# Patient Record
Sex: FEMALE | Race: WHITE | Employment: OTHER | URBAN - METROPOLITAN AREA
[De-identification: names, ages, dates, MRNs, and addresses within clinical notes are randomized per-mention and may not be internally consistent; named-entity substitution may affect disease eponyms.]

---

## 2016-07-12 LAB — TSH SERPL-ACNC: 0.17 UIU/ML (ref 0.45–4.5)

## 2016-09-22 LAB — TSH SERPL-ACNC: 0.65 UIU/ML (ref 0.45–4.5)

## 2016-09-29 LAB
CREAT SERPL-MCNC: 0.72 MG/DL (ref 0.57–1)
GFR SERPL CREATININE-BSD FRML MDRD: 92 ML/MIN/1.73M2
GLUCOSE SERPL-MCNC: 94 MG/DL (ref 65–99)
POTASSIUM SERPL-SCNC: 5.2 MMOL/L (ref 3.5–5.2)

## 2017-03-14 LAB
CREAT SERPL-MCNC: 0.82 MG/DL (ref 0.57–1)
GFR SERPL CREATININE-BSD FRML MDRD: 78 ML/MIN/1.73M2
GLUCOSE SERPL-MCNC: 118 MG/DL (ref 65–99)
POTASSIUM SERPL-SCNC: 3.4 MMOL/L (ref 3.5–5.2)
TSH SERPL-ACNC: 0.22 UIU/ML (ref 0.45–4.5)

## 2017-05-11 LAB
POTASSIUM SERPL-SCNC: 4 MMOL/L (ref 3.5–5.2)
TSH SERPL-ACNC: 0.47 UIU/ML (ref 0.45–4.5)

## 2017-06-26 ENCOUNTER — TRANSFERRED RECORDS (OUTPATIENT)
Dept: HEALTH INFORMATION MANAGEMENT | Facility: CLINIC | Age: 62
End: 2017-06-26

## 2017-06-26 LAB — POTASSIUM SERPL-SCNC: 4.2 MMOL/L (ref 3.5–5.2)

## 2018-01-22 ENCOUNTER — TRANSFERRED RECORDS (OUTPATIENT)
Dept: HEALTH INFORMATION MANAGEMENT | Facility: CLINIC | Age: 63
End: 2018-01-22

## 2018-05-27 ENCOUNTER — HEALTH MAINTENANCE LETTER (OUTPATIENT)
Age: 63
End: 2018-05-27

## 2018-05-29 ENCOUNTER — OFFICE VISIT (OUTPATIENT)
Dept: FAMILY MEDICINE | Facility: CLINIC | Age: 63
End: 2018-05-29
Payer: COMMERCIAL

## 2018-05-29 VITALS
TEMPERATURE: 97.9 F | SYSTOLIC BLOOD PRESSURE: 142 MMHG | BODY MASS INDEX: 35.66 KG/M2 | WEIGHT: 221.9 LBS | HEART RATE: 76 BPM | DIASTOLIC BLOOD PRESSURE: 80 MMHG | OXYGEN SATURATION: 96 % | HEIGHT: 66 IN

## 2018-05-29 DIAGNOSIS — Z12.11 SCREEN FOR COLON CANCER: ICD-10-CM

## 2018-05-29 DIAGNOSIS — E66.9 OBESITY (BMI 30-39.9): ICD-10-CM

## 2018-05-29 DIAGNOSIS — Z76.89 ENCOUNTER TO ESTABLISH CARE: Primary | ICD-10-CM

## 2018-05-29 DIAGNOSIS — I10 BENIGN ESSENTIAL HYPERTENSION: ICD-10-CM

## 2018-05-29 DIAGNOSIS — Z11.59 NEED FOR HEPATITIS C SCREENING TEST: ICD-10-CM

## 2018-05-29 DIAGNOSIS — E03.9 HYPOTHYROIDISM, UNSPECIFIED TYPE: ICD-10-CM

## 2018-05-29 DIAGNOSIS — J44.9 CHRONIC OBSTRUCTIVE PULMONARY DISEASE, UNSPECIFIED COPD TYPE (H): ICD-10-CM

## 2018-05-29 DIAGNOSIS — Z91.09 ENVIRONMENTAL ALLERGIES: ICD-10-CM

## 2018-05-29 PROCEDURE — 99203 OFFICE O/P NEW LOW 30 MIN: CPT | Performed by: NURSE PRACTITIONER

## 2018-05-29 RX ORDER — METOPROLOL SUCCINATE 50 MG/1
50 TABLET, EXTENDED RELEASE ORAL DAILY
Qty: 90 TABLET | Refills: 1 | Status: SHIPPED | OUTPATIENT
Start: 2018-05-29 | End: 2018-12-28

## 2018-05-29 RX ORDER — FLUTICASONE PROPIONATE 50 MCG
1 SPRAY, SUSPENSION (ML) NASAL DAILY
COMMUNITY
End: 2019-11-14

## 2018-05-29 RX ORDER — LEVOTHYROXINE SODIUM 125 UG/1
125 TABLET ORAL DAILY
Qty: 90 TABLET | Refills: 1 | Status: SHIPPED | OUTPATIENT
Start: 2018-05-29 | End: 2018-11-23

## 2018-05-29 RX ORDER — FLUTICASONE PROPIONATE AND SALMETEROL 113; 14 UG/1; UG/1
1 POWDER, METERED RESPIRATORY (INHALATION) 2 TIMES DAILY
Qty: 1 EACH | Refills: 11 | Status: SHIPPED | OUTPATIENT
Start: 2018-05-29 | End: 2018-08-01

## 2018-05-29 RX ORDER — LEVOTHYROXINE SODIUM 125 UG/1
125 TABLET ORAL DAILY
Refills: 0 | COMMUNITY
Start: 2018-04-27 | End: 2018-05-29

## 2018-05-29 RX ORDER — ALBUTEROL SULFATE 90 UG/1
AEROSOL, METERED RESPIRATORY (INHALATION)
Refills: 3 | COMMUNITY
Start: 2017-06-26 | End: 2019-10-16

## 2018-05-29 RX ORDER — METOPROLOL SUCCINATE 50 MG/1
50 TABLET, EXTENDED RELEASE ORAL DAILY
Refills: 4 | COMMUNITY
Start: 2018-03-07 | End: 2018-05-29

## 2018-05-29 RX ORDER — CHLORTHALIDONE 50 MG/1
50 TABLET ORAL DAILY
Refills: 0 | COMMUNITY
Start: 2018-05-02 | End: 2018-07-31

## 2018-05-29 RX ORDER — FLUTICASONE PROPIONATE AND SALMETEROL 113; 14 UG/1; UG/1
POWDER, METERED RESPIRATORY (INHALATION)
Refills: 0 | COMMUNITY
Start: 2018-02-22 | End: 2018-05-29

## 2018-05-29 NOTE — MR AVS SNAPSHOT
After Visit Summary   5/29/2018    Hanna Campo    MRN: [id number]           Patient Information     Date Of Birth          1955        Visit Information        Provider Department      5/29/2018 2:00 PM Stephanie Sherman NP [facility]        Today's Diagnoses     Hypothyroidism, unspecified type    -  1    Screen for colon cancer        Need for hepatitis C screening test        Need for prophylactic vaccination with tetanus-diphtheria (TD)        Benign essential hypertension        Chronic obstructive pulmonary disease, unspecified COPD type (H)           Follow-ups after your visit        Additional Services     GASTROENTEROLOGY ADULT REF PROCEDURE ONLY       Last Lab Result: No results found for: CR  Body mass index is 35.41 kg/(m^2).     Needed:  No  Language:  English    Patient will be contacted to schedule procedure.     Please be aware that coverage of these services is subject to the terms and limitations of your health insurance plan.  Call member services at your health plan with any benefit or coverage questions.  Any procedures must be performed at a [place] facility OR coordinated by your clinic's referral office.    Please bring the following with you to your appointment:    (1) Any X-Rays, CTs or MRIs which have been performed.  Contact the facility where they were done to arrange for  prior to your scheduled appointment.    (2) List of current medications   (3) This referral request   (4) Any documents/labs given to you for this referral                  Future tests that were ordered for you today     Open Future Orders        Priority Expected Expires Ordered    Hepatitis C antibody Routine  5/29/2019 5/29/2018            Who to contact     If you have questions or need follow up information about today's clinic visit or your schedule please contact [facility] directly at [phone].  Normal or non-critical lab and imaging  "results will be communicated to you by MyChart, letter or phone within 4 business days after the clinic has received the results. If you do not hear from us within 7 days, please contact the clinic through Crossborders or phone. If you have a critical or abnormal lab result, we will notify you by phone as soon as possible.  Submit refill requests through Crossborders or call your pharmacy and they will forward the refill request to us. Please allow 3 business days for your refill to be completed.          Additional Information About Your Visit        Crossborders Information     Crossborders gives you secure access to your electronic health record. If you see a primary care provider, you can also send messages to your care team and make appointments. If you have questions, please call your primary care clinic.  If you do not have a primary care provider, please call [phone] and they will assist you.        Care EveryWhere ID     This is your Care EveryWhere ID. This could be used by other organizations to access your [place] medical records  [id number]        Your Vitals Were     Pulse Temperature Height Pulse Oximetry BMI (Body Mass Index)       76 97.9  F (36.6  C) (Oral) 1.686 m (5' 6.38\") 96% 35.41 kg/m2        Blood Pressure from Last 3 Encounters:   05/29/18 142/80    Weight from Last 3 Encounters:   05/29/18 100.7 kg (221 lb 14.4 oz)              We Performed the Following     GASTROENTEROLOGY ADULT REF PROCEDURE ONLY          Today's Medication Changes          These changes are accurate as of 5/29/18  2:49 PM.  If you have any questions, ask your nurse or doctor.               These medicines have changed or have updated prescriptions.        Dose/Directions    Fluticasone-Salmeterol 113-14 MCG/ACT Aepb inhaler   Commonly known as:  AIRDUO RESPICLICK   This may have changed:  See the new instructions.   Used for:  Chronic obstructive pulmonary disease, unspecified COPD type (H)   Changed by:  Stephanie Sherman NP        " Dose:  1 puff   Take 1 puff by mouth 2 times daily   Quantity:  1 each   Refills:  11            Where to get your medicines      These medications were sent to [id number] - [place], [place] - [address] AT [facility] OF [address]  [address] [place] [address]     Phone:  [phone]     Fluticasone-Salmeterol 113-14 MCG/ACT Aepb inhaler    levothyroxine 125 MCG tablet    metoprolol succinate 50 MG 24 hr tablet                Primary Care Provider Office Phone # Fax #    Stephanie Sherman, [phone] [address]  [facility] [address]        Equal Access to Services     NATALY RECINOS : Hadii alexander ruiz hadasho Soomaali, waaxda luqadaha, qaybta kaalmada adeegyada, waxlinda englein haycynthian milan craig. So [facility] [phone].    ATENCIÓN: Si habla español, tiene a patrick disposición servicios gratuitos de asistencia lingüística. [facility] [phone].    We comply with applicable federal civil rights laws and [place] laws. We do not discriminate on the basis of race, color, national origin, age, disability, sex, sexual orientation, or gender identity.            Thank you!     Thank you for choosing [facility]  for your care. Our goal is always to provide you with excellent care. Hearing back from our patients is one way we can continue to improve our services. Please take a few minutes to complete the written survey that you may receive in the mail after your visit with us. Thank you!             Your Updated Medication List - Protect others around you: Learn how to safely use, store and throw away your medicines at www.disposemymeds.org.          This list is accurate as of 5/29/18  2:49 PM.  Always use your most recent med list.                   Brand Name Dispense Instructions for use Diagnosis    chlorthalidone 50 MG tablet    HYGROTON     Take 50 mg by mouth daily        fluticasone 50 MCG/ACT spray    FLONASE     Spray 1 spray into  both nostrils daily        Fluticasone-Salmeterol 113-14 MCG/ACT Aepb inhaler    AIRDUO RESPICLICK    1 each    Take 1 puff by mouth 2 times daily    Chronic obstructive pulmonary disease, unspecified COPD type (H)       levothyroxine 125 MCG tablet    SYNTHROID/LEVOTHROID    90 tablet    Take 1 tablet (125 mcg) by mouth daily    Hypothyroidism, unspecified type       metoprolol succinate 50 MG 24 hr tablet    TOPROL-XL    90 tablet    Take 1 tablet (50 mg) by mouth daily    Benign essential hypertension       VENTOLIN  (90 Base) MCG/ACT Inhaler   Generic drug:  albuterol      INL 1 TO 2 PFS PO Q 4 TO 6 H PRF WHZ        VITAMIN D (CHOLECALCIFEROL) PO      Take 5,000 Units by mouth daily        ZYRTEC ALLERGY PO      Take 5 mg by mouth daily

## 2018-05-29 NOTE — PROGRESS NOTES
SUBJECTIVE:   Hanna Campo is a 63 year old female who presents to clinic today for the following health issues:    New Patient/Transfer of Care    COPD Follow-Up    Symptoms are currently: stable    Current fatigue or dyspnea with ambulation: none    Shortness of breath: stable    Increased or change in Cough/Sputum: No    Fever(s): No    Baseline ambulation without stopping to rest:  3 miles walking. Able to walk up 4 flights of stairs without stopping to rest.    Any ER/UC or hospital admissions since your last visit? No     History   Smoking Status     Former Smoker   Smokeless Tobacco     Never Used     No results found for: FEV1, MJS7HBV  Smoked for 2 years, grew up in home with smoking, worked in smoking [employer] for 10 years. Chronic bronchitis yearly.   -if she stays out of heat/humidity her breathing is okay.         Hypothyroidism Follow-up    Since last visit, patient describes the following symptoms: Weight stable, no hair loss, no skin changes, no constipation, no loose stools      Amount of exercise or physical activity: 3-4 days/week for an average of 3 miles    Problems taking medications regularly: No    Medication side effects: none    Diet: regular (no restrictions)        HTN: dx 2 years ago when she had double bronchitis. BP has been very well controlled on medications. Checks at home    Allergies: had 10 yr allergy injections. Now on zyrtec    [place] for 9 yr. 2 daughters, 1 in [place] , 1 I [place]  Almost  few years from undetected leg blood clots. Ended up blood clots both clots.     Spends a lot of time praying, helps her anxiety.     Uses sunscreen. Had pre-cancer spots taken off.   Is  for [employer] does a lot of walking around, lifting, traveling    Other preventative measures:  Pap smear-up to date, unknown last time  2 colonoscopies-at age 52  Mammogram-unknown last time, states up to date  Has had complete blood work done in the past-cannot remember when last  done  States cholesterol is stable    Problem list and histories reviewed & adjusted, as indicated.  Additional history: as documented    Patient Active Problem List   Diagnosis     Chronic obstructive pulmonary disease, unspecified COPD type (H)     Benign essential hypertension     Hypothyroidism, unspecified type     Environmental allergies     Obesity (BMI 30-39.9)     Past Surgical History:   Procedure Laterality Date     C LAP,APPENDIX UNLISTED PROCED       FOOT SURGERY       MAMMOPLASTY REDUCTION BILATERAL       SURGERY GENERAL IP CONSULT      cervical bone fusion, anterior approach     TUBAL LIGATION         Social History   Substance Use Topics     Smoking status: Former Smoker     Smokeless tobacco: Never Used     Alcohol use Yes     Family History   Problem Relation Age of Onset     Hypertension Mother      Anxiety Disorder Mother      Skin Cancer Father      Lung Cancer Father      smoker     Anxiety Disorder Daughter          Current Outpatient Prescriptions   Medication Sig Dispense Refill     Cetirizine HCl (ZYRTEC ALLERGY PO) Take 5 mg by mouth daily       fluticasone (FLONASE) 50 MCG/ACT spray Spray 1 spray into both nostrils daily       Fluticasone-Salmeterol (AIRDUO RESPICLICK) 113-14 MCG/ACT AEPB inhaler Take 1 puff by mouth 2 times daily 1 each 11     levothyroxine (SYNTHROID/LEVOTHROID) 125 MCG tablet Take 1 tablet (125 mcg) by mouth daily 90 tablet 1     metoprolol succinate (TOPROL-XL) 50 MG 24 hr tablet Take 1 tablet (50 mg) by mouth daily 90 tablet 1     VENTOLIN  (90 Base) MCG/ACT Inhaler INL 1 TO 2 PFS PO Q 4 TO 6 H PRF WHZ  3     VITAMIN D, CHOLECALCIFEROL, PO Take 5,000 Units by mouth daily       chlorthalidone (HYGROTON) 50 MG tablet Take 50 mg by mouth daily  0     [DISCONTINUED] Fluticasone-Salmeterol (AIRDUO RESPICLICK) 113-14 MCG/ACT AEPB inhaler INHALE 1 PUFF BID  0     [DISCONTINUED] levothyroxine (SYNTHROID/LEVOTHROID) 125 MCG tablet Take 125 mcg by mouth daily  0      "[DISCONTINUED] metoprolol succinate (TOPROL-XL) 50 MG 24 hr tablet Take 50 mg by mouth daily  4     No Known Allergies    Reviewed and updated as needed this visit by clinical staff  Tobacco  Allergies  Meds  Problems  Med Hx  Surg Hx  Fam Hx  Soc Hx        Reviewed and updated as needed this visit by Provider  Allergies  Meds  Problems  Med Hx  Surg Hx  Fam Hx         ROS:  Constitutional, HEENT, cardiovascular, pulmonary, gi and gu systems are negative, except as otherwise noted.    OBJECTIVE:     /80 (BP Location: Right arm, Patient Position: Chair, Cuff Size: Adult Regular)  Pulse 76  Temp 97.9  F (36.6  C) (Oral)  Ht 1.686 m (5' 6.38\")  Wt 100.7 kg (221 lb 14.4 oz)  SpO2 96%  BMI 35.41 kg/m2  Body mass index is 35.41 kg/(m^2).  GENERAL: healthy, alert and no distress  EYES: Eyes grossly normal to inspection, PERRL and conjunctivae and sclerae normal  HENT: ear canals and TM's normal, nose and mouth without ulcers or lesions  NECK: no adenopathy, no asymmetry, masses, or scars and thyroid normal to palpation  RESP: lungs clear to auscultation - no rales, rhonchi or wheezes  CV: regular rate and rhythm, normal S1 S2, no S3 or S4, no murmur, click or rub, no peripheral edema  ABDOMEN: soft, overweight, nontender, no hepatosplenomegaly, no masses and bowel sounds normal  MS: no gross musculoskeletal defects noted, no edema  SKIN: no suspicious lesions or rashes  PSYCH: mentation appears normal, affect normal/bright    Diagnostic Test Results:  none     ASSESSMENT/PLAN:         1. Encounter to establish care  She will contact NP when she figures out when her last preventative tests were.    2. Hypothyroidism, unspecified type  refilled  - levothyroxine (SYNTHROID/LEVOTHROID) 125 MCG tablet; Take 1 tablet (125 mcg) by mouth daily  Dispense: 90 tablet; Refill: 1    3. Benign essential hypertension  refilled  - metoprolol succinate (TOPROL-XL) 50 MG 24 hr tablet; Take 1 tablet (50 mg) by mouth " daily  Dispense: 90 tablet; Refill: 1    4. Chronic obstructive pulmonary disease, unspecified COPD type (H)  refilled  - Fluticasone-Salmeterol (AIRDUO RESPICLICK) 113-14 MCG/ACT AEPB inhaler; Take 1 puff by mouth 2 times daily  Dispense: 1 each; Refill: 11    5. Need for hepatitis C screening test  screen  - Hepatitis C antibody; Future    6. Screen for colon cancer  screen  - GASTROENTEROLOGY ADULT REF PROCEDURE ONLY    7. Environmental allergies  stable    8. Obesity (BMI 30-39.9)  Works on diet/exercise. Walks 3 miles few times a week      FUTURE APPOINTMENTS:       - Follow-up visit prn or 6 months. May be due for lab work    FARIBA Maloney, NP-C  [facility]

## 2018-07-31 ENCOUNTER — MYC REFILL (OUTPATIENT)
Dept: FAMILY MEDICINE | Facility: CLINIC | Age: 63
End: 2018-07-31

## 2018-07-31 DIAGNOSIS — J44.9 CHRONIC OBSTRUCTIVE PULMONARY DISEASE, UNSPECIFIED COPD TYPE (H): ICD-10-CM

## 2018-07-31 RX ORDER — FLUTICASONE PROPIONATE AND SALMETEROL 113; 14 UG/1; UG/1
1 POWDER, METERED RESPIRATORY (INHALATION) 2 TIMES DAILY
Qty: 1 EACH | Refills: 11 | Status: CANCELLED | OUTPATIENT
Start: 2018-07-31

## 2018-07-31 NOTE — TELEPHONE ENCOUNTER
"Requested Prescriptions   Pending Prescriptions Disp Refills     Fluticasone-Salmeterol (AIRDUO RESPICLICK) 113-14 MCG/ACT AEPB inhaler  Last Written Prescription Date:  5/29/18  Last Fill Quantity: 1 each,  # refills: 11   Last office visit: 5/29/2018 with prescribing provider:  Stephanie Sherman NP   Future Office Visit:   1 each 11     Sig: Take 1 puff by mouth 2 times daily    Inhaled Steroids Protocol Passed    7/31/2018 12:37 PM       Passed - Patient is age 12 or older       Passed - Recent (12 mo) or future (30 days) visit within the authorizing provider's specialty    Patient had office visit in the last 12 months or has a visit in the next 30 days with authorizing provider or within the authorizing provider's specialty.  See \"Patient Info\" tab in inbasket, or \"Choose Columns\" in Meds & Orders section of the refill encounter.              "

## 2018-07-31 NOTE — TELEPHONE ENCOUNTER
Message from [employer] Cut Films[employer]:  Original authorizing provider: SHERI Maloney would like a refill of the following medications:  Fluticasone-Salmeterol (AIRDUO RESPICLICK) 113-14 MCG/ACT AEPB inhaler [Stephanie Sherman NP]    Preferred pharmacy: [facility] [address] [place] [address] [address] AT [facility] OF [address]    Comment:

## 2018-08-08 DIAGNOSIS — I10 BENIGN ESSENTIAL HYPERTENSION: ICD-10-CM

## 2018-08-08 DIAGNOSIS — Z11.59 NEED FOR HEPATITIS C SCREENING TEST: ICD-10-CM

## 2018-08-08 LAB
ANION GAP SERPL CALCULATED.3IONS-SCNC: 9 MMOL/L (ref 3–14)
BUN SERPL-MCNC: 10 MG/DL (ref 7–30)
CALCIUM SERPL-MCNC: 8.8 MG/DL (ref 8.5–10.1)
CHLORIDE SERPL-SCNC: 106 MMOL/L (ref 94–109)
CO2 SERPL-SCNC: 27 MMOL/L (ref 20–32)
CREAT SERPL-MCNC: 0.76 MG/DL (ref 0.52–1.04)
GFR SERPL CREATININE-BSD FRML MDRD: 77 ML/MIN/1.7M2
GLUCOSE SERPL-MCNC: 106 MG/DL (ref 70–99)
POTASSIUM SERPL-SCNC: 3.9 MMOL/L (ref 3.4–5.3)
SODIUM SERPL-SCNC: 142 MMOL/L (ref 133–144)

## 2018-08-08 PROCEDURE — 86803 HEPATITIS C AB TEST: CPT | Performed by: NURSE PRACTITIONER

## 2018-08-08 PROCEDURE — 36415 COLL VENOUS BLD VENIPUNCTURE: CPT | Performed by: NURSE PRACTITIONER

## 2018-08-08 PROCEDURE — 80048 BASIC METABOLIC PNL TOTAL CA: CPT | Performed by: NURSE PRACTITIONER

## 2018-08-09 LAB — HCV AB SERPL QL IA: NONREACTIVE

## 2018-10-29 ENCOUNTER — ALLIED HEALTH/NURSE VISIT (OUTPATIENT)
Dept: NURSING | Facility: CLINIC | Age: 63
End: 2018-10-29
Payer: COMMERCIAL

## 2018-10-29 DIAGNOSIS — Z23 NEED FOR TETANUS, DIPHTHERIA, AND ACELLULAR PERTUSSIS (TDAP) VACCINE IN PATIENT OF ADOLESCENT AGE OR OLDER: ICD-10-CM

## 2018-10-29 DIAGNOSIS — Z23 NEED FOR PROPHYLACTIC VACCINATION AND INOCULATION AGAINST INFLUENZA: Primary | ICD-10-CM

## 2018-10-29 PROCEDURE — 90471 IMMUNIZATION ADMIN: CPT

## 2018-10-29 PROCEDURE — 90686 IIV4 VACC NO PRSV 0.5 ML IM: CPT

## 2018-10-29 PROCEDURE — 90715 TDAP VACCINE 7 YRS/> IM: CPT

## 2018-10-29 PROCEDURE — 90472 IMMUNIZATION ADMIN EACH ADD: CPT

## 2018-10-29 PROCEDURE — 99207 ZZC NO CHARGE NURSE ONLY: CPT

## 2018-10-29 NOTE — MR AVS SNAPSHOT
After Visit Summary   10/29/2018    Hanna Campo    MRN: [id number]           Patient Information     Date Of Birth          1955        Visit Information        Provider Department      10/29/2018 8:40 AM [facility]        Today's Diagnoses     Need for prophylactic vaccination and inoculation against influenza    -  1    Need for tetanus, diphtheria, and acellular pertussis (Tdap) vaccine in patient of adolescent age or older           Follow-ups after your visit        Who to contact     If you have questions or need follow up information about today's clinic visit or your schedule please contact [facility] directly at [phone].  Normal or non-critical lab and imaging results will be communicated to you by Advanced Telemetryhart, letter or phone within 4 business days after the clinic has received the results. If you do not hear from us within 7 days, please contact the clinic through [employer] Tele[employer] or phone. If you have a critical or abnormal lab result, we will notify you by phone as soon as possible.  Submit refill requests through Musicnotes or call your pharmacy and they will forward the refill request to us. Please allow 3 business days for your refill to be completed.          Additional Information About Your Visit        MyChart Information     Music[employer] gives you secure access to your electronic health record. If you see a primary care provider, you can also send messages to your care team and make appointments. If you have questions, please call your primary care clinic.  If you do not have a primary care provider, please call [phone] and they will assist you.        Care EveryWhere ID     This is your Care EveryWhere ID. This could be used by other organizations to access your [place] medical records  [id number]         Blood Pressure from Last 3 Encounters:   05/29/18 142/80    Weight from Last 3 Encounters:   05/29/18 100.7 kg (221 lb 14.4 oz)               We Performed the Following     FLU VACCINE, SPLIT VIRUS, IM (QUADRIVALENT) [87082]- >3 YRS     TDAP VACCINE (ADACEL)     VACCINE ADMINISTRATION, EACH ADDITIONAL     Vaccine Administration, Initial [83763]        Primary Care Provider Office Phone # Fax #    Stephanie Sherman [phone] [address]  [facility] [address]        Equal Access to Services     [facility] CenterMAGDALENA : Hadii aad ku hadasho Soomaali, waaxda luqadaha, qaybta kaalmada adeegyada, waxay idiin hayaan adeeg kharash la'aan . So [facility] [phone].    ATENCIÓN: Si habla español, tiene a patrick disposición servicios gratuitos de asistencia lingüística. Genevieveame al [phone].    We comply with applicable federal civil rights laws and [place] laws. We do not discriminate on the basis of race, color, national origin, age, disability, sex, sexual orientation, or gender identity.            Thank you!     Thank you for choosing [facility]  for your care. Our goal is always to provide you with excellent care. Hearing back from our patients is one way we can continue to improve our services. Please take a few minutes to complete the written survey that you may receive in the mail after your visit with us. Thank you!             Your Updated Medication List - Protect others around you: Learn how to safely use, store and throw away your medicines at www.disposemymeds.org.          This list is accurate as of 10/29/18  8:52 AM.  Always use your most recent med list.                   Brand Name Dispense Instructions for use Diagnosis    chlorthalidone 50 MG tablet    HYGROTON    90 tablet    Take 1 tablet (50 mg) by mouth daily    Benign essential hypertension       fluticasone 50 MCG/ACT spray    FLONASE     Spray 1 spray into both nostrils daily        fluticasone-salmeterol 113-14 MCG/ACT inhaler    AIRDUO RESPICLICK    1 each    Take 1 puff by mouth 2 times daily    Chronic obstructive pulmonary disease, unspecified  COPD type (H)       levothyroxine 125 MCG tablet    SYNTHROID/LEVOTHROID    90 tablet    Take 1 tablet (125 mcg) by mouth daily    Hypothyroidism, unspecified type       metoprolol succinate 50 MG 24 hr tablet    TOPROL-XL    90 tablet    Take 1 tablet (50 mg) by mouth daily    Benign essential hypertension       VENTOLIN  (90 Base) MCG/ACT inhaler   Generic drug:  albuterol      INL 1 TO 2 PFS PO Q 4 TO 6 H PRF WHZ        VITAMIN D (CHOLECALCIFEROL) PO      Take 5,000 Units by mouth daily        ZYRTEC ALLERGY PO      Take 5 mg by mouth daily

## 2018-10-29 NOTE — PROGRESS NOTES
Injectable Influenza Immunization Documentation    1.  Is the person to be vaccinated sick today?   No    2. Does the person to be vaccinated have an allergy to a component   of the vaccine?   No  Egg Allergy Algorithm Link    3. Has the person to be vaccinated ever had a serious reaction   to influenza vaccine in the past?   No    4. Has the person to be vaccinated ever had Guillain-Barré syndrome?   No    Form completed by Melinda Henderson MA on 10/29/2018 at 8:50 AM

## 2018-10-29 NOTE — NURSING NOTE
Screening Questionnaire for Adult Immunization    Are you sick today?   No   Do you have allergies to medications, food, a vaccine component or latex?   No   Have you ever had a serious reaction after receiving a vaccination?   No   Do you have a long-term health problem with heart disease, lung disease, asthma, kidney disease, metabolic disease (e.g. diabetes), anemia, or other blood disorder?   No   Do you have cancer, leukemia, HIV/AIDS, or any other immune system problem?   No   In the past 3 months, have you taken medications that affect  your immune system, such as prednisone, other steroids, or anticancer drugs; drugs for the treatment of rheumatoid arthritis, Crohn s disease, or psoriasis; or have you had radiation treatments?   No   Have you had a seizure, or a brain or other nervous system problem?   No   During the past year, have you received a transfusion of blood or blood     products, or been given immune (gamma) globulin or antiviral drug?   No   For women: Are you pregnant or is there a chance you could become        pregnant during the next month?   No   Have you received any vaccinations in the past 4 weeks?   No     Immunization questionnaire answers were all negative.        Per orders of Dr. Sherman, injection of TDAP and Flu given by Melinda Henderson. Patient instructed to remain in clinic for 15 minutes afterwards, and to report any adverse reaction to me immediately.       Screening performed by Melinda Henderson on 10/29/2018 at 8:52 AM.

## 2018-11-23 DIAGNOSIS — E03.9 HYPOTHYROIDISM, UNSPECIFIED TYPE: ICD-10-CM

## 2018-11-23 NOTE — TELEPHONE ENCOUNTER
"Requested Prescriptions   Pending Prescriptions Disp Refills     levothyroxine (SYNTHROID/LEVOTHROID) 125 MCG tablet  Last Written Prescription Date:  05/29/18  Last Fill Quantity: 90 tablet,  # refills: 1   Last office visit: 5/29/2018 with prescribing provider:  Stephanie Sherman NP   Future Office Visit:     90 tablet 1     Sig: Take 1 tablet (125 mcg) by mouth daily    Thyroid Protocol Failed    11/23/2018  9:25 AM       Failed - Normal TSH on file in past 12 months    Recent Labs   Lab Test 05/11/17   TSH  0.475             Passed - Patient is 12 years or older       Passed - Recent (12 mo) or future (30 days) visit within the authorizing provider's specialty    Patient had office visit in the last 12 months or has a visit in the next 30 days with authorizing provider or within the authorizing provider's specialty.  See \"Patient Info\" tab in inbasket, or \"Choose Columns\" in Meds & Orders section of the refill encounter.             Passed - No active pregnancy on record    If patient is pregnant or has had a positive pregnancy test, please check TSH.         Passed - No positive pregnancy test in past 12 months    If patient is pregnant or has had a positive pregnancy test, please check TSH.            "

## 2018-11-27 RX ORDER — LEVOTHYROXINE SODIUM 125 UG/1
125 TABLET ORAL DAILY
Qty: 30 TABLET | Refills: 0 | Status: SHIPPED | OUTPATIENT
Start: 2018-11-27 | End: 2018-12-18

## 2018-11-27 NOTE — TELEPHONE ENCOUNTER
Routing refill request to provider for review/approval because:  Labs not current:  TSH      Rupali Patterson RN, BSN

## 2018-11-30 ENCOUNTER — OFFICE VISIT (OUTPATIENT)
Dept: FAMILY MEDICINE | Facility: CLINIC | Age: 63
End: 2018-11-30
Payer: COMMERCIAL

## 2018-11-30 VITALS
BODY MASS INDEX: 35.52 KG/M2 | SYSTOLIC BLOOD PRESSURE: 132 MMHG | RESPIRATION RATE: 16 BRPM | DIASTOLIC BLOOD PRESSURE: 82 MMHG | HEART RATE: 77 BPM | WEIGHT: 221 LBS | OXYGEN SATURATION: 97 % | HEIGHT: 66 IN

## 2018-11-30 DIAGNOSIS — Z12.31 VISIT FOR SCREENING MAMMOGRAM: ICD-10-CM

## 2018-11-30 DIAGNOSIS — E66.01 MORBID OBESITY (H): ICD-10-CM

## 2018-11-30 DIAGNOSIS — E03.9 HYPOTHYROIDISM, UNSPECIFIED TYPE: Primary | ICD-10-CM

## 2018-11-30 LAB
T4 FREE SERPL-MCNC: 1.42 NG/DL (ref 0.76–1.46)
TSH SERPL DL<=0.005 MIU/L-ACNC: 0.07 MU/L (ref 0.4–4)

## 2018-11-30 PROCEDURE — 84439 ASSAY OF FREE THYROXINE: CPT | Performed by: NURSE PRACTITIONER

## 2018-11-30 PROCEDURE — 84443 ASSAY THYROID STIM HORMONE: CPT | Performed by: NURSE PRACTITIONER

## 2018-11-30 PROCEDURE — 36415 COLL VENOUS BLD VENIPUNCTURE: CPT | Performed by: NURSE PRACTITIONER

## 2018-11-30 PROCEDURE — 99213 OFFICE O/P EST LOW 20 MIN: CPT | Performed by: NURSE PRACTITIONER

## 2018-11-30 NOTE — PROGRESS NOTES
SUBJECTIVE:   Hanna Campo is a 63 year old female who presents to clinic today for the following health issues:          Hypothyroidism Follow-up      Since last visit, patient describes the following symptoms: Weight stable, no hair loss, no skin changes, no constipation, no loose stools      Amount of exercise or physical activity: None    Problems taking medications regularly: No    Medication side effects: none    Diet: regular (no restrictions)    Has had pap smear yearly.   Had the flu vaccine  Had TDAP  Has had colonoscopy    Is aware she is slightly overweight.  Does not exercise.  Difficult with her feet pain.  She did start of the complex vitamin to see if it would help.          Problem list and histories reviewed & adjusted, as indicated.  Additional history: as documented    Patient Active Problem List   Diagnosis     Chronic obstructive pulmonary disease, unspecified COPD type (H)     Benign essential hypertension     Hypothyroidism, unspecified type     Environmental allergies     Obesity (BMI 30-39.9)     Obesity (BMI 35.0-39.9) with comorbidity (H)     Past Surgical History:   Procedure Laterality Date     C LAP,APPENDIX UNLISTED PROCED       FOOT SURGERY       MAMMOPLASTY REDUCTION BILATERAL       SURGERY GENERAL IP CONSULT      cervical bone fusion, anterior approach     TUBAL LIGATION         Social History   Substance Use Topics     Smoking status: Former Smoker     Smokeless tobacco: Never Used     Alcohol use Yes     Family History   Problem Relation Age of Onset     Hypertension Mother      Anxiety Disorder Mother      Skin Cancer Father      Lung Cancer Father      smoker     Anxiety Disorder Daughter      Diabetes No family hx of          Current Outpatient Prescriptions   Medication Sig Dispense Refill     Cetirizine HCl (ZYRTEC ALLERGY PO) Take 5 mg by mouth daily       chlorthalidone (HYGROTON) 50 MG tablet Take 1 tablet (50 mg) by mouth daily 90 tablet 0     fluticasone (FLONASE)  "50 MCG/ACT spray Spray 1 spray into both nostrils daily       Fluticasone-Salmeterol (AIRDUO RESPICLICK) 113-14 MCG/ACT AEPB inhaler Take 1 puff by mouth 2 times daily 1 each 11     levothyroxine (SYNTHROID/LEVOTHROID) 125 MCG tablet Take 1 tablet (125 mcg) by mouth daily 30 tablet 0     metoprolol succinate (TOPROL-XL) 50 MG 24 hr tablet Take 1 tablet (50 mg) by mouth daily 90 tablet 1     VENTOLIN  (90 Base) MCG/ACT Inhaler INL 1 TO 2 PFS PO Q 4 TO 6 H PRF WHZ  3     vitamin B complex with vitamin C (VITAMIN  B COMPLEX) tablet Take 1 tablet by mouth daily  0     VITAMIN D, CHOLECALCIFEROL, PO Take 5,000 Units by mouth daily       No Known Allergies    Reviewed and updated as needed this visit by clinical staff  Tobacco  Allergies  Meds  Problems  Med Hx  Surg Hx  Fam Hx  Soc Hx        Reviewed and updated as needed this visit by Provider  Allergies  Meds  Problems         ROS:  Constitutional, HEENT, cardiovascular, pulmonary, gi and gu systems are negative, except as otherwise noted.    OBJECTIVE:     /82 (BP Location: Right arm, Patient Position: Sitting, Cuff Size: Adult Large)  Pulse 77  Resp 16  Ht 1.676 m (5' 6\")  Wt 100.2 kg (221 lb)  SpO2 97%  BMI 35.67 kg/m2  Body mass index is 35.67 kg/(m^2).  GENERAL: healthy, alert and no distress  NECK: no adenopathy, no asymmetry, masses, or scars and thyroid normal to palpation  RESP: lungs clear to auscultation - no rales, rhonchi or wheezes  CV: regular rate and rhythm, normal S1 S2, no S3 or S4, no murmur, click or rub  MS: no gross musculoskeletal defects noted, no edema    Diagnostic Test Results:  No results found for this or any previous visit (from the past 24 hour(s)).    ASSESSMENT/PLAN:       1. Hypothyroidism, unspecified type  Recheck thyroid today.  If all normal return in 1 year for physical.  She is aware to return sooner if she becomes symptomatic and may need repeat thyroid check  - TSH with free T4 reflex    2. Visit " for screening mammogram  Will get mammogram this year  - MA SCREENING DIGITAL BILAT - Future  (s+30); Future    3. Morbid obesity (H)  Only briefly talked about that she needs to work on exercise and diet      FUTURE APPOINTMENTS:       - Follow-up visit in 1 year or as needed    FARIBA Maloney, NP-C  [facility]

## 2018-11-30 NOTE — MR AVS SNAPSHOT
After Visit Summary   11/30/2018    Hanna Campo    MRN: [id number]           Patient Information     Date Of Birth          1955        Visit Information        Provider Department      11/30/2018 9:00 AM Stephanie Sherman NP [facility]        Today's Diagnoses     Hypothyroidism, unspecified type    -  1    Visit for screening mammogram        Morbid obesity (H)           Follow-ups after your visit        Future tests that were ordered for you today     Open Future Orders        Priority Expected Expires Ordered    MA SCREENING DIGITAL BILAT - Future  (s+30) Routine  11/30/2019 11/30/2018            Who to contact     If you have questions or need follow up information about today's clinic visit or your schedule please contact [facility] directly at [phone].  Normal or non-critical lab and imaging results will be communicated to you by MyChart, letter or phone within 4 business days after the clinic has received the results. If you do not hear from us within 7 days, please contact the clinic through documistichart or phone. If you have a critical or abnormal lab result, we will notify you by phone as soon as possible.  Submit refill requests through [employer] or call your pharmacy and they will forward the refill request to us. Please allow 3 business days for your refill to be completed.          Additional Information About Your Visit        MyChart Information     [employer] gives you secure access to your electronic health record. If you see a primary care provider, you can also send messages to your care team and make appointments. If you have questions, please call your primary care clinic.  If you do not have a primary care provider, please call [phone] and they will assist you.        Care EveryWhere ID     This is your Care EveryWhere ID. This could be used by other organizations to access your [facility] medical records  [id number]        Your Vitals Were   "   Pulse Respirations Height Pulse Oximetry BMI (Body Mass Index)       77 16 1.676 m (5' 6\") 97% 35.67 kg/m2        Blood Pressure from Last 3 Encounters:   11/30/18 132/82   05/29/18 142/80    Weight from Last 3 Encounters:   11/30/18 100.2 kg (221 lb)   05/29/18 100.7 kg (221 lb 14.4 oz)              We Performed the Following     TSH with free T4 reflex        Primary Care Provider Office Phone # Fax #    Stephanie NicolasSHERI cooper [phone] [address]  [place] [address]        Equal Access to Services     [facility]: Hadii alexander ruiz hadchencho Sojohn, waaxda luqadaha, qaybta kaalmada adenorahda, tamia rodriguez . So [facility] [phone].    ATENCIÓN: Si habla español, tiene a patrick disposición servicios gratuitos de asistencia lingüística. [facility] [phone].    We comply with applicable federal civil rights laws and [place] laws. We do not discriminate on the basis of race, color, national origin, age, disability, sex, sexual orientation, or gender identity.            Thank you!     Thank you for choosing [facility]  for your care. Our goal is always to provide you with excellent care. Hearing back from our patients is one way we can continue to improve our services. Please take a few minutes to complete the written survey that you may receive in the mail after your visit with us. Thank you!             Your Updated Medication List - Protect others around you: Learn how to safely use, store and throw away your medicines at www.disposemymeds.org.          This list is accurate as of 11/30/18  9:13 AM.  Always use your most recent med list.                   Brand Name Dispense Instructions for use Diagnosis    chlorthalidone 50 MG tablet    HYGROTON    90 tablet    Take 1 tablet (50 mg) by mouth daily    Benign essential hypertension       fluticasone 50 MCG/ACT nasal spray    FLONASE     Spray 1 spray into both nostrils daily        " fluticasone-salmeterol 113-14 MCG/ACT inhaler    AIRDUO RESPICLICK    1 each    Take 1 puff by mouth 2 times daily    Chronic obstructive pulmonary disease, unspecified COPD type (H)       levothyroxine 125 MCG tablet    SYNTHROID/LEVOTHROID    30 tablet    Take 1 tablet (125 mcg) by mouth daily    Hypothyroidism, unspecified type       metoprolol succinate ER 50 MG 24 hr tablet    TOPROL-XL    90 tablet    Take 1 tablet (50 mg) by mouth daily    Benign essential hypertension       VENTOLIN  (90 Base) MCG/ACT inhaler   Generic drug:  albuterol      INL 1 TO 2 PFS PO Q 4 TO 6 H PRF WHZ        vitamin B complex with vitamin C tablet      Take 1 tablet by mouth daily        VITAMIN D (CHOLECALCIFEROL) PO      Take 5,000 Units by mouth daily        ZYRTEC ALLERGY PO      Take 5 mg by mouth daily

## 2018-12-10 DIAGNOSIS — I10 BENIGN ESSENTIAL HYPERTENSION: ICD-10-CM

## 2018-12-10 NOTE — TELEPHONE ENCOUNTER
"Requested Prescriptions   Pending Prescriptions Disp Refills     chlorthalidone (HYGROTON) 50 MG tablet 90 tablet 0     Sig: Take 1 tablet (50 mg) by mouth daily    Diuretics (Including Combos) Protocol Passed - 12/10/2018 10:42 AM       Passed - Blood pressure under 140/90 in past 12 months    BP Readings from Last 3 Encounters:   11/30/18 132/82   05/29/18 142/80                Passed - Recent (12 mo) or future (30 days) visit within the authorizing provider's specialty    Patient had office visit in the last 12 months or has a visit in the next 30 days with authorizing provider or within the authorizing provider's specialty.  See \"Patient Info\" tab in inbasket, or \"Choose Columns\" in Meds & Orders section of the refill encounter.             Passed - Patient is age 18 or older       Passed - No active pregancy on record       Passed - Normal serum creatinine on file in past 12 months    Recent Labs   Lab Test 08/08/18  0945   CR 0.76             Passed - Normal serum potassium on file in past 12 months    Recent Labs   Lab Test 08/08/18  0945   POTASSIUM 3.9                   Passed - Normal serum sodium on file in past 12 months    Recent Labs   Lab Test 08/08/18  0945                Passed - No positive pregnancy test in past 12 months        chlorthalidone (HYGROTON) 50 MG tablet  Last Written Prescription Date:  7/31/18  Last Fill Quantity: 90,  # refills: 0   Last office visit: 11/30/2018 with prescribing provider:  Stephanie Shemran   Future Office Visit:      "

## 2018-12-13 RX ORDER — CHLORTHALIDONE 50 MG/1
50 TABLET ORAL DAILY
Qty: 90 TABLET | Refills: 1 | Status: SHIPPED | OUTPATIENT
Start: 2018-12-13 | End: 2019-06-14

## 2018-12-13 NOTE — TELEPHONE ENCOUNTER
Prescription approved per [facility] Refill Protocol.    Lisa Almonte RN  [facility]

## 2018-12-18 DIAGNOSIS — E03.9 HYPOTHYROIDISM, UNSPECIFIED TYPE: Primary | ICD-10-CM

## 2018-12-18 DIAGNOSIS — E03.9 HYPOTHYROIDISM, UNSPECIFIED TYPE: ICD-10-CM

## 2018-12-18 LAB
T4 FREE SERPL-MCNC: 1.39 NG/DL (ref 0.76–1.46)
TSH SERPL DL<=0.005 MIU/L-ACNC: 0.14 MU/L (ref 0.4–4)

## 2018-12-18 PROCEDURE — 36415 COLL VENOUS BLD VENIPUNCTURE: CPT | Performed by: NURSE PRACTITIONER

## 2018-12-18 PROCEDURE — 84443 ASSAY THYROID STIM HORMONE: CPT | Performed by: NURSE PRACTITIONER

## 2018-12-18 PROCEDURE — 84439 ASSAY OF FREE THYROXINE: CPT | Performed by: NURSE PRACTITIONER

## 2018-12-18 RX ORDER — LEVOTHYROXINE SODIUM 100 UG/1
100 TABLET ORAL DAILY
Qty: 60 TABLET | Refills: 0 | Status: SHIPPED | OUTPATIENT
Start: 2018-12-18 | End: 2019-02-15

## 2018-12-18 RX ORDER — LEVOTHYROXINE SODIUM 25 UG/1
12.5 TABLET ORAL DAILY
Qty: 30 TABLET | Refills: 0 | Status: SHIPPED | OUTPATIENT
Start: 2018-12-18 | End: 2019-02-15

## 2018-12-26 DIAGNOSIS — I10 BENIGN ESSENTIAL HYPERTENSION: ICD-10-CM

## 2018-12-26 DIAGNOSIS — E03.9 HYPOTHYROIDISM, UNSPECIFIED TYPE: ICD-10-CM

## 2018-12-26 NOTE — TELEPHONE ENCOUNTER
"Requested Prescriptions   Pending Prescriptions Disp Refills     metoprolol succinate ER (TOPROL-XL) 50 MG 24 hr tablet 90 tablet 1     Sig: Take 1 tablet (50 mg) by mouth daily    Beta-Blockers Protocol Passed - 12/26/2018  2:30 PM       Passed - Blood pressure under 140/90 in past 12 months    BP Readings from Last 3 Encounters:   11/30/18 132/82   05/29/18 142/80                Passed - Patient is age 6 or older       Passed - Recent (12 mo) or future (30 days) visit within the authorizing provider's specialty    Patient had office visit in the last 12 months or has a visit in the next 30 days with authorizing provider or within the authorizing provider's specialty.  See \"Patient Info\" tab in inbasket, or \"Choose Columns\" in Meds & Orders section of the refill encounter.                metoprolol succinate (TOPROL-XL) 50 MG 24 hr tablet  Last Written Prescription Date:  5/29/18  Last Fill Quantity: 90,  # refills: 1   Last office visit: 11/30/2018 with prescribing provider:  Stephanie Sherman   Future Office Visit:      "

## 2018-12-26 NOTE — TELEPHONE ENCOUNTER
"Requested Prescriptions   Pending Prescriptions Disp Refills     levothyroxine (SYNTHROID/LEVOTHROID) 125 MCG tablet 30 tablet 0     Sig: Take 1 tablet (125 mcg) by mouth daily    Thyroid Protocol Failed - 12/26/2018 12:33 PM       Failed - Normal TSH on file in past 12 months    Recent Labs   Lab Test 12/18/18  0857   TSH 0.14*             Passed - Patient is 12 years or older       Passed - Recent (12 mo) or future (30 days) visit within the authorizing provider's specialty    Patient had office visit in the last 12 months or has a visit in the next 30 days with authorizing provider or within the authorizing provider's specialty.  See \"Patient Info\" tab in inbasket, or \"Choose Columns\" in Meds & Orders section of the refill encounter.             Passed - No active pregnancy on record    If patient is pregnant or has had a positive pregnancy test, please check TSH.         Passed - No positive pregnancy test in past 12 months    If patient is pregnant or has had a positive pregnancy test, please check TSH.              levothyroxine (SYNTHROID/LEVOTHROID) 125 MCG tablet (Discontinued)      Last Written Prescription Date:  11/27/18  Last Fill Quantity: 30,   # refills: 0  Last Office Visit: 11/30/18  Future Office visit:       Routing refill request to provider for review/approval because:  Drug not active on patient's medication list    "

## 2018-12-27 RX ORDER — LEVOTHYROXINE SODIUM 125 UG/1
125 TABLET ORAL DAILY
Qty: 30 TABLET | Refills: 0 | OUTPATIENT
Start: 2018-12-27

## 2018-12-27 NOTE — TELEPHONE ENCOUNTER
Please call patient.  We adjusted her levothyroxine to 112.5 mcg every morning on 12/18/2018.  The pharmacy did get confirmation of receipt on that date.  She should no longer be on the 125 mcg daily.  FARIBA Maloney, NP-C  [facility]

## 2018-12-27 NOTE — TELEPHONE ENCOUNTER
"Routing refill request to provider for review/approval because:  Labs out of range:  TSH    Requested Prescriptions   Pending Prescriptions Disp Refills     levothyroxine (SYNTHROID/LEVOTHROID) 125 MCG tablet 30 tablet 0     Sig: Take 1 tablet (125 mcg) by mouth daily    Thyroid Protocol Failed - 12/26/2018 12:34 PM       Failed - Normal TSH on file in past 12 months    Recent Labs   Lab Test 12/18/18  0857   TSH 0.14*             Passed - Patient is 12 years or older       Passed - Recent (12 mo) or future (30 days) visit within the authorizing provider's specialty    Patient had office visit in the last 12 months or has a visit in the next 30 days with authorizing provider or within the authorizing provider's specialty.  See \"Patient Info\" tab in inbasket, or \"Choose Columns\" in Meds & Orders section of the refill encounter.             Passed - No active pregnancy on record    If patient is pregnant or has had a positive pregnancy test, please check TSH.         Passed - No positive pregnancy test in past 12 months    If patient is pregnant or has had a positive pregnancy test, please check TSH.          Ignacia Harris RN  "

## 2018-12-28 RX ORDER — METOPROLOL SUCCINATE 50 MG/1
50 TABLET, EXTENDED RELEASE ORAL DAILY
Qty: 90 TABLET | Refills: 2 | Status: SHIPPED | OUTPATIENT
Start: 2018-12-28 | End: 2019-10-07

## 2018-12-28 NOTE — TELEPHONE ENCOUNTER
Prescription approved per [facility] Refill Protocol.  Kalli Mccarty RN

## 2019-01-07 DIAGNOSIS — E03.9 HYPOTHYROIDISM, UNSPECIFIED TYPE: ICD-10-CM

## 2019-01-07 NOTE — TELEPHONE ENCOUNTER
"Requested Prescriptions   Pending Prescriptions Disp Refills     levothyroxine (SYNTHROID/LEVOTHROID) 25 MCG tablet 30 tablet 0     Sig: Take 0.5 tablets (12.5 mcg) by mouth daily Take with 100 mcg to = 112.5 mcg daily    Thyroid Protocol Failed - 1/7/2019 12:15 PM       Failed - Normal TSH on file in past 12 months    Recent Labs   Lab Test 12/18/18  0857   TSH 0.14*             Passed - Patient is 12 years or older       Passed - Recent (12 mo) or future (30 days) visit within the authorizing provider's specialty    Patient had office visit in the last 12 months or has a visit in the next 30 days with authorizing provider or within the authorizing provider's specialty.  See \"Patient Info\" tab in inbasket, or \"Choose Columns\" in Meds & Orders section of the refill encounter.             Passed - Medication is active on med list       Passed - No active pregnancy on record    If patient is pregnant or has had a positive pregnancy test, please check TSH.         Passed - No positive pregnancy test in past 12 months    If patient is pregnant or has had a positive pregnancy test, please check TSH.          levothyroxine (SYNTHROID/LEVOTHROID) 25 MCG tablet  Last Written Prescription Date:  12/18/18  Last Fill Quantity: 30,  # refills: 0   Last office visit: 11/30/2018 with prescribing provider:  Stephanie Sherman   Future Office Visit:      "

## 2019-01-09 RX ORDER — LEVOTHYROXINE SODIUM 25 UG/1
12.5 TABLET ORAL DAILY
Qty: 30 TABLET | Refills: 0 | OUTPATIENT
Start: 2019-01-09

## 2019-01-10 NOTE — TELEPHONE ENCOUNTER
Denied. Too soon.   Patient was given a 2 month supply (lasting to mid February) - patient needs appointment and labs since medication was adjusted last Rx.     Kalli Mccarty RN

## 2019-01-15 ENCOUNTER — OFFICE VISIT (OUTPATIENT)
Dept: NURSING | Facility: CLINIC | Age: 64
End: 2019-01-15

## 2019-01-15 DIAGNOSIS — J44.9 CHRONIC OBSTRUCTIVE PULMONARY DISEASE, UNSPECIFIED COPD TYPE (H): ICD-10-CM

## 2019-01-15 NOTE — PROGRESS NOTES
Pt had to reschedule test. She arrived 20 minutes late for appointment and then the Breeze software for testing froze for another 20 minutes and had to be rebooted so no time to do complete PFT before next schedule patient.

## 2019-01-15 NOTE — LETTER
[address]  [address]  [phone]    January 22, 2019      Hanna Campo  [facility] [facility] [address]      Dear Hanna,    Please call to make an appointment at the clinic to discuss your results.  This is not urgent, but we would also like to develop a plan going forward based on your history of being diagnosed with COPD.     Please contact the clinic if you have additional questions.  Thank you.     Sincerely,         FARIBA Maloney, NP-C   [facility]

## 2019-01-21 ENCOUNTER — OFFICE VISIT (OUTPATIENT)
Dept: NURSING | Facility: CLINIC | Age: 64
End: 2019-01-21
Payer: COMMERCIAL

## 2019-01-21 DIAGNOSIS — J44.9 CHRONIC OBSTRUCTIVE PULMONARY DISEASE, UNSPECIFIED COPD TYPE (H): Primary | ICD-10-CM

## 2019-01-21 PROCEDURE — 94060 EVALUATION OF WHEEZING: CPT | Performed by: INTERNAL MEDICINE

## 2019-01-21 PROCEDURE — 94729 DIFFUSING CAPACITY: CPT | Performed by: INTERNAL MEDICINE

## 2019-01-21 PROCEDURE — 94726 PLETHYSMOGRAPHY LUNG VOLUMES: CPT | Performed by: INTERNAL MEDICINE

## 2019-01-23 LAB
DLCOUNC-%PRED-PRE: 98 %
DLCOUNC-PRE: 22.04 ML/MIN/MMHG
DLCOUNC-PRED: 22.42 ML/MIN/MMHG
ERV-%PRED-PRE: 81 %
ERV-PRE: 0.38 L
ERV-PRED: 0.47 L
EXPTIME-PRE: 7.36 SEC
FEF2575-%PRED-POST: 81 %
FEF2575-%PRED-PRE: 76 %
FEF2575-POST: 1.89 L/SEC
FEF2575-PRE: 1.77 L/SEC
FEF2575-PRED: 2.31 L/SEC
FEFMAX-%PRED-PRE: 96 %
FEFMAX-PRE: 6.33 L/SEC
FEFMAX-PRED: 6.57 L/SEC
FEV1-%PRED-PRE: 91 %
FEV1-PRE: 2.43 L
FEV1FEV6-PRE: 74 %
FEV1FEV6-PRED: 80 %
FEV1FVC-PRE: 74 %
FEV1FVC-PRED: 78 %
FEV1SVC-PRE: 75 %
FEV1SVC-PRED: 75 %
FIFMAX-PRE: 5.09 L/SEC
FRCPLETH-%PRED-PRE: 99 %
FRCPLETH-PRE: 2.84 L
FRCPLETH-PRED: 2.85 L
FVC-%PRED-PRE: 96 %
FVC-PRE: 3.28 L
FVC-PRED: 3.39 L
IC-%PRED-PRE: 92 %
IC-PRE: 2.84 L
IC-PRED: 3.08 L
RVPLETH-%PRED-PRE: 119 %
RVPLETH-PRE: 2.46 L
RVPLETH-PRED: 2.05 L
TLCPLETH-%PRED-PRE: 106 %
TLCPLETH-PRE: 5.69 L
TLCPLETH-PRED: 5.36 L
VA-%PRED-PRE: 89 %
VA-PRE: 4.91 L
VC-%PRED-PRE: 90 %
VC-PRE: 3.23 L
VC-PRED: 3.55 L

## 2019-02-08 ENCOUNTER — OFFICE VISIT (OUTPATIENT)
Dept: FAMILY MEDICINE | Facility: CLINIC | Age: 64
End: 2019-02-08
Payer: COMMERCIAL

## 2019-02-08 VITALS
BODY MASS INDEX: 36.42 KG/M2 | WEIGHT: 226.6 LBS | HEART RATE: 66 BPM | DIASTOLIC BLOOD PRESSURE: 80 MMHG | OXYGEN SATURATION: 96 % | SYSTOLIC BLOOD PRESSURE: 142 MMHG | RESPIRATION RATE: 18 BRPM | HEIGHT: 66 IN | TEMPERATURE: 97.9 F

## 2019-02-08 DIAGNOSIS — Z13.220 ENCOUNTER FOR SCREENING FOR LIPID DISORDER: ICD-10-CM

## 2019-02-08 DIAGNOSIS — Z87.891 HISTORY OF TOBACCO ABUSE: ICD-10-CM

## 2019-02-08 DIAGNOSIS — I10 BENIGN ESSENTIAL HYPERTENSION: Primary | ICD-10-CM

## 2019-02-08 DIAGNOSIS — E03.9 HYPOTHYROIDISM, UNSPECIFIED TYPE: ICD-10-CM

## 2019-02-08 DIAGNOSIS — Z87.898 H/O WHEEZING: ICD-10-CM

## 2019-02-08 DIAGNOSIS — E66.01 MORBID OBESITY (H): ICD-10-CM

## 2019-02-08 DIAGNOSIS — Z12.31 VISIT FOR SCREENING MAMMOGRAM: ICD-10-CM

## 2019-02-08 PROBLEM — E66.9 OBESITY (BMI 30-39.9): Status: RESOLVED | Noted: 2018-05-29 | Resolved: 2019-02-08

## 2019-02-08 PROCEDURE — 99214 OFFICE O/P EST MOD 30 MIN: CPT | Performed by: NURSE PRACTITIONER

## 2019-02-08 ASSESSMENT — PAIN SCALES - GENERAL: PAINLEVEL: NO PAIN (0)

## 2019-02-08 ASSESSMENT — MIFFLIN-ST. JEOR: SCORE: 1604.6

## 2019-02-08 NOTE — PROGRESS NOTES
SUBJECTIVE:   Hanna Campo is a 64 year old female who presents to clinic today for the following health issues:    F/u of her spirometry results. Consulted with MD: Results are basically normal.  She reports she is given the diagnosis of COPD after having bilateral bronchitis.  She was symptomatic for a few months after this.  When she was living in [place] she was having a lot of issues with humidity and allergies.  Since being back in [place] the past couple years things are significantly improved. Hasn't been using the airduo: too expensive. Only smoked for 2 years. Both her parents smoked, had 2nd hand smoke. 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  She is certainly at risk for COPD but do not think she has it.  We will remove from her diagnoses list.    Pap yearly-last at [facility], has had TDAP   has had colonoscopy-2 actually- .1998,   2002    [facility]: Likely in the past 10 years    [facility] on [address] in [place]: Has all of her records.  She reports she had her records from [place] sent over to [facility], does not appear those records needed to [place].  Did review the media tab also.    She is overweight.  She does not exercise.  She is on her feet a lot for work.  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    Has feet burning: using vitamins, soaking. But if she is on her feet for 12hrs, still painful. doesnn't exercise. No access to a pool. March will have full time  for grandson. Will free up some time.     She is due for mammogram    Problem list and histories reviewed & adjusted, as indicated.  Additional history: as documented    Patient Active Problem List   Diagnosis     Benign essential hypertension     Hypothyroidism, unspecified type     Environmental allergies     Obesity (BMI 35.0-39.9) with comorbidity (H)     H/O wheezing     Past Surgical History:    Procedure Laterality Date     C LAP,APPENDIX UNLISTED PROCED       FOOT SURGERY       MAMMOPLASTY REDUCTION BILATERAL       SURGERY GENERAL IP CONSULT      cervical bone fusion, anterior approach     TUBAL LIGATION         Social History     Tobacco Use     Smoking status: Former Smoker     Smokeless tobacco: Never Used   Substance Use Topics     Alcohol use: Yes     Family History   Problem Relation Age of Onset     Hypertension Mother      Anxiety Disorder Mother      Skin Cancer Father      Lung Cancer Father         smoker     Anxiety Disorder Daughter      Diabetes No family hx of          Current Outpatient Medications   Medication Sig Dispense Refill     Cetirizine HCl (ZYRTEC ALLERGY PO) Take 5 mg by mouth daily       chlorthalidone (HYGROTON) 50 MG tablet Take 1 tablet (50 mg) by mouth daily 90 tablet 1     fluticasone (FLONASE) 50 MCG/ACT spray Spray 1 spray into both nostrils daily       levothyroxine (SYNTHROID/LEVOTHROID) 100 MCG tablet Take 1 tablet (100 mcg) by mouth daily Take with 12.5 mcg daily to = 112 mcg daily 60 tablet 0     levothyroxine (SYNTHROID/LEVOTHROID) 25 MCG tablet Take 0.5 tablets (12.5 mcg) by mouth daily Take with 100 mcg to = 112.5 mcg daily 30 tablet 0     metoprolol succinate ER (TOPROL-XL) 50 MG 24 hr tablet Take 1 tablet (50 mg) by mouth daily 90 tablet 2     VENTOLIN  (90 Base) MCG/ACT Inhaler INL 1 TO 2 PFS PO Q 4 TO 6 H PRF WHZ  3     vitamin B complex with vitamin C (VITAMIN  B COMPLEX) tablet Take 1 tablet by mouth daily  0     VITAMIN D, CHOLECALCIFEROL, PO Take 5,000 Units by mouth daily       No Known Allergies    Reviewed and updated as needed this visit by clinical staff  Tobacco  Allergies  Meds  Problems  Med Hx  Surg Hx  Fam Hx  Soc Hx        Reviewed and updated as needed this visit by Provider  Tobacco  Allergies  Meds  Problems  Med Hx  Surg Hx  Fam Hx         ROS:  Constitutional, NT, cardiovascular, pulmonary-as above, gi and gu systems  "are negative, except as otherwise noted.    OBJECTIVE:     /80 (BP Location: Right arm, Patient Position: Sitting, Cuff Size: Adult Large)   Pulse 66   Temp 97.9  F (36.6  C) (Oral)   Resp 18   Ht 1.676 m (5' 6\")   Wt 102.8 kg (226 lb 9.6 oz)   SpO2 96%   BMI 36.57 kg/m    Body mass index is 36.57 kg/m .  GENERAL: healthy, alert and no distress  NECK: no adenopathy, no asymmetry, masses, or scars and thyroid normal to palpation  RESP: lungs clear to auscultation - no rales, rhonchi or wheezes  CV: regular rate and rhythm, normal S1 S2, no S3 or S4, no murmur, click or rub  ABDOMEN: soft, overweight, nontender, difficult to note hepatosplenomegaly or masses due to body habitus and bowel sounds normal  MS: no gross musculoskeletal defects noted, no edema    Diagnostic Test Results:  No results found for this or any previous visit (from the past 24 hour(s)).    ASSESSMENT/PLAN:     Hypertension; controlled   Associated with the following complications:    None   Plan:  No changes in the patient's current treatment plan    Hypothyroidism; due for labs   Plan:  No changes in the patient's current treatment plan-will be pending labs in a week        BMI:   Estimated body mass index is 36.57 kg/m  as calculated from the following:    Height as of this encounter: 1.676 m (5' 6\").    Weight as of this encounter: 102.8 kg (226 lb 9.6 oz).   Weight management plan: Discussed healthy diet and exercise guidelines      1. Benign essential hypertension  Blood pressure stable, slightly high, check labs.  Recommend follow-up in 4 months for blood pressure recheck may need to consider medication adjustment.  She reports, admits to eating more salt, needs to focus on that  - Basic metabolic panel; Future    2. Hypothyroidism, unspecified type  Due for labs.  No medication adjustment at this time.  She reports it has never been stable.  Our goal is to achieve that, she states she takes it on an empty stomach an hour before " eating or drinking anything  - TSH with free T4 reflex; Future    3. Obesity (BMI 35.0-39.9) with comorbidity (H)  She is aware she needs to work on healthy diet and getting exercise.  Her feet pain and working a lot limit her from exercising.    4. Visit for screening mammogram  Due for mammogram order is already placed    5. H/O wheezing  History of wheezing.  Do not feel she has COPD.  Took off her diagnoses list.  She uses albuterol intermittently, no longer using the duo air    6. History of tobacco abuse  Smoked for 2 years a long time ago, otherwise had exposure to secondhand smoke    7. Encounter for screening for lipid disorder  Due for fasting cholesterol she will get that next week  - Lipid panel reflex to direct LDL Fasting; Future      FUTURE APPOINTMENTS:       - Follow-up visit in 4 months    This chart was documented by provider using a voice activated software called Dragon in addition to manual typing. There may be vocabulary errors or other grammatical errors due to this.       FARIBA Maloney, NP-C  [facility]

## 2019-02-13 DIAGNOSIS — E03.9 HYPOTHYROIDISM, UNSPECIFIED TYPE: ICD-10-CM

## 2019-02-13 DIAGNOSIS — Z13.220 ENCOUNTER FOR SCREENING FOR LIPID DISORDER: ICD-10-CM

## 2019-02-13 DIAGNOSIS — I10 BENIGN ESSENTIAL HYPERTENSION: ICD-10-CM

## 2019-02-13 LAB
ANION GAP SERPL CALCULATED.3IONS-SCNC: 6 MMOL/L (ref 3–14)
BUN SERPL-MCNC: 8 MG/DL (ref 7–30)
CALCIUM SERPL-MCNC: 8.4 MG/DL (ref 8.5–10.1)
CHLORIDE SERPL-SCNC: 100 MMOL/L (ref 94–109)
CHOLEST SERPL-MCNC: 246 MG/DL
CO2 SERPL-SCNC: 29 MMOL/L (ref 20–32)
CREAT SERPL-MCNC: 0.65 MG/DL (ref 0.52–1.04)
GFR SERPL CREATININE-BSD FRML MDRD: >90 ML/MIN/{1.73_M2}
GLUCOSE SERPL-MCNC: 101 MG/DL (ref 70–99)
HDLC SERPL-MCNC: 44 MG/DL
LDLC SERPL CALC-MCNC: 176 MG/DL
NONHDLC SERPL-MCNC: 202 MG/DL
POTASSIUM SERPL-SCNC: 4.1 MMOL/L (ref 3.4–5.3)
SODIUM SERPL-SCNC: 135 MMOL/L (ref 133–144)
TRIGL SERPL-MCNC: 131 MG/DL
TSH SERPL DL<=0.005 MIU/L-ACNC: 1.08 MU/L (ref 0.4–4)

## 2019-02-13 PROCEDURE — 80061 LIPID PANEL: CPT | Performed by: NURSE PRACTITIONER

## 2019-02-13 PROCEDURE — 80048 BASIC METABOLIC PNL TOTAL CA: CPT | Performed by: NURSE PRACTITIONER

## 2019-02-13 PROCEDURE — 36415 COLL VENOUS BLD VENIPUNCTURE: CPT | Performed by: NURSE PRACTITIONER

## 2019-02-13 PROCEDURE — 84443 ASSAY THYROID STIM HORMONE: CPT | Performed by: NURSE PRACTITIONER

## 2019-03-12 ENCOUNTER — E-VISIT (OUTPATIENT)
Dept: FAMILY MEDICINE | Facility: CLINIC | Age: 64
End: 2019-03-12
Payer: COMMERCIAL

## 2019-03-12 ENCOUNTER — MYC MEDICAL ADVICE (OUTPATIENT)
Dept: FAMILY MEDICINE | Facility: CLINIC | Age: 64
End: 2019-03-12

## 2019-03-12 DIAGNOSIS — G62.9 NEUROPATHY: Primary | ICD-10-CM

## 2019-03-12 PROCEDURE — 99444 ZZC PHYSICIAN ONLINE EVALUATION & MANAGEMENT SERVICE: CPT | Performed by: NURSE PRACTITIONER

## 2019-03-13 RX ORDER — GABAPENTIN 100 MG/1
CAPSULE ORAL
Qty: 60 CAPSULE | Refills: 0 | Status: SHIPPED | OUTPATIENT
Start: 2019-03-13 | End: 2019-05-13

## 2019-03-28 ENCOUNTER — MYC MEDICAL ADVICE (OUTPATIENT)
Dept: FAMILY MEDICINE | Facility: CLINIC | Age: 64
End: 2019-03-28

## 2019-05-13 DIAGNOSIS — G62.9 NEUROPATHY: ICD-10-CM

## 2019-05-13 RX ORDER — GABAPENTIN 100 MG/1
CAPSULE ORAL
Qty: 60 CAPSULE | Refills: 1 | Status: SHIPPED | OUTPATIENT
Start: 2019-05-13 | End: 2019-07-18

## 2019-05-13 NOTE — TELEPHONE ENCOUNTER
Requested Prescriptions   Pending Prescriptions Disp Refills     gabapentin (NEURONTIN) 100 MG capsule 60 capsule 0     Sig: Take 100 mg at night for 3 days, then increase to 200 mg at night       There is no refill protocol information for this order          gabapentin (NEURONTIN) 100 MG capsule      Last Written Prescription Date:  3/13/19  Last Fill Quantity: 60,   # refills: 0  Last Office Visit: 3/12/19  Future Office visit:       Routing refill request to provider for review/approval because:  Drug not on the G, P or [facility] refill protocol or controlled substance

## 2019-05-13 NOTE — TELEPHONE ENCOUNTER
Routing refill request to provider for review/approval because:  Drug not on the [facility] refill protocol   Shefali Danielle RN

## 2019-06-13 DIAGNOSIS — I10 BENIGN ESSENTIAL HYPERTENSION: ICD-10-CM

## 2019-06-13 NOTE — TELEPHONE ENCOUNTER
"Requested Prescriptions   Pending Prescriptions Disp Refills     chlorthalidone (HYGROTON) 50 MG tablet 90 tablet 1     Sig: Take 1 tablet (50 mg) by mouth daily       Diuretics (Including Combos) Protocol Failed - 6/13/2019  1:50 PM        Failed - Blood pressure under 140/90 in past 12 months     BP Readings from Last 3 Encounters:   02/08/19 142/80   11/30/18 132/82   05/29/18 142/80                 Passed - Recent (12 mo) or future (30 days) visit within the authorizing provider's specialty     Patient had office visit in the last 12 months or has a visit in the next 30 days with authorizing provider or within the authorizing provider's specialty.  See \"Patient Info\" tab in inbasket, or \"Choose Columns\" in Meds & Orders section of the refill encounter.              Passed - Medication is active on med list        Passed - Patient is age 18 or older        Passed - No active pregancy on record        Passed - Normal serum creatinine on file in past 12 months     Recent Labs   Lab Test 02/13/19  0853   CR 0.65              Passed - Normal serum potassium on file in past 12 months     Recent Labs   Lab Test 02/13/19  0853   POTASSIUM 4.1                    Passed - Normal serum sodium on file in past 12 months     Recent Labs   Lab Test 02/13/19  0853                 Passed - No positive pregnancy test in past 12 months        chlorthalidone (HYGROTON) 50 MG tablet  Last Written Prescription Date:  12/13/18  Last Fill Quantity: 90,  # refills: 1   Last office visit: 2/8/2019 with prescribing provider:  Stephanie Sherman   Future Office Visit:      "

## 2019-06-14 RX ORDER — CHLORTHALIDONE 50 MG/1
50 TABLET ORAL DAILY
Qty: 30 TABLET | Refills: 0 | Status: SHIPPED | OUTPATIENT
Start: 2019-06-14 | End: 2019-08-14

## 2019-06-14 NOTE — TELEPHONE ENCOUNTER
Routing refill request to provider for review/approval because:  Labs out of range:  Blood pressure    Marlin Escobar RN, BSN

## 2019-06-14 NOTE — TELEPHONE ENCOUNTER
Due for OV and labs. Please sent [employer] message  FARIBA Maloney, NP-C  [facility]

## 2019-07-17 DIAGNOSIS — G62.9 NEUROPATHY: ICD-10-CM

## 2019-07-17 NOTE — TELEPHONE ENCOUNTER
Requested Prescriptions   Pending Prescriptions Disp Refills     gabapentin (NEURONTIN) 100 MG capsule 60 capsule 1     Sig: Take 200 mg at night       There is no refill protocol information for this order          gabapentin (NEURONTIN) 100 MG capsule      Last Written Prescription Date:  5/13/19  Last Fill Quantity: 60,   # refills: 1  Last Office Visit: 3/12/19  Future Office visit:    Next 5 appointments (look out 90 days)    Jul 22, 2019 10:40 AM CDT  Aden Mera with Stephanie Sherman NP  [facility] ([address] [id number] [phone]           Routing refill request to provider for review/approval because:  Drug not on the [facility], [facility] or [facility] refill protocol or controlled substance

## 2019-07-18 RX ORDER — GABAPENTIN 100 MG/1
CAPSULE ORAL
Qty: 60 CAPSULE | Refills: 0 | Status: SHIPPED | OUTPATIENT
Start: 2019-07-18 | End: 2019-07-22

## 2019-07-18 NOTE — TELEPHONE ENCOUNTER
Routing refill request to provider for review/approval because:  Drug not on the [facility] refill protocol         Rupali Patterson RN, BSN, PHN

## 2019-07-22 ENCOUNTER — OFFICE VISIT (OUTPATIENT)
Dept: FAMILY MEDICINE | Facility: CLINIC | Age: 64
End: 2019-07-22
Payer: COMMERCIAL

## 2019-07-22 VITALS
RESPIRATION RATE: 22 BRPM | TEMPERATURE: 98 F | DIASTOLIC BLOOD PRESSURE: 83 MMHG | WEIGHT: 224.7 LBS | HEIGHT: 66 IN | HEART RATE: 67 BPM | OXYGEN SATURATION: 96 % | BODY MASS INDEX: 36.11 KG/M2 | SYSTOLIC BLOOD PRESSURE: 139 MMHG

## 2019-07-22 DIAGNOSIS — G62.9 NEUROPATHY: ICD-10-CM

## 2019-07-22 DIAGNOSIS — R20.2 NUMBNESS AND TINGLING OF BOTH FEET: Primary | ICD-10-CM

## 2019-07-22 DIAGNOSIS — I10 BENIGN ESSENTIAL HYPERTENSION: ICD-10-CM

## 2019-07-22 DIAGNOSIS — R20.0 NUMBNESS AND TINGLING OF BOTH FEET: Primary | ICD-10-CM

## 2019-07-22 DIAGNOSIS — E03.9 HYPOTHYROIDISM, UNSPECIFIED TYPE: ICD-10-CM

## 2019-07-22 LAB
ALBUMIN SERPL-MCNC: 3.6 G/DL (ref 3.4–5)
ALP SERPL-CCNC: 86 U/L (ref 40–150)
ALT SERPL W P-5'-P-CCNC: 41 U/L (ref 0–50)
ANION GAP SERPL CALCULATED.3IONS-SCNC: 7 MMOL/L (ref 3–14)
AST SERPL W P-5'-P-CCNC: 29 U/L (ref 0–45)
BILIRUB SERPL-MCNC: 0.5 MG/DL (ref 0.2–1.3)
BUN SERPL-MCNC: 5 MG/DL (ref 7–30)
CALCIUM SERPL-MCNC: 8.9 MG/DL (ref 8.5–10.1)
CHLORIDE SERPL-SCNC: 98 MMOL/L (ref 94–109)
CO2 SERPL-SCNC: 32 MMOL/L (ref 20–32)
CREAT SERPL-MCNC: 0.63 MG/DL (ref 0.52–1.04)
GFR SERPL CREATININE-BSD FRML MDRD: >90 ML/MIN/{1.73_M2}
GLUCOSE SERPL-MCNC: 97 MG/DL (ref 70–99)
HBA1C MFR BLD: 5.4 % (ref 0–5.6)
POTASSIUM SERPL-SCNC: 3.7 MMOL/L (ref 3.4–5.3)
PROT SERPL-MCNC: 7.5 G/DL (ref 6.8–8.8)
SODIUM SERPL-SCNC: 137 MMOL/L (ref 133–144)
TSH SERPL DL<=0.005 MIU/L-ACNC: 0.51 MU/L (ref 0.4–4)
VIT B12 SERPL-MCNC: 654 PG/ML (ref 193–986)

## 2019-07-22 PROCEDURE — 80053 COMPREHEN METABOLIC PANEL: CPT | Performed by: NURSE PRACTITIONER

## 2019-07-22 PROCEDURE — 99214 OFFICE O/P EST MOD 30 MIN: CPT | Performed by: NURSE PRACTITIONER

## 2019-07-22 PROCEDURE — 84443 ASSAY THYROID STIM HORMONE: CPT | Performed by: NURSE PRACTITIONER

## 2019-07-22 PROCEDURE — 36415 COLL VENOUS BLD VENIPUNCTURE: CPT | Performed by: NURSE PRACTITIONER

## 2019-07-22 PROCEDURE — 82607 VITAMIN B-12: CPT | Performed by: NURSE PRACTITIONER

## 2019-07-22 PROCEDURE — 83036 HEMOGLOBIN GLYCOSYLATED A1C: CPT | Performed by: NURSE PRACTITIONER

## 2019-07-22 RX ORDER — GABAPENTIN 100 MG/1
CAPSULE ORAL
Qty: 90 CAPSULE | Refills: 3 | Status: SHIPPED | OUTPATIENT
Start: 2019-07-22 | End: 2019-08-05

## 2019-07-22 ASSESSMENT — PAIN SCALES - GENERAL: PAINLEVEL: NO PAIN (0)

## 2019-07-22 ASSESSMENT — MIFFLIN-ST. JEOR: SCORE: 1595.98

## 2019-07-22 NOTE — PATIENT INSTRUCTIONS
Things to consider:  Lumbar 2 view x-rays  EMG of lower legs and feet-testing nerve conduction    Take 300 mg of gabapentin, can also add 100 mg in the morning after a few days

## 2019-07-22 NOTE — PROGRESS NOTES
Subjective     Hanna Campo is a 64 year old female who presents to clinic today for the following health issues:    HPI   Concern - Numbness/ burning in both feet  Onset: 3 years ago    Description:   Has been having burning and numbness in both feet. Seems to be getting worse    Intensity: moderate    Progression of Symptoms:  worsening    Accompanying Signs & Symptoms:  no    Previous history of similar problem:   yes    Precipitating factors:   Worsened by: neuropathy    Alleviating factors:  Improved by: nothing    Therapies Tried and outcome: gabapentin is not helping       Went to podiatry, right foot had surgery and infection for a year.  Switched shoe inserts still hurting.  Left one had bone spurs in the past. No diabetes, gabapentin is not helping. Night is hard to sleep, she used to walk a lot, but cannot due to the feet pain. Using the bike 4-5 times a week for 30 min. Bilateral posterior feet burning. She cannot walk barfoot  It hurts.  Denies recent trauma    In 1998 had cervical fusion. Also had hx of sciatica, felt like she was walking sideways but was actually walking straight. Hx of low back bulging discs.     Ankle will swell if at the aguero too long    Is retiring soon.     Bilateral Great Toenail fungus  Onset: one month ago  On left big toe on medial side, right big toe      Patient Active Problem List   Diagnosis     Benign essential hypertension     Hypothyroidism, unspecified type     Environmental allergies     Obesity (BMI 35.0-39.9) with comorbidity (H)     H/O wheezing     Past Surgical History:   Procedure Laterality Date     C LAP,APPENDIX UNLISTED PROCED       FOOT SURGERY       MAMMOPLASTY REDUCTION BILATERAL       SURGERY GENERAL IP CONSULT      cervical bone fusion, anterior approach     TUBAL LIGATION         Social History     Tobacco Use     Smoking status: Former Smoker     Smokeless tobacco: Never Used   Substance Use Topics     Alcohol use: Yes     Family History  "  Problem Relation Age of Onset     Hypertension Mother      Anxiety Disorder Mother      Skin Cancer Father      Lung Cancer Father         smoker     Anxiety Disorder Daughter      Diabetes No family hx of          Current Outpatient Medications   Medication Sig Dispense Refill     Cetirizine HCl (ZYRTEC ALLERGY PO) Take 5 mg by mouth daily       chlorthalidone (HYGROTON) 50 MG tablet Take 1 tablet (50 mg) by mouth daily 30 tablet 0     fluticasone (FLONASE) 50 MCG/ACT spray Spray 1 spray into both nostrils daily       gabapentin (NEURONTIN) 100 MG capsule Take 300 mg at night, okay to add 100 mg in the morning if needed 90 capsule 3     levothyroxine (SYNTHROID/LEVOTHROID) 100 MCG tablet Take 1 tablet (100 mcg) by mouth daily Take with 12.5 mcg daily to = 112 mcg daily 90 tablet 1     levothyroxine (SYNTHROID/LEVOTHROID) 25 MCG tablet Take 0.5 tablets (12.5 mcg) by mouth daily Take with 100 mcg to = 112.5 mcg daily 90 tablet 0     metoprolol succinate ER (TOPROL-XL) 50 MG 24 hr tablet Take 1 tablet (50 mg) by mouth daily 90 tablet 2     VENTOLIN  (90 Base) MCG/ACT Inhaler INL 1 TO 2 PFS PO Q 4 TO 6 H PRF WHZ  3     vitamin B complex with vitamin C (VITAMIN  B COMPLEX) tablet Take 1 tablet by mouth daily  0     VITAMIN D, CHOLECALCIFEROL, PO Take 5,000 Units by mouth daily       No Known Allergies      Reviewed and updated as needed this visit by Provider  Tobacco  Allergies  Meds  Problems  Med Hx  Surg Hx  Fam Hx         Review of Systems   ROS COMP: Constitutional, cardiovascular, pulmonary, MS as above, neuro as above, systems are negative, except as otherwise noted.      Objective    /83 (BP Location: Right arm, Patient Position: Sitting, Cuff Size: Adult Large)   Pulse 67   Temp 98  F (36.7  C) (Oral)   Resp 22   Ht 1.676 m (5' 6\")   Wt 101.9 kg (224 lb 11.2 oz)   SpO2 96%   BMI 36.27 kg/m    Body mass index is 36.27 kg/m .  Physical Exam   GENERAL: healthy, alert and no " "distress  RESP: lungs clear to auscultation - no rales, rhonchi or wheezes  CV: regular rate and rhythm, normal S1 S2, no S3 or S4, no murmur, click or rub, no peripheral edema  MS: no gross musculoskeletal defects noted  NEURO: Normal strength and tone, mentation intact and speech normal, posterior feet decreased sensation    Diagnostic Test Results:  Labs reviewed in Epic  Results for orders placed or performed in visit on 07/22/19 (from the past 24 hour(s))   Hemoglobin A1c   Result Value Ref Range    Hemoglobin A1C 5.4 0 - 5.6 %           Assessment & Plan     1. Numbness and tingling of both feet  Check labs, may need to consider EMG and/or x-ray of lower lumbar.  Leaning towards EMG.  Patient will look into cost of test  - Hemoglobin A1c  - Vitamin B12    2. Hypothyroidism, unspecified type  Due for screening  - TSH with free T4 reflex    3. Benign essential hypertension  Due for screening due to diuretic  - Comprehensive metabolic panel (BMP + Alb, Alk Phos, ALT, AST, Total. Bili, TP)    4. Neuropathy  Increase gabapentin to take 300 mg at night, okay to add 100 mg tab in the morning if still ongoing pain.   - gabapentin (NEURONTIN) 100 MG capsule; Take 300 mg at night, okay to add 100 mg in the morning if needed  Dispense: 90 capsule; Refill: 3     BMI:   Estimated body mass index is 36.27 kg/m  as calculated from the following:    Height as of this encounter: 1.676 m (5' 6\").    Weight as of this encounter: 101.9 kg (224 lb 11.2 oz).     Return in about 4 weeks (around 8/19/2019), or if symptoms worsen or fail to improve.    FARIBA Maloney, NP-C  [facility]    This chart was documented by provider using a voice activated software called Dragon in addition to manual typing. There may be vocabulary errors or other grammatical errors due to this.       "

## 2019-08-04 ENCOUNTER — MYC MEDICAL ADVICE (OUTPATIENT)
Dept: FAMILY MEDICINE | Facility: CLINIC | Age: 64
End: 2019-08-04

## 2019-08-04 DIAGNOSIS — G62.9 NEUROPATHY: ICD-10-CM

## 2019-08-05 RX ORDER — GABAPENTIN 100 MG/1
CAPSULE ORAL
Qty: 120 CAPSULE | Refills: 1 | Status: SHIPPED | OUTPATIENT
Start: 2019-08-05 | End: 2019-10-16

## 2019-08-05 NOTE — TELEPHONE ENCOUNTER
Disp Refills Start End    gabapentin (NEURONTIN) 100 MG capsule 90 capsule 3 7/22/2019     Sig: Take 300 mg at night, okay to add 100 mg in the morning if needed   Sent to pharmacy as: gabapentin (NEURONTIN) 100 MG capsule   Class: E-Prescribe   Order: [id number]   E-Prescribing Status: Receipt confirmed by pharmacy (7/22/2019 11:10 AM CDT)     Copy of recent script from 7/22/19. Patient using faster as has increased dose, as was recommended to do so. Sig needs to be adjusted for increase so patient is allowed refills from her insurance company.  See My[employer] message below.    Lisa Almonte RN

## 2019-08-12 DIAGNOSIS — E03.9 HYPOTHYROIDISM, UNSPECIFIED TYPE: ICD-10-CM

## 2019-08-12 NOTE — TELEPHONE ENCOUNTER
"Requested Prescriptions   Pending Prescriptions Disp Refills     levothyroxine (SYNTHROID/LEVOTHROID) 100 MCG tablet 90 tablet 1     Sig: Take 1 tablet (100 mcg) by mouth daily Take with 12.5 mcg daily to = 112 mcg daily       Thyroid Protocol Passed - 8/12/2019  7:58 AM        Passed - Patient is 12 years or older        Passed - Recent (12 mo) or future (30 days) visit within the authorizing provider's specialty     Patient had office visit in the last 12 months or has a visit in the next 30 days with authorizing provider or within the authorizing provider's specialty.  See \"Patient Info\" tab in inbasket, or \"Choose Columns\" in Meds & Orders section of the refill encounter.              Passed - Medication is active on med list        Passed - Normal TSH on file in past 12 months     Recent Labs   Lab Test 07/22/19  1110   TSH 0.51              Passed - No active pregnancy on record     If patient is pregnant or has had a positive pregnancy test, please check TSH.          Passed - No positive pregnancy test in past 12 months     If patient is pregnant or has had a positive pregnancy test, please check TSH.          levothyroxine (SYNTHROID/LEVOTHROID) 100 MCG tablet  Last Written Prescription Date:  2/15/19  Last Fill Quantity: 90,  # refills: 1   Last office visit: 7/22/2019 with prescribing provider:  Stephanie Sherman   Future Office Visit:      "

## 2019-08-12 NOTE — TELEPHONE ENCOUNTER
"Requested Prescriptions   Pending Prescriptions Disp Refills     levothyroxine (SYNTHROID/LEVOTHROID) 25 MCG tablet 90 tablet 0     Sig: Take 0.5 tablets (12.5 mcg) by mouth daily Take with 100 mcg to = 112.5 mcg daily       Thyroid Protocol Passed - 8/12/2019  8:00 AM        Passed - Patient is 12 years or older        Passed - Recent (12 mo) or future (30 days) visit within the authorizing provider's specialty     Patient had office visit in the last 12 months or has a visit in the next 30 days with authorizing provider or within the authorizing provider's specialty.  See \"Patient Info\" tab in inbasket, or \"Choose Columns\" in Meds & Orders section of the refill encounter.              Passed - Medication is active on med list        Passed - Normal TSH on file in past 12 months     Recent Labs   Lab Test 07/22/19  1110   TSH 0.51              Passed - No active pregnancy on record     If patient is pregnant or has had a positive pregnancy test, please check TSH.          Passed - No positive pregnancy test in past 12 months     If patient is pregnant or has had a positive pregnancy test, please check TSH.          levothyroxine (SYNTHROID/LEVOTHROID) 25 MCG tablet  Last Written Prescription Date:  2/15/19  Last Fill Quantity: 90,  # refills: 0   Last office visit: 7/22/2019 with prescribing provider:  Stephanie Sherman   Future Office Visit:      "

## 2019-08-13 RX ORDER — LEVOTHYROXINE SODIUM 25 UG/1
12.5 TABLET ORAL DAILY
Qty: 45 TABLET | Refills: 2 | Status: SHIPPED | OUTPATIENT
Start: 2019-08-13 | End: 2020-02-06 | Stop reason: DRUGHIGH

## 2019-08-13 RX ORDER — LEVOTHYROXINE SODIUM 100 UG/1
100 TABLET ORAL DAILY
Qty: 90 TABLET | Refills: 2 | Status: SHIPPED | OUTPATIENT
Start: 2019-08-13 | End: 2020-02-06

## 2019-08-13 NOTE — TELEPHONE ENCOUNTER
Prescription approved per [facility] Refill Protocol.  Kalli Mccarty RN

## 2019-08-13 NOTE — TELEPHONE ENCOUNTER
Prescription approved per [facility] Refill Protocol.  Kalli Mccarty RN

## 2019-08-14 DIAGNOSIS — I10 BENIGN ESSENTIAL HYPERTENSION: ICD-10-CM

## 2019-08-14 RX ORDER — CHLORTHALIDONE 50 MG/1
50 TABLET ORAL DAILY
Qty: 90 TABLET | Refills: 2 | Status: SHIPPED | OUTPATIENT
Start: 2019-08-14 | End: 2020-05-07

## 2019-08-14 NOTE — TELEPHONE ENCOUNTER
Reason for Call:  Other prescription    Detailed comments: patient had requested chlorthalidone (HYGROTON) 50 MG tablet at least a week ago and the pharmacy has not received it. Patient going out of town tomorrow the 15th.     Phone Number Patient can be reached at: Other phone number:  [phone] (E)    Best Time: any    Can we leave a detailed message on this number? YES    Call taken on 8/14/2019 at 2:51 PM by Bárbara Redmond

## 2019-08-14 NOTE — TELEPHONE ENCOUNTER
Spoke with patient on the phone and told her no refill request sent to us on this medication since 6/13/19 on this medication.      She would like the medication sent to [facility].    Shefali Danielle RN

## 2019-08-14 NOTE — TELEPHONE ENCOUNTER
"Requested Prescriptions   Pending Prescriptions Disp Refills     chlorthalidone (HYGROTON) 50 MG tablet 30 tablet 0     Sig: Take 1 tablet (50 mg) by mouth daily       Diuretics (Including Combos) Protocol Passed - 8/14/2019  3:14 PM        Passed - Blood pressure under 140/90 in past 12 months     BP Readings from Last 3 Encounters:   07/22/19 139/83   02/08/19 142/80   11/30/18 132/82                 Passed - Recent (12 mo) or future (30 days) visit within the authorizing provider's specialty     Patient had office visit in the last 12 months or has a visit in the next 30 days with authorizing provider or within the authorizing provider's specialty.  See \"Patient Info\" tab in inbasket, or \"Choose Columns\" in Meds & Orders section of the refill encounter.              Passed - Medication is active on med list        Passed - Patient is age 18 or older        Passed - No active pregancy on record        Passed - Normal serum creatinine on file in past 12 months     Recent Labs   Lab Test 07/22/19  1110   CR 0.63              Passed - Normal serum potassium on file in past 12 months     Recent Labs   Lab Test 07/22/19  1110   POTASSIUM 3.7                    Passed - Normal serum sodium on file in past 12 months     Recent Labs   Lab Test 07/22/19  1110                 Passed - No positive pregnancy test in past 12 months        Prescription approved per [facility] Refill Protocol.      Rupali Patterson RN, BSN, PHN    "

## 2019-08-30 ENCOUNTER — MYC MEDICAL ADVICE (OUTPATIENT)
Dept: FAMILY MEDICINE | Facility: CLINIC | Age: 64
End: 2019-08-30

## 2019-08-30 DIAGNOSIS — R20.2 NUMBNESS AND TINGLING OF BOTH FEET: Primary | ICD-10-CM

## 2019-08-30 DIAGNOSIS — R20.0 NUMBNESS AND TINGLING OF BOTH FEET: Primary | ICD-10-CM

## 2019-08-30 NOTE — TELEPHONE ENCOUNTER
Please see patient My Chart from today. See also OV from 7/22/19 note that is copied/pasted below.  Writer did not see referral in system or order.     Shefali Danielle RN

## 2019-09-04 NOTE — TELEPHONE ENCOUNTER
Ordered EMG. Please get patient number to schedule at [facility].  FARIBA Maloney, NP-C  [facility]

## 2019-10-03 DIAGNOSIS — I10 BENIGN ESSENTIAL HYPERTENSION: ICD-10-CM

## 2019-10-03 NOTE — TELEPHONE ENCOUNTER
"Requested Prescriptions   Pending Prescriptions Disp Refills     metoprolol succinate ER (TOPROL-XL) 50 MG 24 hr tablet 90 tablet 2     Sig: Take 1 tablet (50 mg) by mouth daily       Beta-Blockers Protocol Passed - 10/3/2019  8:25 AM        Passed - Blood pressure under 140/90 in past 12 months     BP Readings from Last 3 Encounters:   07/22/19 139/83   02/08/19 142/80   11/30/18 132/82                 Passed - Patient is age 6 or older        Passed - Recent (12 mo) or future (30 days) visit within the authorizing provider's specialty     Patient has had an office visit with the authorizing provider or a provider within the authorizing providers department within the previous 12 mos or has a future within next 30 days. See \"Patient Info\" tab in inbasket, or \"Choose Columns\" in Meds & Orders section of the refill encounter.              Passed - Medication is active on med list        metoprolol succinate ER (TOPROL-XL) 50 MG 24 hr tablet  Last Written Prescription Date:  12/28/18  Last Fill Quantity: 90,  # refills: 2   Last office visit: 7/22/2019 with prescribing provider:  Stephanie Sherman   Future Office Visit:      "

## 2019-10-07 RX ORDER — METOPROLOL SUCCINATE 50 MG/1
50 TABLET, EXTENDED RELEASE ORAL DAILY
Qty: 90 TABLET | Refills: 2 | Status: SHIPPED | OUTPATIENT
Start: 2019-10-07 | End: 2020-08-20

## 2019-10-07 NOTE — TELEPHONE ENCOUNTER
Prescription approved per [facility] Refill Protocol.  Kalli Mccarty RN

## 2019-10-11 ENCOUNTER — OFFICE VISIT (OUTPATIENT)
Dept: FAMILY MEDICINE | Facility: CLINIC | Age: 64
End: 2019-10-11
Payer: COMMERCIAL

## 2019-10-11 VITALS
WEIGHT: 224.9 LBS | HEART RATE: 63 BPM | BODY MASS INDEX: 36.14 KG/M2 | OXYGEN SATURATION: 97 % | RESPIRATION RATE: 20 BRPM | TEMPERATURE: 97.7 F | SYSTOLIC BLOOD PRESSURE: 152 MMHG | HEIGHT: 66 IN | DIASTOLIC BLOOD PRESSURE: 82 MMHG

## 2019-10-11 DIAGNOSIS — R20.2 NUMBNESS AND TINGLING OF BOTH FEET: Primary | ICD-10-CM

## 2019-10-11 DIAGNOSIS — Z23 NEED FOR PROPHYLACTIC VACCINATION AND INOCULATION AGAINST INFLUENZA: ICD-10-CM

## 2019-10-11 DIAGNOSIS — E03.9 HYPOTHYROIDISM, UNSPECIFIED TYPE: ICD-10-CM

## 2019-10-11 DIAGNOSIS — R20.0 NUMBNESS AND TINGLING OF BOTH FEET: Primary | ICD-10-CM

## 2019-10-11 PROCEDURE — 99213 OFFICE O/P EST LOW 20 MIN: CPT | Mod: 25 | Performed by: NURSE PRACTITIONER

## 2019-10-11 PROCEDURE — 90686 IIV4 VACC NO PRSV 0.5 ML IM: CPT | Performed by: NURSE PRACTITIONER

## 2019-10-11 PROCEDURE — 90471 IMMUNIZATION ADMIN: CPT | Performed by: NURSE PRACTITIONER

## 2019-10-11 RX ORDER — HYDROCODONE BITARTRATE AND ACETAMINOPHEN 5; 325 MG/1; MG/1
1 TABLET ORAL EVERY 6 HOURS PRN
Qty: 18 TABLET | Refills: 0 | Status: SHIPPED | OUTPATIENT
Start: 2019-10-11 | End: 2019-10-18

## 2019-10-11 ASSESSMENT — MIFFLIN-ST. JEOR: SCORE: 1596.89

## 2019-10-11 ASSESSMENT — PAIN SCALES - GENERAL: PAINLEVEL: SEVERE PAIN (6)

## 2019-10-11 NOTE — PROGRESS NOTES
Subjective     Hanna Campo is a 64 year old female who presents to clinic today for the following health issues:    HPI   Chronic Pain Follow-Up       Type / Location of Pain: feet  Analgesia/pain control:       Recent changes:  worse      Overall control: Inadequate pain control  Activity level/function:      Daily activities:  Able to do light housework, cooking    Work:  not applicable  Adverse effects:  No  Adherance    Taking medication as directed?  Yes    Participating in other treatments: no -   Risk Factors:    Sleep:  Poor    Mood/anxiety:  worsened    Recent family or social stressors:  none noted    Other aggravating factors: prolonged sitting and prolonged standing  No flowsheet data found.  No flowsheet data found.  Encounter-Level CSA:    There are no encounter-level csa.     Patient-Level CSA:    There are no patient-level csa.         How many servings of fruits and vegetables do you eat daily?  2-3    On average, how many sweetened beverages do you drink each day (soda, juice, sweet tea, etc)?   0-1    How many days per week do you miss taking your medication? 0    Weaning off gabapentin, is not helping. Down to 200 mg daily then weaning off.   Numb feet for now 2-3 years, much worse from July. The past few months has been really bad. EMG was about $2600 or more so not an option for her at this time.   Driving is difficult. Walking can be hard without feeling in feet. Denies falling or leg weakness, has normal bowel and bladder.  Pain also shoots up her calves. Has gone to podiatry, checked labs. Likely needs MRI at this point.  She does need something for pain, she is quite tearful today and this is disrupting her life.  We will send a small refill of Vicodin to her pharmacy.  She should follow-up with neurology as soon as possible as the pain is severe and has been going on long enough.    In 1998 had cervical fusion. Also had hx of sciatica, felt like she was walking sideways but was  actually walking straight. Hx of low back bulging discs.  We talked about checking an x-ray today but admitting that she likely needs an MRI of her neck or her lumbar spine.  She would like to wait until she sees neurology to see what they recommend.      Patient Active Problem List   Diagnosis     Benign essential hypertension     Hypothyroidism, unspecified type     Environmental allergies     Obesity (BMI 35.0-39.9) with comorbidity (H)     H/O wheezing     Past Surgical History:   Procedure Laterality Date     C LAP,APPENDIX UNLISTED PROCED       FOOT SURGERY       MAMMOPLASTY REDUCTION BILATERAL       SURGERY GENERAL IP CONSULT      cervical bone fusion, anterior approach     TUBAL LIGATION         Social History     Tobacco Use     Smoking status: Former Smoker     Smokeless tobacco: Never Used   Substance Use Topics     Alcohol use: Yes     Family History   Problem Relation Age of Onset     Hypertension Mother      Anxiety Disorder Mother      Skin Cancer Father      Lung Cancer Father         smoker     Anxiety Disorder Daughter      Diabetes No family hx of          Current Outpatient Medications   Medication Sig Dispense Refill     Cetirizine HCl (ZYRTEC ALLERGY PO) Take 5 mg by mouth daily       chlorthalidone (HYGROTON) 50 MG tablet Take 1 tablet (50 mg) by mouth daily 90 tablet 2     fluticasone (FLONASE) 50 MCG/ACT spray Spray 1 spray into both nostrils daily       gabapentin (NEURONTIN) 100 MG capsule 100 mg in AM and 300 mg at night (Patient taking differently: 200 mg daily now, weaning off) 120 capsule 1     HYDROcodone-acetaminophen (NORCO) 5-325 MG tablet Take 1 tablet by mouth every 6 hours as needed for pain 18 tablet 0     levothyroxine (SYNTHROID/LEVOTHROID) 100 MCG tablet Take 1 tablet (100 mcg) by mouth daily Take with 12.5 mcg daily to = 112 mcg daily 90 tablet 2     levothyroxine (SYNTHROID/LEVOTHROID) 25 MCG tablet Take 0.5 tablets (12.5 mcg) by mouth daily Take with 100 mcg to = 112.5  "mcg daily 45 tablet 2     metoprolol succinate ER (TOPROL-XL) 50 MG 24 hr tablet Take 1 tablet (50 mg) by mouth daily 90 tablet 2     VENTOLIN  (90 Base) MCG/ACT Inhaler INL 1 TO 2 PFS PO Q 4 TO 6 H PRF WHZ  3     vitamin B complex with vitamin C (VITAMIN  B COMPLEX) tablet Take 1 tablet by mouth daily  0     VITAMIN D, CHOLECALCIFEROL, PO Take 5,000 Units by mouth daily       No Known Allergies    Reviewed and updated as needed this visit by Provider  Tobacco  Allergies  Meds  Problems  Med Hx  Surg Hx  Fam Hx         Review of Systems   ROS COMP: Constitutional, HEENT, cardiovascular, pulmonary, gi and gu systems are negative, except as otherwise noted.      Objective    BP (!) 152/82 (BP Location: Right arm, Patient Position: Sitting, Cuff Size: Adult Large)   Pulse 63   Temp 97.7  F (36.5  C) (Oral)   Resp 20   Ht 1.676 m (5' 6\")   Wt 102 kg (224 lb 14.4 oz)   SpO2 97%   BMI 36.30 kg/m    Body mass index is 36.3 kg/m .  Physical Exam   GENERAL: healthy, alert and no distress  RESP: lungs clear to auscultation - no rales, rhonchi or wheezes  CV: regular rate and rhythm, normal S1 S2, no S3 or S4, no murmur, click or rub  MS: no gross musculoskeletal defects noted  NEURO: Normal strength and tone, mentation intact and speech normal, abnormal sensation in ankles to feet feet are numb    Diagnostic Test Results:  Labs reviewed in Epic        Assessment & Plan     1. Numbness and tingling of both feet  Follow-up with neurology for numbness and tingling in feet.  She has history of cervical spine surgery 1998, and history of bulging disks in her lumbar spine.  Has not had recent imaging, and she would like anything order to wait until she sees neurology.  She was quite tearful today, sent Vicodin for pain until we figure out what else is going on.  - NEUROLOGY ADULT REFERRAL  - HYDROcodone-acetaminophen (NORCO) 5-325 MG tablet; Take 1 tablet by mouth every 6 hours as needed for pain  Dispense: 18 " "tablet; Refill: 0    2. Hypothyroidism, unspecified type  Return before the end of the year for repeat thyroid check.  Last one in July was normal but on the low end  - TSH with free T4 reflex; Future    3. Need for prophylactic vaccination and inoculation against influenza  Given  - INFLUENZA VACCINE IM > 6 MONTHS VALENT IIV4 [83493]  - Vaccine Administration, Initial [04145]     BMI:   Estimated body mass index is 36.3 kg/m  as calculated from the following:    Height as of this encounter: 1.676 m (5' 6\").    Weight as of this encounter: 102 kg (224 lb 14.4 oz).       Return in about 2 weeks (around 10/25/2019), or if symptoms worsen or fail to improve.    FARIBA Maloney, NP-C  [facility]    This chart was documented by provider using a voice activated software called Dragon in addition to manual typing. There may be vocabulary errors or other grammatical errors due to this.       "

## 2019-10-16 ENCOUNTER — OFFICE VISIT (OUTPATIENT)
Dept: NEUROLOGY | Facility: CLINIC | Age: 64
End: 2019-10-16
Attending: NURSE PRACTITIONER
Payer: COMMERCIAL

## 2019-10-16 ENCOUNTER — MYC REFILL (OUTPATIENT)
Dept: FAMILY MEDICINE | Facility: CLINIC | Age: 64
End: 2019-10-16

## 2019-10-16 VITALS
DIASTOLIC BLOOD PRESSURE: 84 MMHG | BODY MASS INDEX: 35.33 KG/M2 | OXYGEN SATURATION: 97 % | WEIGHT: 225.1 LBS | HEART RATE: 57 BPM | HEIGHT: 67 IN | SYSTOLIC BLOOD PRESSURE: 142 MMHG

## 2019-10-16 DIAGNOSIS — G60.9 HEREDITARY AND IDIOPATHIC PERIPHERAL NEUROPATHY: ICD-10-CM

## 2019-10-16 DIAGNOSIS — R20.2 NUMBNESS AND TINGLING OF BOTH FEET: ICD-10-CM

## 2019-10-16 DIAGNOSIS — R20.0 NUMBNESS AND TINGLING OF BOTH FEET: ICD-10-CM

## 2019-10-16 PROCEDURE — 86235 NUCLEAR ANTIGEN ANTIBODY: CPT | Performed by: PSYCHIATRY & NEUROLOGY

## 2019-10-16 PROCEDURE — 86618 LYME DISEASE ANTIBODY: CPT | Performed by: PSYCHIATRY & NEUROLOGY

## 2019-10-16 PROCEDURE — 36415 COLL VENOUS BLD VENIPUNCTURE: CPT | Performed by: PSYCHIATRY & NEUROLOGY

## 2019-10-16 PROCEDURE — 99203 OFFICE O/P NEW LOW 30 MIN: CPT | Performed by: PSYCHIATRY & NEUROLOGY

## 2019-10-16 PROCEDURE — 86334 IMMUNOFIX E-PHORESIS SERUM: CPT | Performed by: PSYCHIATRY & NEUROLOGY

## 2019-10-16 PROCEDURE — 82784 ASSAY IGA/IGD/IGG/IGM EACH: CPT | Performed by: PSYCHIATRY & NEUROLOGY

## 2019-10-16 RX ORDER — HYDROCODONE BITARTRATE AND ACETAMINOPHEN 5; 325 MG/1; MG/1
1 TABLET ORAL EVERY 6 HOURS PRN
Qty: 18 TABLET | Refills: 0 | Status: CANCELLED | OUTPATIENT
Start: 2019-10-16

## 2019-10-16 RX ORDER — DULOXETIN HYDROCHLORIDE 30 MG/1
CAPSULE, DELAYED RELEASE ORAL
Qty: 60 CAPSULE | Refills: 3 | Status: SHIPPED | OUTPATIENT
Start: 2019-10-16 | End: 2019-12-09

## 2019-10-16 ASSESSMENT — MIFFLIN-ST. JEOR: SCORE: 1613.68

## 2019-10-16 ASSESSMENT — PAIN SCALES - GENERAL: PAINLEVEL: SEVERE PAIN (7)

## 2019-10-16 NOTE — LETTER
"    10/16/2019         RE: Hanna Campo  [address]  [place] [address]        Dear Colleague,    Thank you for referring your patient, Hanna Campo, to the [facility]. Please see a copy of my visit note below.    Visit Date:   10/16/2019      Hanna Campo is a 64-year-old female referred by Stephanie Sherman NP for evaluation of pain and numbness affecting her feet.      She first noted these symptoms about 2-1/2 years ago.  Initially, it just felt like she had socks balled up underneath her toes.  As time has gone on, her symptoms have worsened.  This has been particularly so over the last 4 to 6 months.  She reports pain in her feet that can either be stabbing or burning.  Occasionally, it will extend up to the ankles.  Her toes feel numb.  She has not appreciated any upper extremity symptoms or cranial nerve symptoms.  The pain is pretty much present throughout the day, and it makes it difficult for her to sleep.  She has not appreciated any specific weakness.  She denies any radiating pain suggestive of radiculopathy.      She did undergo a cervical fusion in 1998.  This appears to be at the C5 to 6 and C6 to 7 levels.  This was mainly for neck pain.  The imaging studies I could review online did not suggest she had a myelopathy.  She denies a Lhermitte phenomenon currently.      She tried gabapentin up to a dose of 400 mg a day, and it did not help the pain and she is weaning off it now.  She had some minor side effects from the gabapentin.  She describes dizziness and feeling \"off.\"      Laboratory work relevant to neuropathy has included a normal comprehensive metabolic panel, TSH, B12 level, and hemoglobin A1c.  She also had negative hepatitis C serology last year.      PAST MEDICAL HISTORY:  Notable for hypothyroidism and hypertension.      CURRENT MEDICATIONS:   1.  Chlorthalidone.   2.  Gabapentin, which she is weaning off.   3.  Hydrocodone, which was just given for " short-term pain management.    4.  Levothyroxine.   5.  Metoprolol.   6.  Vitamin B complex.    7.  Vitamin D.      ALLERGIES:  SHE HAS NO MEDICAL ALLERGIES.      FAMILY HISTORY:  She is not aware of any family history of neuropathy.      SOCIAL HISTORY:  She is retired.  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  She does not smoke.  She quit using alcohol to a large extent a year and a half ago.      PHYSICAL EXAMINATION:   VITAL SIGNS:  Reveals the patient's heart rate is 57.  Blood pressure 142/84.   NEUROLOGIC:  Pupils are equal, round, and react well to light.  She has full extraocular motility.  Facial strength and sensation are normal, and otherwise cranial nerves II-XII are intact. Motor examination is intact except for a suggestion of toe extension and toe flexion weakness, although this is a bit difficult because she tends to break due to pain.        On sensory testing, she has a mild depression of position sense in the toes.  She has absent vibratory sense in the toes and reduced vibratory appreciation at the ankles.  She reports a decrease in pin and touch to above the midcalf level.  She reports a decrease in cold appreciation to above the ankles.       Finger-to-nose is done well.  Her gait is antalgic.  Reflexes are 1+ in the upper extremities, trace at the right knee, 1+ at the left knee, trace at the right ankle, and absent at the left ankle.  Plantar responses are flexor.      IMPRESSION:  Painful peripheral neuropathy.      PLAN:  I discussed the diagnosis with her.  She has had some testing done looking for treatable causes of neuropathy as noted above, which have proved negative.      I have suggested checking a few other laboratory studies, although I told her it is possible we might not identify an etiology for her neuropathy.  The testing will be serum immunoelectrophoresis, Sjogren antibodies, and Lyme serology.  I will communicate those  results to her.     She did not want to pursue an EMG at this time.     She is tapering off the gabapentin now.  It was not effective, albeit at a relatively modest dose.  She was also reporting some mild side effects from the gabapentin.      I have suggested now she try duloxetine for neuropathic pain.  She will start on 30 mg a day with the latitude to increase to 60 mg after 2 weeks.      She had some questions about what she could use to help with her sleep.  I am hopeful if we can control her neuropathic pain her sleep will be better, but I told her in the short-term, she could try melatonin up to 10 mg at night.      I will be communicating the results of the laboratory work to the patient when available and also plan to see her back in 6 weeks.         FRANKY PEREA MD             D: 10/16/2019   T: 10/16/2019   MT:       Name:     CARMEN HASTINGS   MRN:      [id number]        Account:      [id number]   :      1955           Visit Date:   10/16/2019      Document: [id number]       cc: Stephanie Sherman NP       Again, thank you for allowing me to participate in the care of your patient.        Sincerely,        Franky Perea MD

## 2019-10-16 NOTE — NURSING NOTE
"Hanna Campo's goals for this visit include: consult  She requests these members of her care team be copied on today's visit information:     PCP: Stephanie Sherman    Referring Provider:  Stephanie Sherman NP  [address]  [place] [place] [address]    BP (!) 142/84   Pulse 57   Ht 1.702 m (5' 7\")   Wt 102.1 kg (225 lb 1.6 oz)   SpO2 97%   BMI 35.26 kg/m      Do you need any medication refills at today's visit? n  "

## 2019-10-17 LAB
B BURGDOR IGG+IGM SER QL: 0.02 (ref 0–0.89)
ENA SS-A IGG SER IA-ACNC: <0.2 AI (ref 0–0.9)
ENA SS-B IGG SER IA-ACNC: <0.2 AI (ref 0–0.9)
IGA SERPL-MCNC: 411 MG/DL (ref 70–380)
IGG SERPL-MCNC: 941 MG/DL (ref 695–1620)
IGM SERPL-MCNC: 57 MG/DL (ref 60–265)
PROT PATTERN SERPL IFE-IMP: ABNORMAL

## 2019-10-17 NOTE — PROGRESS NOTES
"Visit Date:   10/16/2019      Hanna Campo is a 64-year-old female referred by Stephanie Sherman NP for evaluation of pain and numbness affecting her feet.      She first noted these symptoms about 2-1/2 years ago.  Initially, it just felt like she had socks balled up underneath her toes.  As time has gone on, her symptoms have worsened.  This has been particularly so over the last 4 to 6 months.  She reports pain in her feet that can either be stabbing or burning.  Occasionally, it will extend up to the ankles.  Her toes feel numb.  She has not appreciated any upper extremity symptoms or cranial nerve symptoms.  The pain is pretty much present throughout the day, and it makes it difficult for her to sleep.  She has not appreciated any specific weakness.  She denies any radiating pain suggestive of radiculopathy.      She did undergo a cervical fusion in 1998.  This appears to be at the C5 to 6 and C6 to 7 levels.  This was mainly for neck pain.  The imaging studies I could review online did not suggest she had a myelopathy.  She denies a Lhermitte phenomenon currently.      She tried gabapentin up to a dose of 400 mg a day, and it did not help the pain and she is weaning off it now.  She had some minor side effects from the gabapentin.  She describes dizziness and feeling \"off.\"      Laboratory work relevant to neuropathy has included a normal comprehensive metabolic panel, TSH, B12 level, and hemoglobin A1c.  She also had negative hepatitis C serology last year.      PAST MEDICAL HISTORY:  Notable for hypothyroidism and hypertension.      CURRENT MEDICATIONS:   1.  Chlorthalidone.   2.  Gabapentin, which she is weaning off.   3.  Hydrocodone, which was just given for short-term pain management.    4.  Levothyroxine.   5.  Metoprolol.   6.  Vitamin B complex.    7.  Vitamin D.      ALLERGIES:  SHE HAS NO MEDICAL ALLERGIES.      FAMILY HISTORY:  She is not aware of any family history of neuropathy.      SOCIAL HISTORY:  " She is retired.  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  She does not smoke.  She quit using alcohol to a large extent a year and a half ago.      PHYSICAL EXAMINATION:   VITAL SIGNS:  Reveals the patient's heart rate is 57.  Blood pressure 142/84.   NEUROLOGIC:  Pupils are equal, round, and react well to light.  She has full extraocular motility.  Facial strength and sensation are normal, and otherwise cranial nerves II-XII are intact. Motor examination is intact except for a suggestion of toe extension and toe flexion weakness, although this is a bit difficult because she tends to break due to pain.        On sensory testing, she has a mild depression of position sense in the toes.  She has absent vibratory sense in the toes and reduced vibratory appreciation at the ankles.  She reports a decrease in pin and touch to above the midcalf level.  She reports a decrease in cold appreciation to above the ankles.       Finger-to-nose is done well.  Her gait is antalgic.  Reflexes are 1+ in the upper extremities, trace at the right knee, 1+ at the left knee, trace at the right ankle, and absent at the left ankle.  Plantar responses are flexor.      IMPRESSION:  Painful peripheral neuropathy.      PLAN:  I discussed the diagnosis with her.  She has had some testing done looking for treatable causes of neuropathy as noted above, which have proved negative.      I have suggested checking a few other laboratory studies, although I told her it is possible we might not identify an etiology for her neuropathy.  The testing will be serum immunoelectrophoresis, Sjogren antibodies, and Lyme serology.  I will communicate those results to her.     She did not want to pursue an EMG at this time.     She is tapering off the gabapentin now.  It was not effective, albeit at a relatively modest dose.  She was also reporting some mild side effects from the gabapentin.      I have suggested  now she try duloxetine for neuropathic pain.  She will start on 30 mg a day with the latitude to increase to 60 mg after 2 weeks.      She had some questions about what she could use to help with her sleep.  I am hopeful if we can control her neuropathic pain her sleep will be better, but I told her in the short-term, she could try melatonin up to 10 mg at night.      I will be communicating the results of the laboratory work to the patient when available and also plan to see her back in 6 weeks.     ADDENDUM 10/17/19: IEP, Sjogren Abs and Lyme. Negative. Called and left message for patient and will release to [employer].      19: See [employer] message. Increasing duloxetine to 90 mg a day.      1/3/20: Duloxetine ineffective and caused side effects. Advised patient to stop. Referring to [facility]        NELDA PEREA MD             D: 10/16/2019   T: 10/16/2019   MT:       Name:     CARMEN HASTINGS   MRN:      [id number]        Account:      [id number]   :      1955           Visit Date:   10/16/2019      Document: [id number]       cc: Stephanie Sherman NP

## 2019-10-18 ENCOUNTER — MYC REFILL (OUTPATIENT)
Dept: FAMILY MEDICINE | Facility: CLINIC | Age: 64
End: 2019-10-18

## 2019-10-18 DIAGNOSIS — R20.0 NUMBNESS AND TINGLING OF BOTH FEET: ICD-10-CM

## 2019-10-18 DIAGNOSIS — R20.2 NUMBNESS AND TINGLING OF BOTH FEET: ICD-10-CM

## 2019-10-18 RX ORDER — HYDROCODONE BITARTRATE AND ACETAMINOPHEN 5; 325 MG/1; MG/1
1 TABLET ORAL EVERY 6 HOURS PRN
Qty: 18 TABLET | Refills: 0 | Status: SHIPPED | OUTPATIENT
Start: 2019-10-18 | End: 2019-11-01

## 2019-10-18 NOTE — TELEPHONE ENCOUNTER
Given for numbness and tingling of feet, not for chronic pain syndrome. Initial prescription given 10/11/19.    Routing refill request to provider for review/approval because:  Drug not on the [facility] refill protocol     Lisa Almonte RN

## 2019-10-18 NOTE — TELEPHONE ENCOUNTER
Requested Prescriptions   Pending Prescriptions Disp Refills     HYDROcodone-acetaminophen (NORCO) 5-325 MG tablet  Last Written Prescription Date:  10/11/19  Last Fill Quantity: 18 tablet,  # refills: 0   Last office visit: 10/11/2019 with prescribing provider:  Stephanie Sherman NP   Future Office Visit:   Next 5 appointments (look out 90 days)    Nov 27, 2019 11:00 AM CST  [facility] Visit with Franky [facility]ard MD  [facility] [address] [place] [id number] [phone]        Routing refill request to provider for review/approval because:  Drug not on the [facility], [facility] or [facility] refill protocol or controlled substance   18 tablet 0     Sig: Take 1 tablet by mouth every 6 hours as needed for pain       There is no refill protocol information for this order

## 2019-10-18 NOTE — TELEPHONE ENCOUNTER
Short term pain medication approved for now.  FARIBA Maloney, NP-C  [facility]

## 2019-11-01 ENCOUNTER — MYC REFILL (OUTPATIENT)
Dept: FAMILY MEDICINE | Facility: CLINIC | Age: 64
End: 2019-11-01

## 2019-11-01 DIAGNOSIS — R20.0 NUMBNESS AND TINGLING OF BOTH FEET: ICD-10-CM

## 2019-11-01 DIAGNOSIS — R20.2 NUMBNESS AND TINGLING OF BOTH FEET: ICD-10-CM

## 2019-11-01 NOTE — TELEPHONE ENCOUNTER
Requested Prescriptions   Pending Prescriptions Disp Refills     HYDROcodone-acetaminophen (NORCO) 5-325 MG tablet  Last Written Prescription Date:  10/18/19  Last Fill Quantity: 18 tablet,  # refills: 0   Last office visit: 10/11/2019 with prescribing provider:  Stephanie Sherman NP   Future Office Visit:   Next 5 appointments (look out 90 days)    Nov 27, 2019 11:00 AM CST  Return Visit with Franky Beard MD  [facility] [address] [place] [id number] [phone]           Routing refill request to provider for review/approval because:  Drug not on the [facility], [facility] or [facility] refill protocol or controlled substance   18 tablet 0     Sig: Take 1 tablet by mouth every 6 hours as needed for pain       There is no refill protocol information for this order

## 2019-11-02 ENCOUNTER — HEALTH MAINTENANCE LETTER (OUTPATIENT)
Age: 64
End: 2019-11-02

## 2019-11-05 RX ORDER — HYDROCODONE BITARTRATE AND ACETAMINOPHEN 5; 325 MG/1; MG/1
1 TABLET ORAL EVERY 6 HOURS PRN
Qty: 18 TABLET | Refills: 0 | Status: SHIPPED | OUTPATIENT
Start: 2019-11-05 | End: 2019-11-20

## 2019-11-05 NOTE — TELEPHONE ENCOUNTER
Controlled Substance Refill Request for Norco  Problem List Complete:  No     PROVIDER TO CONSIDER COMPLETION OF PROBLEM LIST AND OVERVIEW/CONTROLLED SUBSTANCE AGREEMENT    Last Written Prescription Date:  10/18/19  Last Fill Quantity: 18 tablets   # refills: 0    THE MOST RECENT OFFICE VISIT MUST BE WITHIN THE PAST 3 MONTHS. AT LEAST ONE FACE TO FACE VISIT MUST OCCUR EVERY 6 MONTHS. ADDITIONAL VISITS CAN BE VIRTUAL.  (THIS STATEMENT SHOULD BE DELETED.)    Last Office Visit with [facility] primary care provider: 10/11/19    Future Office visit:   Next 5 appointments (look out 90 days)    Nov 27, 2019 11:00 AM CST  Return Visit with Franky Beard MD  [facility] ([facility]) [address] [place] [id number] [phone]          Controlled substance agreement:   Encounter-Level CSA:    There are no encounter-level csa.     Patient-Level CSA:    There are no patient-level csa.         Last Urine Drug Screen: No results found for: CDAUT, No results found for: COMDAT, No results found for: THC13, PCP13, COC13, MAMP13, OPI13, AMP13, BZO13, TCA13, MTD13, BAR13, OXY13, PPX13, BUP13     Processing:  Rx to be electronically transmitted to pharmacy by provider      [email or web address]       checked in past 3 months?  No-problem list incomplete so  not checked.             Rupali Patterson RN, BSN, PHN

## 2019-11-12 DIAGNOSIS — E03.9 HYPOTHYROIDISM, UNSPECIFIED TYPE: ICD-10-CM

## 2019-11-12 DIAGNOSIS — Z91.09 ENVIRONMENTAL ALLERGIES: Primary | ICD-10-CM

## 2019-11-12 RX ORDER — LEVOTHYROXINE SODIUM 25 UG/1
12.5 TABLET ORAL DAILY
Qty: 45 TABLET | Refills: 2 | Status: CANCELLED | OUTPATIENT
Start: 2019-11-12

## 2019-11-12 RX ORDER — LEVOTHYROXINE SODIUM 100 UG/1
100 TABLET ORAL DAILY
Qty: 90 TABLET | Refills: 2 | Status: CANCELLED | OUTPATIENT
Start: 2019-11-12

## 2019-11-12 NOTE — TELEPHONE ENCOUNTER
"Requested Prescriptions   Pending Prescriptions Disp Refills     levothyroxine (SYNTHROID/LEVOTHROID) 25 MCG tablet  Last Written Prescription Date:  8/13/19  Last Fill Quantity: 45 tablet,  # refills: 2   Last office visit: 10/11/2019 with prescribing provider:  Stephanie Sherman NP   Future Office Visit:   Next 5 appointments (look out 90 days)    Nov 27, 2019 11:00 AM CST  Return Visit with Franky Beard MD  [facility] ([facility]) [address] [place] [id number] [phone]          45 tablet 2     Sig: Take 0.5 tablets (12.5 mcg) by mouth daily Take with 100 mcg to = 112.5 mcg daily       Thyroid Protocol Passed - 11/12/2019  8:40 AM        Passed - Patient is 12 years or older        Passed - Recent (12 mo) or future (30 days) visit within the authorizing provider's specialty     Patient has had an office visit with the authorizing provider or a provider within the authorizing providers department within the previous 12 mos or has a future within next 30 days. See \"Patient Info\" tab in inbasket, or \"Choose Columns\" in Meds & Orders section of the refill encounter.              Passed - Medication is active on med list        Passed - Normal TSH on file in past 12 months     Recent Labs   Lab Test 07/22/19  1110   TSH 0.51              Passed - No active pregnancy on record     If patient is pregnant or has had a positive pregnancy test, please check TSH.          Passed - No positive pregnancy test in past 12 months     If patient is pregnant or has had a positive pregnancy test, please check TSH.                levothyroxine (SYNTHROID/LEVOTHROID) 100 MCG tablet  Last Written Prescription Date:  8/13/19  Last Fill Quantity: 90 tablet,  # refills: 2   Last office visit: 10/11/2019 with prescribing provider:  Stephanie Sherman NP   Future Office Visit:   Next 5 appointments (look out 90 days)    Nov 27, 2019 11:00 AM CST  Return Visit with Franky Beard MD  [facility]" "[facility] ([facility]) [address] [address] [place] [id number] [phone]          90 tablet 2     Sig: Take 1 tablet (100 mcg) by mouth daily Take with 12.5 mcg daily to = 112 mcg daily       Thyroid Protocol Passed - 11/12/2019  8:40 AM        Passed - Patient is 12 years or older        Passed - Recent (12 mo) or future (30 days) visit within the authorizing provider's specialty     Patient has had an office visit with the authorizing provider or a provider within the authorizing providers department within the previous 12 mos or has a future within next 30 days. See \"Patient Info\" tab in inbasket, or \"Choose Columns\" in Meds & Orders section of the refill encounter.              Passed - Medication is active on med list        Passed - Normal TSH on file in past 12 months     Recent Labs   Lab Test 07/22/19  1110   TSH 0.51              Passed - No active pregnancy on record     If patient is pregnant or has had a positive pregnancy test, please check TSH.          Passed - No positive pregnancy test in past 12 months     If patient is pregnant or has had a positive pregnancy test, please check TSH.                fluticasone (FLONASE) 50 MCG/ACT nasal spray        Future Office visit:    Next 5 appointments (look out 90 days)    Nov 27, 2019 11:00 AM CST  Return Visit with Franky Beard MD  [facility] ([facility]) [address] [place] [id number]  [phone]           Routing refill request to provider for review/approval because:  Drug not active on patient's medication list  Medication is reported/historical         Sig: Spray 1 spray into both nostrils daily       Inhaled Steroids Protocol Passed - 11/12/2019  8:40 AM        Passed - Patient is age 12 or older        Passed - Recent (12 mo) or future (30 days) visit within the authorizing provider's specialty     Patient has had an office visit with the authorizing " "provider or a provider within the authorizing providers department within the previous 12 mos or has a future within next 30 days. See \"Patient Info\" tab in inbasket, or \"Choose Columns\" in Meds & Orders section of the refill encounter.              Passed - Medication is active on med list          "

## 2019-11-14 RX ORDER — FLUTICASONE PROPIONATE 50 MCG
1 SPRAY, SUSPENSION (ML) NASAL DAILY
Qty: 9.9 ML | Refills: 3 | Status: SHIPPED | OUTPATIENT
Start: 2019-11-14 | End: 2019-12-06

## 2019-11-14 NOTE — TELEPHONE ENCOUNTER
Levothyroxine 25 mcg and 100 mcg tabs are not needed at this time.  Patient was given a 3 month supply with 2 extra refills on 8/13/19. This supply should last till May 2020 and should be on file with pharmacy.      Routing refill request for Flonase nasal spray  to provider for review/approval because:  Medication is reported/historical      Lisa Almonte RN  [facility]

## 2019-11-18 DIAGNOSIS — G62.9 NEUROPATHY: ICD-10-CM

## 2019-11-18 NOTE — TELEPHONE ENCOUNTER
Requested Prescriptions   Pending Prescriptions Disp Refills     gabapentin (NEURONTIN) 100 MG capsule 120 capsule 1     Si mg in AM and 300 mg at night       There is no refill protocol information for this order          gabapentin (NEURONTIN) 100 MG capsule (Discontinued)      Last Written Prescription Date:  19  Last Fill Quantity: 120,   # refills: 1  Last Office Visit: 10/11/19  Future Office visit:    Next 5 appointments (look out 90 days)    2019 11:00 AM CST  Return Visit with Franky Beard MD  [facility]) [address] [place] [id number] [phone]           Routing refill request to provider for review/approval because:  Drug not on the [facility], P or [facility] refill protocol or controlled substance  Drug not active on patient's medication list

## 2019-11-20 RX ORDER — GABAPENTIN 100 MG/1
CAPSULE ORAL
Qty: 120 CAPSULE | Refills: 1 | OUTPATIENT
Start: 2019-11-20

## 2019-11-20 NOTE — TELEPHONE ENCOUNTER
Another provider discontinued this medication.  Will refuse refill.  FARIBA Maloney, NP-C  [facility]

## 2019-11-20 NOTE — TELEPHONE ENCOUNTER
Routing refill request to provider for review/approval because:  Drug not on the [facility] refill protocol   Drug not active on patient's medication list        Rupali Patterson RN, BSN, PHN

## 2019-11-25 DIAGNOSIS — Z91.09 ENVIRONMENTAL ALLERGIES: ICD-10-CM

## 2019-11-25 NOTE — TELEPHONE ENCOUNTER
"Requested Prescriptions   Pending Prescriptions Disp Refills     fluticasone (FLONASE) 50 MCG/ACT nasal spray 9.9 mL 3     Sig: Spray 1 spray into both nostrils daily       Inhaled Steroids Protocol Passed - 11/25/2019 12:56 PM        Passed - Patient is age 12 or older        Passed - Recent (12 mo) or future (30 days) visit within the authorizing provider's specialty     Patient has had an office visit with the authorizing provider or a provider within the authorizing providers department within the previous 12 mos or has a future within next 30 days. See \"Patient Info\" tab in inbasket, or \"Choose Columns\" in Meds & Orders section of the refill encounter.              Passed - Medication is active on med list        fluticasone (FLONASE) 50 MCG/ACT nasal spray  Last Written Prescription Date:  11/14/19  Last Fill Quantity: 9.9ml,  # refills: 3   Last office visit: 10/11/2019 with prescribing provider:  Stephanie Sherman NPCLARICE   Future Office Visit:   Next 5 appointments (look out 90 days)    Nov 27, 2019 11:00 AM CST  Return Visit with Franky Beard MD  [facility] ([facility]) [address] [address] [place] [id number] [phone]           "

## 2019-11-27 RX ORDER — FLUTICASONE PROPIONATE 50 MCG
1 SPRAY, SUSPENSION (ML) NASAL DAILY
Qty: 9.9 ML | Refills: 3 | OUTPATIENT
Start: 2019-11-27

## 2019-11-27 NOTE — TELEPHONE ENCOUNTER
Too soon for refill  fluticasone (FLONASE) 50 MCG/ACT nasal spray 9.9 mL 3 11/14/2019  No   Sig - Route: Spray 1 spray into both nostrils daily - Both Nostrils   Sent to pharmacy as: fluticasone (FLONASE) 50 MCG/ACT nasal spray   Class: E-Prescribe   Order: [id number]       Nakia Mohamud RN

## 2019-11-29 ENCOUNTER — TELEPHONE (OUTPATIENT)
Dept: NEUROLOGY | Facility: CLINIC | Age: 64
End: 2019-11-29

## 2019-11-29 NOTE — TELEPHONE ENCOUNTER
1st attempt to reach the patient and reschedule 6 wk follow up with Dr. Beard; lvm.    [facility]  Adult Med Spec/Surg Spec   [phone]

## 2019-12-02 DIAGNOSIS — J44.9 CHRONIC OBSTRUCTIVE PULMONARY DISEASE, UNSPECIFIED COPD TYPE (H): ICD-10-CM

## 2019-12-02 NOTE — TELEPHONE ENCOUNTER
"Requested Prescriptions   Pending Prescriptions Disp Refills     fluticasone-salmeterol (AIRDUO RESPICLICK) 113-14 MCG/ACT inhaler       Si puff 2 times daily       Inhaled Steroids Protocol Failed - 2019  8:09 AM        Failed - Medication is active on med list        Passed - Patient is age 12 or older        Passed - Recent (12 mo) or future (30 days) visit within the authorizing provider's specialty     Patient has had an office visit with the authorizing provider or a provider within the authorizing providers department within the previous 12 mos or has a future within next 30 days. See \"Patient Info\" tab in inbasket, or \"Choose Columns\" in Meds & Orders section of the refill encounter.                Fluticasone-Salmeterol (AIRDUO RESPICLICK) 113-14 MCG/ACT AEPB inhaler (Discontinued)      Last Written Prescription Date:  18  Last Fill Quantity: 1,   # refills: 11  Last Office Visit: 10/11/19  Future Office visit:    Next 5 appointments (look out 90 days)    Anup 15, 2020  9:00 AM CST  Return Visit with Franky Beard MD  [facility]) [address] [place] [id number] [phone]           Routing refill request to provider for review/approval because:  Drug not active on patient's medication list    "

## 2019-12-04 RX ORDER — FLUTICASONE PROPIONATE AND SALMETEROL 113; 14 UG/1; UG/1
1 POWDER, METERED RESPIRATORY (INHALATION) 2 TIMES DAILY
Qty: 1 INHALER | Refills: 1 | Status: SHIPPED | OUTPATIENT
Start: 2019-12-04 | End: 2019-12-05

## 2019-12-04 NOTE — TELEPHONE ENCOUNTER
Routing refill request to provider for review/approval because:  Drug not on the [facility] refill protocol.    Radha Mcnally RN, [facility]

## 2019-12-05 ENCOUNTER — MYC MEDICAL ADVICE (OUTPATIENT)
Dept: FAMILY MEDICINE | Facility: CLINIC | Age: 64
End: 2019-12-05

## 2019-12-05 DIAGNOSIS — R20.0 NUMBNESS AND TINGLING OF BOTH FEET: ICD-10-CM

## 2019-12-05 DIAGNOSIS — Z91.09 ENVIRONMENTAL ALLERGIES: ICD-10-CM

## 2019-12-05 DIAGNOSIS — R20.2 NUMBNESS AND TINGLING OF BOTH FEET: ICD-10-CM

## 2019-12-05 NOTE — TELEPHONE ENCOUNTER
This writer attempted to contact Hanna on 12/05/19 by phone and [employer] message.       Reason for call clarify all the messages that have been on-going from today and left message.      If patient calls back:   Find out a time provider can call patient back and send back to provider.     Provider also let patient know via message that she does not work in clinic on Thursdays so may or may not be able to call her back today but for sure can call patient back tomorrow morning. IF today is the only time patient can talk, staff can call providers cell number to let provider know, which that number is located on the [place] staff list, a copy is pinned next to providers desk.         FARIBA Maloney, NP-C  [facility]

## 2019-12-05 NOTE — TELEPHONE ENCOUNTER
Pt responded in another encounter.    Hanna Campo   to Stephanie Sherman NP            12/5/19 12:55 PM   Tomorrow is fine anytime after 10am

## 2019-12-05 NOTE — TELEPHONE ENCOUNTER
Provider please see 2 -My Chart messages from patient today. RN has replied and patient is sending another message in response.     Shefali Danielle RN

## 2019-12-06 RX ORDER — HYDROCODONE BITARTRATE AND ACETAMINOPHEN 5; 325 MG/1; MG/1
1 TABLET ORAL 2 TIMES DAILY PRN
Qty: 60 TABLET | Refills: 0 | Status: SHIPPED | OUTPATIENT
Start: 2019-12-06 | End: 2020-01-05

## 2019-12-06 RX ORDER — FLUTICASONE PROPIONATE 50 MCG
1 SPRAY, SUSPENSION (ML) NASAL DAILY
Qty: 9.9 ML | Refills: 0 | COMMUNITY
Start: 2019-12-06 | End: 2020-01-14

## 2019-12-09 DIAGNOSIS — G60.9 HEREDITARY AND IDIOPATHIC PERIPHERAL NEUROPATHY: ICD-10-CM

## 2019-12-09 RX ORDER — DULOXETIN HYDROCHLORIDE 30 MG/1
CAPSULE, DELAYED RELEASE ORAL
Qty: 90 CAPSULE | Refills: 3 | Status: SHIPPED | OUTPATIENT
Start: 2019-12-09 | End: 2020-01-23

## 2019-12-18 ENCOUNTER — TELEPHONE (OUTPATIENT)
Dept: NEUROLOGY | Facility: CLINIC | Age: 64
End: 2019-12-18

## 2019-12-18 NOTE — TELEPHONE ENCOUNTER
Prior Authorization Retail Medication Request    Medication/Dose: Duloxetine 30 mg capsules  ICD code (if different than what is on RX):    Previously Tried and Failed:    Rationale:      Insurance Name:    Insurance ID:        Pharmacy Information (if different than what is on RX)  Name:    Phone:

## 2019-12-19 NOTE — TELEPHONE ENCOUNTER
PA Initiation    Medication: DULoxetine (CYMBALTA) 30 MG capsule - INITIATED  Insurance Company: [employer] - Phone [phone] Fax [phone]  Pharmacy Filling the Rx: [id number] - [place], [place] - [address] AT [facility] OF [address]  Filling Pharmacy Phone: [phone]  Filling Pharmacy Fax: [phone]  Start Date: 12/19/2019

## 2019-12-26 NOTE — TELEPHONE ENCOUNTER
Prior Authorization Approval    Authorization Effective Date: 11/19/2019  Authorization Expiration Date: 12/18/2022  Medication: DULoxetine (CYMBALTA) 30 MG capsule - APPROVED  Reference #: [id number]   Insurance Company: [employer] - Phone [phone] Fax [phone]  Which Pharmacy is filling the prescription (Not needed for infusion/clinic administered): [facility] #[id number] - [address] AT [facility] [address]  Pharmacy Notified: Yes  Patient Notified: Yes    Ernestina Rosenbaum PA Team

## 2020-01-03 ENCOUNTER — TELEPHONE (OUTPATIENT)
Dept: NEUROLOGY | Facility: CLINIC | Age: 65
End: 2020-01-03

## 2020-01-03 DIAGNOSIS — G60.9 IDIOPATHIC PERIPHERAL NEUROPATHY: Primary | ICD-10-CM

## 2020-01-03 NOTE — TELEPHONE ENCOUNTER
----- Message from Franky Beard MD sent at 1/3/2020 11:06 AM CST -----  Regarding: Pain Clinic referral  Yusra. I placed a referral to Pain Clinic at [facility]. Could you help her with this? Thanks DONNIE Beard

## 2020-01-03 NOTE — TELEPHONE ENCOUNTER
I called the patient and gave her the direct number to the pain clinic [phone] and transferred her to make appt.    Yusra Alvarez LPN

## 2020-01-05 ENCOUNTER — MYC REFILL (OUTPATIENT)
Dept: FAMILY MEDICINE | Facility: CLINIC | Age: 65
End: 2020-01-05

## 2020-01-05 ENCOUNTER — MYC MEDICAL ADVICE (OUTPATIENT)
Dept: FAMILY MEDICINE | Facility: CLINIC | Age: 65
End: 2020-01-05

## 2020-01-05 DIAGNOSIS — R20.0 NUMBNESS AND TINGLING OF BOTH FEET: ICD-10-CM

## 2020-01-05 DIAGNOSIS — R20.2 NUMBNESS AND TINGLING OF BOTH FEET: ICD-10-CM

## 2020-01-06 NOTE — TELEPHONE ENCOUNTER
Requested Prescriptions   Pending Prescriptions Disp Refills     HYDROcodone-acetaminophen (NORCO) 5-325 MG tablet 60 tablet 0     Sig: Take 1 tablet by mouth 2 times daily as needed for pain       There is no refill protocol information for this order          HYDROcodone-acetaminophen (NORCO) 5-325 MG tablet      Last Written Prescription Date:  12/6/19  Last Fill Quantity: 60,   # refills: 0  Last Office Visit: 10/11/19  Future Office visit:       Routing refill request to provider for review/approval because:  Drug not on the [facility], P or [facility] refill protocol or controlled substance

## 2020-01-07 NOTE — TELEPHONE ENCOUNTER
Patient sent the following [place] [employer] [place] message regarding refill and when she can get into the pain clinic:  Patient Comment: Dr. Beard took me off  cymbalta and referred me to a pain clinic. My 1-15-20 with Chirag is canceled and my appt at the pain clinic isn't until 2-26-20. I have 7 pills left.    Preferred pharmacy: [id number] - [place] [place] - [address] AT [facility] OF  [address]        Controlled Substance Refill Request for Norco  Problem List Complete:  No     PROVIDER TO CONSIDER COMPLETION OF PROBLEM LIST AND OVERVIEW/CONTROLLED SUBSTANCE AGREEMENT    Last Written Prescription Date:  12/6/19  Last Fill Quantity: 60 tablets   # refills: 0    THE MOST RECENT OFFICE VISIT MUST BE WITHIN THE PAST 3 MONTHS. AT LEAST ONE FACE TO FACE VISIT MUST OCCUR EVERY 6 MONTHS. ADDITIONAL VISITS CAN BE VIRTUAL.  (THIS STATEMENT SHOULD BE DELETED.)    Last Office Visit with [facility] primary care provider: 10/11/19    Future Office visit: None    Controlled substance agreement:   Encounter-Level CSA:    There are no encounter-level csa.     Patient-Level CSA:    There are no patient-level csa.         Last Urine Drug Screen: No results found for: CDAUT, No results found for: COMDAT, No results found for: THC13, PCP13, COC13, MAMP13, OPI13, AMP13, BZO13, TCA13, MTD13, BAR13, OXY13, PPX13, BUP13     Processing:  Rx to be electronically transmitted to pharmacy by provider      [email or web address]       checked in past 3 months?  No-problem list incomplete so  not checked.           Rupail Patterson RN, BSN, PHN

## 2020-01-08 RX ORDER — HYDROCODONE BITARTRATE AND ACETAMINOPHEN 5; 325 MG/1; MG/1
1 TABLET ORAL 2 TIMES DAILY PRN
Qty: 60 TABLET | Refills: 0 | Status: SHIPPED | OUTPATIENT
Start: 2020-01-08 | End: 2020-02-05

## 2020-01-23 ENCOUNTER — TELEPHONE (OUTPATIENT)
Dept: SCHEDULING | Facility: CLINIC | Age: 65
End: 2020-01-23

## 2020-01-23 ENCOUNTER — OFFICE VISIT (OUTPATIENT)
Dept: FAMILY MEDICINE | Facility: CLINIC | Age: 65
End: 2020-01-23
Payer: COMMERCIAL

## 2020-01-23 ENCOUNTER — ANCILLARY PROCEDURE (OUTPATIENT)
Dept: GENERAL RADIOLOGY | Facility: CLINIC | Age: 65
End: 2020-01-23
Attending: PHYSICIAN ASSISTANT
Payer: COMMERCIAL

## 2020-01-23 VITALS
RESPIRATION RATE: 20 BRPM | SYSTOLIC BLOOD PRESSURE: 128 MMHG | HEART RATE: 80 BPM | HEIGHT: 67 IN | WEIGHT: 213 LBS | OXYGEN SATURATION: 97 % | DIASTOLIC BLOOD PRESSURE: 82 MMHG | TEMPERATURE: 97.5 F | BODY MASS INDEX: 33.43 KG/M2

## 2020-01-23 DIAGNOSIS — Z12.11 SCREENING FOR COLON CANCER: ICD-10-CM

## 2020-01-23 DIAGNOSIS — R05.9 COUGH: ICD-10-CM

## 2020-01-23 DIAGNOSIS — J20.9 ACUTE BRONCHITIS, UNSPECIFIED ORGANISM: Primary | ICD-10-CM

## 2020-01-23 DIAGNOSIS — Z12.31 VISIT FOR SCREENING MAMMOGRAM: ICD-10-CM

## 2020-01-23 LAB
FLUAV+FLUBV AG SPEC QL: NEGATIVE
FLUAV+FLUBV AG SPEC QL: NEGATIVE
SPECIMEN SOURCE: NORMAL

## 2020-01-23 PROCEDURE — 71046 X-RAY EXAM CHEST 2 VIEWS: CPT | Mod: FY

## 2020-01-23 PROCEDURE — 99213 OFFICE O/P EST LOW 20 MIN: CPT | Performed by: PHYSICIAN ASSISTANT

## 2020-01-23 PROCEDURE — 87804 INFLUENZA ASSAY W/OPTIC: CPT | Performed by: PHYSICIAN ASSISTANT

## 2020-01-23 RX ORDER — CODEINE PHOSPHATE AND GUAIFENESIN 10; 100 MG/5ML; MG/5ML
1-2 SOLUTION ORAL EVERY 4 HOURS PRN
Qty: 250 ML | Refills: 0 | Status: SHIPPED | OUTPATIENT
Start: 2020-01-23 | End: 2020-01-27

## 2020-01-23 RX ORDER — DOXYCYCLINE HYCLATE 100 MG
100 TABLET ORAL 2 TIMES DAILY
Qty: 20 TABLET | Refills: 0 | Status: SHIPPED | OUTPATIENT
Start: 2020-01-23 | End: 2020-03-05

## 2020-01-23 RX ORDER — PREDNISONE 20 MG/1
40 TABLET ORAL DAILY
Qty: 10 TABLET | Refills: 0 | Status: SHIPPED | OUTPATIENT
Start: 2020-01-23 | End: 2020-03-05

## 2020-01-23 ASSESSMENT — MIFFLIN-ST. JEOR: SCORE: 1553.79

## 2020-01-23 NOTE — TELEPHONE ENCOUNTER
1/23/2020    Call Regarding Preventive Health Screening Colonoscopy and Mammogram and ReattributionPhysical       Attempt 2    Message on voicemail    Comments:       Outreach   GB

## 2020-01-23 NOTE — PROGRESS NOTES
Subjective     Hanna Cmapo is a 65 year old female who presents to clinic today for the following health issues:    HPI   Acute Illness   Acute illness concerns: Cough   Onset: Last Thursday     Fever: no, but ran a fever for 4 days      Chills/Sweats: YES    Headache (location?): YES    Sinus Pressure:YES    Conjunctivitis:  no    Ear Pain: YES: right    Rhinorrhea: YES    Congestion: YES    Sore Throat: YES     Cough: YES-productive of clear sputum    Wheeze: YES    Decreased Appetite: YES    Nausea: no     Vomiting: no     Diarrhea:  YES    Dysuria/Freq.: no     Fatigue/Achiness: YES    Sick/Strep Exposure: YES     Therapies Tried and outcome: Tylenol and Mucinexand coricidin without  relief - OTC; not resolved   Cough started one week ago.  Fever First four days higest 102 and temperature went down on fourth day    Shortness of breath and dizziness - not able to do anything due to cough   Smoked as kid for 3 yrs     2015 had double bronchitis- also history of pneumonia in past        Patient Active Problem List   Diagnosis     Benign essential hypertension     Hypothyroidism, unspecified type     Environmental allergies     Obesity (BMI 35.0-39.9) with comorbidity (H)     H/O wheezing     Past Surgical History:   Procedure Laterality Date     C LAP,APPENDIX UNLISTED PROCED       FOOT SURGERY       MAMMOPLASTY REDUCTION BILATERAL       SURGERY GENERAL IP CONSULT      cervical bone fusion, anterior approach     TUBAL LIGATION         Social History     Tobacco Use     Smoking status: Former Smoker     Smokeless tobacco: Never Used   Substance Use Topics     Alcohol use: Yes     Family History   Problem Relation Age of Onset     Hypertension Mother      Anxiety Disorder Mother      Skin Cancer Father      Lung Cancer Father         smoker     Anxiety Disorder Daughter      Diabetes No family hx of      Colon Cancer No family hx of      Breast Cancer No family hx of          Current Outpatient Medications  "  Medication Sig Dispense Refill     Cetirizine HCl (ZYRTEC ALLERGY PO) Take 5 mg by mouth daily       chlorthalidone (HYGROTON) 50 MG tablet Take 1 tablet (50 mg) by mouth daily 90 tablet 2                   HYDROcodone-acetaminophen (NORCO) 5-325 MG tablet Take 1 tablet by mouth 2 times daily as needed for pain 60 tablet 0     levothyroxine (SYNTHROID/LEVOTHROID) 100 MCG tablet Take 1 tablet (100 mcg) by mouth daily Take with 12.5 mcg daily to = 112 mcg daily 90 tablet 2     levothyroxine (SYNTHROID/LEVOTHROID) 25 MCG tablet Take 0.5 tablets (12.5 mcg) by mouth daily Take with 100 mcg to = 112.5 mcg daily 45 tablet 2     metoprolol succinate ER (TOPROL-XL) 50 MG 24 hr tablet Take 1 tablet (50 mg) by mouth daily 90 tablet 2            vitamin B complex with vitamin C (VITAMIN  B COMPLEX) tablet Take 1 tablet by mouth daily  0     VITAMIN D, CHOLECALCIFEROL, PO Take 5,000 Units by mouth daily       BP Readings from Last 3 Encounters:   01/23/20 128/82   10/16/19 (!) 142/84   10/11/19 (!) 152/82    Wt Readings from Last 3 Encounters:   01/23/20 96.6 kg (213 lb)   10/16/19 102.1 kg (225 lb 1.6 oz)   10/11/19 102 kg (224 lb 14.4 oz)                      Reviewed and updated as needed this visit by Provider  Tobacco  Allergies  Meds  Problems  Med Hx  Surg Hx  Fam Hx  Soc Hx          Review of Systems   ROS COMP: Constitutional, HEENT, cardiovascular, pulmonary, gi and gu systems are negative, except as otherwise noted.      Objective    /82 (BP Location: Right arm)   Pulse 80   Temp 97.5  F (36.4  C) (Oral)   Resp 20   Ht 1.702 m (5' 7\")   Wt 96.6 kg (213 lb)   SpO2 97%   BMI 33.36 kg/m    Body mass index is 33.36 kg/m .  Physical Exam   GENERAL: alert, no distress, obese and no distress but looking very fatigued and ill but not toxic appearing - spasmodic dry cough  EYES: Eyes grossly normal to inspection, PERRL and conjunctivae and sclerae normal  HENT: ear canals and TM's normal, nose and mouth " without ulcers or lesions  NECK: no adenopathy, no asymmetry, masses, or scars and thyroid normal to palpation  RESP: lungs clear to auscultation - no rales, rhonchi or wheezes  CV: regular rate and rhythm, normal S1 S2, no S3 or S4, no murmur, click or rub, no peripheral edema and peripheral pulses strong  MS: no gross musculoskeletal defects noted, no edema    Diagnostic Test Results:  Results for orders placed or performed in visit on 01/23/20 (from the past 24 hour(s))   Influenza A/B antigen   Result Value Ref Range    Influenza A/B Agn Specimen Nasal     Influenza A Negative NEG^Negative    Influenza B Negative NEG^Negative     CXR - no evidence of pneumonia         Assessment & Plan     1. Acute bronchitis, unspecified organism  Xray without evidence of pneumonia.  Influenza testing negative. Lungs are clear to auscultation Will treat with burst of prednisone and antibiotic (doxycycline) - robitussin with codeine as needed for cough.  Warning signs and symptoms warranting urgent evaluation reviewed.   - predniSONE (DELTASONE) 20 MG tablet; Take 2 tablets (40 mg) by mouth daily for 5 days  Dispense: 10 tablet; Refill: 0  - doxycycline hyclate (VIBRA-TABS) 100 MG tablet; Take 1 tablet (100 mg) by mouth 2 times daily  Dispense: 20 tablet; Refill: 0  - guaiFENesin-codeine (ROBITUSSIN AC) 100-10 MG/5ML solution; Take 5-10 mLs by mouth every 4 hours as needed for cough  Dispense: 250 mL; Refill: 0    2. Cough    - Influenza A/B antigen  - XR Chest 2 Views; Future  - guaiFENesin-codeine (ROBITUSSIN AC) 100-10 MG/5ML solution; Take 5-10 mLs by mouth every 4 hours as needed for cough  Dispense: 250 mL; Refill: 0    3. Visit for screening mammogram  Encouraged to schedule mammogram   - MA SCREENING DIGITAL BILAT - Future  (s+30); Future    4. Screening for colon cancer  Encouraged to schedule colonoscopy   - GASTROENTEROLOGY ADULT REF PROCEDURE ONLY [facility] [phone]; No Provider Preference            Patient Instructions     Schedule colonoscopy and mammogram at [facility] ([phone]).      Take doxycycline twice a day for 10 days  Take prednisone daily for 5 days   Take robitussin with codeine as needed every 4 hours as needed for cough. Do not drive after taking.   Follow up with us if no improvement in symptoms over the next 5-7 days  Return urgently if any change in symptoms like recurrence of fever, shortness of breath, increasing cough or other change in symptoms     NON PRESCRIPTION TREATMENT    Mucinex 600 mg 2 tabs twice a day   Increase humidity to 30-40% in bedroom at night - vaporizer  Avoid decongestant  Saline nasal spray as needed  Increase fluid intake  Benadryl 25mg 1/2 - 1 hour before bed time  Maintain 8 hr minimum of sleep at night  Robitussin DM cough gels for cough      At [facility], we strive to deliver an exceptional experience to you, every time we see you. If you receive a survey, please complete it as we do value your feedback.  If you have MyChart, you can expect to receive results automatically within 24 hours of their completion.  Your provider will send a note interpreting your results as well.   If you do not have MyChart, you should receive your results in about a week by mail.    Your care team:     Family Medicine   BALJINDER Rivas MD Emily Bunt, APRN CNP S. MD Yasmeen Vizcaino MD Angela Wermerskirchen, MD    Internal Medicine  Michael Dang MD coming 2020     Clinic hours: Monday - Wednesday 7 am-7 pm   Thursdays and Fridays 7 am-5 pm.     [facility]: Monday - Friday 11 am-9 pm,   Saturday and Sunday 9 am-5 pm.    [facility]: Monday -Thursday 8 am-8 pm; Friday 8 am-6 pm; Saturday and Sunday 9 am-5 pm.     [facility]: Monday - Thursday 8 am - 7 pm; Friday 8 am - 6 pm    Clinic: [phone]   [facility]  [facility]: [phone]   [facility]: [phone]                Return in about 5 days (around 1/28/2020), or if symptoms worsen or fail to improve.    Ruth Ochoa PA-C  [facility]

## 2020-01-23 NOTE — RESULT ENCOUNTER NOTE
Tita Ferreira  The radiologist did not see a pneumonia either   Please call or MyChart my office with any questions or concerns.    Ruth Ochoa, PAC

## 2020-01-23 NOTE — PATIENT INSTRUCTIONS
Schedule colonoscopy and mammogram at [facility] ([phone]).      Take doxycycline twice a day for 10 days  Take prednisone daily for 5 days   Take robitussin with codeine as needed every 4 hours as needed for cough. Do not drive after taking.   Follow up with us if no improvement in symptoms over the next 5-7 days  Return urgently if any change in symptoms like recurrence of fever, shortness of breath, increasing cough or other change in symptoms     NON PRESCRIPTION TREATMENT    Mucinex 600 mg 2 tabs twice a day   Increase humidity to 30-40% in bedroom at night - vaporizer  Avoid decongestant  Saline nasal spray as needed  Increase fluid intake  Benadryl 25mg 1/2 - 1 hour before bed time  Maintain 8 hr minimum of sleep at night  Robitussin DM cough gels for cough      At [facility], we strive to deliver an exceptional experience to you, every time we see you. If you receive a survey, please complete it as we do value your feedback.  If you have MyChart, you can expect to receive results automatically within 24 hours of their completion.  Your provider will send a note interpreting your results as well.   If you do not have MyChart, you should receive your results in about a week by mail.    Your care team:     Family Medicine   BALJINDER Rivas MD Emily Bunt, APRN CNP   S. MD Yasmeen Vizcaino MD Angela Wermerskirchen, MD    Internal Medicine  Michael Dang MD coming 2020     Clinic hours: Monday - Wednesday 7 am-7 pm   Thursdays and Fridays 7 am-5 pm.     [facility]: Monday - Friday 11 am-9 pm,   Saturday and Sunday 9 am-5 pm.    [facility]: Monday -Thursday 8 am-8 pm; Friday 8 am-6 pm; Saturday and Sunday 9 am-5 pm.     [facility]: Monday - Thursday 8 am - 7 pm; Friday 8 am - 6 pm    Clinic: [phone]   [facility]: [phone]   [facility]: [phone]

## 2020-01-26 ENCOUNTER — MYC MEDICAL ADVICE (OUTPATIENT)
Dept: FAMILY MEDICINE | Facility: CLINIC | Age: 65
End: 2020-01-26

## 2020-01-26 DIAGNOSIS — R05.9 COUGH: ICD-10-CM

## 2020-01-26 DIAGNOSIS — J20.9 ACUTE BRONCHITIS, UNSPECIFIED ORGANISM: ICD-10-CM

## 2020-01-27 RX ORDER — CODEINE PHOSPHATE AND GUAIFENESIN 10; 100 MG/5ML; MG/5ML
1-2 SOLUTION ORAL EVERY 4 HOURS PRN
Qty: 250 ML | Refills: 0 | Status: SHIPPED | OUTPATIENT
Start: 2020-01-27 | End: 2020-03-05

## 2020-01-27 RX ORDER — LEVOFLOXACIN 500 MG/1
500 TABLET, FILM COATED ORAL DAILY
Qty: 10 TABLET | Refills: 0 | Status: SHIPPED | OUTPATIENT
Start: 2020-01-27 | End: 2020-03-05

## 2020-01-27 NOTE — TELEPHONE ENCOUNTER
Routing to provider to advise; patient was seen in clinic 1/23/20.      Rupali Patterson RN, BSN, PHN

## 2020-01-31 DIAGNOSIS — R19.7 DIARRHEA, UNSPECIFIED TYPE: ICD-10-CM

## 2020-01-31 LAB
C COLI+JEJUNI+LARI FUSA STL QL NAA+PROBE: NOT DETECTED
C DIFF TOX B STL QL: NEGATIVE
EC STX1 GENE STL QL NAA+PROBE: NOT DETECTED
EC STX2 GENE STL QL NAA+PROBE: NOT DETECTED
ENTERIC PATHOGEN COMMENT: NORMAL
NOROV GI+II ORF1-ORF2 JNC STL QL NAA+PR: NOT DETECTED
RVA NSP5 STL QL NAA+PROBE: NOT DETECTED
SALMONELLA SP RPOD STL QL NAA+PROBE: NOT DETECTED
SHIGELLA SP+EIEC IPAH STL QL NAA+PROBE: NOT DETECTED
SPECIMEN SOURCE: NORMAL
V CHOL+PARA RFBL+TRKH+TNAA STL QL NAA+PR: NOT DETECTED
Y ENTERO RECN STL QL NAA+PROBE: NOT DETECTED

## 2020-01-31 PROCEDURE — 87493 C DIFF AMPLIFIED PROBE: CPT | Mod: 59 | Performed by: PHYSICIAN ASSISTANT

## 2020-01-31 PROCEDURE — 87506 IADNA-DNA/RNA PROBE TQ 6-11: CPT | Performed by: PHYSICIAN ASSISTANT

## 2020-01-31 NOTE — RESULT ENCOUNTER NOTE
Tita Ferreira  Your clostridium difficile test was negative. (this is a type of bacteria that you can develop with antibiotic use).  Your stool culture is still pending.   Let me know how you are doing.   Please call or MyChart my office with any questions or concerns.    Ruth Ochoa, PAC

## 2020-02-01 NOTE — RESULT ENCOUNTER NOTE
Tita Ferreira  Your stool cultures were negative.   Please call or MyChart my office with any questions or concerns.    Ruth Ochoa, PAC

## 2020-02-05 ENCOUNTER — OFFICE VISIT (OUTPATIENT)
Dept: FAMILY MEDICINE | Facility: CLINIC | Age: 65
End: 2020-02-05
Payer: COMMERCIAL

## 2020-02-05 ENCOUNTER — MYC MEDICAL ADVICE (OUTPATIENT)
Dept: FAMILY MEDICINE | Facility: CLINIC | Age: 65
End: 2020-02-05

## 2020-02-05 VITALS
TEMPERATURE: 97.4 F | HEART RATE: 87 BPM | BODY MASS INDEX: 33.43 KG/M2 | OXYGEN SATURATION: 96 % | SYSTOLIC BLOOD PRESSURE: 116 MMHG | HEIGHT: 67 IN | WEIGHT: 213 LBS | RESPIRATION RATE: 18 BRPM | DIASTOLIC BLOOD PRESSURE: 82 MMHG

## 2020-02-05 DIAGNOSIS — R20.2 NUMBNESS AND TINGLING OF BOTH FEET: ICD-10-CM

## 2020-02-05 DIAGNOSIS — I10 BENIGN ESSENTIAL HYPERTENSION: ICD-10-CM

## 2020-02-05 DIAGNOSIS — G89.29 OTHER CHRONIC PAIN: Primary | ICD-10-CM

## 2020-02-05 DIAGNOSIS — Z12.31 ENCOUNTER FOR SCREENING MAMMOGRAM FOR BREAST CANCER: ICD-10-CM

## 2020-02-05 DIAGNOSIS — E03.9 HYPOTHYROIDISM, UNSPECIFIED TYPE: ICD-10-CM

## 2020-02-05 DIAGNOSIS — M79.671 PAIN IN BOTH FEET: ICD-10-CM

## 2020-02-05 DIAGNOSIS — R20.0 NUMBNESS AND TINGLING OF BOTH FEET: ICD-10-CM

## 2020-02-05 DIAGNOSIS — M79.672 PAIN IN BOTH FEET: ICD-10-CM

## 2020-02-05 LAB
ALBUMIN SERPL-MCNC: 3.1 G/DL (ref 3.4–5)
ALP SERPL-CCNC: 90 U/L (ref 40–150)
ALT SERPL W P-5'-P-CCNC: 30 U/L (ref 0–50)
ANION GAP SERPL CALCULATED.3IONS-SCNC: 6 MMOL/L (ref 3–14)
AST SERPL W P-5'-P-CCNC: 29 U/L (ref 0–45)
BILIRUB SERPL-MCNC: 0.5 MG/DL (ref 0.2–1.3)
BUN SERPL-MCNC: 7 MG/DL (ref 7–30)
CALCIUM SERPL-MCNC: 9.3 MG/DL (ref 8.5–10.1)
CHLORIDE SERPL-SCNC: 97 MMOL/L (ref 94–109)
CO2 SERPL-SCNC: 35 MMOL/L (ref 20–32)
CREAT SERPL-MCNC: 0.62 MG/DL (ref 0.52–1.04)
GFR SERPL CREATININE-BSD FRML MDRD: >90 ML/MIN/{1.73_M2}
GLUCOSE SERPL-MCNC: 76 MG/DL (ref 70–99)
POTASSIUM SERPL-SCNC: 3.6 MMOL/L (ref 3.4–5.3)
PROT SERPL-MCNC: 7.3 G/DL (ref 6.8–8.8)
SODIUM SERPL-SCNC: 138 MMOL/L (ref 133–144)
T4 FREE SERPL-MCNC: 1.48 NG/DL (ref 0.76–1.46)
TSH SERPL DL<=0.005 MIU/L-ACNC: 0.26 MU/L (ref 0.4–4)

## 2020-02-05 PROCEDURE — 84443 ASSAY THYROID STIM HORMONE: CPT | Performed by: NURSE PRACTITIONER

## 2020-02-05 PROCEDURE — 80307 DRUG TEST PRSMV CHEM ANLYZR: CPT | Mod: 90 | Performed by: NURSE PRACTITIONER

## 2020-02-05 PROCEDURE — 99000 SPECIMEN HANDLING OFFICE-LAB: CPT | Performed by: NURSE PRACTITIONER

## 2020-02-05 PROCEDURE — 36415 COLL VENOUS BLD VENIPUNCTURE: CPT | Performed by: NURSE PRACTITIONER

## 2020-02-05 PROCEDURE — 84439 ASSAY OF FREE THYROXINE: CPT | Performed by: NURSE PRACTITIONER

## 2020-02-05 PROCEDURE — 99214 OFFICE O/P EST MOD 30 MIN: CPT | Performed by: NURSE PRACTITIONER

## 2020-02-05 PROCEDURE — 80053 COMPREHEN METABOLIC PANEL: CPT | Performed by: NURSE PRACTITIONER

## 2020-02-05 RX ORDER — HYDROCODONE BITARTRATE AND ACETAMINOPHEN 5; 325 MG/1; MG/1
1 TABLET ORAL 3 TIMES DAILY PRN
Qty: 60 TABLET | Refills: 0 | Status: SHIPPED | OUTPATIENT
Start: 2020-02-05 | End: 2020-02-19

## 2020-02-05 ASSESSMENT — MIFFLIN-ST. JEOR: SCORE: 1553.79

## 2020-02-05 NOTE — PROGRESS NOTES
Subjective     Hanna Campo is a 65 year old female who presents to clinic today for the following health issues:    HPI   Chronic Pain Follow-Up       Type / Location of Pain: Feet   Analgesia/pain control:       Recent changes:  worse      Overall control: Inadequate pain control  Activity level/function:      Daily activities:  Unable to perform most daily activities - chores, hobbies, social activities, driving    Work:  not applicable  Adverse effects:  No  Adherance    Taking medication as directed?  Yes    Participating in other treatments: Yes, going to see pain clinic   Risk Factors:    Sleep:  Poor    Mood/anxiety:  worsened    Recent family or social stressors:  Yes     Other aggravating factors: Everything   No flowsheet data found.  No flowsheet data found.  Encounter-Level CSA:    There are no encounter-level csa.     Patient-Level CSA:    There are no patient-level csa.            Side effects of gabapentin and cymbalta plus it wasn't helping her pain so those were stopped. Pain clinic visit is 2020.  We talked about potentially filling out a controlled substance agreement, but if patient is going to see pain clinic, and if they will prescribe narcotics I will not have patient sign that at this time.  She will leave a urine sample for drug testing.  Advised the pain clinic will likely have her leave on again in a couple weeks.  She has unknown neuropathy, has been to multiple specialties.  She states that she used to be on chronic high-dose narcotics and methadone many many years ago, and so it is possible she has higher drug tolerance than others.  She also states she is very low pain tolerance.  Advised that provider is willing to fill narcotic until she sees pain clinic, it is not ideal to leave her on the narcotics for her pain.    Patient is from [place], they wont transfer records because they are restricted state.     Cousin pancratic cancer but not blood relative .     Mid-shin.  Feet are numb now. signficant pain. Been to multiple specailties. Needs pain clinic consult at this time.     Patient needs refill of her levothyroxine and her thyroid checked.  Blood pressure stable although her pain is likely contributing to being slightly elevated at times        Patient Active Problem List   Diagnosis     Benign essential hypertension     Hypothyroidism, unspecified type     Environmental allergies     Obesity (BMI 35.0-39.9) with comorbidity (H)     H/O wheezing     Other chronic pain     Pain in both feet     Past Surgical History:   Procedure Laterality Date     C LAP,APPENDIX UNLISTED PROCED       FOOT SURGERY       MAMMOPLASTY REDUCTION BILATERAL       SURGERY GENERAL IP CONSULT      cervical bone fusion, anterior approach     TUBAL LIGATION         Social History     Tobacco Use     Smoking status: Former Smoker     Smokeless tobacco: Never Used   Substance Use Topics     Alcohol use: Yes     Family History   Problem Relation Age of Onset     Hypertension Mother      Anxiety Disorder Mother      Skin Cancer Father      Lung Cancer Father         smoker     Anxiety Disorder Daughter      Diabetes No family hx of      Colon Cancer No family hx of      Breast Cancer No family hx of          Current Outpatient Medications   Medication Sig Dispense Refill     Cetirizine HCl (ZYRTEC ALLERGY PO) Take 5 mg by mouth daily       chlorthalidone (HYGROTON) 50 MG tablet Take 1 tablet (50 mg) by mouth daily 90 tablet 2     HYDROcodone-acetaminophen (NORCO) 5-325 MG tablet Take 1 tablet by mouth 3 times daily as needed for pain or moderate to severe pain 60 tablet 0     levofloxacin (LEVAQUIN) 500 MG tablet Take 1 tablet (500 mg) by mouth daily 10 tablet 0     levothyroxine (SYNTHROID/LEVOTHROID) 100 MCG tablet Take 1 tablet (100 mcg) by mouth daily Take with 12.5 mcg daily to = 112 mcg daily 90 tablet 2     levothyroxine (SYNTHROID/LEVOTHROID) 25 MCG tablet Take 0.5 tablets (12.5 mcg) by mouth daily  "Take with 100 mcg to = 112.5 mcg daily 45 tablet 2     metoprolol succinate ER (TOPROL-XL) 50 MG 24 hr tablet Take 1 tablet (50 mg) by mouth daily 90 tablet 2     vitamin B complex with vitamin C (VITAMIN  B COMPLEX) tablet Take 1 tablet by mouth daily  0     VITAMIN D, CHOLECALCIFEROL, PO Take 5,000 Units by mouth daily       doxycycline hyclate (VIBRA-TABS) 100 MG tablet Take 1 tablet (100 mg) by mouth 2 times daily (Patient not taking: Reported on 2/5/2020) 20 tablet 0     guaiFENesin-codeine (ROBITUSSIN AC) 100-10 MG/5ML solution Take 5-10 mLs by mouth every 4 hours as needed for cough (Patient not taking: Reported on 2/5/2020) 250 mL 0     No Known Allergies    Reviewed and updated as needed this visit by Provider  Tobacco  Allergies  Meds  Problems  Med Hx  Surg Hx  Fam Hx         Review of Systems   ROS COMP: Constitutional, HEENT, cardiovascular, pulmonary, gi and gu neuro as above, systems are negative, except as otherwise noted.      Objective    /82 (BP Location: Right arm)   Pulse 87   Temp 97.4  F (36.3  C) (Oral)   Resp 18   Ht 1.702 m (5' 7\")   Wt 96.6 kg (213 lb)   SpO2 96%   BMI 33.36 kg/m    Body mass index is 33.36 kg/m .  Physical Exam   GENERAL: healthy, alert and no distress  EYES: Eyes grossly normal to inspection, PERRL and conjunctivae and sclerae normal  HENT: ear canals and TM's normal, nose and mouth without ulcers or lesions  NECK: no adenopathy, no asymmetry, masses, or scars and thyroid normal to palpation  RESP: lungs clear to auscultation - no rales, rhonchi or wheezes  CV: regular rate and rhythm, normal S1 S2, no S3 or S4, no murmur, click or rub  MS: no gross musculoskeletal defects noted  NEURO: Normal strength and tone, mentation intact and speech normal, numb and tingling in legs from mid-shin to feet. Feet seem to be always numb, and are painful when squeezed too hard    Diagnostic Test Results:  Labs reviewed in Epic  No results found for this or any " "previous visit (from the past 24 hour(s)).        Assessment & Plan     1. Other chronic pain  We will give a refill of the Norco and increase to 1 pill 3 times a day as needed for pain.  She will follow-up with the pain clinic in about 2-1/2 weeks.  The goal is to have pain clinic take over prescribing narcotics or manage her chronic feet pain.  Did not sign a CSA today, as that she will likely sign 1 with the pain clinic  - HYDROcodone-acetaminophen (NORCO) 5-325 MG tablet; Take 1 tablet by mouth 3 times daily as needed for pain or moderate to severe pain  Dispense: 60 tablet; Refill: 0  - Comprehensive Drug Analysis, Urine    2. Pain in both feet/3. Numbness and tingling of both feet  For chronic pain.  See above  - HYDROcodone-acetaminophen (NORCO) 5-325 MG tablet; Take 1 tablet by mouth 3 times daily as needed for pain or moderate to severe pain  Dispense: 60 tablet; Refill: 0    4. Encounter for screening mammogram for breast cancer  Due for mammogram will get one done  - *MA Screening Digital Bilateral; Future    5. Hypothyroidism, unspecified type  Due for thyroid screening  - TSH with free T4 reflex    Addend: Send patient results message, decrease levothyroxine 200 MCG's daily.  TSH and T4 are both abnormal.  Recommend recheck in about 8 weeks.  Okay for lab only at that time.    6. Benign essential hypertension  Screening  - Comprehensive metabolic panel (BMP + Alb, Alk Phos, ALT, AST, Total. Bili, TP)     BMI:   Estimated body mass index is 33.36 kg/m  as calculated from the following:    Height as of this encounter: 1.702 m (5' 7\").    Weight as of this encounter: 96.6 kg (213 lb).   Weight management plan: Discussed healthy diet and exercise guidelines        Return in about 4 weeks (around 3/4/2020), or if symptoms worsen or fail to improve.    Follow-up is really based upon pain clinic recommendations    FARIBA Maloney, NP-C  [facility]    This chart was documented by provider using " a voice activated software called Dragon in addition to manual typing. There may be vocabulary errors or other grammatical errors due to this.

## 2020-02-05 NOTE — PATIENT INSTRUCTIONS
[phone] for mammogram    At [facility], we strive to deliver an exceptional experience to you, every time we see you. If you receive a survey, please complete it as we do value your feedback.  If you have MyChart, you can expect to receive results automatically within 24 hours of their completion.  Your provider will send a note interpreting your results as well.   If you do not have MyChart, you should receive your results in about a week by mail.    Your care team:     Family Medicine   BALJINDER Rivas MD Emily Bunt, APRN CNP   S. MD Yasmeen Vizcaino MD Angela Wermerskirchen, MD    Internal Medicine  Michael Dang MD coming 2020     Clinic hours: Monday - Wednesday 7 am-7 pm   Thursdays and Fridays 7 am-5 pm.     [facility]: Monday - Friday 11 am-9 pm,   Saturday and Sunday 9 am-5 pm.    [facility]: Monday -Thursday 8 am-8 pm; Friday 8 am-6 pm; Saturday and Sunday 9 am-5 pm.     [facility]: Monday - Thursday 8 am - 7 pm; Friday 8 am - 6 pm    Clinic: [phone]   [facility]: [phone] m [facility]: [phone]

## 2020-02-06 RX ORDER — LEVOTHYROXINE SODIUM 100 UG/1
100 TABLET ORAL DAILY
Qty: 90 TABLET | Refills: 0 | Status: SHIPPED | OUTPATIENT
Start: 2020-02-06 | End: 2020-08-11

## 2020-02-06 NOTE — TELEPHONE ENCOUNTER
Patient has questions via My Chart on labs done yesterday.    Provider please review and advise.    Shefali Danielle RN

## 2020-02-08 LAB — COMPREHEN DRUG ANALYSIS UR: NORMAL

## 2020-02-12 ASSESSMENT — ENCOUNTER SYMPTOMS
SORE THROAT: 0
SMELL DISTURBANCE: 0
COUGH DISTURBING SLEEP: 0
HEADACHES: 0
BOWEL INCONTINENCE: 0
VOMITING: 0
SINUS PAIN: 0
TINGLING: 1
DIZZINESS: 0
WHEEZING: 0
POLYPHAGIA: 0
HOARSE VOICE: 0
DYSPNEA ON EXERTION: 0
SHORTNESS OF BREATH: 0
SNORES LOUDLY: 1
HEMOPTYSIS: 0
TASTE DISTURBANCE: 0
NUMBNESS: 1
HEARTBURN: 0
SPEECH CHANGE: 0
CONSTIPATION: 0
WEIGHT GAIN: 0
DIARRHEA: 0
CHILLS: 0
WEAKNESS: 0
SPUTUM PRODUCTION: 0
BLOATING: 0
FEVER: 0
ALTERED TEMPERATURE REGULATION: 0
BLOOD IN STOOL: 0
POLYDIPSIA: 0
PARALYSIS: 0
RECTAL PAIN: 0
NECK MASS: 0
POSTURAL DYSPNEA: 0
DISTURBANCES IN COORDINATION: 0
LOSS OF CONSCIOUSNESS: 0
TROUBLE SWALLOWING: 0
INCREASED ENERGY: 0
NIGHT SWEATS: 1
FATIGUE: 0
NAUSEA: 0
JAUNDICE: 0
COUGH: 0
WEIGHT LOSS: 0
MEMORY LOSS: 0
TREMORS: 0
ABDOMINAL PAIN: 0
HALLUCINATIONS: 0
SEIZURES: 0
SINUS CONGESTION: 0
DECREASED APPETITE: 0

## 2020-02-12 ASSESSMENT — ANXIETY QUESTIONNAIRES
7. FEELING AFRAID AS IF SOMETHING AWFUL MIGHT HAPPEN: NOT AT ALL
2. NOT BEING ABLE TO STOP OR CONTROL WORRYING: NOT AT ALL
5. BEING SO RESTLESS THAT IT IS HARD TO SIT STILL: NOT AT ALL
7. FEELING AFRAID AS IF SOMETHING AWFUL MIGHT HAPPEN: NOT AT ALL
GAD7 TOTAL SCORE: 0
GAD7 TOTAL SCORE: 0
4. TROUBLE RELAXING: NOT AT ALL
1. FEELING NERVOUS, ANXIOUS, OR ON EDGE: NOT AT ALL
3. WORRYING TOO MUCH ABOUT DIFFERENT THINGS: NOT AT ALL
6. BECOMING EASILY ANNOYED OR IRRITABLE: NOT AT ALL

## 2020-02-13 ENCOUNTER — MYC MEDICAL ADVICE (OUTPATIENT)
Dept: FAMILY MEDICINE | Facility: CLINIC | Age: 65
End: 2020-02-13

## 2020-02-13 ASSESSMENT — ANXIETY QUESTIONNAIRES: GAD7 TOTAL SCORE: 0

## 2020-02-19 ENCOUNTER — MYC REFILL (OUTPATIENT)
Dept: FAMILY MEDICINE | Facility: CLINIC | Age: 65
End: 2020-02-19

## 2020-02-19 DIAGNOSIS — R20.2 NUMBNESS AND TINGLING OF BOTH FEET: ICD-10-CM

## 2020-02-19 DIAGNOSIS — G89.29 OTHER CHRONIC PAIN: ICD-10-CM

## 2020-02-19 DIAGNOSIS — R20.0 NUMBNESS AND TINGLING OF BOTH FEET: ICD-10-CM

## 2020-02-19 NOTE — TELEPHONE ENCOUNTER
Requested Prescriptions   Pending Prescriptions Disp Refills     HYDROcodone-acetaminophen 5-325 MG PO tablet 60 tablet 0     Sig: Take 1 tablet by mouth 3 times daily as needed for pain or moderate to severe pain       There is no refill protocol information for this order          HYDROcodone-acetaminophen 5-325 MG PO tablet      Last Written Prescription Date:  2/5/2020  Last Fill Quantity: 60,   # refills: 0  Last Office Visit: 2/5/2020  Future Office visit:       Routing refill request to provider for review/approval because:  Drug not on the [facility], P or [facility] refill protocol or controlled substance

## 2020-02-20 RX ORDER — HYDROCODONE BITARTRATE AND ACETAMINOPHEN 5; 325 MG/1; MG/1
1 TABLET ORAL 3 TIMES DAILY PRN
Qty: 30 TABLET | Refills: 0 | Status: SHIPPED | OUTPATIENT
Start: 2020-02-20 | End: 2020-03-01

## 2020-02-20 NOTE — TELEPHONE ENCOUNTER
**See patient MyChart message below regarding request for increase due to uncontrolled pain.    Controlled Substance Refill Request for Norco  Problem List Complete:    No     PROVIDER TO CONSIDER COMPLETION OF PROBLEM LIST AND OVERVIEW/CONTROLLED SUBSTANCE AGREEMENT    Last Written Prescription Date:  2/5/20  Last Fill Quantity: 60,   # refills: 0    THE MOST RECENT OFFICE VISIT MUST BE WITHIN THE PAST 3 MONTHS. AT LEAST ONE FACE TO FACE VISIT MUST OCCUR EVERY 6 MONTHS. ADDITIONAL VISITS CAN BE VIRTUAL.  (THIS STATEMENT SHOULD BE DELETED.)    [facility] Office Visit with [facility] care provider: 2/5/20    Future Office visit:     Controlled substance agreement:   Encounter-Level CSA:    There are no encounter-level csa.     Patient-Level CSA:    There are no patient-level csa.         Last Urine Drug Screen: No results found for: Bret WILLIAMSON Drug Analysis UR   Date Value Ref Range Status   02/05/2020 FINAL  Final     Comment:     (Note)  ====================================================================  COMPREHENSIVE DRUG ANALYSIS,UR  ====================================================================  Test                             Result       Flag       Units        Drug Present   Codeine                        >3257                   ng/mg creat   Morphine                       517                     ng/mg creat   Normorphine                    265                     ng/mg creat   Norcodeine                     1065                    ng/mg creat    Sources of codeine include scheduled prescription medications;    morphine is an expected metabolite of codeine. Other sources of    morphine include scheduled prescription medications or as a    metabolite of heroin.    Normorphine is an expected metabolite of morphine.    Norcodeine is an expected metabolite of codeine.   Hydrocodone                    183                     ng/mg creat   Hydromorphone                  36                      ng/mg  creat   Dihydrocodeine                 128                     ng/mg creat   Norhydrocodone                 331                     ng/mg creat    Hydrocodone and its subsequent metabolites hydromorphone,    dihydrocodeine, and norhydrocodone are minor metabolites of    codeine; concentrations of each of these compounds rarely exceed    15% of the codeine concentration when this is the source.    Sources of hydrocodone include scheduled prescription    medications. Hydromorphone, dihydrocodeine and norhydrocodone are    expected metabolites of hydrocodone. Hydromorphone and    dihydrocodeine are also available as scheduled prescription    medications.   Acetaminophen                  PRESENT                               Dextrorphan/Levorphanol        PRESENT                                Dextrorphan is an expected metabolite of dextromethorphan, an    over-the-counter or prescription cough suppressant. Levorphanol    is a scheduled prescription medication. Dextrorphan cannot be    distinguished from levorphanol by the method used for analysis.   Guaifenesin                    PRESENT                                Guaifenesin may be administered as an over-the-counter or    prescription drug; it may also be present as a breakdown product    of methocarbamol.   Metoprolol                     PRESENT                              ====================================================================  Test                      Result    Flag   Units      Ref Range        Creatinine              307              mg/dL      >=20            ====================================================================  For clinical consultation, please call [phone].  ====================================================================  Analysis performed by [employer] [place] [address]     , No results found for: THC13, PCP13, COC13, MAMP13, OPI13, AMP13, BZO13, TCA13, MTD13, BAR13, OXY13, PPX13, BUP13      Processing:  Rx to be electronically transmitted to pharmacy by provider      [email or web address]       checked in past 3 months?  No, there is no overview or chronic pain listed in problem list. No CSA on file, therefore  not checked.    Routing refill request to provider for review/approval because:  Drug not on the [facility] refill protocol     Lisa Almonte RN  [facility]

## 2020-02-20 NOTE — TELEPHONE ENCOUNTER
Provider sent in refill.  Will send mychart message also.  FARIBA Maloney, NP-C  [facility]

## 2020-02-26 ENCOUNTER — OFFICE VISIT (OUTPATIENT)
Dept: ANESTHESIOLOGY | Facility: CLINIC | Age: 65
End: 2020-02-26
Attending: PSYCHIATRY & NEUROLOGY
Payer: COMMERCIAL

## 2020-02-26 VITALS
WEIGHT: 222.4 LBS | HEIGHT: 67 IN | DIASTOLIC BLOOD PRESSURE: 91 MMHG | BODY MASS INDEX: 34.91 KG/M2 | OXYGEN SATURATION: 97 % | RESPIRATION RATE: 18 BRPM | SYSTOLIC BLOOD PRESSURE: 142 MMHG | HEART RATE: 77 BPM

## 2020-02-26 DIAGNOSIS — M79.671 PAIN IN BOTH FEET: Primary | ICD-10-CM

## 2020-02-26 DIAGNOSIS — M79.672 PAIN IN BOTH FEET: Primary | ICD-10-CM

## 2020-02-26 RX ORDER — CAPSAICIN 0.025 %
1 CREAM (GRAM) TOPICAL 4 TIMES DAILY PRN
Qty: 120 G | Refills: 4 | Status: SHIPPED | OUTPATIENT
Start: 2020-02-26 | End: 2020-05-13

## 2020-02-26 ASSESSMENT — PAIN SCALES - GENERAL: PAINLEVEL: SEVERE PAIN (7)

## 2020-02-26 ASSESSMENT — MIFFLIN-ST. JEOR: SCORE: 1596.43

## 2020-02-26 NOTE — PATIENT INSTRUCTIONS
Medications:    capsaicin (ZOSTRIX) 0.025 % external cream- Apply 1 g topically 4 times daily as needed (foot pain).    Referrals:    Acupuncture referral placed with the [facility]-   Call to [phone].  -Please call your insurance provider to find out about acupuncture coverage, being that not all policies cover acupuncture services.    Physical Therapy Referral placed-   If you have not heard from the scheduling office within 2 business days, please call [phone] for all locations      Recommended Follow up:  3 months with Dr. Morales      To speak with a nurse, schedule/reschedule/cancel a clinic appointment, or request a medication refill call: [phone]    You can also reach us by [email or web address]: [email or web address]

## 2020-02-26 NOTE — PROGRESS NOTES
[facility] Consultation    Date of visit: 2/26/2020    Reason for consultation:    Hanna Campo is a 65 year old female who is seen in consultation today at the request of her provider, Franky Beard.    Primary Care Provider is Stephanie Sherman.  Pain medications are being prescribed by PCP.    Please see the [facility] health questionnaire which the patient completed and reviewed with me in detail.    Chief Complaint:    No chief complaint on file.      Pain history:  Hanna Campo is a 65 year old female who first started having problems with pain in her feet bilaterally due to idiopathic peripheral neuropathy over 3 years ago. She notes that initially felt like she had socks bunched up on her feet. She had a career of standing on her feet 10 to 12 hours per day. She tried new inserts but nothing has helped.     She has had to retire early because of the pain in her feet.      Pain Description:  Same in both feet that is numb but radiating with shooting pain in both feet. The pain is mostly in the soles. The pain is constant whether she is walking or standing or sitting or laying down. The pain radiates up to right below the calf in a stocking-like distribution.     Pain rating: intensity ranges from {PAIN SCALE:458190} to {PAIN SCALE:154002}, and Averages {PAIN SCALE:914514} on a 0-10 scale.  Aggravating factors include: ***  Relieving factors include: ***  Any bowel or bladder incontinence: ***    Current treatments include:  Hydrocodone-acetaminophen 5-325 3 tabs per day  Realtime topical pain cream       Previous medication treatments included:  Gabapentin 200 mg QHS  Cymbalta - night sweats, GI upset  Essential oils   CBD    Other treatments have included:  Hanna Campo has not been seen at a pain clinic in the past.    PT: no  Acupuncture: no  TENs Unit: yes, but doesn't help  Injections: no    Past Medical History:  Past Medical History:    Diagnosis Date     Environmental allergies      HTN (hypertension)      Hypothyroidism      Patient Active Problem List    Diagnosis Date Noted     Other chronic pain 02/05/2020     Priority: Medium     Pain in both feet 02/05/2020     Priority: Medium     H/O wheezing 02/08/2019     Priority: Medium     Obesity (BMI 35.0-39.9) with comorbidity (H) 11/30/2018     Priority: Medium     Benign essential hypertension 05/29/2018     Priority: Medium     Hypothyroidism, unspecified type 05/29/2018     Priority: Medium     Environmental allergies 05/29/2018     Priority: Medium       Past Surgical History:  Past Surgical History:   Procedure Laterality Date     C LAP,APPENDIX UNLISTED PROCED       FOOT SURGERY       MAMMOPLASTY REDUCTION BILATERAL       SURGERY GENERAL IP CONSULT      cervical bone fusion, anterior approach     TUBAL LIGATION       Medications:  Current Outpatient Medications   Medication Sig Dispense Refill     Cetirizine HCl (ZYRTEC ALLERGY PO) Take 5 mg by mouth daily       chlorthalidone (HYGROTON) 50 MG tablet Take 1 tablet (50 mg) by mouth daily 90 tablet 2     doxycycline hyclate (VIBRA-TABS) 100 MG tablet Take 1 tablet (100 mg) by mouth 2 times daily (Patient not taking: Reported on 2/5/2020) 20 tablet 0     guaiFENesin-codeine (ROBITUSSIN AC) 100-10 MG/5ML solution Take 5-10 mLs by mouth every 4 hours as needed for cough (Patient not taking: Reported on 2/5/2020) 250 mL 0     HYDROcodone-acetaminophen 5-325 MG PO tablet Take 1 tablet by mouth 3 times daily as needed for pain or moderate to severe pain 30 tablet 0     levofloxacin (LEVAQUIN) 500 MG tablet Take 1 tablet (500 mg) by mouth daily 10 tablet 0     levothyroxine (SYNTHROID/LEVOTHROID) 100 MCG tablet Take 1 tablet (100 mcg) by mouth daily 90 tablet 0     metoprolol succinate ER (TOPROL-XL) 50 MG 24 hr tablet Take 1 tablet (50 mg) by mouth daily 90 tablet 2     vitamin B complex with vitamin C (VITAMIN  B COMPLEX) tablet Take 1 tablet by  "mouth daily  0     VITAMIN D, CHOLECALCIFEROL, PO Take 5,000 Units by mouth daily       Allergies:   No Known Allergies  Social History:  Home situation:   Occupation/Schooling: retired; worked trade shows  Tobacco use: no  Alcohol use: yes, 4 drinks per night  Drug use: no  History of chemical dependency treatment: no    Family history:  Family History   Problem Relation Age of Onset     Hypertension Mother      Anxiety Disorder Mother      Skin Cancer Father      Lung Cancer Father         smoker     Anxiety Disorder Daughter      Diabetes No family hx of      Colon Cancer No family hx of      Breast Cancer No family hx of        Review of Systems:    POSTIVE IN BOLD  GENERAL: fever/chills, fatigue, general unwell feeling, weight gain/loss.  HEAD/EYES:  headache, dizziness, or vision changes.    EARS/NOSE/THROAT:  Nosebleeds, hearing loss, sinus infection, earache, tinnitus.  IMMUNE:  Allergies, cancer, immune deficiency, or infections.  SKIN:  Urticaria, rash, hives  HEME/Lymphatic:   anemia, easy bruising, easy bleeding.  RESPIRATORY:  cough, wheezing, or shortness of breath  CARDIOVASCULAR/Circulation:  Extremity edema, syncope, hypertension, tachycardia, or angina.  GASTROINTESTINAL:  abdominal pain, nausea/emesis, diarrhea, constipation,  hematochezia, or melena.  ENDOCRINE:  Diabetes, steroid use,  thyroid disease or osteoporosis.  MUSCULOSKELETAL: neck pain, back pain, arthralgia, arthritis, or gout.  GENITOURINARY:  frequency, urgency, dysuria, difficulty voiding, hematuria or incontinence.  NEUROLOGIC:  weakness, numbness, paresthesias, seizure, tremor, stroke or memory loss.  PSYCHIATRIC:  depression, anxiety, stress, suicidal thoughts or mood swings.     Physical Exam:  Vitals:    02/26/20 0906   BP: (!) 142/91   BP Location: Right arm   Patient Position: Chair   Cuff Size: Adult Large   Pulse: 77   Resp: 18   SpO2: 97%   Weight: 100.9 kg (222 lb 6.4 oz)   Height: 1.702 m (5' 7\") " "    Exam:  Constitutional: {GENERAL APPEARANCE:487438::\"healthy\",\"alert\",\"no distress\"}  Head: normocephalic. Atraumatic. ***  Eyes: no redness or jaundice noted ***  ENT: oropharnx normal.  MMM.  Neck supple.  ***  Cardiovascular: RRR no m/g/r ***  Respiratory: clear ***  Gastrointestinal: soft, non-tender, normoactive bowel sounds ***  : deferred***  Skin: {[facility] SKIN EXAM:034797::\"no suspicious lesions or rashes\"}  Psychiatric: {[facility] PATIENT PSYCH APPEARANCE:501380::\"mentation appears normal\",\"affect normal/bright\"}    Musculoskeletal exam:  Gait/Station/Posture: ***  Cervical spine: ***   Flex:  *** degrees   Ext: *** degrees   Rotation to right: *** degrees   Rotation to left: *** degrees   ***    Thoracic spine:  Normal ***    Lumbar spine: ***   Flex:  *** degrees   Ext: *** degrees   Rotation/ext to right: {PAINFUL/PAIN FREE:756999}   Rotation/ext to left: {PAINFUL/PAIN FREE:478335}    Myofascial tenderness:  ***  Straight leg exam: ***  Yoandy's maneuver: ***    Neurologic exam:  CN:  Cranial nerves 2-12 are normal***  Motor:  5/5 UE and LE strength, except for ***  Reflexes:     Biceps:     R:  ***/4 L: ***/4   Brachioradialis   R:  ***/4 L: ***/4   Triceps:  R:  ***/4 L: ***/4   Patella:  R:  ***/4 L: ***/4   Achilles:  R:  ***/4 L: ***/4  Other reflexes:  Toes downgoing***   Sensory:  (upper and lower extremities):   Light touch: normal ***   Vibration: normal ***   Allodynia: absent ***   Dysethesia: absent ***   Hyperalgesia: absent ***    Diagnostic tests:  MRI of *** was completed on *** showing:  \"***\"    Personally reviewed imaging ***    Other testing (labs, diagnostics) reviewed:  Labs***  EKG***    [place] Prescription Monitoring Program reviewed***    Outside records reviewed***      Screening tools:  DIRE Score for ongoing opioid management is calculated as follows:    Diagnosis = ***    Intractability = ***    Risk: Psych = ***  Chem Hlth = ***  Reliability = ***  Social = ***    Efficacy = " ***    Total DIRE Score = *** (14 or higher predicts good candidate for ongoing opioid management; 13 or lower predicts poor candidate for opioid management)         Assessment:  1. ***    Hanna Campo is a 65 year old female who presents with the complaints of ***. ***    Plan:  Diagnosis reviewed, treatment option addressed, and risk/benefits discussed.  Self-care instructions given.  I am recommending a multidisciplinary treatment plan to help this patient better manage her pain.      1. Physical Therapy: ***  2. Pain Psychologist to address issues of relaxation, behavioral change, coping style, and other factors important to improvement: ***  3. Diagnostic Studies: ***  4. Medication Management: ***  5. Further procedures recommended: ***  6. Other treatments:  7. Urine toxicology screen: ***   8. Recommendations/follow-up for PCP:  ***  9. Release of information: ***  10. Follow up: ***    Total time spent was *** minutes, and more than 50% of face to face time was spent in counseling and/or coordination of care regarding principles of multidisciplinary care, medication management, and ***      Answers for HPI/ROS submitted by the patient on 2/12/2020   GIRISH 7 TOTAL SCORE: 0  General Symptoms: Yes  Skin Symptoms: No  HENT Symptoms: Yes  EYE SYMPTOMS: No  HEART SYMPTOMS: No  LUNG SYMPTOMS: Yes  INTESTINAL SYMPTOMS: Yes  URINARY SYMPTOMS: No  GYNECOLOGIC SYMPTOMS: No  BREAST SYMPTOMS: No  SKELETAL SYMPTOMS: No  BLOOD SYMPTOMS: No  NERVOUS SYSTEM SYMPTOMS: Yes  MENTAL HEALTH SYMPTOMS: No  Fever: No  Loss of appetite: No  Weight loss: No  Weight gain: No  Fatigue: No  Night sweats: Yes  Chills: No  Increased stress: Yes  Excessive hunger: No  Excessive thirst: No  Feeling hot or cold when others believe the temperature is normal: No  Loss of height: No  Post-operative complications: No  Surgical site pain: No  Hallucinations: No  Change in or Loss of Energy: No  Hyperactivity: No  Confusion: No  Ear pain:  No  Ear discharge: No  Hearing loss: No  Tinnitus: Yes  Nosebleeds: No  Congestion: No  Sinus pain: No  Trouble swallowing: No   Voice hoarseness: No  Mouth sores: No  Sore throat: No  Tooth pain: No  Gum tenderness: No  Bleeding gums: No  Change in taste: No  Change in sense of smell: No  Dry mouth: No  Hearing aid used: No  Neck lump: No  Cough: No  Sputum or phlegm: No  Coughing up blood: No  Difficulty breating or shortness of breath: No  Snoring: Yes  Wheezing: No  Difficulty breathing on exertion: No  Nighttime Cough: No  Difficulty breathing when lying flat: No  Heart burn or indigestion: No  Nausea: No  Vomiting: No  Abdominal pain: No  Bloating: No  Constipation: No  Diarrhea: No  Blood in stool: No  Black stools: No  Rectal or Anal pain: No  Fecal incontinence: No  Yellowing of skin or eyes: No  Vomit with blood: No  Change in stools: No  Trouble with coordination: No  Dizziness or trouble with balance: No  Fainting or black-out spells: No  Memory loss: No  Headache: No  Seizures: No  Speech problems: No  Tingling: Yes  Tremor: No  Weakness: No  Difficulty walking: Yes  Paralysis: No  Numbness: Yes

## 2020-02-26 NOTE — NURSING NOTE
LPN reviewed AVS with Pt.  Pt verbalized an understanding of information, and was asked to contact clinic with questions.    Follow up recommended by provider:     Maddi Galeana LPN

## 2020-02-26 NOTE — LETTER
2/26/2020       RE: Hanna Campo  [address]  [facility] [address]     Dear Colleague,    Thank you for referring your patient, Hanna Campo, to the [facility] FOR COMPREHENSIVE PAIN MANAGEMENT at [facility]. Please see a copy of my visit note below.                          [facility] Consultation    Date of visit: 2/26/2020    Reason for consultation:    Hanna Campo is a 65 year old female who is seen in consultation today at the request of her provider, Franky Beard.    Primary Care Provider is Stephanie Sherman.  Pain medications are being prescribed by PCP.    Please see the [facility] health questionnaire which the patient completed and reviewed with me in detail.    Chief Complaint:    Chief Complaint   Patient presents with     Pain Management     New       Pain history:  Hanna Campo is a 65 year old female who first started having problems with pain in her feet bilaterally due to idiopathic peripheral neuropathy over 3 years ago. She notes that initially felt like she had socks bunched up on her feet. She had a career of standing on her feet 10 to 12 hours per day. She tried new inserts but nothing has helped.     She has had to retire early because of the pain in her feet.      Pain Description:  Same in both feet that is numb but radiating with shooting pain in both feet. The pain is mostly in the soles. The pain is constant whether she is walking or standing or sitting or laying down. The pain radiates up to right below the calf in a stocking-like distribution.     Pain rating: Averages 7/10 on a 0-10 scale.  Aggravating factors include: walking, putting pressure on feet  Relieving factors include: nothing  Any bowel or bladder incontinence: n/a    Current treatments include:  Hydrocodone-acetaminophen 5-325 3 tabs per day  Realtime topical pain cream       Previous medication treatments  included:  Gabapentin 200 mg QHS  Cymbalta - night sweats, GI upset  Essential oils   CBD    Other treatments have included:  Hanna Campo has not been seen at a pain clinic in the past.    PT: no  Acupuncture: no  TENs Unit: yes, but doesn't help  Injections: no    Past Medical History:  Past Medical History:   Diagnosis Date     Environmental allergies      HTN (hypertension)      Hypothyroidism      Patient Active Problem List    Diagnosis Date Noted     Other chronic pain 02/05/2020     Priority: Medium     Pain in both feet 02/05/2020     Priority: Medium     H/O wheezing 02/08/2019     Priority: Medium     Obesity (BMI 35.0-39.9) with comorbidity (H) 11/30/2018     Priority: Medium     Benign essential hypertension 05/29/2018     Priority: Medium     Hypothyroidism, unspecified type 05/29/2018     Priority: Medium     Environmental allergies 05/29/2018     Priority: Medium       Past Surgical History:  Past Surgical History:   Procedure Laterality Date     C LAP,APPENDIX UNLISTED PROCED       FOOT SURGERY       MAMMOPLASTY REDUCTION BILATERAL       SURGERY GENERAL IP CONSULT      cervical bone fusion, anterior approach     TUBAL LIGATION       Medications:  Current Outpatient Medications   Medication Sig Dispense Refill     Cetirizine HCl (ZYRTEC ALLERGY PO) Take 5 mg by mouth daily       chlorthalidone (HYGROTON) 50 MG tablet Take 1 tablet (50 mg) by mouth daily 90 tablet 2     doxycycline hyclate (VIBRA-TABS) 100 MG tablet Take 1 tablet (100 mg) by mouth 2 times daily (Patient not taking: Reported on 2/5/2020) 20 tablet 0     guaiFENesin-codeine (ROBITUSSIN AC) 100-10 MG/5ML solution Take 5-10 mLs by mouth every 4 hours as needed for cough (Patient not taking: Reported on 2/5/2020) 250 mL 0     HYDROcodone-acetaminophen 5-325 MG PO tablet Take 1 tablet by mouth 3 times daily as needed for pain or moderate to severe pain 30 tablet 0     levofloxacin (LEVAQUIN) 500 MG tablet Take 1 tablet (500 mg) by mouth  "daily 10 tablet 0     levothyroxine (SYNTHROID/LEVOTHROID) 100 MCG tablet Take 1 tablet (100 mcg) by mouth daily 90 tablet 0     metoprolol succinate ER (TOPROL-XL) 50 MG 24 hr tablet Take 1 tablet (50 mg) by mouth daily 90 tablet 2     vitamin B complex with vitamin C (VITAMIN  B COMPLEX) tablet Take 1 tablet by mouth daily  0     VITAMIN D, CHOLECALCIFEROL, PO Take 5,000 Units by mouth daily       Allergies:   No Known Allergies  Social History:  Home situation:   Occupation/Schooling: retired; worked trade shows  Tobacco use: no  Alcohol use: yes, 4 drinks per night  Drug use: no  History of chemical dependency treatment: no    Family history:  Family History   Problem Relation Age of Onset     Hypertension Mother      Anxiety Disorder Mother      Skin Cancer Father      Lung Cancer Father         smoker     Anxiety Disorder Daughter      Diabetes No family hx of      Colon Cancer No family hx of      Breast Cancer No family hx of        Review of Systems:    See questionnaire answers below      Physical Exam:  Vitals:    02/26/20 0906   BP: (!) 142/91   BP Location: Right arm   Patient Position: Chair   Cuff Size: Adult Large   Pulse: 77   Resp: 18   SpO2: 97%   Weight: 100.9 kg (222 lb 6.4 oz)   Height: 1.702 m (5' 7\")     Exam:  Constitutional: healthy, alert and no distress  Head: normocephalic. Atraumatic.   Eyes: no redness or jaundice noted   ENT: oropharnx normal.  MMM.  Neck supple.    Cardiovascular: RRR no m/g/r   Respiratory: clear   Gastrointestinal: soft, non-tender, normoactive bowel sounds   : deferred  Skin: no suspicious lesions or rashes  Psychiatric: mentation appears normal and affect normal/bright    Musculoskeletal exam:  Gait/Station/Posture: non-antalgic, slow steady gait    Lumbar spine: no paraspinal tenderness, mild decrease in ROM f/e/bl rotation    Lower Extremities: Diminished sensation to pinprick bilaterally to below calf    Neurologic exam:  CN:  Cranial nerves 2-12 are " normal  Motor:  5/5 LE strength  Reflexes:     Patella:  R:  1/4 L: 1/4   Achilles:  R:  1/4 L: 1/4  Other reflexes:  Toes downgoing   Sensory:  (upper and lower extremities):   Light touch: diminished bilateral feet above ankle   Vibration: diminished in b/l feet   Allodynia: b/l soles of feet    Diagnostic tests:  No MRI or EMG results for review      Other testing (labs, diagnostics) reviewed:  Labs  Last Comprehensive Metabolic Panel:  Sodium   Date Value Ref Range Status   02/05/2020 138 133 - 144 mmol/L Final     Potassium   Date Value Ref Range Status   02/05/2020 3.6 3.4 - 5.3 mmol/L Final     Chloride   Date Value Ref Range Status   02/05/2020 97 94 - 109 mmol/L Final     Carbon Dioxide   Date Value Ref Range Status   02/05/2020 35 (H) 20 - 32 mmol/L Final     Anion Gap   Date Value Ref Range Status   02/05/2020 6 3 - 14 mmol/L Final     Glucose   Date Value Ref Range Status   02/05/2020 76 70 - 99 mg/dL Final     Urea Nitrogen   Date Value Ref Range Status   02/05/2020 7 7 - 30 mg/dL Final     Creatinine   Date Value Ref Range Status   02/05/2020 0.62 0.52 - 1.04 mg/dL Final     GFR Estimate   Date Value Ref Range Status   02/05/2020 >90 >60 mL/min/[1.73_m2] Final     Comment:     Non  GFR Calc  Starting 12/18/2018, serum creatinine based estimated GFR (eGFR) will be   calculated using the Chronic Kidney Disease Epidemiology Collaboration   (CKD-EPI) equation.       Calcium   Date Value Ref Range Status   02/05/2020 9.3 8.5 - 10.1 mg/dL Final         [place] Prescription Monitoring Program reviewed - appropriate    Outside records reviewed [facility] on day of visit      Assessment:  1. Idiopathic Peripheral Neuropathy of both feet  2. Chronic Opioid Use    Hanna Campo is a 65 year old female who presents with the complaints of bilateral lower extremity pain in association with peripheral neuropathy. She has tried multiple medication therapies and we discussed methods to help  prevent worsening neuropathy, including decreasing daily alcohol intake as well as maintaining physical activity, exercise, and partaking in physical therapy.  We discussed trialing capsaicin cream as an option to help relieve her pain.     Plan:  Diagnosis reviewed, treatment option addressed, and risk/benefits discussed.  Self-care instructions given.  I am recommending a multidisciplinary treatment plan to help this patient better manage her pain.      1. Physical Therapy: Referral placed for PT  2. Pain Psychologist to address issues of relaxation, behavioral change, coping style, and other factors important to improvement: not at this time  3. Diagnostic Studies: none  4. Medication Management: okay to continue current regiment  1. Rx for Capsaicin cream provided; apply to bilateral lower extremities  5. Further procedures recommended: none; patient not interested in interventions at this time  6. Other treatments: Acupuncture referral  7. Recommendations/follow-up for PCP:  Okay to continue current regimen, but would advise applying multimodal therapies as well as medication management, including PT, acupuncture, potentially other neuropathic agents, and consider laser therapy in the future.  8. Follow up: 3 months    Total time spent was 40 minutes, and more than 50% of face to face time was spent in counseling and/or coordination of care regarding principles of multidisciplinary care, medication management, and therapeutic options.     Lisa Morales MD    Pain Medicine  Department of Anesthesiology  [facility]        Answers for HPI/ROS submitted by the patient on 2/12/2020   GIRISH 7 TOTAL SCORE: 0  General Symptoms: Yes  Skin Symptoms: No  HENT Symptoms: Yes  EYE SYMPTOMS: No  HEART SYMPTOMS: No  LUNG SYMPTOMS: Yes  INTESTINAL SYMPTOMS: Yes  URINARY SYMPTOMS: No  GYNECOLOGIC SYMPTOMS: No  BREAST SYMPTOMS: No  SKELETAL SYMPTOMS: No  BLOOD SYMPTOMS: No  NERVOUS SYSTEM SYMPTOMS:  Yes  MENTAL HEALTH SYMPTOMS: No  Fever: No  Loss of appetite: No  Weight loss: No  Weight gain: No  Fatigue: No  Night sweats: Yes  Chills: No  Increased stress: Yes  Excessive hunger: No  Excessive thirst: No  Feeling hot or cold when others believe the temperature is normal: No  Loss of height: No  Post-operative complications: No  Surgical site pain: No  Hallucinations: No  Change in or Loss of Energy: No  Hyperactivity: No  Confusion: No  Ear pain: No  Ear discharge: No  Hearing loss: No  Tinnitus: Yes  Nosebleeds: No  Congestion: No  Sinus pain: No  Trouble swallowing: No   Voice hoarseness: No  Mouth sores: No  Sore throat: No  Tooth pain: No  Gum tenderness: No  Bleeding gums: No  Change in taste: No  Change in sense of smell: No  Dry mouth: No  Hearing aid used: No  Neck lump: No  Cough: No  Sputum or phlegm: No  Coughing up blood: No  Difficulty breating or shortness of breath: No  Snoring: Yes  Wheezing: No  Difficulty breathing on exertion: No  Nighttime Cough: No  Difficulty breathing when lying flat: No  Heart burn or indigestion: No  Nausea: No  Vomiting: No  Abdominal pain: No  Bloating: No  Constipation: No  Diarrhea: No  Blood in stool: No  Black stools: No  Rectal or Anal pain: No  Fecal incontinence: No  Yellowing of skin or eyes: No  Vomit with blood: No  Change in stools: No  Trouble with coordination: No  Dizziness or trouble with balance: No  Fainting or black-out spells: No  Memory loss: No  Headache: No  Seizures: No  Speech problems: No  Tingling: Yes  Tremor: No  Weakness: No  Difficulty walking: Yes  Paralysis: No  Numbness: Yes    Answers for HPI/ROS submitted by the patient on 2/12/2020   GIRISH 7 TOTAL SCORE: 0  General Symptoms: Yes  Skin Symptoms: No  HENT Symptoms: Yes  EYE SYMPTOMS: No  HEART SYMPTOMS: No  LUNG SYMPTOMS: Yes  INTESTINAL SYMPTOMS: Yes  URINARY SYMPTOMS: No  GYNECOLOGIC SYMPTOMS: No  BREAST SYMPTOMS: No  SKELETAL SYMPTOMS: No  BLOOD SYMPTOMS: No  NERVOUS SYSTEM  SYMPTOMS: Yes  MENTAL HEALTH SYMPTOMS: No  Fever: No  Loss of appetite: No  Weight loss: No  Weight gain: No  Fatigue: No  Night sweats: Yes  Chills: No  Increased stress: Yes  Excessive hunger: No  Excessive thirst: No  Feeling hot or cold when others believe the temperature is normal: No  Loss of height: No  Post-operative complications: No  Surgical site pain: No  Hallucinations: No  Change in or Loss of Energy: No  Hyperactivity: No  Confusion: No  Ear pain: No  Ear discharge: No  Hearing loss: No  Tinnitus: Yes  Nosebleeds: No  Congestion: No  Sinus pain: No  Trouble swallowing: No   Voice hoarseness: No  Mouth sores: No  Sore throat: No  Tooth pain: No  Gum tenderness: No  Bleeding gums: No  Change in taste: No  Change in sense of smell: No  Dry mouth: No  Hearing aid used: No  Neck lump: No  Cough: No  Sputum or phlegm: No  Coughing up blood: No  Difficulty breating or shortness of breath: No  Snoring: Yes  Wheezing: No  Difficulty breathing on exertion: No  Nighttime Cough: No  Difficulty breathing when lying flat: No  Heart burn or indigestion: No  Nausea: No  Vomiting: No  Abdominal pain: No  Bloating: No  Constipation: No  Diarrhea: No  Blood in stool: No  Black stools: No  Rectal or Anal pain: No  Fecal incontinence: No  Yellowing of skin or eyes: No  Vomit with blood: No  Change in stools: No  Trouble with coordination: No  Dizziness or trouble with balance: No  Fainting or black-out spells: No  Memory loss: No  Headache: No  Seizures: No  Speech problems: No  Tingling: Yes  Tremor: No  Weakness: No  Difficulty walking: Yes  Paralysis: No  Numbness: Yes      Again, thank you for allowing me to participate in the care of your patient.      Sincerely,    Lisa Morales MD

## 2020-03-01 ENCOUNTER — MYC REFILL (OUTPATIENT)
Dept: FAMILY MEDICINE | Facility: CLINIC | Age: 65
End: 2020-03-01

## 2020-03-01 DIAGNOSIS — R20.2 NUMBNESS AND TINGLING OF BOTH FEET: ICD-10-CM

## 2020-03-01 DIAGNOSIS — R20.0 NUMBNESS AND TINGLING OF BOTH FEET: ICD-10-CM

## 2020-03-01 DIAGNOSIS — G89.29 OTHER CHRONIC PAIN: ICD-10-CM

## 2020-03-02 NOTE — TELEPHONE ENCOUNTER
Requested Prescriptions   Pending Prescriptions Disp Refills     HYDROcodone-acetaminophen (NORCO) 5-325 MG tablet 30 tablet 0     Sig: Take 1 tablet by mouth 3 times daily as needed for pain or moderate to severe pain       There is no refill protocol information for this order          HYDROcodone-acetaminophen (NORCO) 5-325 MG tablet      Last Written Prescription Date:  2/20/2020  Last Fill Quantity: 30,   # refills: 0  Last Office Visit: 2/5/2020  Future Office visit:       Routing refill request to provider for review/approval because:  Drug not on the [facility], [facility] or [facility] refill protocol or controlled substance

## 2020-03-03 NOTE — TELEPHONE ENCOUNTER
Controlled Substance Refill Request for Norco  Problem List Complete:    No     PROVIDER TO CONSIDER COMPLETION OF PROBLEM LIST AND OVERVIEW/CONTROLLED SUBSTANCE AGREEMENT    Last Written Prescription Date:  2/20/20  Last Fill Quantity: 30 tablets   # refills: 0    THE MOST RECENT OFFICE VISIT MUST BE WITHIN THE PAST 3 MONTHS. AT LEAST ONE FACE TO FACE VISIT MUST OCCUR EVERY 6 MONTHS. ADDITIONAL VISITS CAN BE VIRTUAL.  (THIS STATEMENT SHOULD BE DELETED.)    [place] [facility] [place] 2/5/2020    Future Office visit:   Next 5 appointments (look out 90 days)    Mar 05, 2020  9:40 AM CST  MyChart Short with Stephanie Sherman NP  [facility] [address] [place] [id number] [phone]          Controlled substance agreement:   Encounter-Level CSA:    There are no encounter-level csa.     Patient-Level CSA:    There are no patient-level csa.         Last Urine Drug Screen: No results found for: Bret WILLIAMSON Drug Analysis UR   Date Value Ref Range Status   02/05/2020 FINAL  Final     Comment:     (Note)  ====================================================================  COMPREHENSIVE DRUG ANALYSIS,UR  ====================================================================  Test                             Result       Flag       Units        Drug Present   Codeine                        >3257                   ng/mg creat   Morphine                       517                     ng/mg creat   Normorphine                    265                     ng/mg creat   Norcodeine                     1065                    ng/mg creat    Sources of codeine include scheduled prescription medications;    morphine is an expected metabolite of codeine. Other sources of    morphine include scheduled prescription medications or as a    metabolite of heroin.    Normorphine is an expected metabolite of morphine.    Norcodeine is an expected metabolite  of codeine.   Hydrocodone                    183                     ng/mg creat   Hydromorphone                  36                      ng/mg creat   Dihydrocodeine                 128                     ng/mg creat   Norhydrocodone                 331                     ng/mg creat    Hydrocodone and its subsequent metabolites hydromorphone,    dihydrocodeine, and norhydrocodone are minor metabolites of    codeine; concentrations of each of these compounds rarely exceed    15% of the codeine concentration when this is the source.    Sources of hydrocodone include scheduled prescription    medications. Hydromorphone, dihydrocodeine and norhydrocodone are    expected metabolites of hydrocodone. Hydromorphone and    dihydrocodeine are also available as scheduled prescription    medications.   Acetaminophen                  PRESENT                               Dextrorphan/Levorphanol        PRESENT                                Dextrorphan is an expected metabolite of dextromethorphan, an    over-the-counter or prescription cough suppressant. Levorphanol    is a scheduled prescription medication. Dextrorphan cannot be    distinguished from levorphanol by the method used for analysis.   Guaifenesin                    PRESENT                                Guaifenesin may be administered as an over-the-counter or    prescription drug; it may also be present as a breakdown product    of methocarbamol.   Metoprolol                     PRESENT                              ====================================================================  Test                      Result    Flag   Units      Ref Range        Creatinine              307              mg/dL      >=20            ====================================================================  For clinical consultation, please call [phone].  ====================================================================  Analysis performed by "[employer] [address]  [place] [address]     , No results found for: THC13, PCP13, COC13, MAMP13, OPI13, AMP13, BZO13, TCA13, MTD13, BAR13, OXY13, PPX13, BUP13     Processing:  Rx to be electronically transmitted to pharmacy by provider      [email or web address]       checked in past 3 months?  No-problem list incomplete so  not checked.         Rupali Patterson RN, BSN, PHN

## 2020-03-04 ENCOUNTER — THERAPY VISIT (OUTPATIENT)
Dept: PHYSICAL THERAPY | Facility: CLINIC | Age: 65
End: 2020-03-04
Attending: ANESTHESIOLOGY
Payer: COMMERCIAL

## 2020-03-04 DIAGNOSIS — M79.672 PAIN IN BOTH FEET: ICD-10-CM

## 2020-03-04 DIAGNOSIS — M79.671 PAIN IN BOTH FEET: ICD-10-CM

## 2020-03-04 PROCEDURE — 97162 PT EVAL MOD COMPLEX 30 MIN: CPT | Mod: GP | Performed by: PHYSICAL THERAPIST

## 2020-03-04 PROCEDURE — 97140 MANUAL THERAPY 1/> REGIONS: CPT | Mod: GP | Performed by: PHYSICAL THERAPIST

## 2020-03-04 PROCEDURE — 97110 THERAPEUTIC EXERCISES: CPT | Mod: GP | Performed by: PHYSICAL THERAPIST

## 2020-03-04 RX ORDER — HYDROCODONE BITARTRATE AND ACETAMINOPHEN 5; 325 MG/1; MG/1
1 TABLET ORAL 3 TIMES DAILY PRN
Qty: 30 TABLET | Refills: 0 | Status: SHIPPED | OUTPATIENT
Start: 2020-03-04 | End: 2020-03-09

## 2020-03-04 NOTE — PROGRESS NOTES
[facility] Initial Evaluation  Subjective:  The history is provided by the patient. No  was used.   Patient Health History  Hanna Campo being seen for chronic foot pain caused by neuropathy..     Problem began: 2/26/2020 (MD appointment).   Problem occurred: unknown   Pain is reported as 8/10 on pain scale.  General health as reported by patient is fair.  Pertinent medical history includes: high blood pressure, numbness/tingling, overweight and pain at night/rest.   Red flags:  None as reported by patient.  Medical allergies: none.   Surgeries include:  Other. Other surgery history details: Calcium build up on heel surgically removed, cervical bone fusion on C567, emergency life-saving surgery after appendix was burst for 2-3 days, breast reduction.    Current medications:  High blood pressure medication, pain medication and thyroid medication.    Current occupation is retired due to neck to feet.  independent --standing all day.   Primary job tasks include:  Other.   Other job/home tasks details: Took early [employer] so no job tasks as of 12-31-19. I mostly sit at home because of the pain. Any activity on my feet vastly increases the pain.                Therapist Generated HPI Evaluation  Problem details: I have had foot pain for a long time last three years.  Last spring 2019, the pain seemed to kick into over drove.  I could not walk very well,  Not diabetitic, neuropathy in the feet.  .         Type of problem:  Right foot and left foot.    This is a chronic condition.  Condition occurred with:  Insidious onset.  Where condition occurred: for unknown reasons.  Patient reports pain:  Anterior, lateral, lower leg, medial and posterior.  Pain is described as burning (tingling, numbness) and is constant.  Pain radiates to:  Lower leg and ankle. Pain is worse in the P.M. and worse during the night.  Since onset symptoms are gradually worsening (more  intensity).  Associated symptoms:  Numbness and tingling. Symptoms are exacerbated by activity, walking, weight bearing, standing and transfers  Relieved by: medication pain. nerve medication did not work   Imaging testing: auto immune--negative.  Past treatment: none.   Restrictions due to condition include:  Currently not working due to present treatment.  Home/work barriers: house, rambler with basement 4 steps.   for 14 month.                          Objective:  Standing Alignment:    Cervical/Thoracic:  Forward head  Shoulder/UE:  Rounded shoulders  Lumbar:  Lordosis decr            Gait:    Gait Type:  Antalgic     Deviations:  Ankle:  Heel strike decr L, heel strike decr R, push off decr L and push off decr R    Flexibility/Screens:       Lower Extremity:  Decreased left lower extremity flexibility:Hamstrings; Gastroc and Soleus    Decreased right lower extremity flexibility:  Hamstrings; Gastroc and Soleus          Ankle/Foot Evaluation  ROM:    AROM:    Dorsiflexion:  Left:   5  Right:   2  Plantarflexion:  Left:  33    Right:  25  Inversion:  Left:  20     Right:  15  Eversion:  15     Right:  5        Strength:    Dorsiflexion:  Left: 4/5     Pain:   Right: 4/5   Pain:  Plantarflexion: Left: 3/5   Pain:   Right: 3+/5  Pain:          Anterior Tibialis:Left: 5/5  Pain:  Right: 3+/5  Pain:  Posterior Tibialis: Left: 3+/5  Pain:  Right: 3+/5  Pain:  Peroneals: Left: 4/5  Pain:  Right: 5-/5  Pain:  Extensor Digitorum: Left: 4/5  Pain:Right: 5-/5  Pain:  Gastroc/Soleus:Left: 3/5   Pain:  Right: 3+/5  Pain:      PALPATION:   Left ankle tenderness present at:  gastroc/soleus; plantar fascia and medial calcaneal  Right ankle tenderness present at:   gastroc/soleus; plantar fascia and medial calcaneal    MOBILITY TESTING:       Talocrural Left: hypomobile    Talocrural Right: hypomobile  Subtalar Left: hypomobile    Subtalar Right: hypomobile  Midtarsal Left: hypomobile    Midtarsal Right:  hypomobile    FUNCTIONAL TESTS:           Proprioception:  Stork Balance Test: Left: 2  Right: 0-1                                                     General     ROS    Assessment/Plan:    Patient is a 65 year old female with bilateral feet pain  complaints.    Patient has the following significant findings with corresponding treatment plan.                Diagnosis 1:  Bilateral feet pain/neuropathy  Pain -  manual therapy, self management, education and home program  Decreased ROM/flexibility - manual therapy, therapeutic exercise, therapeutic activity and home program  Decreased joint mobility - manual therapy, therapeutic exercise, therapeutic activity and home program  Decreased strength - therapeutic exercise, therapeutic activities and home program  Impaired balance - neuro re-education, gait training, therapeutic activities, adaptive equipment/assistive device and home program  Impaired gait - gait training, assistive devices and home program  Impaired muscle performance - neuro re-education and home program  Decreased function - therapeutic activities and home program  Impaired posture - neuro re-education, therapeutic activities and home program    Therapy Evaluation Codes:   1) History comprised of:   Personal factors that impact the plan of care:      Past/current experiences, Profession, Time since onset of symptoms and retired due to pain .    Comorbidity factors that impact the plan of care are:      High blood pressure, Overweight and cervical fusion, foot surgery, .     Medications impacting care: High blood pressure, Pain and thyroid.  2) Examination of Body Systems comprised of:   Body structures and functions that impact the plan of care:      Ankle, Toes and foot.   Activity limitations that impact the plan of care are:      Cooking, Driving, Dressing, Lifting, Squatting/kneeling, Stairs, Standing and Walking.  3) Clinical presentation characteristics  are:   Evolving/Changing.  4) Decision-Making    Moderate complexity using standardized patient assessment instrument and/or measureable assessment of functional outcome.  Cumulative Therapy Evaluation is: Moderate complexity.    Previous and current functional limitations:  (See Goal Flow Sheet for this information)    Short term and Long term goals: (See Goal Flow Sheet for this information)     Communication ability:  Patient appears to be able to clearly communicate and understand verbal and written communication and follow directions correctly.  Treatment Explanation - The following has been discussed with the patient:   RX ordered/plan of care  This patient would benefit from PT intervention to resume normal activities.   Rehab potential is fair.    Frequency:  1 X week, once daily  Duration:  for 10 weeks  Discharge Plan:  Achieve all LTG.  Independent in home treatment program.    Please refer to the daily flowsheet for treatment today, total treatment time and time spent performing 1:1 timed codes.

## 2020-03-05 ENCOUNTER — OFFICE VISIT (OUTPATIENT)
Dept: FAMILY MEDICINE | Facility: CLINIC | Age: 65
End: 2020-03-05
Payer: COMMERCIAL

## 2020-03-05 VITALS
SYSTOLIC BLOOD PRESSURE: 135 MMHG | BODY MASS INDEX: 34.37 KG/M2 | TEMPERATURE: 97.7 F | WEIGHT: 219 LBS | DIASTOLIC BLOOD PRESSURE: 80 MMHG | HEART RATE: 74 BPM | HEIGHT: 67 IN | OXYGEN SATURATION: 99 % | RESPIRATION RATE: 18 BRPM

## 2020-03-05 DIAGNOSIS — M79.672 PAIN IN BOTH FEET: ICD-10-CM

## 2020-03-05 DIAGNOSIS — E03.9 HYPOTHYROIDISM, UNSPECIFIED TYPE: ICD-10-CM

## 2020-03-05 DIAGNOSIS — M79.671 PAIN IN BOTH FEET: ICD-10-CM

## 2020-03-05 DIAGNOSIS — G89.29 OTHER CHRONIC PAIN: Primary | ICD-10-CM

## 2020-03-05 DIAGNOSIS — E66.01 MORBID OBESITY (H): ICD-10-CM

## 2020-03-05 PROCEDURE — 99000 SPECIMEN HANDLING OFFICE-LAB: CPT | Performed by: NURSE PRACTITIONER

## 2020-03-05 PROCEDURE — 99214 OFFICE O/P EST MOD 30 MIN: CPT | Performed by: NURSE PRACTITIONER

## 2020-03-05 PROCEDURE — 80307 DRUG TEST PRSMV CHEM ANLYZR: CPT | Mod: 90 | Performed by: NURSE PRACTITIONER

## 2020-03-05 ASSESSMENT — MIFFLIN-ST. JEOR: SCORE: 1581.01

## 2020-03-05 ASSESSMENT — PAIN SCALES - GENERAL: PAINLEVEL: SEVERE PAIN (7)

## 2020-03-05 NOTE — LETTER
[facility]  03/05/20    Patient: Hanna Campo  YOB: 1955  Medical Record Number: [id number]                                                                  Opioid / Opioid Plus Controlled Substance Agreement    I understand that my care provider has prescribed an opioid (narcotic) controlled substance to help manage my condition(s). I am taking this medicine to help me function or work. I know this is strong medicine, and that it can cause serious side effects. Opioid medicine can be sedating, addicting and may cause a dependency on the drug. They can affect my ability to drive or think, and cause depression. They need to be taken exactly as prescribed. Combining opioids with certain medicines or chemicals (such as cocaine, sedatives and tranquilizers, sleeping pills, meth) can be dangerous or even fatal. Also, if I stop opioids suddenly, I may have severe withdrawal symptoms. Last, I understand that opioids do not work for all types of pain nor for all patients. If not helpful, I may be asked to stop them.      The risks, benefits, and side effects of these medicine(s) were explained to me. I agree that:    1. I will take part in other treatments as advised by my care team. This may be psychiatry or counseling, physical therapy, behavioral therapy, group treatment or a referral to a pain clinic. I will reduce or stop my medicine when my care team tells me to do so.  2. I will take my medicines as prescribed. I will not change the dose or schedule unless my care team tells me to. There will be no refills if I  run out early.   I may be contactedwithout warning and asked to complete a urine drug test or pill count at any time.   3. I will keep all my appointments, and understand this is part of the monitoring of opioids. My care team may require an office visit for EVERY opioid/controlled substance refill. If I miss appointments or don t follow instructions, my care team may stop my  medicine.  4. I will not ask other providers to prescribe controlled substances, and I will not accept controlled substances from other people. If I need another prescribed controlled substance for a new reason, I will tell my care team within 1 business day.  5. I will use one pharmacy to fill all of my controlled substance prescriptions, and it is up to me to make sure that I do not run out of my medicines on weekends or holidays. If my care team is willing to refill my opioid prescription without a visit, I must request refills only during office hours, refills may take up to 3 days to process, and it may take up to 5 to 7 days for my medicine to be mailed and ready at my pharmacy. Prescriptions will not be mailed anywhere except my pharmacy.        [id number]  Rev 12/18         Registration to scan to EHR                             Page 1 of 2               Controlled Substance Agreement Opioid        [facility]  03/05/20  Patient: Hanna Campo  YOB: 1955  Medical Record Number: [id number]                                                                  6. I am responsible for my prescriptions. If the medicine/prescription is lost or stolen, it will not be replaced. I also agree not to share controlled substance medicines with anyone.  7. I agree to not use ANY illegal or recreational drugs. This includes marijuana, cocaine, bath salts or other drugs. I agree not to use alcohol unless my care team says I may.          I agree to give urine samples whenever asked. If I don t give a urine sample, the care team may stop my medicine.    8. If I enroll in the [place] Medical Marijuana program, I will tell my care team. I will also sign an agreement to share my medical records with my care team.   9. I will bring in my list of medicines (or my medicine bottles) each time I come to the clinic.   10. I will tell my care team right away if I become pregnant or have a new medical problem  treated outside of my regular clinic.  11. I understand that this medicine can affect my thinking and judgment. It may be unsafe for me to drive, use machinery and do dangerous tasks. I will not do any of these things until I know how the medicine affects me. If my dose changes, I will wait to see how it affects me. I will contact my care team if I have concerns about medicine side effects.    I understand that if I do not follow any of the conditions above, my prescriptions or treatment may be stopped.      I agree that my provider, clinic care team, and pharmacy may work with any city, state or federal law enforcement agency that investigates the misuse, sale, or other diversion of my controlled medicine. I will allow my provider to discuss my care with or share a copy of this agreement with any other treating provider, pharmacy or emergency room where I receive care. I agree to give up (waive) any right of privacy or confidentiality with respect to these consents.     I have read this agreement and have asked questions about anything I did not understand.      ________________________________________________________________________  Patient signature - Date/Time -  Hanna Campo                                      ________________________________________________________________________  Witness signature                                                            ________________________________________________________________________  Provider signature - Stephanie Sherman, NP      [id number]  Rev 12/18         Registration to scan to EHR                         Page 2 of 2                   Controlled Substance Agreement Opioid           Page 1 of 2  Opioid Pain Medicines (also known as Narcotics)  What You Need to Know    What are opioids?   Opioids are pain medicines that must be prescribed by a doctor.  They are also known as narcotics.    Examples are:     morphine (MS Contin, Silvana)    oxycodone  (Oxycontin)    oxycodone and acetaminophen (Percocet)    hydrocodone and acetaminophen (Vicodin, Norco)     fentanyl patch (Duragesic)     hydromorphone (Dilaudid)     methadone     What do opioids do well?   Opioids are best for short-term pain after a surgery or injury. They also work well for cancer pain. Unlike other pain medicines, they do not cause liver or kidney failure or ulcers. They may help some people with long-lasting (chronic) pain.     What do opioids NOT do well?   Opioids never get rid of pain entirely, and they do not work well for most patients with chronic pain. Opioids do not reduce swelling, one of the causes of pain. They also don t work well for nerve pain.                           For informational purposes only.  Not to replace the advice of your care provider.  Copyright [address] [facility]. All right reserved. [employer] 269647-Nkj 02/18.      Page 2 of 2    Risks and side effects   Talk to your doctor before you start or decide to keep taking one of these medicines. Side effects include:    Lowering your breathing rate enough to cause death    Overdose, including death, especially if taking higher than prescribed doses    Long-term opioid use    Worse depression symptoms; less pleasure in things you usually enjoy    Feeling tired or sluggish    Slower thoughts or cloudy thinking    Being more sensitive to pain over time; pain is harder to control    Trouble sleeping or restless sleep    Changes in hormone levels (for example, less testosterone)    Changes in sex drive or ability to have sex    Constipation    Unsafe driving    Itching and sweating    Feeling dizzy    Nausea, vomiting and dry mouth    What else should I know about opioids?  When someone takes opioids for too long or too often, they become dependent. This means that if you stop or reduce the medicine too quickly, you will have withdrawal symptoms.    Dependence is not the same as addiction. Addiction is when  people keep using a substance that harms their body, their mind or their relations with others. If you have a history of drug or alcohol abuse, taking opioids can cause a relapse.    Over time, opioids don t work as well. Most people will need higher and higher doses. The higher the dose, the more serious the side effects. We don t know the long-term effects of opioids.      Prescribed opioids aren't the best way to manage chronic pain    Other ways to manage pain include:      Ibuprofen or acetaminophen.  You should always try this first.      Treat health problems that may be causing pain.      acupuncture or massage, deep breathing, meditation, visual imagery, aromatherapy.      Use heat or ice at the pain site      Physical therapy and exercise      Stop smoking      See a counselor or therapist                                                  People who have used opioids for a long time may have a lower quality of life, worse depression, higher levels of pain and more visits to doctors.    Never share your opioids with others. Be sure to store opioids in a secure place, locked if possible.Young children can easily swallow them and overdose.     You can overdose on opioids.  Signs of overdose include decrease or loss of consciousness, slowed breathing, trouble waking and blue lips.  If someone is worried about overdose, they should call 911.    If you are at risk for overdose, you may get naloxone (Narcan, a medicine that reverses the effects of opioids.  If you overdose, a friend or family member can give you Narcan while waiting for the ambulance.  They need to know the signs of overdose and how to give Narcan.    While you're taking opioids:    Don't use alcohol or street drugs. Taking them together can cause death.    Don't take any of these medicines unless your doctor says its okay.  Taking these with opioids can cause death.    Benzodiazepines (such as lorazepam         or diazepam)    Muscle relaxers  (such as cyclobenzaprine)    sleeping pills    other opioids    Safe disposal of opioids  Find your area drug take-back program, your pharmacy mail-back program, buy a special disposal bag (such as Deterra) from your pharmacy or flush them down the toilet.  Use the guidelines at:  www.fda.gov/drugs/resourcesforyou

## 2020-03-05 NOTE — PROGRESS NOTES
Subjective     Hanna Campo is a 65 year old female who presents to clinic today for the following health issues:    HPI   Chronic Pain Follow-Up    Where in your body do you have pain? Bilateral foot pain  How has your pain affected your ability to work? Not applicable  Which of these pain treatments have you tried since your last clinic visit? Physical Therapy  How well are you sleeping? Poor  How has your mood been since your last visit? About the same  Have you had a significant life event? No  Other aggravating factors: prolonged standing  Taking medication as directed? Yes    No flowsheet data found.  GIRISH-7 SCORE 2/12/2020   Total Score 0 (minimal anxiety)   Total Score 0     No flowsheet data found.  Encounter-Level CSA:    There are no encounter-level csa.     Patient-Level CSA:    There are no patient-level csa.         How many servings of fruits and vegetables do you eat daily?  0-1    On average, how many sweetened beverages do you drink each day (Examples: soda, juice, sweet tea, etc.  Do NOT count diet or artificially sweetened beverages)?   0    How many days per week do you exercise enough to make your heart beat faster? none    How many minutes a day do you exercise enough to make your heart beat faster? no    How many days per week do you miss taking your medication? 0      Was in [place]: tried marijauna for her pain, it was edibles. Got the munchies.  She knows in [place] it is legal for only specific causes.  She wanted to be upfront about her use of it as she knows part of the pain contract it is against the rules.  She did state that the marijuana did not improve her pain at all and just made her more hungry.  We did talk about if she wants to look into a provider in [place] who does medical marijuana she can, gave patient some information about that on the AVS.  She states [facility] is one of only clinics that her insurance will give coverage. Will have patient return in 2 months for chronic  pain follow-up, and if the urine is positive for marijuana today, we will have her repeat in two months again.  She is aware, she also signed the chronic pain contract.  As long as things continue to be negative then provider will continue filling pain medication with the goal of reducing use and eventually stopping once her chronic pain is under control.     Chronic pain is in both of her feet, ongoing neuropathy for years, but significantly worse in the last year in 2019.  She is been to multiple specialties, had multiple work-ups which shows no specific cause.  She went to a pain clinic through [facility] a week ago, that note is still being finalized but ultimately she is going to work on doing therapy, acupuncture once that she has the financial ability to do that, and currently the Norco for pain.  We talked about her use of methadone years ago, and she asked a provider does not.  Advised that writer does not, has not been trained in methadone dosing and/or monitoring.  She can follow with methadone clinic if she likes.  In which case then we would take her off the Norco.  Same thing goes for medical marijuana, if she chooses to sign up with medical marijuana provider, we will taper her off the [place].    Started PT yesterday. Is going to hold off on acupuncture due to cost for now. Will be doing weekly PT plus her daily exercises.  She will also be using a cream that the pain clinic provider gave her.           Patient Active Problem List   Diagnosis     Benign essential hypertension     Hypothyroidism, unspecified type     Environmental allergies     Obesity (BMI 35.0-39.9) with comorbidity (H)     H/O wheezing     Other chronic pain     Pain in both feet     Past Surgical History:   Procedure Laterality Date     C LAP,APPENDIX UNLISTED PROCED       FOOT SURGERY       MAMMOPLASTY REDUCTION BILATERAL       SURGERY GENERAL IP CONSULT      cervical bone fusion, anterior approach     TUBAL LIGATION         Social  "History     Tobacco Use     Smoking status: Former Smoker     Smokeless tobacco: Never Used   Substance Use Topics     Alcohol use: Yes     Family History   Problem Relation Age of Onset     Hypertension Mother      Anxiety Disorder Mother      Skin Cancer Father      Lung Cancer Father         smoker     Anxiety Disorder Daughter      Diabetes No family hx of      Colon Cancer No family hx of      Breast Cancer No family hx of          Current Outpatient Medications   Medication Sig Dispense Refill     capsaicin (ZOSTRIX) 0.025 % external cream Apply 1 g topically 4 times daily as needed (foot pain) 120 g 4     Cetirizine HCl (ZYRTEC ALLERGY PO) Take 5 mg by mouth daily       chlorthalidone (HYGROTON) 50 MG tablet Take 1 tablet (50 mg) by mouth daily 90 tablet 2     HYDROcodone-acetaminophen (NORCO) 5-325 MG tablet Take 1 tablet by mouth 3 times daily as needed for pain or moderate to severe pain 30 tablet 0     levothyroxine (SYNTHROID/LEVOTHROID) 100 MCG tablet Take 1 tablet (100 mcg) by mouth daily 90 tablet 0     metoprolol succinate ER (TOPROL-XL) 50 MG 24 hr tablet Take 1 tablet (50 mg) by mouth daily 90 tablet 2     vitamin B complex with vitamin C (VITAMIN  B COMPLEX) tablet Take 1 tablet by mouth daily  0     VITAMIN D, CHOLECALCIFEROL, PO Take 5,000 Units by mouth daily       No Known Allergies      Reviewed and updated as needed this visit by Provider  Tobacco  Allergies  Meds  Problems  Med Hx  Surg Hx  Fam Hx         Review of Systems   ROS COMP: Constitutional, cardiovascular, pulmonary,MS as above, systems are negative, except as otherwise noted.      Objective    /80 (BP Location: Right arm, Patient Position: Chair, Cuff Size: Adult Regular)   Pulse 74   Temp 97.7  F (36.5  C) (Oral)   Resp 18   Ht 1.702 m (5' 7\")   Wt 99.3 kg (219 lb)   LMP  (Exact Date)   SpO2 99%   Breastfeeding No   BMI 34.30 kg/m    Body mass index is 34.3 kg/m .  Physical Exam   GENERAL: healthy, alert " and no distress  RESP: lungs clear to auscultation - no rales, rhonchi or wheezes  CV: regular rate and rhythm, normal S1 S2, no S3 or S4, no murmur, click or rub  MS: no gross musculoskeletal defects noted, pain in feet, walking gingerly due to pain    Diagnostic Test Results:  Labs reviewed in Epic  No results found for this or any previous visit (from the past 24 hour(s)).        Assessment & Plan     1. Other chronic pain/2. Pain in both feet  Patient left a urine sample today for her chronic pain in both of her feet.  Unknown cause of neuropathy.  It has caused her to take an early intermediate.  She saw the pain clinic last week, but will have PCP manage the pain medication for now.  Patient signed pain contract today.  If her urine is positive for marijuana, we will repeat the urine in two months.  She did admit to provider she used when she is in [place] about a week or two ago.  She states it did not improve her pain any more than the pain medicine.  - Comprehensive Drug Analysis, Urine      3. Morbid obesity (H)  Patient is overweight, has chronic pain in her feet, it limits her mobility    4. Hypothyroidism, unspecified type   Patient needs to return in 1 month for repeat TSH.  Lab only appointment okay.  Decrease levothyroxine to 100 mics daily about a month ago  - **TSH with free T4 reflex FUTURE 1yr; Future         Return in about 2 months (around 5/5/2020) for Chronic Pain Recheck.    FARIBA Maloney, NP-C  [facility]    This chart was documented by provider using a voice activated software called Dragon in addition to manual typing. There may be vocabulary errors or other grammatical errors due to this.

## 2020-03-05 NOTE — PATIENT INSTRUCTIONS
Could add 1000 mg tylenol twice a day    [email or web address]/index.html    Diagnoses for medical cannabis  Cancer associated with severe/chronic pain, nausea  or severe vomiting, or cachexia or severe wasting. Glaucoma. HIV/AIDS. Tourette Syndrome. Amyotrophic Lateral Sclerosis (ALS). Seizures, including those characteristic of Epilepsy. Severe and persistent muscle spasms, including  those characteristic of Multiple Sclerosis. Inflammatory bowel disease, including Crohn s disease. Terminal illness, with a probable life expectancy of less  than one year* Intractable pain Post-Traumatic Stress Disorder

## 2020-03-09 ENCOUNTER — MYC REFILL (OUTPATIENT)
Dept: FAMILY MEDICINE | Facility: CLINIC | Age: 65
End: 2020-03-09

## 2020-03-09 DIAGNOSIS — G89.29 OTHER CHRONIC PAIN: ICD-10-CM

## 2020-03-09 DIAGNOSIS — R20.2 NUMBNESS AND TINGLING OF BOTH FEET: ICD-10-CM

## 2020-03-09 DIAGNOSIS — R20.0 NUMBNESS AND TINGLING OF BOTH FEET: ICD-10-CM

## 2020-03-09 LAB — COMPREHEN DRUG ANALYSIS UR: NORMAL

## 2020-03-09 NOTE — TELEPHONE ENCOUNTER
Requested Prescriptions   Pending Prescriptions Disp Refills     HYDROcodone-acetaminophen (NORCO) 5-325 MG tablet 30 tablet 0     Sig: Take 1 tablet by mouth 3 times daily as needed for pain or moderate to severe pain       There is no refill protocol information for this order          HYDROcodone-acetaminophen (NORCO) 5-325 MG tablet      Last Written Prescription Date:  3/4/2020  Last Fill Quantity: 30,   # refills: 0  Last Office Visit: 3/5/2020  Future Office visit:       Routing refill request to provider for review/approval because:  Drug not on the [facility], [facility] or [facility] refill protocol or controlled substance

## 2020-03-10 NOTE — PROGRESS NOTES
[facility] Consultation    Date of visit: 2/26/2020    Reason for consultation:    Hanna Campo is a 65 year old female who is seen in consultation today at the request of her provider, Franky Beard.    Primary Care Provider is Stephanie Sherman.  Pain medications are being prescribed by PCP.    Please see the [facility] health questionnaire which the patient completed and reviewed with me in detail.    Chief Complaint:    Chief Complaint   Patient presents with     Pain Management     New       Pain history:  Hanna Campo is a 65 year old female who first started having problems with pain in her feet bilaterally due to idiopathic peripheral neuropathy over 3 years ago. She notes that initially felt like she had socks bunched up on her feet. She had a career of standing on her feet 10 to 12 hours per day. She tried new inserts but nothing has helped.     She has had to retire early because of the pain in her feet.      Pain Description:  Same in both feet that is numb but radiating with shooting pain in both feet. The pain is mostly in the soles. The pain is constant whether she is walking or standing or sitting or laying down. The pain radiates up to right below the calf in a stocking-like distribution.     Pain rating: Averages 7/10 on a 0-10 scale.  Aggravating factors include: walking, putting pressure on feet  Relieving factors include: nothing  Any bowel or bladder incontinence: n/a    Current treatments include:  Hydrocodone-acetaminophen 5-325 3 tabs per day  Realtime topical pain cream       Previous medication treatments included:  Gabapentin 200 mg QHS  Cymbalta - night sweats, GI upset  Essential oils   CBD    Other treatments have included:  Hanna Campo has not been seen at a pain clinic in the past.    PT: no  Acupuncture: no  TENs Unit: yes, but doesn't help  Injections: no    Past Medical History:  Past Medical History:   Diagnosis  Date     Environmental allergies      HTN (hypertension)      Hypothyroidism      Patient Active Problem List    Diagnosis Date Noted     Other chronic pain 02/05/2020     Priority: Medium     Pain in both feet 02/05/2020     Priority: Medium     H/O wheezing 02/08/2019     Priority: Medium     Obesity (BMI 35.0-39.9) with comorbidity (H) 11/30/2018     Priority: Medium     Benign essential hypertension 05/29/2018     Priority: Medium     Hypothyroidism, unspecified type 05/29/2018     Priority: Medium     Environmental allergies 05/29/2018     Priority: Medium       Past Surgical History:  Past Surgical History:   Procedure Laterality Date     C LAP,APPENDIX UNLISTED PROCED       FOOT SURGERY       MAMMOPLASTY REDUCTION BILATERAL       SURGERY GENERAL IP CONSULT      cervical bone fusion, anterior approach     TUBAL LIGATION       Medications:  Current Outpatient Medications   Medication Sig Dispense Refill     Cetirizine HCl (ZYRTEC ALLERGY PO) Take 5 mg by mouth daily       chlorthalidone (HYGROTON) 50 MG tablet Take 1 tablet (50 mg) by mouth daily 90 tablet 2     doxycycline hyclate (VIBRA-TABS) 100 MG tablet Take 1 tablet (100 mg) by mouth 2 times daily (Patient not taking: Reported on 2/5/2020) 20 tablet 0     guaiFENesin-codeine (ROBITUSSIN AC) 100-10 MG/5ML solution Take 5-10 mLs by mouth every 4 hours as needed for cough (Patient not taking: Reported on 2/5/2020) 250 mL 0     HYDROcodone-acetaminophen 5-325 MG PO tablet Take 1 tablet by mouth 3 times daily as needed for pain or moderate to severe pain 30 tablet 0     levofloxacin (LEVAQUIN) 500 MG tablet Take 1 tablet (500 mg) by mouth daily 10 tablet 0     levothyroxine (SYNTHROID/LEVOTHROID) 100 MCG tablet Take 1 tablet (100 mcg) by mouth daily 90 tablet 0     metoprolol succinate ER (TOPROL-XL) 50 MG 24 hr tablet Take 1 tablet (50 mg) by mouth daily 90 tablet 2     vitamin B complex with vitamin C (VITAMIN  B COMPLEX) tablet Take 1 tablet by mouth daily   "0     VITAMIN D, CHOLECALCIFEROL, PO Take 5,000 Units by mouth daily       Allergies:   No Known Allergies  Social History:  Home situation:   Occupation/Schooling: retired; worked trade shows  Tobacco use: no  Alcohol use: yes, 4 drinks per night  Drug use: no  History of chemical dependency treatment: no    Family history:  Family History   Problem Relation Age of Onset     Hypertension Mother      Anxiety Disorder Mother      Skin Cancer Father      Lung Cancer Father         smoker     Anxiety Disorder Daughter      Diabetes No family hx of      Colon Cancer No family hx of      Breast Cancer No family hx of        Review of Systems:    See questionnaire answers below      Physical Exam:  Vitals:    02/26/20 0906   BP: (!) 142/91   BP Location: Right arm   Patient Position: Chair   Cuff Size: Adult Large   Pulse: 77   Resp: 18   SpO2: 97%   Weight: 100.9 kg (222 lb 6.4 oz)   Height: 1.702 m (5' 7\")     Exam:  Constitutional: healthy, alert and no distress  Head: normocephalic. Atraumatic.   Eyes: no redness or jaundice noted   ENT: oropharnx normal.  MMM.  Neck supple.    Cardiovascular: RRR no m/g/r   Respiratory: clear   Gastrointestinal: soft, non-tender, normoactive bowel sounds   : deferred  Skin: no suspicious lesions or rashes  Psychiatric: mentation appears normal and affect normal/bright    Musculoskeletal exam:  Gait/Station/Posture: non-antalgic, slow steady gait    Lumbar spine: no paraspinal tenderness, mild decrease in ROM f/e/bl rotation    Lower Extremities: Diminished sensation to pinprick bilaterally to below calf    Neurologic exam:  CN:  Cranial nerves 2-12 are normal  Motor:  5/5 LE strength  Reflexes:     Patella:  R:  1/4 L: 1/4   Achilles:  R:  1/4 L: 1/4  Other reflexes:  Toes downgoing   Sensory:  (upper and lower extremities):   Light touch: diminished bilateral feet above ankle   Vibration: diminished in b/l feet   Allodynia: b/l soles of feet    Diagnostic tests:  No MRI or " EMG results for review      Other testing (labs, diagnostics) reviewed:  Labs  Last Comprehensive Metabolic Panel:  Sodium   Date Value Ref Range Status   02/05/2020 138 133 - 144 mmol/L Final     Potassium   Date Value Ref Range Status   02/05/2020 3.6 3.4 - 5.3 mmol/L Final     Chloride   Date Value Ref Range Status   02/05/2020 97 94 - 109 mmol/L Final     Carbon Dioxide   Date Value Ref Range Status   02/05/2020 35 (H) 20 - 32 mmol/L Final     Anion Gap   Date Value Ref Range Status   02/05/2020 6 3 - 14 mmol/L Final     Glucose   Date Value Ref Range Status   02/05/2020 76 70 - 99 mg/dL Final     Urea Nitrogen   Date Value Ref Range Status   02/05/2020 7 7 - 30 mg/dL Final     Creatinine   Date Value Ref Range Status   02/05/2020 0.62 0.52 - 1.04 mg/dL Final     GFR Estimate   Date Value Ref Range Status   02/05/2020 >90 >60 mL/min/[1.73_m2] Final     Comment:     Non  GFR Calc  Starting 12/18/2018, serum creatinine based estimated GFR (eGFR) will be   calculated using the Chronic Kidney Disease Epidemiology Collaboration   (CKD-EPI) equation.       Calcium   Date Value Ref Range Status   02/05/2020 9.3 8.5 - 10.1 mg/dL Final         [place] Prescription Monitoring Program reviewed - appropriate    Outside records reviewed through [facility] on day of visit      Assessment:  1. Idiopathic Peripheral Neuropathy of both feet  2. Chronic Opioid Use    Hanna Campo is a 65 year old female who presents with the complaints of bilateral lower extremity pain in association with peripheral neuropathy. She has tried multiple medication therapies and we discussed methods to help prevent worsening neuropathy, including decreasing daily alcohol intake as well as maintaining physical activity, exercise, and partaking in physical therapy.  We discussed trialing capsaicin cream as an option to help relieve her pain.     Plan:  Diagnosis reviewed, treatment option addressed, and risk/benefits discussed.   Self-care instructions given.  I am recommending a multidisciplinary treatment plan to help this patient better manage her pain.      1. Physical Therapy: Referral placed for PT  2. Pain Psychologist to address issues of relaxation, behavioral change, coping style, and other factors important to improvement: not at this time  3. Diagnostic Studies: none  4. Medication Management: okay to continue current regiment  1. Rx for Capsaicin cream provided; apply to bilateral lower extremities  5. Further procedures recommended: none; patient not interested in interventions at this time  6. Other treatments: Acupuncture referral  7. Recommendations/follow-up for PCP:  Okay to continue current regimen, but would advise applying multimodal therapies as well as medication management, including PT, acupuncture, potentially other neuropathic agents, and consider laser therapy in the future.  8. Follow up: 3 months    Total time spent was 40 minutes, and more than 50% of face to face time was spent in counseling and/or coordination of care regarding principles of multidisciplinary care, medication management, and therapeutic options.     Lisa Morales MD    Pain Medicine  Department of Anesthesiology  [facility]        Answers for HPI/ROS submitted by the patient on 2/12/2020   GIRISH 7 TOTAL SCORE: 0  General Symptoms: Yes  Skin Symptoms: No  HENT Symptoms: Yes  EYE SYMPTOMS: No  HEART SYMPTOMS: No  LUNG SYMPTOMS: Yes  INTESTINAL SYMPTOMS: Yes  URINARY SYMPTOMS: No  GYNECOLOGIC SYMPTOMS: No  BREAST SYMPTOMS: No  SKELETAL SYMPTOMS: No  BLOOD SYMPTOMS: No  NERVOUS SYSTEM SYMPTOMS: Yes  MENTAL HEALTH SYMPTOMS: No  Fever: No  Loss of appetite: No  Weight loss: No  Weight gain: No  Fatigue: No  Night sweats: Yes  Chills: No  Increased stress: Yes  Excessive hunger: No  Excessive thirst: No  Feeling hot or cold when others believe the temperature is normal: No  Loss of height: No  Post-operative  complications: No  Surgical site pain: No  Hallucinations: No  Change in or Loss of Energy: No  Hyperactivity: No  Confusion: No  Ear pain: No  Ear discharge: No  Hearing loss: No  Tinnitus: Yes  Nosebleeds: No  Congestion: No  Sinus pain: No  Trouble swallowing: No   Voice hoarseness: No  Mouth sores: No  Sore throat: No  Tooth pain: No  Gum tenderness: No  Bleeding gums: No  Change in taste: No  Change in sense of smell: No  Dry mouth: No  Hearing aid used: No  Neck lump: No  Cough: No  Sputum or phlegm: No  Coughing up blood: No  Difficulty breating or shortness of breath: No  Snoring: Yes  Wheezing: No  Difficulty breathing on exertion: No  Nighttime Cough: No  Difficulty breathing when lying flat: No  Heart burn or indigestion: No  Nausea: No  Vomiting: No  Abdominal pain: No  Bloating: No  Constipation: No  Diarrhea: No  Blood in stool: No  Black stools: No  Rectal or Anal pain: No  Fecal incontinence: No  Yellowing of skin or eyes: No  Vomit with blood: No  Change in stools: No  Trouble with coordination: No  Dizziness or trouble with balance: No  Fainting or black-out spells: No  Memory loss: No  Headache: No  Seizures: No  Speech problems: No  Tingling: Yes  Tremor: No  Weakness: No  Difficulty walking: Yes  Paralysis: No  Numbness: Yes    Answers for HPI/ROS submitted by the patient on 2/12/2020   GIRISH 7 TOTAL SCORE: 0  General Symptoms: Yes  Skin Symptoms: No  HENT Symptoms: Yes  EYE SYMPTOMS: No  HEART SYMPTOMS: No  LUNG SYMPTOMS: Yes  INTESTINAL SYMPTOMS: Yes  URINARY SYMPTOMS: No  GYNECOLOGIC SYMPTOMS: No  BREAST SYMPTOMS: No  SKELETAL SYMPTOMS: No  BLOOD SYMPTOMS: No  NERVOUS SYSTEM SYMPTOMS: Yes  MENTAL HEALTH SYMPTOMS: No  Fever: No  Loss of appetite: No  Weight loss: No  Weight gain: No  Fatigue: No  Night sweats: Yes  Chills: No  Increased stress: Yes  Excessive hunger: No  Excessive thirst: No  Feeling hot or cold when others believe the temperature is normal: No  Loss of height:  No  Post-operative complications: No  Surgical site pain: No  Hallucinations: No  Change in or Loss of Energy: No  Hyperactivity: No  Confusion: No  Ear pain: No  Ear discharge: No  Hearing loss: No  Tinnitus: Yes  Nosebleeds: No  Congestion: No  Sinus pain: No  Trouble swallowing: No   Voice hoarseness: No  Mouth sores: No  Sore throat: No  Tooth pain: No  Gum tenderness: No  Bleeding gums: No  Change in taste: No  Change in sense of smell: No  Dry mouth: No  Hearing aid used: No  Neck lump: No  Cough: No  Sputum or phlegm: No  Coughing up blood: No  Difficulty breating or shortness of breath: No  Snoring: Yes  Wheezing: No  Difficulty breathing on exertion: No  Nighttime Cough: No  Difficulty breathing when lying flat: No  Heart burn or indigestion: No  Nausea: No  Vomiting: No  Abdominal pain: No  Bloating: No  Constipation: No  Diarrhea: No  Blood in stool: No  Black stools: No  Rectal or Anal pain: No  Fecal incontinence: No  Yellowing of skin or eyes: No  Vomit with blood: No  Change in stools: No  Trouble with coordination: No  Dizziness or trouble with balance: No  Fainting or black-out spells: No  Memory loss: No  Headache: No  Seizures: No  Speech problems: No  Tingling: Yes  Tremor: No  Weakness: No  Difficulty walking: Yes  Paralysis: No  Numbness: Yes

## 2020-03-11 ENCOUNTER — THERAPY VISIT (OUTPATIENT)
Dept: PHYSICAL THERAPY | Facility: CLINIC | Age: 65
End: 2020-03-11
Payer: COMMERCIAL

## 2020-03-11 DIAGNOSIS — M79.672 PAIN IN BOTH FEET: ICD-10-CM

## 2020-03-11 DIAGNOSIS — M79.671 PAIN IN BOTH FEET: ICD-10-CM

## 2020-03-11 PROCEDURE — 97140 MANUAL THERAPY 1/> REGIONS: CPT | Mod: GP | Performed by: PHYSICAL THERAPIST

## 2020-03-11 PROCEDURE — 97110 THERAPEUTIC EXERCISES: CPT | Mod: GP | Performed by: PHYSICAL THERAPIST

## 2020-03-11 PROCEDURE — 97112 NEUROMUSCULAR REEDUCATION: CPT | Mod: GP | Performed by: PHYSICAL THERAPIST

## 2020-03-11 RX ORDER — HYDROCODONE BITARTRATE AND ACETAMINOPHEN 5; 325 MG/1; MG/1
1 TABLET ORAL 3 TIMES DAILY PRN
Qty: 90 TABLET | Refills: 0 | Status: SHIPPED | OUTPATIENT
Start: 2020-03-11 | End: 2020-04-08

## 2020-03-11 NOTE — TELEPHONE ENCOUNTER
Controlled Substance Refill Request for Norco  Problem List Complete:    No     PROVIDER TO CONSIDER COMPLETION OF PROBLEM LIST AND OVERVIEW/CONTROLLED SUBSTANCE AGREEMENT    Last Written Prescription Date:    HYDROcodone-acetaminophen (NORCO) 5-325 MG tablet  30 tablet  0  3/4/2020   No    Sig - Route: Take 1 tablet by mouth 3 times daily as needed for pain or moderate to severe pain - Oral    Sent to pharmacy as: HYDROcodone-acetaminophen (NORCO) 5-325 MG tablet    Class: E-Prescribe    Earliest Fill Date: 3/4/2020    Order: [id number]          THE MOST RECENT OFFICE VISIT MUST BE WITHIN THE PAST 3 MONTHS. AT LEAST ONE FACE TO FACE VISIT MUST OCCUR EVERY 6 MONTHS. ADDITIONAL VISITS CAN BE VIRTUAL.  (THIS STATEMENT SHOULD BE DELETED.)    [place] [facility] [place]: 03/05/2020    Future Office visit:     Controlled substance agreement:   Encounter-Level CSA:    There are no encounter-level csa.     Patient-Level CSA:    Controlled Substance Agreement - Opioid - Scan on 3/5/2020  4:22 PM         Last Urine Drug Screen: No results found for: Bret WILLIAMSON Drug Analysis UR   Date Value Ref Range Status   03/05/2020 FINAL  Final     Comment:     (Note)  ====================================================================  COMPREHENSIVE DRUG ANALYSIS,UR  ====================================================================  Test                             Result       Flag       Units        Drug Present   Carboxy-THC                    114                     ng/mg creat    Carboxy-THC is a metabolite of tetrahydrocannabinol  (THC).    Source of THC is most commonly illicit, but THC is also present    in a scheduled prescription medication.   Hydrocodone                    638                     ng/mg creat   Hydromorphone                  118                     ng/mg creat   Dihydrocodeine                 259                     ng/mg creat   Norhydrocodone                 1196                     ng/mg creat    Sources of hydrocodone include scheduled prescription    medications. Hydromorphone, dihydrocodeine and norhydrocodone are    expected metabolites of hydrocodone. Hydromorphone and    dihydrocodeine are also available as scheduled prescription    medications.   Acetaminophen                  PRESENT                               Metoprolol                     PRESENT                              ====================================================================  Test                      Result    Flag   Units      Ref Range        Creatinine              284              mg/dL      >=20            ====================================================================  For clinical consultation, please call [phone].  ====================================================================  Analysis performed by [facility] [employer] [place] [address]     , No results found for: THC13, PCP13, COC13, MAMP13, OPI13, AMP13, BZO13, TCA13, MTD13, BAR13, OXY13, PPX13, BUP13     Processing:  Rx to be electronically transmitted to pharmacy by provider      [email or web address]       checked in past 3 months?  No, route to RN- No problem list    Nakia Cunningham RN  [facility]

## 2020-03-12 ENCOUNTER — TELEPHONE (OUTPATIENT)
Dept: FAMILY MEDICINE | Facility: CLINIC | Age: 65
End: 2020-03-12

## 2020-03-12 NOTE — TELEPHONE ENCOUNTER
Refill sent.  Patient filled out a controlled substance agreement at last appointment.  Should be in scanning.  She is aware she will return in 2 months for follow-up.  FARIBA Maloney, NP-C  [facility]

## 2020-03-12 NOTE — TELEPHONE ENCOUNTER
Please call patient about the following:  Provider had agreed for patient to take an additional Norco for a total of 4 a day until she was seen pain clinic.  Now that provider is managing the pain medication, provider is only willing to do 3 a day as needed at this time.  FARIBA Maloney, NP-C  [facility]

## 2020-03-12 NOTE — TELEPHONE ENCOUNTER
Called and talked to patient about medication questions. Patient is aware that medication refill was sent.    Hanna states that they are holding the Norco until 03/14/2020.  HYDROcodone-acetaminophen (NORCO) 5-325 MG tablet  90 tablet  0  3/11/2020   No    Sig - Route: Take 1 tablet by mouth 3 times daily as needed for pain or moderate to severe pain - Oral    Sent to pharmacy as: HYDROcodone-acetaminophen (NORCO) 5-325 MG tablet    Class: E-Prescribe    Earliest Fill Date: 3/11/2020    Order: [id number]        Upon chart review, the patient should have enough medication for pain to last her until the 14th. Hanna states that she was told by provider that she could take an additional one PRN with her starting PT and having increased pain.     HYDROcodone-acetaminophen (NORCO) 5-325 MG tablet (Discontinued)  30 tablet  0  3/4/2020  3/9/2020  No    Sig - Route: Take 1 tablet by mouth 3 times daily as needed for pain or moderate to severe pain - Oral    Sent to pharmacy as: HYDROcodone-acetaminophen (NORCO) 5-325 MG tablet    Class: E-Prescribe    Earliest Fill Date: 3/4/2020          Patient would like to know if provider could do something or say something to get the pharmacy to allow her to refill sooner.     Patient also wanted to know why her chlorthalidone was only filled for 30 day when she had been getting it for 90. Patient is aware that this medication was sent for 90 day supply with 2 refills.     Patient has agreed to discussing reasoning for this with her pharmacy.       Routing to provider to please advise on the early refill on Norco.    Nakia Cunningham RN  [facility]

## 2020-03-12 NOTE — TELEPHONE ENCOUNTER
Reason for Call:  Other call back    Detailed comments: Pt states that her hydrocodone is getting held at the pharmacy until the 14th and she will run out before then, she also stated that there was only a partial refill on her chlorthalidone which she usually gets a 90 day supply for. She would like to request a call back to advise. Thank you.    Phone Number Patient can be reached at: Home number on file [phone] (home)    Best Time: Any    Can we leave a detailed message on this number? YES    Call taken on 3/12/2020 at 10:37 AM by Amna Hanley

## 2020-03-12 NOTE — TELEPHONE ENCOUNTER
This writer attempted to contact 1 on 03/12/20      Reason for call PER MESSAGE and left detailed message.      If patient calls back:   [place] Care Team (MA/TC) called. Inform patient that someone from the team will contact them, document that pt called and route to care team.         LXIONG3, MEDICAL ASSISTANT

## 2020-03-18 ENCOUNTER — THERAPY VISIT (OUTPATIENT)
Dept: PHYSICAL THERAPY | Facility: CLINIC | Age: 65
End: 2020-03-18
Payer: COMMERCIAL

## 2020-03-18 DIAGNOSIS — M79.671 PAIN IN BOTH FEET: ICD-10-CM

## 2020-03-18 DIAGNOSIS — M79.672 PAIN IN BOTH FEET: ICD-10-CM

## 2020-03-18 PROCEDURE — 97110 THERAPEUTIC EXERCISES: CPT | Mod: GP | Performed by: PHYSICAL THERAPIST

## 2020-03-18 PROCEDURE — 97112 NEUROMUSCULAR REEDUCATION: CPT | Mod: GP | Performed by: PHYSICAL THERAPIST

## 2020-03-18 PROCEDURE — 97140 MANUAL THERAPY 1/> REGIONS: CPT | Mod: GP | Performed by: PHYSICAL THERAPIST

## 2020-04-08 ENCOUNTER — MYC REFILL (OUTPATIENT)
Dept: FAMILY MEDICINE | Facility: CLINIC | Age: 65
End: 2020-04-08

## 2020-04-08 DIAGNOSIS — G89.29 OTHER CHRONIC PAIN: ICD-10-CM

## 2020-04-08 DIAGNOSIS — R20.0 NUMBNESS AND TINGLING OF BOTH FEET: ICD-10-CM

## 2020-04-08 DIAGNOSIS — R20.2 NUMBNESS AND TINGLING OF BOTH FEET: ICD-10-CM

## 2020-04-08 NOTE — TELEPHONE ENCOUNTER
Requested Prescriptions   Pending Prescriptions Disp Refills     HYDROcodone-acetaminophen (NORCO) 5-325 MG tablet 90 tablet 0     Sig: Take 1 tablet by mouth 3 times daily as needed for pain or moderate to severe pain       There is no refill protocol information for this order          HYDROcodone-acetaminophen (NORCO) 5-325 MG tablet      Last Written Prescription Date:  3/11/2020  Last Fill Quantity: 90,   # refills: 0  Last Office Visit: 3/5/2020  Future Office visit:       Routing refill request to provider for review/approval because:  Drug not on the [facility], [facility] or [facility] refill protocol or controlled substance

## 2020-04-10 RX ORDER — HYDROCODONE BITARTRATE AND ACETAMINOPHEN 5; 325 MG/1; MG/1
1 TABLET ORAL 3 TIMES DAILY PRN
Qty: 90 TABLET | Refills: 0 | Status: SHIPPED | OUTPATIENT
Start: 2020-04-10 | End: 2020-05-08

## 2020-04-10 NOTE — TELEPHONE ENCOUNTER
Controlled Substance Refill Request for Norco  Problem List Complete:    No     PROVIDER TO CONSIDER COMPLETION OF PROBLEM LIST AND OVERVIEW/CONTROLLED SUBSTANCE AGREEMENT      Last Office Visit with [facility] primary care provider: 3/5/20    Future Office visit: None    Controlled substance agreement:   Encounter-Level CSA:    There are no encounter-level csa.     Patient-Level CSA:    Controlled Substance Agreement - Opioid - Scan on 3/5/2020  4:22 PM         Last Urine Drug Screen: No results found for: Wade WILLIAMSONen Drug Analysis UR   Date Value Ref Range Status   03/05/2020 FINAL  Final     Comment:     (Note)  ====================================================================  COMPREHENSIVE DRUG ANALYSIS,UR  ====================================================================  Test                             Result       Flag       Units        Drug Present   Carboxy-THC                    114                     ng/mg creat    Carboxy-THC is a metabolite of tetrahydrocannabinol  (THC).    Source of THC is most commonly illicit, but THC is also present    in a scheduled prescription medication.   Hydrocodone                    638                     ng/mg creat   Hydromorphone                  118                     ng/mg creat   Dihydrocodeine                 259                     ng/mg creat   Norhydrocodone                 1196                    ng/mg creat    Sources of hydrocodone include scheduled prescription    medications. Hydromorphone, dihydrocodeine and norhydrocodone are    expected metabolites of hydrocodone. Hydromorphone and    dihydrocodeine are also available as scheduled prescription    medications.   Acetaminophen                  PRESENT                               Metoprolol                     PRESENT                              ====================================================================  Test                      Result    Flag   Units      Ref Range        Creatinine               284              mg/dL      >=20            ====================================================================  For clinical consultation, please call [phone].  ====================================================================  Analysis performed by [employer] [place] [address]     , No results found for: THC13, PCP13, COC13, MAMP13, OPI13, AMP13, BZO13, TCA13, MTD13, BAR13, OXY13, PPX13, BUP13     Processing:  Rx to be electronically transmitted to pharmacy by provider      [email or web address]       checked in past 3 months? No- problem list incomplete so RN does not complete check    Routing refill request to provider for review/approval because:  Drug not on the [facility] refill protocol   Kalli Mccarty RN  [facility]

## 2020-04-10 NOTE — TELEPHONE ENCOUNTER
Refill sent. She can call for refills if she needs them next month also.  FARIBA Maloney, NP-C  [facility]

## 2020-05-07 ENCOUNTER — MYC REFILL (OUTPATIENT)
Dept: FAMILY MEDICINE | Facility: CLINIC | Age: 65
End: 2020-05-07

## 2020-05-07 DIAGNOSIS — I10 BENIGN ESSENTIAL HYPERTENSION: ICD-10-CM

## 2020-05-08 ENCOUNTER — MYC REFILL (OUTPATIENT)
Dept: FAMILY MEDICINE | Facility: CLINIC | Age: 65
End: 2020-05-08

## 2020-05-08 DIAGNOSIS — R20.0 NUMBNESS AND TINGLING OF BOTH FEET: ICD-10-CM

## 2020-05-08 DIAGNOSIS — G89.29 OTHER CHRONIC PAIN: ICD-10-CM

## 2020-05-08 DIAGNOSIS — R20.2 NUMBNESS AND TINGLING OF BOTH FEET: ICD-10-CM

## 2020-05-11 RX ORDER — CHLORTHALIDONE 50 MG/1
50 TABLET ORAL DAILY
Qty: 90 TABLET | Refills: 2 | Status: SHIPPED | OUTPATIENT
Start: 2020-05-11 | End: 2020-08-31

## 2020-05-11 NOTE — TELEPHONE ENCOUNTER
"Requested Prescriptions   Pending Prescriptions Disp Refills     chlorthalidone (HYGROTON) 50 MG tablet 90 tablet 2     Sig: Take 1 tablet (50 mg) by mouth daily       Diuretics (Including Combos) Protocol Passed - 5/8/2020  9:09 AM        Passed - Blood pressure under 140/90 in past 12 months     BP Readings from Last 3 Encounters:   03/05/20 135/80   02/26/20 (!) 142/91   02/05/20 116/82                 Passed - Recent (12 mo) or future (30 days) visit within the authorizing provider's specialty     Patient has had an office visit with the authorizing provider or a provider within the authorizing providers department within the previous 12 mos or has a future within next 30 days. See \"Patient Info\" tab in inbasket, or \"Choose Columns\" in Meds & Orders section of the refill encounter.              Passed - Medication is active on med list        Passed - Patient is age 18 or older        Passed - No active pregancy on record        Passed - Normal serum creatinine on file in past 12 months     Recent Labs   Lab Test 02/05/20  1329   CR 0.62              Passed - Normal serum potassium on file in past 12 months     Recent Labs   Lab Test 02/05/20  1329   POTASSIUM 3.6                    Passed - Normal serum sodium on file in past 12 months     Recent Labs   Lab Test 02/05/20  1329                 Passed - No positive pregnancy test in past 12 months           Prescription approved per [facility] Refill Protocol.      Rupali Patterson RN, BSN, PHN    "

## 2020-05-12 RX ORDER — HYDROCODONE BITARTRATE AND ACETAMINOPHEN 5; 325 MG/1; MG/1
1 TABLET ORAL 3 TIMES DAILY PRN
Qty: 90 TABLET | Refills: 0 | Status: SHIPPED | OUTPATIENT
Start: 2020-05-12 | End: 2020-06-04

## 2020-05-12 NOTE — TELEPHONE ENCOUNTER
Controlled Substance Refill Request for Norco  Problem List Complete:    No     PROVIDER TO CONSIDER COMPLETION OF PROBLEM LIST AND OVERVIEW/CONTROLLED SUBSTANCE AGREEMENT    Last Written Prescription Date:  4/10/20  Last Fill Quantity: 90 tablets   # refills: 0    THE MOST RECENT OFFICE VISIT MUST BE WITHIN THE PAST 3 MONTHS. AT LEAST ONE FACE TO FACE VISIT MUST OCCUR EVERY 6 MONTHS. ADDITIONAL VISITS CAN BE VIRTUAL.  (THIS STATEMENT SHOULD BE DELETED.)    Last Office Visit with [facility] primary care provider: 3/5/2020    Future Office visit: None    Controlled substance agreement:   Encounter-Level CSA:    There are no encounter-level csa.     Patient-Level CSA:    Controlled Substance Agreement - Opioid - Scan on 3/5/2020  4:22 PM         Last Urine Drug Screen: No results found for: Bret WILLIAMSON Drug Analysis UR   Date Value Ref Range Status   03/05/2020 FINAL  Final     Comment:     (Note)  ====================================================================  COMPREHENSIVE DRUG ANALYSIS,UR  ====================================================================  Test                             Result       Flag       Units        Drug Present   Carboxy-THC                    114                     ng/mg creat    Carboxy-THC is a metabolite of tetrahydrocannabinol  (THC).    Source of THC is most commonly illicit, but THC is also present    in a scheduled prescription medication.   Hydrocodone                    638                     ng/mg creat   Hydromorphone                  118                     ng/mg creat   Dihydrocodeine                 259                     ng/mg creat   Norhydrocodone                 1196                    ng/mg creat    Sources of hydrocodone include scheduled prescription    medications. Hydromorphone, dihydrocodeine and norhydrocodone are    expected metabolites of hydrocodone. Hydromorphone and    dihydrocodeine are also available as scheduled prescription    medications.    Acetaminophen                  PRESENT                               Metoprolol                     PRESENT                              ====================================================================  Test                      Result    Flag   Units      Ref Range        Creatinine              284              mg/dL      >=20            ====================================================================  For clinical consultation, please call [phone].  ====================================================================  Analysis performed by [employer] [place] [address]     , No results found for: THC13, PCP13, COC13, MAMP13, OPI13, AMP13, BZO13, TCA13, MTD13, BAR13, OXY13, PPX13, BUP13     Processing:  Rx to be electronically transmitted to pharmacy by provider      [email or web address]       checked in past 3 months?  No-problem list incomplete so  not checked.           Rupali Patterson RN, BSN, PHN

## 2020-05-13 ENCOUNTER — VIRTUAL VISIT (OUTPATIENT)
Dept: ANESTHESIOLOGY | Facility: CLINIC | Age: 65
End: 2020-05-13
Payer: COMMERCIAL

## 2020-05-13 DIAGNOSIS — G60.9 IDIOPATHIC PERIPHERAL NEUROPATHY: ICD-10-CM

## 2020-05-13 DIAGNOSIS — M79.672 PAIN IN BOTH FEET: Primary | ICD-10-CM

## 2020-05-13 DIAGNOSIS — M79.671 PAIN IN BOTH FEET: Primary | ICD-10-CM

## 2020-05-13 RX ORDER — PREGABALIN 25 MG/1
25 CAPSULE ORAL 2 TIMES DAILY
Qty: 60 CAPSULE | Refills: 1 | Status: SHIPPED | OUTPATIENT
Start: 2020-05-13 | End: 2020-05-27

## 2020-05-13 ASSESSMENT — PAIN SCALES - GENERAL: PAINLEVEL: SEVERE PAIN (7)

## 2020-05-13 NOTE — PROGRESS NOTES
"Hanna Campo is a 65 year old female who is being evaluated via a billable video visit.      The patient has been notified of following:     \"This video visit will be conducted via a call between you and your physician/provider. We have found that certain health care needs can be provided without the need for an in-person physical exam.  This service lets us provide the care you need with a video conversation.  If a prescription is necessary we can send it directly to your pharmacy.  If lab work is needed we can place an order for that and you can then stop by our lab to have the test done at a later time.    Video visits are billed at different rates depending on your insurance coverage.  Please reach out to your insurance provider with any questions.    If during the course of the call the physician/provider feels a video visit is not appropriate, you will not be charged for this service.\"    Patient has given verbal consent for Video visit? Yes    How would you like to obtain your AVS? See Nursing documentation    Patient would like the video invitation sent by: See nursing documentation    Will anyone else be joining your video visit? No        Video-Visit Details    Type of service:  Video Visit    Video Duration: 17 minutes    Originating Location (pt. Location): Home    Distant Location (provider location):  [facility]     Platform used for Video Visit: Jie Morales MD    Interval Update:    Doing the PT exercises, doing on a daily basis; they are helpful for low back symptoms.  Tried capsaicin cream and did not have any relief from that      PLAN: Trial Lyrica; Rx sent to pharmacy (failed gabapentin in the past due to side effects)  Follow-up in 4 weeks    "

## 2020-05-13 NOTE — LETTER
"5/13/2020       RE: Hanna Campo  [address]  [facility] [address]     Dear Colleague,    Thank you for referring your patient, Hanna Campo, to the [facility] at [facility]. Please see a copy of my visit note below.    Hanna Campo is a 65 year old female who is being evaluated via a billable video visit.      The patient has been notified of following:     \"This video visit will be conducted via a call between you and your physician/provider. We have found that certain health care needs can be provided without the need for an in-person physical exam.  This service lets us provide the care you need with a video conversation.  If a prescription is necessary we can send it directly to your pharmacy.  If lab work is needed we can place an order for that and you can then stop by our lab to have the test done at a later time.    Video visits are billed at different rates depending on your insurance coverage.  Please reach out to your insurance provider with any questions.    If during the course of the call the physician/provider feels a video visit is not appropriate, you will not be charged for this service.\"    Patient has given verbal consent for Video visit? Yes    How would you like to obtain your AVS? MyChart    Patient would like the video invitation sent by: Send to e-mail at: [email or web address]    Will anyone else be joining your video visit? Linda Alston CMA          Hanna Campo is a 65 year old female who is being evaluated via a billable video visit.      The patient has been notified of following:     \"This video visit will be conducted via a call between you and your physician/provider. We have found that certain health care needs can be provided without the need for an in-person physical exam.  This service lets us provide the care you need with a video conversation.  If a prescription " "is necessary we can send it directly to your pharmacy.  If lab work is needed we can place an order for that and you can then stop by our lab to have the test done at a later time.    Video visits are billed at different rates depending on your insurance coverage.  Please reach out to your insurance provider with any questions.    If during the course of the call the physician/provider feels a video visit is not appropriate, you will not be charged for this service.\"    Patient has given verbal consent for Video visit? Yes    How would you like to obtain your AVS? See Nursing documentation    Patient would like the video invitation sent by: See nursing documentation    Will anyone else be joining your video visit? No        Video-Visit Details    Type of service:  Video Visit    Video Duration: 17 minutes    Originating Location (pt. Location): Home    Distant Location (provider location):  [facility]     Platform used for Video Visit: Jie Morales MD    Interval Update:    Doing the PT exercises, doing on a daily basis; they are helpful for low back symptoms.  Tried capsaicin cream and did not have any relief from that      PLAN: Trial Lyrica; Rx sent to pharmacy (failed gabapentin in the past due to side effects)  Follow-up in 4 weeks  "

## 2020-05-13 NOTE — PROGRESS NOTES
"Hanna Campo is a 65 year old female who is being evaluated via a billable video visit.      The patient has been notified of following:     \"This video visit will be conducted via a call between you and your physician/provider. We have found that certain health care needs can be provided without the need for an in-person physical exam.  This service lets us provide the care you need with a video conversation.  If a prescription is necessary we can send it directly to your pharmacy.  If lab work is needed we can place an order for that and you can then stop by our lab to have the test done at a later time.    Video visits are billed at different rates depending on your insurance coverage.  Please reach out to your insurance provider with any questions.    If during the course of the call the physician/provider feels a video visit is not appropriate, you will not be charged for this service.\"    Patient has given verbal consent for Video visit? Yes    How would you like to obtain your AVS? Aden    Patient would like the video invitation sent by: Send to e-mail at: [email or web address]    Will anyone else be joining your video visit? No      Maricruz Alston CMA        "

## 2020-05-27 ENCOUNTER — VIRTUAL VISIT (OUTPATIENT)
Dept: ANESTHESIOLOGY | Facility: CLINIC | Age: 65
End: 2020-05-27
Payer: COMMERCIAL

## 2020-05-27 DIAGNOSIS — G60.9 IDIOPATHIC PERIPHERAL NEUROPATHY: Primary | ICD-10-CM

## 2020-05-27 RX ORDER — BUPRENORPHINE 5 UG/H
1 PATCH TRANSDERMAL
Qty: 4 PATCH | Refills: 0 | Status: SHIPPED | OUTPATIENT
Start: 2020-05-27 | End: 2020-06-10 | Stop reason: DRUGHIGH

## 2020-05-27 ASSESSMENT — PAIN SCALES - GENERAL: PAINLEVEL: SEVERE PAIN (6)

## 2020-05-27 NOTE — PROGRESS NOTES
"Hanna Campo is a 65 year old female who is being evaluated via a billable video visit.      The patient has been notified of following:     \"This video visit will be conducted via a call between you and your physician/provider. We have found that certain health care needs can be provided without the need for an in-person physical exam.  This service lets us provide the care you need with a video conversation.  If a prescription is necessary we can send it directly to your pharmacy.  If lab work is needed we can place an order for that and you can then stop by our lab to have the test done at a later time.    Video visits are billed at different rates depending on your insurance coverage.  Please reach out to your insurance provider with any questions.    If during the course of the call the physician/provider feels a video visit is not appropriate, you will not be charged for this service.\"    Patient has given verbal consent for Video visit? Yes    How would you like to obtain your AVS? Aden    Patient would like the video invitation sent by: Send to e-mail at: [email or web address]    Will anyone else be joining your video visit? No        Maricruz Alston CMA        "

## 2020-05-27 NOTE — PROGRESS NOTES
"Hanna Campo is a 65 year old female who is being evaluated via a billable video visit.      The patient has been notified of following:     \"This video visit will be conducted via a call between you and your physician/provider. We have found that certain health care needs can be provided without the need for an in-person physical exam.  This service lets us provide the care you need with a video conversation.  If a prescription is necessary we can send it directly to your pharmacy.  If lab work is needed we can place an order for that and you can then stop by our lab to have the test done at a later time.    Video visits are billed at different rates depending on your insurance coverage.  Please reach out to your insurance provider with any questions.    If during the course of the call the physician/provider feels a video visit is not appropriate, you will not be charged for this service.\"    Patient has given verbal consent for Video visit? Yes    How would you like to obtain your AVS? Aden    Patient would like the video invitation sent by: Send to e-mail at: [email or web address]    Will anyone else be joining your video visit? No        Video-Visit Details    Type of service:  Video Visit    Video Duration 18 minutes    Originating Location (pt. Location): Home    Distant Location (provider location):  [facility]     Platform used for Video Visit: Jie Morales MD    Interval Update:  Not doing well in terms of peripheral neuropathy  Tearful during conversation and frustrated that she is not having relief  Norco 5-325 3 tabs per day is not helping    Plan:   Discussed Butrans Patch and she will try this medication - Rx for Butrans Patch sent (prior authorization done)  Discontinue use of Lyrica as it is causing too much dizziness and sedation; she is worried about being too lightheaded with this medication    Encouraged her to continue HEPs and physical " therapy exercises. She is doing this on a daily basis.    In the future, will discuss potential of SCS trial and implant as an option.        Follow up: 4 weeks

## 2020-05-27 NOTE — LETTER
"5/27/2020       RE: Hanna Campo  [address]  [facility] [address]     Dear Colleague,    Thank you for referring your patient, Hanna Campo, to the [facility] FOR COMPREHENSIVE PAIN MANAGEMENT at [facility]. Please see a copy of my visit note below.    Hanna Campo is a 65 year old female who is being evaluated via a billable video visit.      The patient has been notified of following:     \"This video visit will be conducted via a call between you and your physician/provider. We have found that certain health care needs can be provided without the need for an in-person physical exam.  This service lets us provide the care you need with a video conversation.  If a prescription is necessary we can send it directly to your pharmacy.  If lab work is needed we can place an order for that and you can then stop by our lab to have the test done at a later time.    Video visits are billed at different rates depending on your insurance coverage.  Please reach out to your insurance provider with any questions.    If during the course of the call the physician/provider feels a video visit is not appropriate, you will not be charged for this service.\"    Patient has given verbal consent for Video visit? Yes    How would you like to obtain your AVS? MyChart    Patient would like the video invitation sent by: Send to e-mail at: [email or web address]    Will anyone else be joining your video visit? Linda Alston CMA          Hanna Campo is a 65 year old female who is being evaluated via a billable video visit.      The patient has been notified of following:     \"This video visit will be conducted via a call between you and your physician/provider. We have found that certain health care needs can be provided without the need for an in-person physical exam.  This service lets us provide the care you need with a video conversation.  If a prescription " "is necessary we can send it directly to your pharmacy.  If lab work is needed we can place an order for that and you can then stop by our lab to have the test done at a later time.    Video visits are billed at different rates depending on your insurance coverage.  Please reach out to your insurance provider with any questions.    If during the course of the call the physician/provider feels a video visit is not appropriate, you will not be charged for this service.\"    Patient has given verbal consent for Video visit? Yes    How would you like to obtain your AVS? Aden    Patient would like the video invitation sent by: Send to e-mail at: [email or web address]    Will anyone else be joining your video visit? No        Video-Visit Details    Type of service:  Video Visit    Video Duration 18 minutes    Originating Location (pt. Location): Home    Distant Location (provider location):  [facility]GEMENT     Platform used for Video Visit: Jie Morales MD    Interval Update:  Not doing well in terms of peripheral neuropathy  Tearful during conversation and frustrated that she is not having relief  Norco 5-325 3 tabs per day is not helping    Plan:   Discussed Butrans Patch and she will try this medication - Rx for Butrans Patch sent (prior authorization done)  Discontinue use of Lyrica as it is causing too much dizziness and sedation; she is worried about being too lightheaded with this medication    Encouraged her to continue HEPs and physical therapy exercises. She is doing this on a daily basis.    In the future, will discuss potential of SCS trial and implant as an option.        Follow up: 4 weeks    AVS sent to pt's Aden.     Maddi Galeana LPN      Again, thank you for allowing me to participate in the care of your patient.      Sincerely,    Lisa Morales MD  "

## 2020-05-27 NOTE — PATIENT INSTRUCTIONS
Medications:    buprenorphine (BUTRANS) 5 MCG/HR WK patch- Place 1 patch onto the skin every 7 days.   -We will start a Prior Authorization for this mediation. The Pharmacy or P.A. staff will contact you with more information.    Discontinue Lyrica.     When calling in for refills for your medication- You MUST call (Or MyChart) the clinic DIRECTLY and at least 7 days before you are needing your medication refilled.      Treatment planning:    Continue Home Exercise Program and physical therapy exercises.       Recommended Follow up:  4 weeks with Dr. Morales. Please call to schedule if you don't have have an appointment already set up.         To speak with a nurse, schedule/reschedule/cancel a clinic appointment, or request a medication refill call: [phone]    You can also reach us by Bizo: [email or web address]

## 2020-05-28 ENCOUNTER — MYC MEDICAL ADVICE (OUTPATIENT)
Dept: ANESTHESIOLOGY | Facility: CLINIC | Age: 65
End: 2020-05-28

## 2020-05-28 NOTE — NURSING NOTE
Addendum    Pt called back and stating Lyrica did not work for and will follow up with the Provider on 05/27/20 to discuss alternatives.    Maricruz Alston, CMA

## 2020-05-29 ENCOUNTER — TELEPHONE (OUTPATIENT)
Dept: ANESTHESIOLOGY | Facility: CLINIC | Age: 65
End: 2020-05-29

## 2020-05-29 NOTE — TELEPHONE ENCOUNTER
LPN followed up with the pt regarding [facility] not having the medication available. The pharmacist at the [facility] in [place], [place] stated that the medication would take several days to come in.   The pharmacist called the surrounding [facility] but they are closed to due current events.     LPN called around to different [facility] pharmacies, and Pt was agreeable to go to the [facility].     LPN called in Butrans patch as ordered.     Pt was given the pharmacy information on the phone, and then sent the information on Ninjathat verification of insurance information was indicated.     Maddi Galeana LPN

## 2020-05-29 NOTE — TELEPHONE ENCOUNTER
Prior Authorization Retail Medication Request    Medication/Dose: buprenorphine (BUTRANS) 5 MCG/HR WK patch- Place 1 patch onto the skin every 7 days.  ICD code (if different than what is on RX):    Previously Tried and Failed:  Rationale:      Insurance Name:    Insurance ID:        Pharmacy Information (if different than what is on RX)  Name:    Phone:

## 2020-05-29 NOTE — TELEPHONE ENCOUNTER
Central Prior Authorization Team   Phone: [phone]      PA Initiation    Medication: buprenorphine (BUTRANS) 5 MCG/HR WK patch   Insurance Company: [employer] - Phone [phone] Fax [phone]  Pharmacy Filling the Rx: [id number] [place], [place] - [address] AT [facility] OF  [address]  Filling Pharmacy Phone: [phone]  Filling Pharmacy Fax: [phone]  Start Date: 5/29/2020

## 2020-05-29 NOTE — TELEPHONE ENCOUNTER
"Prior Authorization Approval        Authorization Effective Date: 4/29/2020  Authorization Expiration Date: 5/29/2021  Medication: buprenorphine (BUTRANS) 5 MCG/HR WK patch   Approved Dose/Quantity:   Reference #: [id number]   Insurance Company: [employer] - Phone [phone] Fax [phone]   Which Pharmacy is filling the prescription (Not needed for infusion/clinic administered): [id number] - [facility], [place] - [facility] [address] AT [facility] OF  [address]  Pharmacy Notified: No, tried to call the pharmacy but rec'd a recording \"this location is temporarily closed\"  Patient Notified:      "

## 2020-06-02 DIAGNOSIS — G89.29 OTHER CHRONIC PAIN: ICD-10-CM

## 2020-06-02 DIAGNOSIS — E03.9 HYPOTHYROIDISM, UNSPECIFIED TYPE: ICD-10-CM

## 2020-06-02 LAB
T4 FREE SERPL-MCNC: 1.37 NG/DL (ref 0.76–1.46)
TSH SERPL DL<=0.005 MIU/L-ACNC: 4.62 MU/L (ref 0.4–4)

## 2020-06-02 PROCEDURE — 84439 ASSAY OF FREE THYROXINE: CPT | Performed by: NURSE PRACTITIONER

## 2020-06-02 PROCEDURE — 99000 SPECIMEN HANDLING OFFICE-LAB: CPT | Performed by: NURSE PRACTITIONER

## 2020-06-02 PROCEDURE — 84443 ASSAY THYROID STIM HORMONE: CPT | Performed by: NURSE PRACTITIONER

## 2020-06-02 PROCEDURE — 36415 COLL VENOUS BLD VENIPUNCTURE: CPT | Performed by: NURSE PRACTITIONER

## 2020-06-02 PROCEDURE — 80307 DRUG TEST PRSMV CHEM ANLYZR: CPT | Mod: 90 | Performed by: NURSE PRACTITIONER

## 2020-06-03 DIAGNOSIS — I10 BENIGN ESSENTIAL HYPERTENSION: ICD-10-CM

## 2020-06-03 NOTE — TELEPHONE ENCOUNTER
"Requested Prescriptions   Pending Prescriptions Disp Refills     chlorthalidone (HYGROTON) 50 MG tablet 90 tablet 2     Sig: Take 1 tablet (50 mg) by mouth daily       Diuretics (Including Combos) Protocol Passed - 6/3/2020  1:07 PM        Passed - Blood pressure under 140/90 in past 12 months     BP Readings from Last 3 Encounters:   03/05/20 135/80   02/26/20 (!) 142/91   02/05/20 116/82                 Passed - Recent (12 mo) or future (30 days) visit within the authorizing provider's specialty     Patient has had an office visit with the authorizing provider or a provider within the authorizing providers department within the previous 12 mos or has a future within next 30 days. See \"Patient Info\" tab in inbasket, or \"Choose Columns\" in Meds & Orders section of the refill encounter.              Passed - Medication is active on med list        Passed - Patient is age 18 or older        Passed - No active pregancy on record        Passed - Normal serum creatinine on file in past 12 months     Recent Labs   Lab Test 02/05/20  1329   CR 0.62              Passed - Normal serum potassium on file in past 12 months     Recent Labs   Lab Test 02/05/20  1329   POTASSIUM 3.6                    Passed - Normal serum sodium on file in past 12 months     Recent Labs   Lab Test 02/05/20  1329                 Passed - No positive pregnancy test in past 12 months           chlorthalidone (HYGROTON) 50 MG tablet  Last Written Prescription Date:  5/11/2020  Last Fill Quantity: 90,  # refills: 2   Last office visit: 3/5/2020 with prescribing provider:  Stephanie Sherman   Future Office Visit:            "

## 2020-06-05 RX ORDER — CHLORTHALIDONE 50 MG/1
50 TABLET ORAL DAILY
Qty: 90 TABLET | Refills: 2
Start: 2020-06-05

## 2020-06-05 NOTE — TELEPHONE ENCOUNTER
90 day supply with 2 refills sent on 5/11/2020. Should have refills on file at pharmacy.      Rupali Patterson RN, BSN, PHN

## 2020-06-08 LAB — COMPREHEN DRUG ANALYSIS UR: NORMAL

## 2020-06-09 ENCOUNTER — TELEPHONE (OUTPATIENT)
Dept: ANESTHESIOLOGY | Facility: CLINIC | Age: 65
End: 2020-06-09

## 2020-06-09 DIAGNOSIS — G60.9 IDIOPATHIC PERIPHERAL NEUROPATHY: Primary | ICD-10-CM

## 2020-06-10 ENCOUNTER — MYC REFILL (OUTPATIENT)
Dept: FAMILY MEDICINE | Facility: CLINIC | Age: 65
End: 2020-06-10

## 2020-06-10 DIAGNOSIS — I10 BENIGN ESSENTIAL HYPERTENSION: ICD-10-CM

## 2020-06-10 RX ORDER — BUPRENORPHINE 10 UG/H
1 PATCH TRANSDERMAL
Qty: 4 PATCH | Refills: 0 | Status: SHIPPED | OUTPATIENT
Start: 2020-06-10 | End: 2020-06-11

## 2020-06-10 NOTE — TELEPHONE ENCOUNTER
Prescription sent into alternative pharmacy due to preferred pharmacy not having medication in stock, per pt request.     Aydee Garcia, RN, BSN

## 2020-06-10 NOTE — TELEPHONE ENCOUNTER
Verbal orders given by Dr. Morales to increase butrans patch to 10mcg/hr every 7 days.     checked and no red flags noted. Prescription sent into pt's pharmacy.     Aydee Garcia RN, BSN

## 2020-06-11 RX ORDER — BUPRENORPHINE 10 UG/H
1 PATCH TRANSDERMAL
Qty: 4 PATCH | Refills: 0 | Status: SHIPPED | OUTPATIENT
Start: 2020-06-11 | End: 2020-06-24

## 2020-06-11 RX ORDER — CHLORTHALIDONE 50 MG/1
50 TABLET ORAL DAILY
Qty: 90 TABLET | Refills: 2 | OUTPATIENT
Start: 2020-06-11

## 2020-06-11 NOTE — TELEPHONE ENCOUNTER
Duplicate request.  Medication refilled on 5/11/20 x 90 days with 2 refills.   Should be on file. Denied today for this reason.    Lisa Almonte RN  [facility]

## 2020-06-11 NOTE — TELEPHONE ENCOUNTER
RNCC received message from pt. She states that alternative pharmacy only has name brand butrans patches that cost $400 out of pocket.     RNCC confirmed with [facility] in [place] ([address]) has generic buprenorphine 10mcg patches and prescription was sent in for pt. Prescription sent to [facility] in [place] yesterday has been cancelled. Pt updated.     Aydee Garcia, RN, BSN

## 2020-06-22 DIAGNOSIS — I10 BENIGN ESSENTIAL HYPERTENSION: ICD-10-CM

## 2020-06-24 RX ORDER — BUPRENORPHINE 10 UG/H
1 PATCH TRANSDERMAL
Qty: 4 PATCH | Refills: 0 | Status: SHIPPED | OUTPATIENT
Start: 2020-06-24 | End: 2020-08-28

## 2020-06-24 RX ORDER — CHLORTHALIDONE 50 MG/1
50 TABLET ORAL DAILY
Qty: 90 TABLET | Refills: 2 | OUTPATIENT
Start: 2020-06-24

## 2020-06-24 NOTE — TELEPHONE ENCOUNTER
Verbal orders given by Dr. Morales to send butrans patch refill so that pt's pharmacy will order medication before it is due. Pt has had to go to alternative pharmacy last two fills due to issue with supply.     Pt updated and was instructed to contact pharmacy when she puts on last patch so they can order medication and have it ready for her before she runs out.     Aydee Garcia, RN, BSN

## 2020-06-24 NOTE — TELEPHONE ENCOUNTER
Refused noting to see the 5/11/2020 Rx.    Radha Mcnally RN, [facility] Triage

## 2020-07-01 ENCOUNTER — VIRTUAL VISIT (OUTPATIENT)
Dept: ANESTHESIOLOGY | Facility: CLINIC | Age: 65
End: 2020-07-01
Payer: COMMERCIAL

## 2020-07-01 DIAGNOSIS — G60.9 IDIOPATHIC PERIPHERAL NEUROPATHY: Primary | ICD-10-CM

## 2020-07-01 ASSESSMENT — PAIN SCALES - GENERAL: PAINLEVEL: NO PAIN (1)

## 2020-07-01 NOTE — PATIENT INSTRUCTIONS
Medications:    Continue Butrans 10 mcg/hr as ordered by Dr. Morales.     When calling in for refills for your medication- You MUST call (Or MyChart) the clinic DIRECTLY and at least 7 days before you are needing your medication refilled.      Recommended Follow up:  2 months with Dr. Morales.   Please call [phone] to schedule this appointment if you don't already have an appointment made.       To speak with a nurse, schedule/reschedule/cancel a clinic appointment, or request a medication refill call: [phone]    You can also reach us by Certalia: [email or web address]

## 2020-07-01 NOTE — PROGRESS NOTES
"Hanna Campo is a 65 year old female who is being evaluated via a billable video visit.      The patient has been notified of following:     \"This video visit will be conducted via a call between you and your physician/provider. We have found that certain health care needs can be provided without the need for an in-person physical exam.  This service lets us provide the care you need with a video conversation.  If a prescription is necessary we can send it directly to your pharmacy.  If lab work is needed we can place an order for that and you can then stop by our lab to have the test done at a later time.    Video visits are billed at different rates depending on your insurance coverage.  Please reach out to your insurance provider with any questions.    If during the course of the call the physician/provider feels a video visit is not appropriate, you will not be charged for this service.\"    Patient has given verbal consent for Video visit? Yes  How would you like to obtain your AVS? Aden  Patient would like the video invitation sent by: Send to e-mail at: [email or web address]  Will anyone else be joining your video visit? No        Video-Visit Details    Type of service:  Video Visit    Video Duration: 12 minutes    Originating Location (pt. Location): Home    Distant Location (provider location):  [facility] COM[facility]NAGEMENT     Platform used for Video Visit: Jie Morales MD    Interval History:     Recommendations at previous visit on 5/27/2020:  Discussed Butrans Patch and she will try this medication - Rx for Butrans Patch sent (prior authorization done)  Discontinue use of Lyrica as it is causing too much dizziness and sedation; she is worried about being too lightheaded with this medication     Encouraged her to continue HEPs and physical therapy exercises. She is doing this on a daily basis.    In the future, will discuss potential of SCS trial and implant as an " option.      Since last visit:  Hanna has transitioned to Butrans patch 10mcg/hr patch (initially started at 5mcg/hr and titrated up to 10mcg/hr)  States that pain is significantly better controlled with this patch and is no longer taking any rescue Norco.   Is able to sleep through the night with this new medication  Still experiences foot numbness. However the pain is significantly better controlled.        PLAN:  Continue Butrans 10mcg/hr patch  Follow up 2 months    Video duration: 12 minutes

## 2020-07-01 NOTE — LETTER
"7/1/2020       RE: Hanna Campo  [address]  [place] [address]     Dear Colleague,    Thank you for referring your patient, Hanna Campo, to the [facility] CL[facility] FOR [facility] Please see a copy of my visit note below.    Hanna Campo is a 65 year old female who is being evaluated via a billable video visit.      The patient has been notified of following:     \"This video visit will be conducted via a call between you and your physician/provider. We have found that certain health care needs can be provided without the need for an in-person physical exam.  This service lets us provide the care you need with a video conversation.  If a prescription is necessary we can send it directly to your pharmacy.  If lab work is needed we can place an order for that and you can then stop by our lab to have the test done at a later time.    Video visits are billed at different rates depending on your insurance coverage.  Please reach out to your insurance provider with any questions.    If during the course of the call the physician/provider feels a video visit is not appropriate, you will not be charged for this service.\"    Patient has given verbal consent for Video visit? Yes  How would you like to obtain your AVS? MyChart  Patient would like the video invitation sent by: Send to e-mail at: [email or web address]  Will anyone else be joining your video visit? No  {If patient encounters technical issues they should call [phone]}      Maricruz Alston CMA            Hanna Campo is a 65 year old female who is being evaluated via a billable video visit.      The patient has been notified of following:     \"This video visit will be conducted via a call between you and your physician/provider. We have found that certain health care needs can be provided without the need for an in-person physical exam.  This service lets " "us provide the care you need with a video conversation.  If a prescription is necessary we can send it directly to your pharmacy.  If lab work is needed we can place an order for that and you can then stop by our lab to have the test done at a later time.    Video visits are billed at different rates depending on your insurance coverage.  Please reach out to your insurance provider with any questions.    If during the course of the call the physician/provider feels a video visit is not appropriate, you will not be charged for this service.\"    Patient has given verbal consent for Video visit? Yes  How would you like to obtain your AVS? Fantahart  Patient would like the video invitation sent by: Send to e-mail at: [email or web address]  Will anyone else be joining your video visit? No  {If patient encounters technical issues they should call [phone] :[phone]}      Video-Visit Details    Type of service:  Video Visit    Video Duration: 12 minutes    Originating Location (pt. Location): Home    Distant Location (provider location):  [facility] COM[facility]NAGEMENT     Platform used for Video Visit: Jie Morales MD    Interval History:     Recommendations at previous visit on 5/27/2020:  Discussed Butrans Patch and she will try this medication - Rx for Butrans Patch sent (prior authorization done)  Discontinue use of Lyrica as it is causing too much dizziness and sedation; she is worried about being too lightheaded with this medication     Encouraged her to continue HEPs and physical therapy exercises. She is doing this on a daily basis.    In the future, will discuss potential of SCS trial and implant as an option.      Since last visit:  Hanna has transitioned to Butrans patch 10mcg/hr patch (initially started at 5mcg/hr and titrated up to 10mcg/hr)  States that pain is significantly better controlled with this patch and is no longer taking any rescue Norco.   Is able to sleep " through the night with this new medication  Still experiences foot numbness. However the pain is significantly better controlled.        PLAN:  Continue Butrans 10mcg/hr patch  Follow up 2 months    Video duration: 12 minutes    AVS sent to [facility].   Maddi Galeana LPN      Again, thank you for allowing me to participate in the care of your patient.      Sincerely,    Lisa Morales MD

## 2020-07-01 NOTE — LETTER
7/1/2020      RE: Hanna Campo  [address]  [place] [address]       Hanna Campo is a 65 year old female who is being evaluated via a billable video visit.      Maricruz Alston CMA        Video-Visit Details    Type of service:  Video Visit    Video Duration: 12 minutes    Originating Location (pt. Location): Home    Distant Location (provider location):  [facility] FOR COMPREHENSIVE PAIN MANAGEMENT     Platform used for Video Visit: Jie Morales MD    Interval History:     Recommendations at previous visit on 5/27/2020:  Discussed Butrans Patch and she will try this medication - Rx for Butrans Patch sent (prior authorization done)  Discontinue use of Lyrica as it is causing too much dizziness and sedation; she is worried about being too lightheaded with this medication     Encouraged her to continue HEPs and physical therapy exercises. She is doing this on a daily basis.    In the future, will discuss potential of SCS trial and implant as an option.      Since last visit:  Hanna has transitioned to Butrans patch 10mcg/hr patch (initially started at 5mcg/hr and titrated up to 10mcg/hr)  States that pain is significantly better controlled with this patch and is no longer taking any rescue Norco.   Is able to sleep through the night with this new medication  Still experiences foot numbness. However the pain is significantly better controlled.        PLAN:  Continue Butrans 10mcg/hr patch  Follow up 2 months    Video duration: 12 minutes    AVS sent to [facility].   SHANTHI Brower MD

## 2020-07-01 NOTE — PROGRESS NOTES
"Hanna Campo is a 65 year old female who is being evaluated via a billable video visit.      The patient has been notified of following:     \"This video visit will be conducted via a call between you and your physician/provider. We have found that certain health care needs can be provided without the need for an in-person physical exam.  This service lets us provide the care you need with a video conversation.  If a prescription is necessary we can send it directly to your pharmacy.  If lab work is needed we can place an order for that and you can then stop by our lab to have the test done at a later time.    Video visits are billed at different rates depending on your insurance coverage.  Please reach out to your insurance provider with any questions.    If during the course of the call the physician/provider feels a video visit is not appropriate, you will not be charged for this service.\"    Patient has given verbal consent for Video visit? Yes  How would you like to obtain your AVS? Aden  Patient would like the video invitation sent by: Send to e-mail at: [email or web address]  Will anyone else be joining your video visit? No        Maricruz Alston CMA          "

## 2020-07-23 ENCOUNTER — MYC MEDICAL ADVICE (OUTPATIENT)
Dept: ANESTHESIOLOGY | Facility: CLINIC | Age: 65
End: 2020-07-23

## 2020-07-23 DIAGNOSIS — G60.9 IDIOPATHIC PERIPHERAL NEUROPATHY: Primary | ICD-10-CM

## 2020-07-29 RX ORDER — BUPRENORPHINE 15 UG/H
1 PATCH TRANSDERMAL
Qty: 4 PATCH | Refills: 0 | Status: SHIPPED | OUTPATIENT
Start: 2020-07-29 | End: 2020-11-12

## 2020-07-29 NOTE — TELEPHONE ENCOUNTER
Verbal orders given by Dr. Morales to increase butrans to 15mcg patch. Prescription routed to provider for authorization.     Aydee Garcia, BSN, RN

## 2020-08-06 ENCOUNTER — TELEPHONE (OUTPATIENT)
Dept: ANESTHESIOLOGY | Facility: CLINIC | Age: 65
End: 2020-08-06

## 2020-08-06 NOTE — TELEPHONE ENCOUNTER
RNCC received voice message from pt left on 8/5/20. She states that pharmacy did not order butrans 15mcg and pt is due to change 8/5/20. RNCC returned call 8/6/20 in the morning. Pt is concerned that pharmacy will not get medication delivered for a few days and she is asking what can be done to bridge her until medication can be filled.     RNCC called pharmacy. Pharmacists states that medication will be delivered today. He also stated that there was an error with how the prescription was addressed on their end in the work que, as this was a new prescription with different dosage than previous fill. He also suggests pt call a few days prior to needing the refill so that pharmacy can ensure it is ordered and will arrive in time.     Pt updated. She was advised to contact pharmacy to coordinate time to  prescription after supply delivery. She was also advised on pharmacist recommendation to call a few days prior to when she needs refill. Pt verbalized understanding and declined questions.     Aydee Garcia, SONNYN, RN

## 2020-08-08 DIAGNOSIS — E03.9 HYPOTHYROIDISM, UNSPECIFIED TYPE: ICD-10-CM

## 2020-08-11 RX ORDER — LEVOTHYROXINE SODIUM 100 UG/1
100 TABLET ORAL DAILY
Qty: 30 TABLET | Refills: 0 | Status: SHIPPED | OUTPATIENT
Start: 2020-08-11 | End: 2020-09-01

## 2020-08-11 NOTE — TELEPHONE ENCOUNTER
Due for medication check, virtual visit okay. Also due for labs for TSH and kidney function. Please help her set this up.   Thank you,  FARIBA Maloney, NP-C  [facility]

## 2020-08-11 NOTE — TELEPHONE ENCOUNTER
Routing refill request to provider for review/approval because:  Labs out of range:  TSH  Kalli Mccarty RN  [facility]

## 2020-08-19 DIAGNOSIS — I10 BENIGN ESSENTIAL HYPERTENSION: ICD-10-CM

## 2020-08-20 RX ORDER — METOPROLOL SUCCINATE 50 MG/1
50 TABLET, EXTENDED RELEASE ORAL DAILY
Qty: 90 TABLET | Refills: 1 | Status: SHIPPED | OUTPATIENT
Start: 2020-08-20 | End: 2021-02-26

## 2020-08-26 ASSESSMENT — ANXIETY QUESTIONNAIRES
1. FEELING NERVOUS, ANXIOUS, OR ON EDGE: NOT AT ALL
GAD7 TOTAL SCORE: 0
7. FEELING AFRAID AS IF SOMETHING AWFUL MIGHT HAPPEN: NOT AT ALL
2. NOT BEING ABLE TO STOP OR CONTROL WORRYING: NOT AT ALL
5. BEING SO RESTLESS THAT IT IS HARD TO SIT STILL: NOT AT ALL
7. FEELING AFRAID AS IF SOMETHING AWFUL MIGHT HAPPEN: NOT AT ALL
4. TROUBLE RELAXING: NOT AT ALL
6. BECOMING EASILY ANNOYED OR IRRITABLE: NOT AT ALL
3. WORRYING TOO MUCH ABOUT DIFFERENT THINGS: NOT AT ALL

## 2020-08-27 ENCOUNTER — MYC REFILL (OUTPATIENT)
Dept: FAMILY MEDICINE | Facility: CLINIC | Age: 65
End: 2020-08-27

## 2020-08-27 DIAGNOSIS — R20.0 NUMBNESS AND TINGLING OF BOTH FEET: ICD-10-CM

## 2020-08-27 DIAGNOSIS — R20.2 NUMBNESS AND TINGLING OF BOTH FEET: ICD-10-CM

## 2020-08-27 DIAGNOSIS — G89.29 OTHER CHRONIC PAIN: ICD-10-CM

## 2020-08-27 ASSESSMENT — ANXIETY QUESTIONNAIRES: GAD7 TOTAL SCORE: 0

## 2020-08-28 ENCOUNTER — MYC MEDICAL ADVICE (OUTPATIENT)
Dept: ANESTHESIOLOGY | Facility: CLINIC | Age: 65
End: 2020-08-28

## 2020-08-28 ENCOUNTER — VIRTUAL VISIT (OUTPATIENT)
Dept: ANESTHESIOLOGY | Facility: CLINIC | Age: 65
End: 2020-08-28
Payer: COMMERCIAL

## 2020-08-28 DIAGNOSIS — R20.0 NUMBNESS AND TINGLING OF BOTH FEET: ICD-10-CM

## 2020-08-28 DIAGNOSIS — G60.9 IDIOPATHIC PERIPHERAL NEUROPATHY: ICD-10-CM

## 2020-08-28 DIAGNOSIS — R20.2 NUMBNESS AND TINGLING OF BOTH FEET: ICD-10-CM

## 2020-08-28 DIAGNOSIS — G89.29 OTHER CHRONIC PAIN: ICD-10-CM

## 2020-08-28 RX ORDER — HYDROCODONE BITARTRATE AND ACETAMINOPHEN 5; 325 MG/1; MG/1
TABLET ORAL
Qty: 60 TABLET | Refills: 0 | Status: SHIPPED | OUTPATIENT
Start: 2020-08-28 | End: 2020-09-21

## 2020-08-28 RX ORDER — HYDROCODONE BITARTRATE AND ACETAMINOPHEN 5; 325 MG/1; MG/1
1 TABLET ORAL 2 TIMES DAILY PRN
Qty: 60 TABLET | Refills: 0 | Status: CANCELLED | OUTPATIENT
Start: 2020-08-28

## 2020-08-28 RX ORDER — BUPRENORPHINE 10 UG/H
1 PATCH TRANSDERMAL
Qty: 4 PATCH | Refills: 0 | Status: SHIPPED | OUTPATIENT
Start: 2020-08-28 | End: 2020-09-26

## 2020-08-28 ASSESSMENT — PAIN SCALES - GENERAL: PAINLEVEL: SEVERE PAIN (7)

## 2020-08-28 ASSESSMENT — ANXIETY QUESTIONNAIRES: GAD7 TOTAL SCORE: 0

## 2020-08-28 NOTE — TELEPHONE ENCOUNTER
Controlled Substance Refill Request for Norco  Problem List Complete:    No     PROVIDER TO CONSIDER COMPLETION OF PROBLEM LIST AND OVERVIEW/CONTROLLED SUBSTANCE AGREEMENT    Last Written Prescription Date:  6/4/20  Last Fill Quantity: 60 tablets   # refills: 0    THE MOST RECENT OFFICE VISIT MUST BE WITHIN THE PAST 3 MONTHS. AT LEAST ONE FACE TO FACE VISIT MUST OCCUR EVERY 6 MONTHS. ADDITIONAL VISITS CAN BE VIRTUAL.  (THIS STATEMENT SHOULD BE DELETED.)    [place] [facility] [place]: 3/5/20    Future Office visit:   Next 5 appointments (look out 90 days)    Aug 31, 2020  9:40 AM CDT  [facility] with Stephanie Sherman NP  [facility] [address] [place] [id number] [phone]          Controlled substance agreement:   Encounter-Level CSA:    There are no encounter-level csa.     Patient-Level CSA:    Controlled Substance Agreement - Opioid - Scan on 3/5/2020  4:22 PM         Last Urine Drug Screen: No results found for: Bret WILLIAMSON Drug Analysis [facility]   Date Value Ref Range Status   06/02/2020 FINAL  Final     Comment:     (Note)  ====================================================================  COMPREHENSIVE DRUG ANALYSIS,UR  ====================================================================  Test                             Result       Flag       Units        Drug Present   Hydrocodone                    115                     ng/mg creat   Hydromorphone                  51                      ng/mg creat   Dihydrocodeine                 106                     ng/mg creat   Norhydrocodone                 339                     ng/mg creat    Sources of hydrocodone include scheduled prescription    medications. Hydromorphone, dihydrocodeine and norhydrocodone are    expected metabolites of hydrocodone. Hydromorphone and    dihydrocodeine are also available as scheduled prescription    medications.   Buprenorphine                   5                       ng/mg creat   Norbuprenorphine               13                      ng/mg creat    Source of buprenorphine is a scheduled prescription medication.    Norbuprenorphine is an expected metabolite of buprenorphine.   Acetaminophen                  PRESENT                               Metoprolol                     PRESENT                              ====================================================================  Test                      Result    Flag   Units      Ref Range        Creatinine              222              mg/dL      >=20            ====================================================================  For clinical consultation, please call [phone].  ====================================================================  Analysis performed by [employer] [place] [address]     , No results found for: THC13, PCP13, COC13, MAMP13, OPI13, AMP13, BZO13, TCA13, MTD13, BAR13, OXY13, PPX13, BUP13     Processing:  Rx to be electronically transmitted to pharmacy by provider      [email or web address]       checked in past 3 months?  No-problem list incomplete so  not checked.           Rupali Patterson RN, BSN, PHN

## 2020-08-28 NOTE — TELEPHONE ENCOUNTER
Filled by pain provider today.  FARIBA Maloney, NP-C  [facility]

## 2020-08-28 NOTE — LETTER
"8/28/2020       RE: Hanna Campo  [address]  [place] [address]     Dear Colleague,    Thank you for referring your patient, Hanna Campo, to the [facility] at [facility]. Please see a copy of my visit note below.    Hanna Campo is a 65 year old female who is being evaluated via a billable video visit.      The patient has been notified of following:     \"This video visit will be conducted via a call between you and your physician/provider. We have found that certain health care needs can be provided without the need for an in-person physical exam.  This service lets us provide the care you need with a video conversation.  If a prescription is necessary we can send it directly to your pharmacy.  If lab work is needed we can place an order for that and you can then stop by our lab to have the test done at a later time.    Video visits are billed at different rates depending on your insurance coverage.  Please reach out to your insurance provider with any questions.    If during the course of the call the physician/provider feels a video visit is not appropriate, you will not be charged for this service.\"    Patient has given verbal consent for Video visit? Yes  How would you like to obtain your AVS? MyChart  If you are dropped from the video visit, the video invite should be resent to: Text to cell phone: [phone]  Will anyone else be joining your video visit? No  {If patient encounters technical issues they should call [phone] :[id number]}      Maricruz Alston CMA          Hanna Campo is a 65 year old female who is being evaluated via a billable video visit.      The patient has been notified of following:     \"This video visit will be conducted via a call between you and your physician/provider. We have found that certain health care needs can be provided without the need for an in-person physical exam. " " This service lets us provide the care you need with a video conversation.  If a prescription is necessary we can send it directly to your pharmacy.  If lab work is needed we can place an order for that and you can then stop by our lab to have the test done at a later time.    Video visits are billed at different rates depending on your insurance coverage.  Please reach out to your insurance provider with any questions.    If during the course of the call the physician/provider feels a video visit is not appropriate, you will not be charged for this service.\"    Patient has given verbal consent for Video visit? {YES-NO  Default Yes:4444::\"Yes\"}  How would you like to obtain your AVS? {AVS Preference:008921}  If you are dropped from the video visit, the video invite should be resent to: {video visit invitation:172825}  Will anyone else be joining your video visit? {:310143}  {If patient encounters technical issues they should call [phone] :[id number]}      Video-Visit Details    Type of service:  Video Visit    Video Start Time: {video visit start/end time:152948}  Video End Time: {video visit start/end time:152948}    Originating Location (pt. Location): {video visit patient location:197911::\"Home\"}    Distant Location (provider location):  [facility]     Platform used for Video Visit: {Virtual Visit Platforms:839616::\"Academize\"}    {signature options:461316}      Interval History:  Having worse pain control despite increase in butrans patch  Feet continue to be painful and became worse after switching from 10mcg patch to 15 mcg patch  More active lifestyle and losing weight, but frustrated that continues to have pain that prevents her from sleeping and engaging in daily activities  Has not done well with other neuropathics including cymbalta, pregabalin and gabapentin      PLAN:  Decrease Butrans patch down to 10 mcg from 15 mcg   Rx provided for hydrocodone-acetaminophen " 5-325, max 2 tabs per day  F/U 4 weeks    Again, thank you for allowing me to participate in the care of your patient.      Sincerely,    Lisa Morales MD

## 2020-08-28 NOTE — PROGRESS NOTES
"Hanna Campo is a 65 year old female who is being evaluated via a billable video visit.      The patient has been notified of following:     \"This video visit will be conducted via a call between you and your physician/provider. We have found that certain health care needs can be provided without the need for an in-person physical exam.  This service lets us provide the care you need with a video conversation.  If a prescription is necessary we can send it directly to your pharmacy.  If lab work is needed we can place an order for that and you can then stop by our lab to have the test done at a later time.    Video visits are billed at different rates depending on your insurance coverage.  Please reach out to your insurance provider with any questions.    If during the course of the call the physician/provider feels a video visit is not appropriate, you will not be charged for this service.\"    Patient has given verbal consent for Video visit? Yes  How would you like to obtain your AVS? MyChart  If you are dropped from the video visit, the video invite should be resent to: see documentation  Will anyone else be joining your video visit? No        Video-Visit Details    Type of service:  Video Visit    Video Duration: 20 minutes    Originating Location (pt. Location): Home    Distant Location (provider location):  [facility]     Platform used for Video Visit: Jie Morales MD      Interval History:  Having worse pain control despite increase in butrans patch  Feet continue to be painful and became worse after switching from 10mcg patch to 15 mcg patch  More active lifestyle and losing weight, but frustrated that continues to have pain that prevents her from sleeping and engaging in daily activities  Has not done well with other neuropathics including cymbalta, pregabalin and gabapentin      PLAN:  Decrease Butrans patch down to 10 mcg from 15 mcg   Rx provided " for hydrocodone-acetaminophen 5-325, max 2 tabs per day  F/U 4 weeks

## 2020-08-28 NOTE — PROGRESS NOTES
"Hanna Campo is a 65 year old female who is being evaluated via a billable video visit.      The patient has been notified of following:     \"This video visit will be conducted via a call between you and your physician/provider. We have found that certain health care needs can be provided without the need for an in-person physical exam.  This service lets us provide the care you need with a video conversation.  If a prescription is necessary we can send it directly to your pharmacy.  If lab work is needed we can place an order for that and you can then stop by our lab to have the test done at a later time.    Video visits are billed at different rates depending on your insurance coverage.  Please reach out to your insurance provider with any questions.    If during the course of the call the physician/provider feels a video visit is not appropriate, you will not be charged for this service.\"    Patient has given verbal consent for Video visit? Yes  How would you like to obtain your AVS? MyChart  If you are dropped from the video visit, the video invite should be resent to: Text to cell phone: [phone]  Will anyone else be joining your video visit? No        Maricruz Alston CMA        "

## 2020-08-28 NOTE — LETTER
8/28/2020       RE: Hanna Campo  [address]  [facility] [address]     Dear Colleague,    Thank you for referring your patient, Hanna Campo, to the [facility] at [facility]. Please see a copy of my visit note below.    Interval History:  Having worse pain control despite increase in butrans patch  Feet continue to be painful and became worse after switching from 10mcg patch to 15 mcg patch  More active lifestyle and losing weight, but frustrated that continues to have pain that prevents her from sleeping and engaging in daily activities  Has not done well with other neuropathics including cymbalta, pregabalin and gabapentin      PLAN:  Decrease Butrans patch down to 10 mcg from 15 mcg   Rx provided for hydrocodone-acetaminophen 5-325, max 2 tabs per day  F/U 4 weeks    Again, thank you for allowing me to participate in the care of your patient.  Sincerely,    Lisa Morales MD

## 2020-08-28 NOTE — PATIENT INSTRUCTIONS
Medications:    Decrease butrans patch to 10mcg per week    Start hydrocodone 5-325mg twice daily as needed for breakthrough pain     *When calling in for refills for your opioid medication- You MUST call (Or MyChart) the clinic DIRECTLY and at least 7 days before you are needing your medication refilled.    *Please provide the clinic with a minium of 1 week notice, on all prescription refills.         Recommended Follow up:  9/23/20 at 7am for video or clinic visit with Dr. Morales       Please call [phone] to schedule this appointment if you don't already have an appointment made.         To speak with a nurse, schedule/reschedule/cancel a clinic appointment, or request a medication refill call: [phone]    You can also reach us by [employer]: [email or web address]

## 2020-08-31 ENCOUNTER — MYC MEDICAL ADVICE (OUTPATIENT)
Dept: FAMILY MEDICINE | Facility: CLINIC | Age: 65
End: 2020-08-31

## 2020-08-31 ENCOUNTER — OFFICE VISIT (OUTPATIENT)
Dept: FAMILY MEDICINE | Facility: CLINIC | Age: 65
End: 2020-08-31
Payer: COMMERCIAL

## 2020-08-31 VITALS
OXYGEN SATURATION: 98 % | HEIGHT: 67 IN | WEIGHT: 194 LBS | HEART RATE: 56 BPM | TEMPERATURE: 97.6 F | DIASTOLIC BLOOD PRESSURE: 73 MMHG | BODY MASS INDEX: 30.45 KG/M2 | SYSTOLIC BLOOD PRESSURE: 136 MMHG

## 2020-08-31 DIAGNOSIS — G89.29 OTHER CHRONIC PAIN: Primary | ICD-10-CM

## 2020-08-31 DIAGNOSIS — E03.9 HYPOTHYROIDISM, UNSPECIFIED TYPE: ICD-10-CM

## 2020-08-31 DIAGNOSIS — I10 BENIGN ESSENTIAL HYPERTENSION: ICD-10-CM

## 2020-08-31 LAB
ALBUMIN SERPL-MCNC: 3.7 G/DL (ref 3.4–5)
ALP SERPL-CCNC: 65 U/L (ref 40–150)
ALT SERPL W P-5'-P-CCNC: 22 U/L (ref 0–50)
ANION GAP SERPL CALCULATED.3IONS-SCNC: 5 MMOL/L (ref 3–14)
AST SERPL W P-5'-P-CCNC: 19 U/L (ref 0–45)
BILIRUB SERPL-MCNC: 0.8 MG/DL (ref 0.2–1.3)
BUN SERPL-MCNC: 14 MG/DL (ref 7–30)
CALCIUM SERPL-MCNC: 9.4 MG/DL (ref 8.5–10.1)
CHLORIDE SERPL-SCNC: 105 MMOL/L (ref 94–109)
CO2 SERPL-SCNC: 30 MMOL/L (ref 20–32)
CREAT SERPL-MCNC: 0.7 MG/DL (ref 0.52–1.04)
GFR SERPL CREATININE-BSD FRML MDRD: >90 ML/MIN/{1.73_M2}
GLUCOSE SERPL-MCNC: 96 MG/DL (ref 70–99)
POTASSIUM SERPL-SCNC: 4.3 MMOL/L (ref 3.4–5.3)
PROT SERPL-MCNC: 7.4 G/DL (ref 6.8–8.8)
SODIUM SERPL-SCNC: 140 MMOL/L (ref 133–144)
TSH SERPL DL<=0.005 MIU/L-ACNC: 0.61 MU/L (ref 0.4–4)

## 2020-08-31 PROCEDURE — 36415 COLL VENOUS BLD VENIPUNCTURE: CPT | Performed by: NURSE PRACTITIONER

## 2020-08-31 PROCEDURE — 80053 COMPREHEN METABOLIC PANEL: CPT | Performed by: NURSE PRACTITIONER

## 2020-08-31 PROCEDURE — 99214 OFFICE O/P EST MOD 30 MIN: CPT | Performed by: NURSE PRACTITIONER

## 2020-08-31 PROCEDURE — 84443 ASSAY THYROID STIM HORMONE: CPT | Performed by: NURSE PRACTITIONER

## 2020-08-31 RX ORDER — CHLORTHALIDONE 50 MG/1
50 TABLET ORAL DAILY
Qty: 90 TABLET | Refills: 1 | Status: SHIPPED | OUTPATIENT
Start: 2020-08-31 | End: 2021-05-25

## 2020-08-31 ASSESSMENT — PAIN SCALES - GENERAL: PAINLEVEL: MILD PAIN (3)

## 2020-08-31 ASSESSMENT — MIFFLIN-ST. JEOR: SCORE: 1467.61

## 2020-08-31 NOTE — LETTER
[facility]  08/31/20    Patient: Hanna Campo  YOB: 1955  Medical Record Number: [id number]                                                                  Opioid / Opioid Plus Controlled Substance Agreement    I understand that my care provider has prescribed an opioid (narcotic) controlled substance to help manage my condition(s). I am taking this medicine to help me function or work. I know this is strong medicine, and that it can cause serious side effects. Opioid medicine can be sedating, addicting and may cause a dependency on the drug. They can affect my ability to drive or think, and cause depression. They need to be taken exactly as prescribed. Combining opioids with certain medicines or chemicals (such as cocaine, sedatives and tranquilizers, sleeping pills, meth) can be dangerous or even fatal. Also, if I stop opioids suddenly, I may have severe withdrawal symptoms. Last, I understand that opioids do not work for all types of pain nor for all patients. If not helpful, I may be asked to stop them.        The risks, benefits, and side effects of these medicine(s) were explained to me. I agree that:    1. I will take part in other treatments as advised by my care team. This may be psychiatry or counseling, physical therapy, behavioral therapy, group treatment or a referral to a pain clinic. I will reduce or stop my medicine when my care team tells me to do so.  2. I will take my medicines as prescribed. I will not change the dose or schedule unless my care team tells me to. There will be no refills if I  run out early.   I may be contactedwithout warning and asked to complete a urine drug test or pill count at any time.   3. I will keep all my appointments, and understand this is part of the monitoring of opioids. My care team may require an office visit for EVERY opioid/controlled substance refill. If I miss appointments or don t follow instructions, my care team may  stop my medicine.  4. I will not ask other providers to prescribe controlled substances, and I will not accept controlled substances from other people. If I need another prescribed controlled substance for a new reason, I will tell my care team within 1 business day.  5. I will use one pharmacy to fill all of my controlled substance prescriptions, and it is up to me to make sure that I do not run out of my medicines on weekends or holidays. If my care team is willing to refill my opioid prescription without a visit, I must request refills only during office hours, refills may take up to 3 days to process, and it may take up to 5 to 7 days for my medicine to be mailed and ready at my pharmacy. Prescriptions will not be mailed anywhere except my pharmacy.        [id number]  Rev 12/18         Registration to scan to EHR                             Page 1 of 2               Controlled Substance Agreement Opioid        [facility]  08/31/20  Patient: Hanna Campo  YOB: 1955  Medical Record Number: [id number]                                                                  6. I am responsible for my prescriptions. If the medicine/prescription is lost or stolen, it will not be replaced. I also agree not to share controlled substance medicines with anyone.  7. I agree to not use ANY illegal or recreational drugs. This includes marijuana, cocaine, bath salts or other drugs. I agree not to use alcohol unless my care team says I may.          I agree to give urine samples whenever asked. If I don t give a urine sample, the care team may stop my medicine.    8. If I enroll in the [place] Medical Marijuana program, I will tell my care team. I will also sign an agreement to share my medical records with my care team.   9. I will bring in my list of medicines (or my medicine bottles) each time I come to the clinic.   10. I will tell my care team right away if I become pregnant or have a new medical  problem treated outside of my regular clinic.  11. I understand that this medicine can affect my thinking and judgment. It may be unsafe for me to drive, use machinery and do dangerous tasks. I will not do any of these things until I know how the medicine affects me. If my dose changes, I will wait to see how it affects me. I will contact my care team if I have concerns about medicine side effects.    I understand that if I do not follow any of the conditions above, my prescriptions or treatment may be stopped.      I agree that my provider, clinic care team, and pharmacy may work with any city, state or federal law enforcement agency that investigates the misuse, sale, or other diversion of my controlled medicine. I will allow my provider to discuss my care with or share a copy of this agreement with any other treating provider, pharmacy or emergency room where I receive care. I agree to give up (waive) any right of privacy or confidentiality with respect to these consents.     I have read this agreement and have asked questions about anything I did not understand.      ________________________________________________________________________  Patient signature - Date/Time -  Hanna Campo                                      ________________________________________________________________________  Witness signature                                                            ________________________________________________________________________  Provider signature - Stephanie Sherman, NP      [id number]  Rev 12/18         Registration to scan to EHR                         Page 2 of 2                   Controlled Substance Agreement Opioid           Page 1 of 2  Opioid Pain Medicines (also known as Narcotics)  What You Need to Know    What are opioids?   Opioids are pain medicines that must be prescribed by a doctor.  They are also known as narcotics.    Examples are:     morphine (MS Contin, Silvana)    oxycodone  (Oxycontin)    oxycodone and acetaminophen (Percocet)    hydrocodone and acetaminophen (Vicodin, Norco)     fentanyl patch (Duragesic)     hydromorphone (Dilaudid)     methadone     What do opioids do well?   Opioids are best for short-term pain after a surgery or injury. They also work well for cancer pain. Unlike other pain medicines, they do not cause liver or kidney failure or ulcers. They may help some people with long-lasting (chronic) pain.     What do opioids NOT do well?   Opioids never get rid of pain entirely, and they do not work well for most patients with chronic pain. Opioids do not reduce swelling, one of the causes of pain. They also don t work well for nerve pain.                           For informational purposes only.  Not to replace the advice of your care provider.  Copyright [address] [facility]. All right reserved. [employer] [id number] 02/18.      Page 2 of 2    Risks and side effects   Talk to your doctor before you start or decide to keep taking one of these medicines. Side effects include:    Lowering your breathing rate enough to cause death    Overdose, including death, especially if taking higher than prescribed doses    Long-term opioid use    Worse depression symptoms; less pleasure in things you usually enjoy    Feeling tired or sluggish    Slower thoughts or cloudy thinking    Being more sensitive to pain over time; pain is harder to control    Trouble sleeping or restless sleep    Changes in hormone levels (for example, less testosterone)    Changes in sex drive or ability to have sex    Constipation    Unsafe driving    Itching and sweating    Feeling dizzy    Nausea, vomiting and dry mouth    What else should I know about opioids?  When someone takes opioids for too long or too often, they become dependent. This means that if you stop or reduce the medicine too quickly, you will have withdrawal symptoms.    Dependence is not the same as addiction. Addiction is when  people keep using a substance that harms their body, their mind or their relations with others. If you have a history of drug or alcohol abuse, taking opioids can cause a relapse.    Over time, opioids don t work as well. Most people will need higher and higher doses. The higher the dose, the more serious the side effects. We don t know the long-term effects of opioids.      Prescribed opioids aren't the best way to manage chronic pain    Other ways to manage pain include:      Ibuprofen or acetaminophen.  You should always try this first.      Treat health problems that may be causing pain.      acupuncture or massage, deep breathing, meditation, visual imagery, aromatherapy.      Use heat or ice at the pain site      Physical therapy and exercise      Stop smoking      See a counselor or therapist                                                  People who have used opioids for a long time may have a lower quality of life, worse depression, higher levels of pain and more visits to doctors.    Never share your opioids with others. Be sure to store opioids in a secure place, locked if possible.Young children can easily swallow them and overdose.     You can overdose on opioids.  Signs of overdose include decrease or loss of consciousness, slowed breathing, trouble waking and blue lips.  If someone is worried about overdose, they should call 911.    If you are at risk for overdose, you may get naloxone (Narcan, a medicine that reverses the effects of opioids.  If you overdose, a friend or family member can give you Narcan while waiting for the ambulance.  They need to know the signs of overdose and how to give Narcan.    While you're taking opioids:    Don't use alcohol or street drugs. Taking them together can cause death.    Don't take any of these medicines unless your doctor says its okay.  Taking these with opioids can cause death.    Benzodiazepines (such as lorazepam         or diazepam)    Muscle relaxers  (such as cyclobenzaprine)    sleeping pills    other opioids    Safe disposal of opioids  Find your area drug take-back program, your pharmacy mail-back program, buy a special disposal bag (such as Deterra) from your pharmacy or flush them down the toilet.  Use the guidelines at:  www.fda.gov/drugs/resourcesforyou

## 2020-08-31 NOTE — PATIENT INSTRUCTIONS
E-visit in 3 months  Phone visit in 6 months    At [facility], we strive to deliver an exceptional experience to you, every time we see you. If you receive a survey, please complete it as we do value your feedback.  If you have MyChart, you can expect to receive results automatically within 24 hours of their completion.  Your provider will send a note interpreting your results as well.   If you do not have MyChart, you should receive your results in about a week by mail.    Your care team:                            Family Medicine Internal Medicine   MD Jewel Gomez, MD Michael Beck, MD Li Mcclelland PANhiC  Melinda Ledesma, APRN CNP    Guzman Hughes, MD Pediatrics   Carlos Erickson, PA-C  Fabiola Whittington, CITLALI Katz, MD Kaitlin Chiu APRN CNP   MD Erin Su, MD Sheba Tomas, MD Gissell Rasmussen, APRN CNP  Ruth Luciano, PA-C  Stephanie Sherman, CNP  MD Yasmeen Vizcaino MD Angela Wermerskirchen, MD      Clinic hours: Monday - Thursday 7 am-7 pm; Fridays 7 am-5 pm.   Urgent care: Monday - Friday 11 am-9 pm; Saturday and Sunday 9 am-5 pm.    Clinic: [phone]       [facility]: Monday - Thursday 8 am - 7 pm; Friday 8 am - 6 pm  [facility]: [phone]     Use www.oncare.org for 24/7 diagnosis and treatment of dozens of conditions.
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
## 2020-08-31 NOTE — LETTER
[facility]  08/31/20    Patient: Hanna Campo  YOB: 1955  Medical Record Number: [id number]                                                                  Opioid / Opioid Plus Controlled Substance Agreement    I understand that my care provider has prescribed an opioid (narcotic) controlled substance to help manage my condition(s). I am taking this medicine to help me function or work. I know this is strong medicine, and that it can cause serious side effects. Opioid medicine can be sedating, addicting and may cause a dependency on the drug. They can affect my ability to drive or think, and cause depression. They need to be taken exactly as prescribed. Combining opioids with certain medicines or chemicals (such as cocaine, sedatives and tranquilizers, sleeping pills, meth) can be dangerous or even fatal. Also, if I stop opioids suddenly, I may have severe withdrawal symptoms. Last, I understand that opioids do not work for all types of pain nor for all patients. If not helpful, I may be asked to stop them.        The risks, benefits, and side effects of these medicine(s) were explained to me. I agree that:    1. I will take part in other treatments as advised by my care team. This may be psychiatry or counseling, physical therapy, behavioral therapy, group treatment or a referral to a pain clinic. I will reduce or stop my medicine when my care team tells me to do so.  2. I will take my medicines as prescribed. I will not change the dose or schedule unless my care team tells me to. There will be no refills if I  run out early.   I may be contactedwithout warning and asked to complete a urine drug test or pill count at any time.   3. I will keep all my appointments, and understand this is part of the monitoring of opioids. My care team may require an office visit for EVERY opioid/controlled substance refill. If I miss appointments or don t follow instructions, my care team may  stop my medicine.  4. I will not ask other providers to prescribe controlled substances, and I will not accept controlled substances from other people. If I need another prescribed controlled substance for a new reason, I will tell my care team within 1 business day.  5. I will use one pharmacy to fill all of my controlled substance prescriptions, and it is up to me to make sure that I do not run out of my medicines on weekends or holidays. If my care team is willing to refill my opioid prescription without a visit, I must request refills only during office hours, refills may take up to 3 days to process, and it may take up to 5 to 7 days for my medicine to be mailed and ready at my pharmacy. Prescriptions will not be mailed anywhere except my pharmacy.        [id number]  Rev 12/18         Registration to scan to EHR                             Page 1 of 2               Controlled Substance Agreement Opioid        [facility]  08/31/20  Patient: Hanna Campo  YOB: 1955  Medical Record Number: [id number]                                                                  6. I am responsible for my prescriptions. If the medicine/prescription is lost or stolen, it will not be replaced. I also agree not to share controlled substance medicines with anyone.  7. I agree to not use ANY illegal or recreational drugs. This includes marijuana, cocaine, bath salts or other drugs. I agree not to use alcohol unless my care team says I may.          I agree to give urine samples whenever asked. If I don t give a urine sample, the care team may stop my medicine.    8. If I enroll in the [place] Medical Marijuana program, I will tell my care team. I will also sign an agreement to share my medical records with my care team.   9. I will bring in my list of medicines (or my medicine bottles) each time I come to the clinic.   10. I will tell my care team right away if I become pregnant or have a new medical  problem treated outside of my regular clinic.  11. I understand that this medicine can affect my thinking and judgment. It may be unsafe for me to drive, use machinery and do dangerous tasks. I will not do any of these things until I know how the medicine affects me. If my dose changes, I will wait to see how it affects me. I will contact my care team if I have concerns about medicine side effects.    I understand that if I do not follow any of the conditions above, my prescriptions or treatment may be stopped.      I agree that my provider, clinic care team, and pharmacy may work with any city, state or federal law enforcement agency that investigates the misuse, sale, or other diversion of my controlled medicine. I will allow my provider to discuss my care with or share a copy of this agreement with any other treating provider, pharmacy or emergency room where I receive care. I agree to give up (waive) any right of privacy or confidentiality with respect to these consents.     I have read this agreement and have asked questions about anything I did not understand.      ________________________________________________________________________  Patient signature - Date/Time -  Hanna Campo                                      ________________________________________________________________________  Witness signature                                                            ________________________________________________________________________  Provider signature - Stephanie Sherman, NP      [id number]  Rev 12/18         Registration to scan to EHR                         Page 2 of 2                   Controlled Substance Agreement Opioid           Page 1 of 2  Opioid Pain Medicines (also known as Narcotics)  What You Need to Know    What are opioids?   Opioids are pain medicines that must be prescribed by a doctor.  They are also known as narcotics.    Examples are:     morphine (MS Contin, Silvana)    oxycodone  (Oxycontin)    oxycodone and acetaminophen (Percocet)    hydrocodone and acetaminophen (Vicodin, Norco)     fentanyl patch (Duragesic)     hydromorphone (Dilaudid)     methadone     What do opioids do well?   Opioids are best for short-term pain after a surgery or injury. They also work well for cancer pain. Unlike other pain medicines, they do not cause liver or kidney failure or ulcers. They may help some people with long-lasting (chronic) pain.     What do opioids NOT do well?   Opioids never get rid of pain entirely, and they do not work well for most patients with chronic pain. Opioids do not reduce swelling, one of the causes of pain. They also don t work well for nerve pain.                           For informational purposes only.  Not to replace the advice of your care provider.  Copyright 201 [facility]. All right reserved. [employer] 048183-Vhe 02/18.      Page 2 of 2    Risks and side effects   Talk to your doctor before you start or decide to keep taking one of these medicines. Side effects include:    Lowering your breathing rate enough to cause death    Overdose, including death, especially if taking higher than prescribed doses    Long-term opioid use    Worse depression symptoms; less pleasure in things you usually enjoy    Feeling tired or sluggish    Slower thoughts or cloudy thinking    Being more sensitive to pain over time; pain is harder to control    Trouble sleeping or restless sleep    Changes in hormone levels (for example, less testosterone)    Changes in sex drive or ability to have sex    Constipation    Unsafe driving    Itching and sweating    Feeling dizzy    Nausea, vomiting and dry mouth    What else should I know about opioids?  When someone takes opioids for too long or too often, they become dependent. This means that if you stop or reduce the medicine too quickly, you will have withdrawal symptoms.    Dependence is not the same as addiction. Addiction is when  people keep using a substance that harms their body, their mind or their relations with others. If you have a history of drug or alcohol abuse, taking opioids can cause a relapse.    Over time, opioids don t work as well. Most people will need higher and higher doses. The higher the dose, the more serious the side effects. We don t know the long-term effects of opioids.      Prescribed opioids aren't the best way to manage chronic pain    Other ways to manage pain include:      Ibuprofen or acetaminophen.  You should always try this first.      Treat health problems that may be causing pain.      acupuncture or massage, deep breathing, meditation, visual imagery, aromatherapy.      Use heat or ice at the pain site      Physical therapy and exercise      Stop smoking      See a counselor or therapist                                                  People who have used opioids for a long time may have a lower quality of life, worse depression, higher levels of pain and more visits to doctors.    Never share your opioids with others. Be sure to store opioids in a secure place, locked if possible.Young children can easily swallow them and overdose.     You can overdose on opioids.  Signs of overdose include decrease or loss of consciousness, slowed breathing, trouble waking and blue lips.  If someone is worried about overdose, they should call 911.    If you are at risk for overdose, you may get naloxone (Narcan, a medicine that reverses the effects of opioids.  If you overdose, a friend or family member can give you Narcan while waiting for the ambulance.  They need to know the signs of overdose and how to give Narcan.    While you're taking opioids:    Don't use alcohol or street drugs. Taking them together can cause death.    Don't take any of these medicines unless your doctor says its okay.  Taking these with opioids can cause death.    Benzodiazepines (such as lorazepam         or diazepam)    Muscle relaxers  (such as cyclobenzaprine)    sleeping pills    other opioids    Safe disposal of opioids  Find your area drug take-back program, your pharmacy mail-back program, buy a special disposal bag (such as Deterra) from your pharmacy or flush them down the toilet.  Use the guidelines at:  www.fda.gov/drugs/resourcesforyou

## 2020-09-01 RX ORDER — LEVOTHYROXINE SODIUM 100 UG/1
100 TABLET ORAL DAILY
Qty: 90 TABLET | Refills: 0 | Status: SHIPPED | OUTPATIENT
Start: 2020-09-01 | End: 2020-11-12

## 2020-09-13 ENCOUNTER — MYC REFILL (OUTPATIENT)
Dept: FAMILY MEDICINE | Facility: CLINIC | Age: 65
End: 2020-09-13

## 2020-09-13 DIAGNOSIS — E03.9 HYPOTHYROIDISM, UNSPECIFIED TYPE: ICD-10-CM

## 2020-09-13 RX ORDER — LEVOTHYROXINE SODIUM 100 UG/1
100 TABLET ORAL DAILY
Qty: 90 TABLET | Refills: 0 | Status: CANCELLED | OUTPATIENT
Start: 2020-09-13

## 2020-09-15 ENCOUNTER — MYC MEDICAL ADVICE (OUTPATIENT)
Dept: ANESTHESIOLOGY | Facility: CLINIC | Age: 65
End: 2020-09-15

## 2020-09-21 ENCOUNTER — MYC REFILL (OUTPATIENT)
Dept: ANESTHESIOLOGY | Facility: CLINIC | Age: 65
End: 2020-09-21

## 2020-09-21 DIAGNOSIS — R20.0 NUMBNESS AND TINGLING OF BOTH FEET: ICD-10-CM

## 2020-09-21 DIAGNOSIS — R20.2 NUMBNESS AND TINGLING OF BOTH FEET: ICD-10-CM

## 2020-09-21 DIAGNOSIS — G89.29 OTHER CHRONIC PAIN: ICD-10-CM

## 2020-09-22 RX ORDER — HYDROCODONE BITARTRATE AND ACETAMINOPHEN 5; 325 MG/1; MG/1
TABLET ORAL
Qty: 60 TABLET | Refills: 0 | Status: SHIPPED | OUTPATIENT
Start: 2020-09-22 | End: 2020-10-09

## 2020-09-22 NOTE — TELEPHONE ENCOUNTER
RNCC called pharmacy to clarify prescription. Per Dr. Morales, pt was instructed to increase hydrocodone to 3 tabs per day on 9/15/20. Pt updated.     SONNY SmithN, RN

## 2020-09-23 ENCOUNTER — TELEPHONE (OUTPATIENT)
Dept: PALLIATIVE MEDICINE | Facility: CLINIC | Age: 65
End: 2020-09-23

## 2020-09-23 ENCOUNTER — MYC MEDICAL ADVICE (OUTPATIENT)
Dept: ANESTHESIOLOGY | Facility: CLINIC | Age: 65
End: 2020-09-23

## 2020-09-23 ENCOUNTER — VIRTUAL VISIT (OUTPATIENT)
Dept: ANESTHESIOLOGY | Facility: CLINIC | Age: 65
End: 2020-09-23
Payer: COMMERCIAL

## 2020-09-23 DIAGNOSIS — G60.9 IDIOPATHIC PERIPHERAL NEUROPATHY: Primary | ICD-10-CM

## 2020-09-23 ASSESSMENT — PAIN SCALES - GENERAL: PAINLEVEL: MODERATE PAIN (5)

## 2020-09-23 NOTE — PATIENT INSTRUCTIONS
Medications:    Continue Norco, Max of 3 tablets per day.    Continue Butrans patch 10 mcg.     When calling in for refills for your opioid medication- You MUST call (Or MyChart) the clinic DIRECTLY and at least 7 days before you are needing your medication refilled.      Recommended Follow up:  6 weeks with Dr. Morales.        Please call [phone] to schedule this appointment if you don't already have an appointment made.         To speak with a nurse, schedule/reschedule/cancel a clinic appointment, or request a medication refill call: [phone]    You can also reach us by Fonemesh: [email or web address]

## 2020-09-23 NOTE — TELEPHONE ENCOUNTER
[facility] Call Center    Phone Message    May a detailed message be left on voicemail: yes     Reason for Call: Other:      Pt is calling in to schedule her 6 week f/u visit with Dr oMrales.   6 weeks would be 10/28/20.  Please call her back to schedule as first available is showing as December.    Thanks.    Action Taken: Message routed to:  [facility] ([facility] Pain    Travel Screening: Not Applicable

## 2020-09-23 NOTE — PROGRESS NOTES
"Hanna Campo is a 65 year old female who is being evaluated via a billable video visit.      The patient has been notified of following:     \"This video visit will be conducted via a call between you and your physician/provider. We have found that certain health care needs can be provided without the need for an in-person physical exam.  This service lets us provide the care you need with a video conversation.  If a prescription is necessary we can send it directly to your pharmacy.  If lab work is needed we can place an order for that and you can then stop by our lab to have the test done at a later time.    Video visits are billed at different rates depending on your insurance coverage.  Please reach out to your insurance provider with any questions.    If during the course of the call the physician/provider feels a video visit is not appropriate, you will not be charged for this service.\"    Patient has given verbal consent for Video visit? Yes  How would you like to obtain your AVS? MyChart  If you are dropped from the video visit, the video invite should be resent to: Send to e-mail at: [email or web address]  Will anyone else be joining your video visit? No        Maricruz Alston CMA      Answers for HPI/ROS submitted by the patient on 8/28/2020   GIRISH 7 TOTAL SCORE: 0    "

## 2020-09-23 NOTE — TELEPHONE ENCOUNTER
Per Patient is returning a call, Patient is wanting to have nurse schedule the 6 week apt. Patients states anytime, anyday that would work and to just wanting to get a call or a mychart message of the day and time.    Patient is wanting to have it by Video only. Please advise.    I did try to schedule the 6 week apt, nothing is available until 12/2

## 2020-09-23 NOTE — LETTER
"9/23/2020       RE: Hanna Campo  [address]  [place] [address]     Dear Colleague,    Thank you for referring your patient, Hanna Campo, to the [facility] FOR COMPREHENSIVE PAIN MANAGEMENT at [facility]. Please see a copy of my visit note below.    Hanna Campo is a 65 year old female who is being evaluated via a billable video visit.      The patient has been notified of following:     \"This video visit will be conducted via a call between you and your physician/provider. We have found that certain health care needs can be provided without the need for an in-person physical exam.  This service lets us provide the care you need with a video conversation.  If a prescription is necessary we can send it directly to your pharmacy.  If lab work is needed we can place an order for that and you can then stop by our lab to have the test done at a later time.    Video visits are billed at different rates depending on your insurance coverage.  Please reach out to your insurance provider with any questions.    If during the course of the call the physician/provider feels a video visit is not appropriate, you will not be charged for this service.\"    Patient has given verbal consent for Video visit? Yes  How would you like to obtain your AVS? MyChart  If you are dropped from the video visit, the video invite should be resent to: Send to e-mail at: [email or web address]  Will anyone else be joining your video visit? No        Maricruz Alston CMA      Answers for HPI/ROS submitted by the patient on 8/28/2020   GIRISH 7 TOTAL SCORE: 0      Hanna Campo is a 65 year old female who is being evaluated via a billable video visit.      The patient has been notified of following:     \"This video visit will be conducted via a call between you and your physician/provider. We have found that certain health care needs can be provided without the need for an in-person " "physical exam.  This service lets us provide the care you need with a video conversation.  If a prescription is necessary we can send it directly to your pharmacy.  If lab work is needed we can place an order for that and you can then stop by our lab to have the test done at a later time.    Video visits are billed at different rates depending on your insurance coverage.  Please reach out to your insurance provider with any questions.    If during the course of the call the physician/provider feels a video visit is not appropriate, you will not be charged for this service.\"    Patient has given verbal consent for Video visit? Yes  How would you like to obtain your AVS? MyChart  If you are dropped from the video visit, the video invite should be resent to: Text to cell phone: see nursing documentation  Will anyone else be joining your video visit? No        Video-Visit Details    Type of service:  Video Visit    Video Duration 40 minutes    Originating Location (pt. Location): Home    Distant Location (provider location):  [facility]NAGEMENT     Platform used for Video Visit: [facility]    Lisa Morales MD      Interval History:  Has just started new 15mcg butrans patch, having side effects from the higher dose (hallucinations, vivid dreams)    Will be due in 2 weeks for butrans patch refill, in 20 days will need to refill for 90 tabs (3/day) of norco    PLAN:  Okay to take 3 tabs per day of Norco  Continue Butrans patch 10 mcg    Will be due for patch refill in 2 weeks and norco refill in 20 days (due to increased dose)    F/u 6 weeks    [facility]   Maddi Galeana LPN      Again, thank you for allowing me to participate in the care of your patient.      Sincerely,    Lisa Morales MD      "

## 2020-09-23 NOTE — LETTER
9/23/2020       RE: Hanna Campo  [address]  [place] [address]     Dear Colleague,    Thank you for referring your patient, Hanna Campo, to the [facility] FOR COM[facility]ENSIVE PAIN MANAGEMENT at [facility]. Please see a copy of my visit note below.    Answers for HPI/ROS submitted by the patient on 8/28/2020   GIRISH 7 TOTAL SCORE: 0    Interval History:  Has just started new 15mcg butrans patch, having side effects from the higher dose (hallucinations, vivid dreams)    Will be due in 2 weeks for butrans patch refill, in 20 days will need to refill for 90 tabs (3/day) of norco    PLAN:  Okay to take 3 tabs per day of Norco  Continue Butrans patch 10 mcg    Will be due for patch refill in 2 weeks and norco refill in 20 days (due to increased dose)    F/u 6 weeks    [facility]   Maddi Galeana LPN    Again, thank you for allowing me to participate in the care of your patient.  Sincerely,    Lisa Morales MD

## 2020-09-25 NOTE — TELEPHONE ENCOUNTER
I called and LVM for the pt informing her I was reaching out to schedule her a follow up with Dr. Morales.    You can call us back at [phone].    Maricruz Alston, RAFAEL

## 2020-09-26 ENCOUNTER — MYC MEDICAL ADVICE (OUTPATIENT)
Dept: ANESTHESIOLOGY | Facility: CLINIC | Age: 65
End: 2020-09-26

## 2020-09-26 ENCOUNTER — MYC REFILL (OUTPATIENT)
Dept: ANESTHESIOLOGY | Facility: CLINIC | Age: 65
End: 2020-09-26

## 2020-09-26 DIAGNOSIS — G60.9 IDIOPATHIC PERIPHERAL NEUROPATHY: ICD-10-CM

## 2020-09-28 ENCOUNTER — TELEPHONE (OUTPATIENT)
Dept: ANESTHESIOLOGY | Facility: CLINIC | Age: 65
End: 2020-09-28

## 2020-09-28 RX ORDER — BUPRENORPHINE 10 UG/H
1 PATCH TRANSDERMAL
Qty: 4 PATCH | Refills: 0 | Status: SHIPPED | OUTPATIENT
Start: 2020-09-28 | End: 2020-10-24

## 2020-09-28 NOTE — TELEPHONE ENCOUNTER
Refill request    Medication: buprenorphine (BUTRANS) 10 MCG/HR WK patch       MNPMP Checked: Yes    Last refilled 09/02/20 for 4 patches      Last clinic appointment: 09/23/20  Next clinic appointment: Will reach out to pt to schedule      Preferred pharmacy:    [facility] [facility]

## 2020-10-09 ENCOUNTER — MYC REFILL (OUTPATIENT)
Dept: ANESTHESIOLOGY | Facility: CLINIC | Age: 65
End: 2020-10-09

## 2020-10-09 DIAGNOSIS — R20.2 NUMBNESS AND TINGLING OF BOTH FEET: ICD-10-CM

## 2020-10-09 DIAGNOSIS — R20.0 NUMBNESS AND TINGLING OF BOTH FEET: ICD-10-CM

## 2020-10-09 DIAGNOSIS — G89.29 OTHER CHRONIC PAIN: ICD-10-CM

## 2020-10-12 ENCOUNTER — TELEPHONE (OUTPATIENT)
Dept: ANESTHESIOLOGY | Facility: CLINIC | Age: 65
End: 2020-10-12

## 2020-10-12 NOTE — TELEPHONE ENCOUNTER
Refill request    Medication: HYDROcodone-acetaminophen (NORCO) 5-325 MG tablet      MNPMP Checked: Yes    Last refilled 09/22/20 for 60 tablets      Last clinic appointment: 09/23/20  Next clinic appointment: 11/25/20      Preferred pharmacy:    [facility]- [place], [place]- [address] at [facility] of [address]

## 2020-10-14 ENCOUNTER — TELEPHONE (OUTPATIENT)
Dept: ANESTHESIOLOGY | Facility: CLINIC | Age: 65
End: 2020-10-14

## 2020-10-14 DIAGNOSIS — M79.671 PAIN IN BOTH FEET: Primary | ICD-10-CM

## 2020-10-14 DIAGNOSIS — M79.672 PAIN IN BOTH FEET: Primary | ICD-10-CM

## 2020-10-14 RX ORDER — HYDROCODONE BITARTRATE AND ACETAMINOPHEN 5; 325 MG/1; MG/1
TABLET ORAL
Qty: 60 TABLET | Refills: 0 | Status: SHIPPED | OUTPATIENT
Start: 2020-10-14 | End: 2020-10-14 | Stop reason: DRUGHIGH

## 2020-10-14 RX ORDER — HYDROCODONE BITARTRATE AND ACETAMINOPHEN 5; 325 MG/1; MG/1
TABLET ORAL
Qty: 90 TABLET | Refills: 0 | Status: SHIPPED | OUTPATIENT
Start: 2020-10-14 | End: 2020-10-30

## 2020-10-14 NOTE — TELEPHONE ENCOUNTER
Prior Authorization Retail Medication Request    Medication/Dose: HYDROcodone-acetaminophen (NORCO) 5-325 MG tablet  ICD code (if different than what is on RX):    Previously Tried and Failed:    Rationale:      Insurance Name:    Insurance ID:        Pharmacy Information (if different than what is on RX)  Name:    Phone:

## 2020-10-14 NOTE — TELEPHONE ENCOUNTER
Prior Authorization Not Needed per Insurance        Medication: HYDROcodone-acetaminophen (NORCO) 5-325 MG tablet  Insurance Company: [employer] - Phone [phone] Fax [phone]  Expected CoPay:      Pharmacy Filling the Rx: [id number] [place] [place] - [address] AT [facility] OF  [address]  Pharmacy Notified: Yes - left voicemail to call me back directly if needing anything further  Patient Notified:

## 2020-10-23 PROBLEM — M79.671 PAIN IN BOTH FEET: Status: RESOLVED | Noted: 2020-02-05 | Resolved: 2020-10-23

## 2020-10-23 PROBLEM — M79.672 PAIN IN BOTH FEET: Status: RESOLVED | Noted: 2020-02-05 | Resolved: 2020-10-23

## 2020-10-23 NOTE — PROGRESS NOTES
Discharge Note    Progress reporting period is from initial evaluation date (please see noted date below) to Mar 18, 2020.  Linked Episodes   Type: Episode: Status: Noted: Resolved: Last update: Updated by:   PHYSICAL THERAPY bilateral feet pain 3/4/2020 Active 3/4/2020  3/25/2020 11:25 AM Lina Fischer, EUGENE      Comments:       Hanna failed to follow up and current status is unknown.  Please see information below for last relevant information on current status.  Patient seen for 3 visits.  Patient seen another visit for virtual appointment.     SUBJECTIVE  Subjective changes noted by patient:  I have been doing the exercises once day.  I took a pain pill every 8 hours   .  Current pain level is 8/10.     Previous pain level was   .   Changes in function:  Yes (See Goal flowsheet attached for changes in current functional level)  Adverse reaction to treatment or activity: None    OBJECTIVE  Changes noted in objective findings: continue with tightness, minimal movement with DF,  walking still antagic, with unable to heelstrike      ASSESSMENT/PLAN  Diagnosis: bilateral feet pain right greater than left    Updated problem list and treatment plan:   Pain - HEP  Decreased ROM/flexibility - HEP  Impaired muscle performance - HEP  Impaired gait - HEP  Impaired balance - HEP  STG/LTGs have been met or progress has been made towards goals:  Yes, please see goal flowsheet for most current information  Assessment of Progress: current status is unknown.    Last current status: Pt has not made progress   Self Management Plans:  HEP  I have re-evaluated this patient and find that the nature, scope, duration and intensity of the therapy is appropriate for the medical condition of the patient.  Hanna continues to require the following intervention to meet STG and LTG's:  HEP.    Recommendations:  Discharge with current home program.  Patient to follow up with MD as needed.    Please refer to the daily flowsheet for  treatment today, total treatment time and time spent performing 1:1 timed codes.

## 2020-10-24 ENCOUNTER — MYC REFILL (OUTPATIENT)
Dept: ANESTHESIOLOGY | Facility: CLINIC | Age: 65
End: 2020-10-24

## 2020-10-24 DIAGNOSIS — G60.9 IDIOPATHIC PERIPHERAL NEUROPATHY: ICD-10-CM

## 2020-10-26 ENCOUNTER — TELEPHONE (OUTPATIENT)
Dept: PALLIATIVE MEDICINE | Facility: CLINIC | Age: 65
End: 2020-10-26

## 2020-10-26 NOTE — TELEPHONE ENCOUNTER
Refill request    Medication: buprenorphine (BUTRANS) 10 MCG/HR WK patch      MNPMP Checked: Yes    Last refilled 09/30/20 for 4 patches      Last clinic appointment: 09/23/20  Next clinic appointment: 11/25/20      Preferred pharmacy:    [facility]- [place], [place]- [address] at [facility] of [address]

## 2020-10-27 RX ORDER — BUPRENORPHINE 10 UG/H
1 PATCH TRANSDERMAL
Qty: 4 PATCH | Refills: 0 | Status: SHIPPED | OUTPATIENT
Start: 2020-10-27 | End: 2020-11-21

## 2020-10-30 ENCOUNTER — MYC REFILL (OUTPATIENT)
Dept: SURGERY | Facility: CLINIC | Age: 65
End: 2020-10-30

## 2020-10-30 DIAGNOSIS — M79.671 PAIN IN BOTH FEET: ICD-10-CM

## 2020-10-30 DIAGNOSIS — M79.672 PAIN IN BOTH FEET: ICD-10-CM

## 2020-11-05 ENCOUNTER — TELEPHONE (OUTPATIENT)
Dept: ANESTHESIOLOGY | Facility: CLINIC | Age: 65
End: 2020-11-05

## 2020-11-05 RX ORDER — HYDROCODONE BITARTRATE AND ACETAMINOPHEN 5; 325 MG/1; MG/1
TABLET ORAL
Qty: 90 TABLET | Refills: 0 | Status: SHIPPED | OUTPATIENT
Start: 2020-11-05 | End: 2020-12-01

## 2020-11-05 NOTE — TELEPHONE ENCOUNTER
Refill request    Medication: HYDROcodone-acetaminophen (NORCO) 5-325 MG tablet      MNPMP Checked: Yes    Last refilled 10/14/20 for 60 tablets      Last clinic appointment: 09/23/20  Next clinic appointment: 11/25/20      Preferred pharmacy:    [facility]- [place], [place]- [address] at [facility] of  [address]

## 2020-11-12 ENCOUNTER — OFFICE VISIT (OUTPATIENT)
Dept: FAMILY MEDICINE | Facility: CLINIC | Age: 65
End: 2020-11-12
Payer: COMMERCIAL

## 2020-11-12 VITALS
TEMPERATURE: 97.4 F | WEIGHT: 203 LBS | RESPIRATION RATE: 16 BRPM | BODY MASS INDEX: 31.86 KG/M2 | HEART RATE: 61 BPM | SYSTOLIC BLOOD PRESSURE: 137 MMHG | OXYGEN SATURATION: 100 % | HEIGHT: 67 IN | DIASTOLIC BLOOD PRESSURE: 75 MMHG

## 2020-11-12 DIAGNOSIS — G89.29 OTHER CHRONIC PAIN: ICD-10-CM

## 2020-11-12 DIAGNOSIS — L60.0 INGROWING TOENAIL WITH INFECTION: Primary | ICD-10-CM

## 2020-11-12 DIAGNOSIS — E03.9 HYPOTHYROIDISM, UNSPECIFIED TYPE: ICD-10-CM

## 2020-11-12 PROCEDURE — 99213 OFFICE O/P EST LOW 20 MIN: CPT | Performed by: NURSE PRACTITIONER

## 2020-11-12 RX ORDER — LEVOTHYROXINE SODIUM 100 UG/1
100 TABLET ORAL DAILY
Qty: 90 TABLET | Refills: 1 | Status: SHIPPED | OUTPATIENT
Start: 2020-11-12 | End: 2021-05-25

## 2020-11-12 RX ORDER — CEPHALEXIN 500 MG/1
500 CAPSULE ORAL 2 TIMES DAILY
Qty: 14 CAPSULE | Refills: 0 | Status: SHIPPED | OUTPATIENT
Start: 2020-11-12 | End: 2020-11-20

## 2020-11-12 ASSESSMENT — MIFFLIN-ST. JEOR: SCORE: 1508.43

## 2020-11-12 NOTE — PATIENT INSTRUCTIONS
At [facility], we strive to deliver an exceptional experience to you, every time we see you. If you receive a survey, please complete it as we do value your feedback.  If you have MyChart, you can expect to receive results automatically within 24 hours of their completion.  Your provider will send a note interpreting your results as well.   If you do not have MyChart, you should receive your results in about a week by mail.    Your care team:                            Family Medicine Internal Medicine   MD Jewel Gomez MD Shantel Branch-Fleming, MD Srinivasa Vaka, MD Katya Georgiev PA-C Megan Hill, APRN CNP    Guzman Hughes, MD Pediatrics   Carlos Erickson, PANhiC  Fabiola Whittington, CNP MD Kaitlin Hudson APRN CNP   MD Erin Su MD Deborah Mielke, MD Gissell Rasmussen, APRN CNP  Ruth Luciano, PANhiC  Stephanie Sherman, CNP  MD Yasmeen Vizcaino MD Angela Wermerskirchen, MD      Clinic hours: Monday - Thursday 7 am-7 pm; Fridays 7 am-5 pm.   Urgent care: Monday - Friday 11 am-9 pm; Saturday and Sunday 9 am-5 pm.    Clinic: [phone]       [facility]: Monday - Thursday 8 am - 7 pm; Friday 8 am - 6 pm  [facility]: [phone]     Use [email or web address] for 24/7 diagnosis and treatment of dozens of conditions.

## 2020-11-12 NOTE — PROGRESS NOTES
"Subjective     Hanna Campo is a 65 year old female who presents to clinic today for the following health issues:    HPI         Concern - right big toe  Onset: noticed on Saturday  Description: clipped toe nail and noticed brown bubbly oozing from outter upper area of nail  Intensity: moderate  Progression of Symptoms:  same  Accompanying Signs & Symptoms: redness and discoloration  Previous history of similar problem: none  Precipitating factors:        Worsened by: None.  Alleviating factors:        Improved by: None.  Therapies tried and outcome: wrapped toe with antibiotic ointment    She was on her feet all day Saturday. With the pain medication she is more active. No trauma. Middle toe appears to have bruise/bleeding under nail. Big toe is red around the nail, very slight bruise appearing area at base of big toe nail.     For pain in both feet, following with pain clinic. norco TID prn, plus butrans patch 10 mg helping her be active.  Has another appointment in few weeks with pain clinic.     Will send refill of thyroid medication. Denies symptoms.      Review of Systems   Constitutional, cardiovascular, pulmonary, skin/nail as above systems are negative, except as otherwise noted.      Objective    /75 (BP Location: Left arm, Patient Position: Sitting, Cuff Size: Adult Large)   Pulse 61   Temp 97.4  F (36.3  C) (Tympanic)   Resp 16   Ht 1.702 m (5' 7\")   Wt 92.1 kg (203 lb)   SpO2 100%   BMI 31.79 kg/m    Body mass index is 31.79 kg/m .  Physical Exam   GENERAL: healthy, alert and no distress  RESP: lungs clear to auscultation - no rales, rhonchi or wheezes  CV: regular rate and rhythm, normal S1 S2, no S3 or S4, no murmur, click or rub  MS: no gross musculoskeletal defects noted, no edema  SKIN: right big toe red around the nail, small area of darkness noted at base of toe nail and on right upper outer nail. Middle toe nail appears to have bleeding or bruise under it    No results found for " this or any previous visit (from the past 24 hour(s)).        Assessment & Plan     Ingrowing toenail with infection  Likely toenail infection, send provider a MyCCedexist message next week if not improving.  Could extend treatment to a total of 10 days  - cephALEXin (KEFLEX) 500 MG capsule; Take 1 capsule (500 mg) by mouth 2 times daily for 7 days    Hypothyroidism, unspecified type  Refill sent  - levothyroxine (SYNTHROID/LEVOTHROID) 100 MCG tablet; Take 1 tablet (100 mcg) by mouth daily    Other chronic pain  Following with pain clinic            Return in about 4 months (around 3/12/2021) for Phone or Video visit okay, Medication check, Lab Work.    FARIBA Maloney, NP-C  [facility]

## 2020-11-14 ENCOUNTER — HEALTH MAINTENANCE LETTER (OUTPATIENT)
Age: 65
End: 2020-11-14

## 2020-11-21 ENCOUNTER — MYC REFILL (OUTPATIENT)
Dept: ANESTHESIOLOGY | Facility: CLINIC | Age: 65
End: 2020-11-21

## 2020-11-21 DIAGNOSIS — G60.9 IDIOPATHIC PERIPHERAL NEUROPATHY: ICD-10-CM

## 2020-11-25 ENCOUNTER — VIRTUAL VISIT (OUTPATIENT)
Dept: ANESTHESIOLOGY | Facility: CLINIC | Age: 65
End: 2020-11-25
Payer: COMMERCIAL

## 2020-11-25 ENCOUNTER — MYC MEDICAL ADVICE (OUTPATIENT)
Dept: ANESTHESIOLOGY | Facility: CLINIC | Age: 65
End: 2020-11-25

## 2020-11-25 DIAGNOSIS — G60.9 IDIOPATHIC PERIPHERAL NEUROPATHY: Primary | ICD-10-CM

## 2020-11-25 PROCEDURE — 99213 OFFICE O/P EST LOW 20 MIN: CPT | Mod: 95 | Performed by: ANESTHESIOLOGY

## 2020-11-25 RX ORDER — BUPRENORPHINE 10 UG/H
1 PATCH TRANSDERMAL
Qty: 4 PATCH | Refills: 0 | Status: SHIPPED | OUTPATIENT
Start: 2020-11-25 | End: 2020-12-18

## 2020-11-25 ASSESSMENT — PAIN SCALES - GENERAL: PAINLEVEL: SEVERE PAIN (7)

## 2020-11-25 NOTE — PROGRESS NOTES
"Hanna Campo is a 65 year old female who is being evaluated via a billable video visit.      The patient has been notified of following:     \"This video visit will be conducted via a call between you and your physician/provider. We have found that certain health care needs can be provided without the need for an in-person physical exam.  This service lets us provide the care you need with a video conversation.  If a prescription is necessary we can send it directly to your pharmacy.  If lab work is needed we can place an order for that and you can then stop by our lab to have the test done at a later time.    Video visits are billed at different rates depending on your insurance coverage.  Please reach out to your insurance provider with any questions.    If during the course of the call the physician/provider feels a video visit is not appropriate, you will not be charged for this service.\"    Patient has given verbal consent for Video visit? Yes  How would you like to obtain your AVS? MyChart  If you are dropped from the video visit, the video invite should be resent to: Text to cell phone: see chart  Will anyone else be joining your video visit? No        Video-Visit Details    Type of service:  Video Visit    Video Duration 14 minutes    Originating Location (pt. Location): Home    Distant Location (provider location):  [facility] [place]     Platform used for Video Visit: Jie Morales MD    Interval History:   Doing well with current regimen   Is pleased that she finds her pain well managed with combination of butrans patch and 3 tabs of hydrocodone-acetaminophen  per day. There are days when she takes 2 tabs of norco rather than 3.  Overall quite pleased with this combination.     PLAN:  Continue current regimen. No changes at this time. Rx for butrans patch provided, not due for Norco Rx at this time.     Lisa Morales MD  Assistant "   Pain Medicine  Department of Anesthesiology  [facility] [place]

## 2020-11-25 NOTE — PROGRESS NOTES
"Hanna Campo is a 65 year old female who is being evaluated via a billable video visit.      The patient has been notified of following:     \"This video visit will be conducted via a call between you and your physician/provider. We have found that certain health care needs can be provided without the need for an in-person physical exam.  This service lets us provide the care you need with a video conversation.  If a prescription is necessary we can send it directly to your pharmacy.  If lab work is needed we can place an order for that and you can then stop by our lab to have the test done at a later time.    Video visits are billed at different rates depending on your insurance coverage.  Please reach out to your insurance provider with any questions.    If during the course of the call the physician/provider feels a video visit is not appropriate, you will not be charged for this service.\"    Patient has given verbal consent for Video visit? Yes  How would you like to obtain your AVS? MyChart  If you are dropped from the video visit, the video invite should be resent to: Send to e-mail at: [email or web address]  Will anyone else be joining your video visit? No        Maricruz Alston CMA          "

## 2020-11-25 NOTE — LETTER
"11/25/2020       RE: Hanna Campo  [address]  [place] [address]     Dear Colleague,    Thank you for referring your patient, Hanna Campo, to the [facility] [place] at [facility]. Please see a copy of my visit note below.    Hanna Campo is a 65 year old female who is being evaluated via a billable video visit.      The patient has been notified of following:     \"This video visit will be conducted via a call between you and your physician/provider. We have found that certain health care needs can be provided without the need for an in-person physical exam.  This service lets us provide the care you need with a video conversation.  If a prescription is necessary we can send it directly to your pharmacy.  If lab work is needed we can place an order for that and you can then stop by our lab to have the test done at a later time.    Video visits are billed at different rates depending on your insurance coverage.  Please reach out to your insurance provider with any questions.    If during the course of the call the physician/provider feels a video visit is not appropriate, you will not be charged for this service.\"    Patient has given verbal consent for Video visit? Yes  How would you like to obtain your AVS? MyChart  If you are dropped from the video visit, the video invite should be resent to: Send to e-mail at: [email or web address]  Will anyone else be joining your video visit? Linda Alston CMA            Hanna Campo is a 65 year old female who is being evaluated via a billable video visit.      The patient has been notified of following:     \"This video visit will be conducted via a call between you and your physician/provider. We have found that certain health care needs can be provided without the need for an in-person physical exam.  This service lets us provide the care you " "need with a video conversation.  If a prescription is necessary we can send it directly to your pharmacy.  If lab work is needed we can place an order for that and you can then stop by our lab to have the test done at a later time.    Video visits are billed at different rates depending on your insurance coverage.  Please reach out to your insurance provider with any questions.    If during the course of the call the physician/provider feels a video visit is not appropriate, you will not be charged for this service.\"    Patient has given verbal consent for Video visit? Yes  How would you like to obtain your AVS? MyChart  If you are dropped from the video visit, the video invite should be resent to: Text to cell phone: see chart  Will anyone else be joining your video visit? No    Video-Visit Details    Type of service:  Video Visit    Video Duration 14 minutes    Originating Location (pt. Location): Home    Distant Location (provider location):  [facility]NA[facility] [place]     Platform used for Video Visit: Jie Morales MD    Interval History:   Doing well with current regimen   Is pleased that she finds her pain well managed with combination of butrans patch and 3 tabs of hydrocodone-acetaminophen  per day. There are days when she takes 2 tabs of norco rather than 3.  Overall quite pleased with this combination.     PLAN:  Continue current regimen. No changes at this time. Rx for butrans patch provided, not due for Norco Rx at this time.     Lisa Morales MD    Pain Medicine  Department of Anesthesiology  [facility]        "

## 2020-11-25 NOTE — PATIENT INSTRUCTIONS
Medications:    Continue current medication regimen.     For refills of opioid medications - You MUST call (Or MyChart) the clinic DIRECTLY and at least 7 days before you run out of your medication.    Recommended Follow up:      2-3 months with Dr. Morales. Or earlier if indicated.        Please call [phone], option #1 to schedule your clinic appointment if you don't already have an appointment scheduled.    To speak with a nurse, schedule/reschedule/cancel a clinic appointment, or request a medication refill call: [phone], option #1.    You can also reach us by gripNote: [email or web address]

## 2020-11-29 ENCOUNTER — MYC MEDICAL ADVICE (OUTPATIENT)
Dept: FAMILY MEDICINE | Facility: CLINIC | Age: 65
End: 2020-11-29

## 2020-12-01 ENCOUNTER — MYC REFILL (OUTPATIENT)
Dept: SURGERY | Facility: CLINIC | Age: 65
End: 2020-12-01

## 2020-12-01 DIAGNOSIS — M79.672 PAIN IN BOTH FEET: ICD-10-CM

## 2020-12-01 DIAGNOSIS — M79.671 PAIN IN BOTH FEET: ICD-10-CM

## 2020-12-02 ENCOUNTER — TELEPHONE (OUTPATIENT)
Dept: FAMILY MEDICINE | Facility: CLINIC | Age: 65
End: 2020-12-02

## 2020-12-02 ENCOUNTER — TELEPHONE (OUTPATIENT)
Dept: ANESTHESIOLOGY | Facility: CLINIC | Age: 65
End: 2020-12-02

## 2020-12-02 RX ORDER — HYDROCODONE BITARTRATE AND ACETAMINOPHEN 5; 325 MG/1; MG/1
TABLET ORAL
Qty: 90 TABLET | Refills: 0 | Status: SHIPPED | OUTPATIENT
Start: 2020-12-02 | End: 2020-12-30

## 2020-12-02 NOTE — TELEPHONE ENCOUNTER
This writer attempted to contact pt on 12/02/20      Reason for call relay message and left message.      If patient calls back:   Relay message pt needs some sort of visit for covid orders, (read verbatim), document that pt called and close encounter        Zaida Hutchison MA

## 2020-12-02 NOTE — TELEPHONE ENCOUNTER
.Reason for Call:  Other appointment    Detailed comments: Patient send a MyChart request asking Stephanie Sherman to put a covid test order for her. Patient decline to schedule an appointment with Stephanie Sherman stating that she can't afford to see a provider and just want a covid test order.     Phone Number Patient can be reached at: Cell number on file:    Telephone Information:   Mobile [phone]       Best Time: any    Can we leave a detailed message on this number? YES    Call taken on 12/2/2020 at 9:19 AM by Aleshia Benton

## 2020-12-02 NOTE — TELEPHONE ENCOUNTER
Refill request    Medication: HYDROcodone-acetaminophen (NORCO) 5-325 MG tablet      MNPMP Checked: Yes    Last refilled 11/05/20 for 90 tablets      Last clinic appointment: 11/25/20  Next clinic appointment: Pt does not have a follow up scheduled at this time    Preferred pharmacy:    [facility]- [place], [place]- [address] at [facility] of [address]

## 2020-12-03 NOTE — TELEPHONE ENCOUNTER
Called and left a voicemail message regarding needing a visit for orders and to return our call to schedule.  Tayler Roche MA  [facility]  2nd Floor  Primary Care

## 2020-12-14 ENCOUNTER — TELEPHONE (OUTPATIENT)
Dept: PALLIATIVE MEDICINE | Facility: CLINIC | Age: 65
End: 2020-12-14

## 2020-12-18 ENCOUNTER — MYC REFILL (OUTPATIENT)
Dept: ANESTHESIOLOGY | Facility: CLINIC | Age: 65
End: 2020-12-18

## 2020-12-18 DIAGNOSIS — G60.9 IDIOPATHIC PERIPHERAL NEUROPATHY: ICD-10-CM

## 2020-12-21 ENCOUNTER — TELEPHONE (OUTPATIENT)
Dept: ANESTHESIOLOGY | Facility: CLINIC | Age: 65
End: 2020-12-21

## 2020-12-21 NOTE — TELEPHONE ENCOUNTER
Refill request    Medication: buprenorphine (BUTRANS) 10 MCG/HR WK patch      MNPMP Checked: Yes    Last refilled 11/25/20 for 4 patches    Last clinic appointment: 11/25/20  Next clinic appointment: 02/17/21      Preferred pharmacy:    [facility]- [place], [place]- [address] at [facility] of [address]

## 2020-12-22 RX ORDER — BUPRENORPHINE 10 UG/H
1 PATCH TRANSDERMAL
Qty: 4 PATCH | Refills: 0 | Status: SHIPPED | OUTPATIENT
Start: 2020-12-22 | End: 2021-01-13

## 2020-12-30 ENCOUNTER — MYC REFILL (OUTPATIENT)
Dept: SURGERY | Facility: CLINIC | Age: 65
End: 2020-12-30

## 2020-12-30 DIAGNOSIS — M79.671 PAIN IN BOTH FEET: ICD-10-CM

## 2020-12-30 DIAGNOSIS — M79.672 PAIN IN BOTH FEET: ICD-10-CM

## 2020-12-31 ENCOUNTER — TELEPHONE (OUTPATIENT)
Dept: ANESTHESIOLOGY | Facility: CLINIC | Age: 65
End: 2020-12-31

## 2020-12-31 NOTE — TELEPHONE ENCOUNTER
Refill request received on 12/30/20 from pt. Routed to provider on 12/31/20.    Medication: HYDROcodone-acetaminophen (NORCO) 5-325 MG tablet       MNPMP Checked: Yes    Norco- Last Picked up on 12/6/20 for 90 tablets.    (Picked up = sold date on )      Last clinic appointment: 11/25/20  Next clinic appointment: 2/17/21    Send to pharmacy-   [id number] - [place], [place] - [address] AT [facility] OF  [address]

## 2021-01-04 RX ORDER — HYDROCODONE BITARTRATE AND ACETAMINOPHEN 5; 325 MG/1; MG/1
TABLET ORAL
Qty: 90 TABLET | Refills: 0 | Status: SHIPPED | OUTPATIENT
Start: 2021-01-04 | End: 2021-01-31

## 2021-01-13 ENCOUNTER — MYC REFILL (OUTPATIENT)
Dept: ANESTHESIOLOGY | Facility: CLINIC | Age: 66
End: 2021-01-13

## 2021-01-13 ENCOUNTER — TELEPHONE (OUTPATIENT)
Dept: PALLIATIVE MEDICINE | Facility: CLINIC | Age: 66
End: 2021-01-13

## 2021-01-13 DIAGNOSIS — G60.9 IDIOPATHIC PERIPHERAL NEUROPATHY: ICD-10-CM

## 2021-01-13 NOTE — TELEPHONE ENCOUNTER
Refill request    Medication:  buprenorphine (BUTRANS) 10 MCG/HR WK patch    MNPMP Checked: Yes    Last refilled 12/23/20 for 4 patches      Last clinic appointment: 11/25/20  Next clinic appointment: 02/17/21      Preferred pharmacy:    [facility]- [place], [place]- [address] [facility] of [address]

## 2021-01-19 RX ORDER — BUPRENORPHINE 10 UG/H
1 PATCH TRANSDERMAL
Qty: 4 PATCH | Refills: 0 | Status: SHIPPED | OUTPATIENT
Start: 2021-01-19 | End: 2021-02-13

## 2021-01-31 ENCOUNTER — MYC REFILL (OUTPATIENT)
Dept: SURGERY | Facility: CLINIC | Age: 66
End: 2021-01-31

## 2021-01-31 DIAGNOSIS — M79.672 PAIN IN BOTH FEET: ICD-10-CM

## 2021-01-31 DIAGNOSIS — M79.671 PAIN IN BOTH FEET: ICD-10-CM

## 2021-02-01 ENCOUNTER — TELEPHONE (OUTPATIENT)
Dept: ANESTHESIOLOGY | Facility: CLINIC | Age: 66
End: 2021-02-01

## 2021-02-01 NOTE — TELEPHONE ENCOUNTER
Refill request    Medication: HYDROcodone-acetaminophen (NORCO) 5-325 MG tablet      MNPMP Checked: Yes    Last refilled 01/06/21 for 90 tablets      Last clinic appointment: 11/25/20  Next clinic appointment: 02/17/21      Preferred pharmacy:    [facility]- [place], [place]- [address] at [facility] of  [address]

## 2021-02-03 RX ORDER — HYDROCODONE BITARTRATE AND ACETAMINOPHEN 5; 325 MG/1; MG/1
TABLET ORAL
Qty: 90 TABLET | Refills: 0 | Status: SHIPPED | OUTPATIENT
Start: 2021-02-03 | End: 2021-02-17

## 2021-02-13 ENCOUNTER — MYC REFILL (OUTPATIENT)
Dept: ANESTHESIOLOGY | Facility: CLINIC | Age: 66
End: 2021-02-13

## 2021-02-13 DIAGNOSIS — G60.9 IDIOPATHIC PERIPHERAL NEUROPATHY: ICD-10-CM

## 2021-02-17 ENCOUNTER — VIRTUAL VISIT (OUTPATIENT)
Dept: ANESTHESIOLOGY | Facility: CLINIC | Age: 66
End: 2021-02-17
Payer: COMMERCIAL

## 2021-02-17 ENCOUNTER — TELEPHONE (OUTPATIENT)
Dept: ANESTHESIOLOGY | Facility: CLINIC | Age: 66
End: 2021-02-17

## 2021-02-17 DIAGNOSIS — M79.672 PAIN IN BOTH FEET: ICD-10-CM

## 2021-02-17 DIAGNOSIS — M79.671 PAIN IN BOTH FEET: ICD-10-CM

## 2021-02-17 PROCEDURE — 99213 OFFICE O/P EST LOW 20 MIN: CPT | Mod: 95 | Performed by: ANESTHESIOLOGY

## 2021-02-17 RX ORDER — BUPRENORPHINE 10 UG/H
1 PATCH TRANSDERMAL
Qty: 4 PATCH | Refills: 0 | Status: SHIPPED | OUTPATIENT
Start: 2021-02-17 | End: 2021-03-13

## 2021-02-17 RX ORDER — HYDROCODONE BITARTRATE AND ACETAMINOPHEN 5; 325 MG/1; MG/1
TABLET ORAL
Qty: 90 TABLET | Refills: 0 | Status: SHIPPED | OUTPATIENT
Start: 2021-03-01 | End: 2021-03-01

## 2021-02-17 ASSESSMENT — PAIN SCALES - GENERAL: PAINLEVEL: MODERATE PAIN (4)

## 2021-02-17 NOTE — PROGRESS NOTES
Video-Visit Details    Type of service:  Video Visit    Video Duration: 20 minutes    Originating Location (pt. Location): Other patient is a passenger in a car during appointment    Distant Location (provider location):  [facility] COM[facility] PA[facility]NAGEMENT [place]     Platform used for Video Visit: Other: Aden Campo is a 67 yo Female presenting for follow up due to history of:     1. Peripheral Neuropathy  2. Chronic Opioid Use    Interval History:  Doing well overall; feels like her pain is adequately managed with current regimen. Noted increase in pain a few weeks ago but realized it was because butrans patch had fallen off her back. After replacing patch, pain returned to baseline/manageable level.      PLAN:   Rx for Butrans patch refilled  Due for Hydrocodone refill in 2 weeks.     F/U 2 to 3 months.

## 2021-02-17 NOTE — LETTER
2/17/2021       RE: Hanna Campo  [address]  [place] [address]     Dear Colleague,    Thank you for referring your patient, Hanna Campo, to the [facility] FOR COMPREHENSIVE PAIN MANAGEMENT [place] at [facility]. Please see a copy of my visit note below.    Hanna is a 66 year old who is being evaluated via a billable video visit.      How would you like to obtain your AVS? Mail a copy  If the video visit is dropped, the invitation should be resent by: Text to cell phone: [phone]  Will anyone else be joining your video visit? No      Maricruz Alston CMA      Video-Visit Details    Type of service:  Video Visit    Video Duration: 20 minutes    Originating Location (pt. Location): Other patient is a passenger in a car during appointment    Distant Location (provider location):  [facility] FOR [facility] PAIN MANAGEMENT [place]     Platform used for Video Visit: Other: Aden Campo is a 67 yo Female presenting for follow up due to history of:     1. Peripheral Neuropathy  2. Chronic Opioid Use    Interval History:  Doing well overall; feels like her pain is adequately managed with current regimen. Noted increase in pain a few weeks ago but realized it was because butrans patch had fallen off her back. After replacing patch, pain returned to baseline/manageable level.      PLAN:   Rx for Butrans patch refilled  Due for Hydrocodone refill in 2 weeks.     F/U 2 to 3 months.       AVS mailed to the pt.     Maddi Galeana LPN        Again, thank you for allowing me to participate in the care of your patient.      Sincerely,    Lisa Morales MD

## 2021-02-17 NOTE — PROGRESS NOTES
Hanna is a 66 year old who is being evaluated via a billable video visit.      How would you like to obtain your AVS? Mail a copy  If the video visit is dropped, the invitation should be resent by: Text to cell phone: [phone]  Will anyone else be joining your video visit? Linda Alston CMA

## 2021-02-17 NOTE — TELEPHONE ENCOUNTER
Prior Authorization Retail Medication Request    Medication/Dose:   ICD code (if different than what is on RX):    Previously Tried and Failed:    Rationale:      Insurance Name:    Insurance ID:        Pharmacy Information (if different than what is on RX)  Name:    Phone:

## 2021-02-18 ENCOUNTER — TELEPHONE (OUTPATIENT)
Dept: ANESTHESIOLOGY | Facility: CLINIC | Age: 66
End: 2021-02-18

## 2021-02-20 NOTE — TELEPHONE ENCOUNTER
Central Prior Authorization Team   Phone: [phone]       PA Initiation Via Fax    Medication: HYDROcodone-acetaminophen (NORCO) 5-325 MG tablet  Insurance Company: [employer] - Phone [phone] Fax [phone]  Pharmacy Filling the Rx: [id number] - [place], [place] - [address] AT [facility] OF  [address]  Filling Pharmacy Phone: [phone]  Filling Pharmacy Fax:    Start Date: 2/20/2021

## 2021-02-22 NOTE — TELEPHONE ENCOUNTER
Prior Authorization Not Needed per Insurance    Medication: HYDROcodone-acetaminophen (NORCO) 5-325 MG tablet- No PA Needed  Insurance Company: [employer] - Phone [phone] Fax [phone]  Expected CoPay:      Pharmacy Filling the Rx: [id number] - [place], [place] - [address] AT [facility] OF  [address]  Pharmacy Notified: No  Patient Notified: No

## 2021-03-01 ENCOUNTER — MYC REFILL (OUTPATIENT)
Dept: ANESTHESIOLOGY | Facility: CLINIC | Age: 66
End: 2021-03-01

## 2021-03-01 DIAGNOSIS — M79.672 PAIN IN BOTH FEET: ICD-10-CM

## 2021-03-01 DIAGNOSIS — M79.671 PAIN IN BOTH FEET: ICD-10-CM

## 2021-03-13 ENCOUNTER — MYC REFILL (OUTPATIENT)
Dept: ANESTHESIOLOGY | Facility: CLINIC | Age: 66
End: 2021-03-13

## 2021-03-13 DIAGNOSIS — G60.9 IDIOPATHIC PERIPHERAL NEUROPATHY: ICD-10-CM

## 2021-03-17 ENCOUNTER — TELEPHONE (OUTPATIENT)
Dept: ANESTHESIOLOGY | Facility: CLINIC | Age: 66
End: 2021-03-17

## 2021-03-17 RX ORDER — BUPRENORPHINE 10 UG/H
1 PATCH TRANSDERMAL
Qty: 4 PATCH | Refills: 0 | Status: SHIPPED | OUTPATIENT
Start: 2021-03-17 | End: 2021-04-13

## 2021-03-17 NOTE — TELEPHONE ENCOUNTER
Refill request    Medication: buprenorphine (BUTRANS) 10 MCG/HR WK patch       MNPMP Checked: Yes    Last refilled 02/18/21 for 4 patches    Last clinic appointment: 02/17/21  Next clinic appointment: Will reach out to pt to schedule      Preferred pharmacy:    [facility]- [place], [place]- [address] at [facility] of [address]

## 2021-03-30 ENCOUNTER — TELEPHONE (OUTPATIENT)
Dept: ANESTHESIOLOGY | Facility: CLINIC | Age: 66
End: 2021-03-30

## 2021-03-30 NOTE — TELEPHONE ENCOUNTER
Refill request    Medication: Hydrocodone-Acetamin 5-325 Mg      MNPMP Checked: Yes    Last refilled 03/03/21 for 90 tablets      Last clinic appointment: 02/17/21  Next clinic appointment: Will reach out to the pt to schedule f/u      Preferred pharmacy:    [facility]- [place], [place]- [address] at [facility] of [address]

## 2021-03-31 RX ORDER — HYDROCODONE BITARTRATE AND ACETAMINOPHEN 5; 325 MG/1; MG/1
TABLET ORAL
Qty: 90 TABLET | Refills: 0 | Status: SHIPPED | OUTPATIENT
Start: 2021-03-31 | End: 2021-04-22

## 2021-04-03 ENCOUNTER — HEALTH MAINTENANCE LETTER (OUTPATIENT)
Age: 66
End: 2021-04-03

## 2021-04-13 DIAGNOSIS — G60.9 IDIOPATHIC PERIPHERAL NEUROPATHY: ICD-10-CM

## 2021-04-13 RX ORDER — BUPRENORPHINE 10 UG/H
1 PATCH TRANSDERMAL
Qty: 4 PATCH | Refills: 0 | Status: SHIPPED | OUTPATIENT
Start: 2021-04-13 | End: 2021-04-22

## 2021-04-13 NOTE — TELEPHONE ENCOUNTER
Refill request    Medication:     buprenorphine (BUTRANS) 10 MCG/HR WK patch    MNPMP Checked: Yes    Last refilled 3/19/21 for 4 patches       Last clinic appointment: 2/17/21  Next clinic appointment: 4/22/21      Preferred pharmacy:    [facility] in [facility]

## 2021-04-19 ASSESSMENT — ANXIETY QUESTIONNAIRES
6. BECOMING EASILY ANNOYED OR IRRITABLE: NOT AT ALL
3. WORRYING TOO MUCH ABOUT DIFFERENT THINGS: NOT AT ALL
7. FEELING AFRAID AS IF SOMETHING AWFUL MIGHT HAPPEN: NOT AT ALL
5. BEING SO RESTLESS THAT IT IS HARD TO SIT STILL: NOT AT ALL
GAD7 TOTAL SCORE: 0
1. FEELING NERVOUS, ANXIOUS, OR ON EDGE: NOT AT ALL
7. FEELING AFRAID AS IF SOMETHING AWFUL MIGHT HAPPEN: NOT AT ALL
4. TROUBLE RELAXING: NOT AT ALL
2. NOT BEING ABLE TO STOP OR CONTROL WORRYING: NOT AT ALL
GAD7 TOTAL SCORE: 0

## 2021-04-19 ASSESSMENT — ENCOUNTER SYMPTOMS
SKIN CHANGES: 0
NAIL CHANGES: 1
POOR WOUND HEALING: 0

## 2021-04-20 ASSESSMENT — ANXIETY QUESTIONNAIRES: GAD7 TOTAL SCORE: 0

## 2021-04-22 ENCOUNTER — OFFICE VISIT (OUTPATIENT)
Dept: ANESTHESIOLOGY | Facility: CLINIC | Age: 66
End: 2021-04-22
Payer: COMMERCIAL

## 2021-04-22 ENCOUNTER — APPOINTMENT (OUTPATIENT)
Dept: LAB | Facility: CLINIC | Age: 66
End: 2021-04-22
Payer: COMMERCIAL

## 2021-04-22 VITALS — SYSTOLIC BLOOD PRESSURE: 151 MMHG | DIASTOLIC BLOOD PRESSURE: 85 MMHG | HEART RATE: 69 BPM | OXYGEN SATURATION: 97 %

## 2021-04-22 DIAGNOSIS — Z79.891 OPIOID CONTRACT EXISTS: Primary | ICD-10-CM

## 2021-04-22 DIAGNOSIS — G60.9 IDIOPATHIC PERIPHERAL NEUROPATHY: ICD-10-CM

## 2021-04-22 DIAGNOSIS — M79.672 PAIN IN BOTH FEET: ICD-10-CM

## 2021-04-22 DIAGNOSIS — M79.671 PAIN IN BOTH FEET: ICD-10-CM

## 2021-04-22 PROCEDURE — 80307 DRUG TEST PRSMV CHEM ANLYZR: CPT | Mod: 90 | Performed by: PATHOLOGY

## 2021-04-22 PROCEDURE — 99000 SPECIMEN HANDLING OFFICE-LAB: CPT | Performed by: PATHOLOGY

## 2021-04-22 PROCEDURE — 99213 OFFICE O/P EST LOW 20 MIN: CPT | Performed by: ANESTHESIOLOGY

## 2021-04-22 PROCEDURE — 80361 OPIATES 1 OR MORE: CPT | Mod: 90 | Performed by: PATHOLOGY

## 2021-04-22 PROCEDURE — 36415 COLL VENOUS BLD VENIPUNCTURE: CPT | Performed by: PATHOLOGY

## 2021-04-22 PROCEDURE — 80365 DRUG SCREENING OXYCODONE: CPT | Mod: 90 | Performed by: PATHOLOGY

## 2021-04-22 RX ORDER — HYDROCODONE BITARTRATE AND ACETAMINOPHEN 5; 325 MG/1; MG/1
TABLET ORAL
Qty: 90 TABLET | Refills: 0 | Status: SHIPPED | OUTPATIENT
Start: 2021-04-22 | End: 2021-05-25

## 2021-04-22 RX ORDER — BUPRENORPHINE 10 UG/H
1 PATCH TRANSDERMAL
Qty: 4 PATCH | Refills: 0 | Status: SHIPPED | OUTPATIENT
Start: 2021-04-22 | End: 2021-06-11

## 2021-04-22 ASSESSMENT — PAIN SCALES - GENERAL: PAINLEVEL: SEVERE PAIN (6)

## 2021-04-22 NOTE — PATIENT INSTRUCTIONS
Medications:    buprenorphine (BUTRANS) 10 MCG/HR WK patch. Place 1 patch onto the skin every 7 days.    HYDROcodone-acetaminophen (NORCO) 5-325 MG tablet. Take max of 3 tabs per day.    For refills of opioid medications - You MUST call (Or MyChart) the clinic DIRECTLY and at least 7 days before you run out of your medication.    Controlled substance agreement was signed today with you and Dr. Henson. A copy was provided to you for your records.   A whole blood drug screen was ordered.- Please stop at lab on the first floor, before you leave.       Recommended Follow up:      Follow up in 1-2 months with Oliva Walden NP.    To speak with a nurse, schedule/reschedule/cancel a clinic appointment, or request a medication refill call: [phone], option #1.    You can also reach us by Big Contacts: [email or web address]

## 2021-04-22 NOTE — NURSING NOTE
AVS reviewed with patient CSA and Blood drug screen signed and collected today. Pt was assisted to schedule a follow up in 1-2 months with FARIBA Weiner CNP.

## 2021-04-22 NOTE — LETTER
4/22/2021       RE: Hanna Campo  [address]  [facility] [address]     Dear Colleague,    Thank you for referring your patient, Hanna Campo, to the [facility] [place] [facility]. Please see a copy of my visit note below.    [facility] Chronic Pain Management : Progress Note    Date of visit: 5/8/2021    Interval history:  Hanna Campo is a 66 year old female who is known to our clinic for idiopathic peripheral neuropathy.  She has been seeing Dr. Morales.  I am seeing her in Dr. Morales's absence.  Patient has history of idiopathic peripheral neuropathy over 3 years.  Her symptoms include numbness of the bilateral feet, mostly on the soles.  She does not have radicular pain.  She had difficulty in walking and standing due to the pain.  Pain symptoms radiate  from her foot to leg in a stocking distribution.  She reports pain score 7-10 on a one 0-to-10 scale.  She has been managing her pain with a stable dose of opioid.  She had tried multiple therapies which are proven to be without benefits.  Today she came for follow-up visit.      [place] Prescription Monitoring Program:   Reviewed. No concerns     Review of Systems:  The 14 system ROS was reviewed and was negative except what is documented above and as follows.  Any bowel or bladder problems: none  Mood: okay    Physical Exam:  Vitals:    04/22/21 1012   BP: (!) 151/85   BP Location: Right arm   Pulse: 69   SpO2: 97%     Medications:  Current Outpatient Medications   Medication Sig Dispense Refill     buprenorphine (BUTRANS) 10 MCG/HR WK patch Place 1 patch onto the skin every 7 days 4 patch 0     HYDROcodone-acetaminophen (NORCO) 5-325 MG tablet Take max of 3 tabs per day 90 tablet 0     Cetirizine HCl (ZYRTEC ALLERGY PO) Take 5 mg by mouth daily       chlorthalidone (HYGROTON) 50 MG tablet  Take 1 tablet (50 mg) by mouth daily 90 tablet 1     levothyroxine (SYNTHROID/LEVOTHROID) 100 MCG tablet Take 1 tablet (100 mcg) by mouth daily 90 tablet 1     metoprolol succinate ER (TOPROL-XL) 50 MG 24 hr tablet Take 1 tablet (50 mg) by mouth daily 90 tablet 1     vitamin B complex with vitamin C (VITAMIN  B COMPLEX) tablet Take 1 tablet by mouth daily  0     VITAMIN D, CHOLECALCIFEROL, PO Take 5,000 Units by mouth daily         Analgesic Medications:   Medications related to Pain Management (From now, onward)    Start     Dose/Rate Route Frequency Ordered Stop    04/22/21 0000  buprenorphine (BUTRANS) 10 MCG/HR WK patch      1 patch Transdermal EVERY 7 DAYS 04/22/21 1045      04/22/21 0000  HYDROcodone-acetaminophen (NORCO) 5-325 MG tablet         04/22/21 1045               LABORATORY VALUES:   Recent Labs   Lab Test 08/31/20  1128 02/05/20  1329    138   POTASSIUM 4.3 3.6   CHLORIDE 105 97   CO2 30 35*   ANIONGAP 5 6   GLC 96 76   BUN 14 7   CR 0.70 0.62   AMBER 9.4 9.3       CBC RESULTS: No results for input(s): WBC, RBC, HGB, HCT, MCV, MCH, MCHC, RDW, PLT in the last 20448 hours.    Most Recent 3 INR's:No lab results found.      ASSESSMENT:    Idiopathic peripheral neuropathy  Chronic continuous use of opioid  Pain contract exists  Chronic pain syndrome      PLAN:    1. Medications.   Butrans patch 10 mics per hour q. 7 days.  4 patches dispensed  Norco 5/325 mg 3 times daily as needed.  90 tablets dispensed    2. Interventional procedures:    None at this time.     3. Labs and imaging: We will obtain urine drug screen to see if compliance to medications prescribed.    4. Rehab:  Advised to perform home exercise using [employer] videos  [email or web address].  The patient is also encouraged to stay active as tolerated.     5. Psychology: No current needs.     6. Disposition: Patient will follow up with clinic nurse practitioner.      Assessment will be ongoing with changes in  treatment as indicated.  Benefits/risks/alternatives to treatment have been reviewed and the patient has been instructed to contact this office if they have any questions or concerns.  This plan of care has been discussed with the patient and the patient is in agreement.     Paul Henson MD, PHD

## 2021-04-30 LAB
6MAM SERPL QL CFM: NEGATIVE
AMPHETAMINES SPEC-MCNC: NEGATIVE NG/ML
APAP BLD-MCNC: NEGATIVE UG/ML
BARBITURATES SPEC-MCNC: NEGATIVE UG/ML
BENZODIAZ SPEC-MCNC: NEGATIVE NG/ML
BUPRENORPHINE SERPLBLD-MCNC: NEGATIVE NG/ML
CARBOXYTHC BLD-MCNC: NEGATIVE NG/ML
CARISOPRODOL IA: NEGATIVE UG/ML
COCAINE METABOLITE IA: NEGATIVE NG/ML
CODEINE SERPL-MCNC: NEGATIVE NG/ML
DECLARED MEDICATIONS: ABNORMAL
DIHYDROCODEINE: 3.2 NG/ML
ETHANOL BLD-MCNC: NEGATIVE GM/DL
FENTANYL IA: NEGATIVE NG/ML
GABAPENTIN IA: NEGATIVE UG/ML
HYDROCODONE SERPL-MCNC: 15.6 NG/ML
HYDROMORPHONE SERPL-MCNC: NEGATIVE NG/ML
MEPERIDINE SERPLBLD-MCNC: NEGATIVE NG/ML
METHADONE BLD-MCNC: NEGATIVE NG/ML
MORPHINE SERPL-MCNC: NEGATIVE NG/ML
OPIATES SERPL QL CFM: POSITIVE
OPIATES SPEC-MCNC: POSITIVE NG/ML
OTHER DRUGS DETECTED: ABNORMAL
OXYCODONE SERPL QL: NEGATIVE
OXYCODONE SERPL-MCNC: NEGATIVE NG/ML
OXYCODONE SERPLBLD SCN-MCNC: NEGATIVE NG/ML
OXYMORPHONE SERPLBLD CFM-MCNC: NEGATIVE NG/ML
PCP SPEC-MCNC: NEGATIVE NG/ML
PROPOXYPH SPEC-MCNC: NEGATIVE NG/ML
TRAMADOL BLD-MCNC: NEGATIVE NG/ML

## 2021-05-08 NOTE — PROGRESS NOTES
[facility] for Comprehensive Chronic Pain Management : Progress Note    Date of visit: 5/8/2021    Interval history:  Hanna Campo is a 66 year old female who is known to our clinic for idiopathic peripheral neuropathy.  She has been seeing Dr. Morales.  I am seeing her in Dr. Morales's absence.  Patient has history of idiopathic peripheral neuropathy over 3 years.  Her symptoms include numbness of the bilateral feet, mostly on the soles.  She does not have radicular pain.  She had difficulty in walking and standing due to the pain.  Pain symptoms radiate  from her foot to leg in a stocking distribution.  She reports pain score 7-10 on a one 0-to-10 scale.  She has been managing her pain with a stable dose of opioid.  She had tried multiple therapies which are proven to be without benefits.  Today she came for follow-up visit.      [place] Pres[place] Monitoring [place]:   Reviewed. No concerns     Review of Systems:  The 14 system ROS was reviewed and was negative except what is documented above and as follows.  Any bowel or bladder problems: none  Mood: okay    Physical Exam:  Vitals:    04/22/21 1012   BP: (!) 151/85   BP Location: Right arm   Pulse: 69   SpO2: 97%     Medications:  Current Outpatient Medications   Medication Sig Dispense Refill     buprenorphine (BUTRANS) 10 MCG/HR WK patch Place 1 patch onto the skin every 7 days 4 patch 0     HYDROcodone-acetaminophen (NORCO) 5-325 MG tablet Take max of 3 tabs per day 90 tablet 0     Cetirizine HCl (ZYRTEC ALLERGY PO) Take 5 mg by mouth daily       chlorthalidone (HYGROTON) 50 MG tablet Take 1 tablet (50 mg) by mouth daily 90 tablet 1     levothyroxine (SYNTHROID/LEVOTHROID) 100 MCG tablet Take 1 tablet (100 mcg) by mouth daily 90 tablet 1     metoprolol succinate ER (TOPROL-XL) 50 MG 24 hr tablet Take 1 tablet (50 mg) by mouth daily 90 tablet 1     vitamin B complex with vitamin C (VITAMIN  B COMPLEX) tablet Take 1 tablet  by mouth daily  0     VITAMIN D, CHOLECALCIFEROL, PO Take 5,000 Units by mouth daily         Analgesic Medications:   Medications related to Pain Management (From now, onward)    Start     Dose/Rate Route Frequency Ordered Stop    04/22/21 0000  buprenorphine (BUTRANS) 10 MCG/HR WK patch      1 patch Transdermal EVERY 7 DAYS 04/22/21 1045      04/22/21 0000  HYDROcodone-acetaminophen (NORCO) 5-325 MG tablet         04/22/21 1045               LABORATORY VALUES:   Recent Labs   Lab Test 08/31/20  1128 02/05/20  1329    138   POTASSIUM 4.3 3.6   CHLORIDE 105 97   CO2 30 35*   ANIONGAP 5 6   GLC 96 76   BUN 14 7   CR 0.70 0.62   AMBER 9.4 9.3       CBC RESULTS: No results for input(s): WBC, RBC, HGB, HCT, MCV, MCH, MCHC, RDW, PLT in the last 35291 hours.    Most Recent 3 INR's:No lab results found.        ASSESSMENT:    Idiopathic peripheral neuropathy  Chronic continuous use of opioid  Pain contract exists  Chronic pain syndrome           PLAN:    1. Medications.   Butrans patch 10 mics per hour q. 7 days.  4 patches dispensed  Norco 5/325 mg 3 times daily as needed.  90 tablets dispensed    2. Interventional procedures:    None at this time.     3. Labs and imaging: We will obtain urine drug screen to see if compliance to medications prescribed.    4. Rehab:  Advised to perform home exercise using [employer] videos  [email or web address].  The patient is also encouraged to stay active as tolerated.     5. Psychology: No current needs.     6. Disposition: Patient will follow up with clinic nurse practitioner.      Assessment will be ongoing with changes in treatment as indicated.  Benefits/risks/alternatives to treatment have been reviewed and the patient has been instructed to contact this office if they have any questions or concerns.  This plan of care has been discussed with the patient and the patient is in agreement.     Paul Henson MD, PHD

## 2021-05-25 ENCOUNTER — VIRTUAL VISIT (OUTPATIENT)
Dept: FAMILY MEDICINE | Facility: CLINIC | Age: 66
End: 2021-05-25
Payer: COMMERCIAL

## 2021-05-25 ENCOUNTER — TELEPHONE (OUTPATIENT)
Dept: ANESTHESIOLOGY | Facility: CLINIC | Age: 66
End: 2021-05-25

## 2021-05-25 DIAGNOSIS — G89.29 OTHER CHRONIC PAIN: ICD-10-CM

## 2021-05-25 DIAGNOSIS — I10 BENIGN ESSENTIAL HYPERTENSION: ICD-10-CM

## 2021-05-25 DIAGNOSIS — M79.672 PAIN IN BOTH FEET: ICD-10-CM

## 2021-05-25 DIAGNOSIS — E03.9 HYPOTHYROIDISM, UNSPECIFIED TYPE: Primary | ICD-10-CM

## 2021-05-25 DIAGNOSIS — M79.671 PAIN IN BOTH FEET: ICD-10-CM

## 2021-05-25 DIAGNOSIS — E66.01 MORBID OBESITY (H): ICD-10-CM

## 2021-05-25 PROCEDURE — 99213 OFFICE O/P EST LOW 20 MIN: CPT | Mod: 95 | Performed by: NURSE PRACTITIONER

## 2021-05-25 RX ORDER — METOPROLOL SUCCINATE 50 MG/1
50 TABLET, EXTENDED RELEASE ORAL DAILY
Qty: 90 TABLET | Refills: 1 | Status: SHIPPED | OUTPATIENT
Start: 2021-05-25 | End: 2021-09-15

## 2021-05-25 RX ORDER — HYDROCODONE BITARTRATE AND ACETAMINOPHEN 5; 325 MG/1; MG/1
TABLET ORAL
Qty: 90 TABLET | Refills: 0 | Status: SHIPPED | OUTPATIENT
Start: 2021-05-30 | End: 2021-06-25

## 2021-05-25 RX ORDER — LEVOTHYROXINE SODIUM 100 UG/1
100 TABLET ORAL DAILY
Qty: 90 TABLET | Refills: 1 | Status: SHIPPED | OUTPATIENT
Start: 2021-05-25 | End: 2021-09-15

## 2021-05-25 RX ORDER — CHLORTHALIDONE 50 MG/1
50 TABLET ORAL DAILY
Qty: 90 TABLET | Refills: 1 | Status: SHIPPED | OUTPATIENT
Start: 2021-05-25 | End: 2021-09-15

## 2021-05-25 NOTE — TELEPHONE ENCOUNTER
Refill request    Medication: Hydrocodone-Acetamin 5-325 Mg      MNPMP Checked: Yes    Last refilled 04/30/21 for 90 tablets      Last clinic appointment: 04/22/21  Next clinic appointment: 06/25/21      Preferred pharmacy:    [facility]- [place], [place]- [address] at [facility] of [address]

## 2021-05-25 NOTE — PROGRESS NOTES
"Hanna is a 66 year old who is being evaluated via a billable video visit.      How would you like to obtain your AVS? MyChart  If the video visit is dropped, the invitation should be resent by: Text to cell phone: cell  Will anyone else be joining your video visit? No      Video Start Time 2:03pm    Assessment & Plan     Hypothyroidism, unspecified type  Denies symptoms, will get labs done  - **TSH with free T4 reflex FUTURE 1yr; Future  - levothyroxine (SYNTHROID/LEVOTHROID) 100 MCG tablet; Take 1 tablet (100 mcg) by mouth daily    Benign essential hypertension  States BP is better than in clinic, will send results to provider. Due for labs  - Comprehensive metabolic panel (BMP + Alb, Alk Phos, ALT, AST, Total. Bili, TP); Future  - chlorthalidone (HYGROTON) 50 MG tablet; Take 1 tablet (50 mg) by mouth daily  - metoprolol succinate ER (TOPROL-XL) 50 MG 24 hr tablet; Take 1 tablet (50 mg) by mouth daily    Morbid obesity (H)  Difficulty to exercise due to chronic feet neuropathy    Other chronic pain  Following with pain clinic, things are stable       BMI:   Estimated body mass index is 31.79 kg/m  as calculated from the following:    Height as of 11/12/20: 1.702 m (5' 7\").    Weight as of 11/12/20: 92.1 kg (203 lb).   Weight management plan: discuss next time      Return in about 6 months (around 11/25/2021) for BP Recheck, Routine Visit.    FARIBA Malnoey, NP-C  [facility]    Ottoniel Ferreira is a 66 year old who presents for the following health issues  accompanied by her self:    HPI     At home BP is better than in clinic. Last seen in person was 151/81 at pain clinic. Feels good    Watches 2.5 yr grandson mon-Friday. Is busy, is sleeping good. Just natural to be a bit tired.    Following pain clinic, urine tox is normal    Review of Systems   Constitutional, HEENT, cardiovascular, pulmonary, gi and gu systems are negative, except as otherwise noted.      Objective           Vitals:  No " vitals were obtained today due to virtual visit.    Physical Exam   GENERAL: Healthy, alert and no distress  EYES: Eyes grossly normal to inspection.  No discharge or erythema, or obvious scleral/conjunctival abnormalities.  RESP: No audible wheeze, cough, or visible cyanosis.  No visible retractions or increased work of breathing.    SKIN: Visible skin clear. No significant rash, abnormal pigmentation or lesions.  NEURO: Cranial nerves grossly intact.  Mentation and speech appropriate for age.  PSYCH: Mentation appears normal, affect normal/bright, judgement and insight intact, normal speech and appearance well-groomed.    Office Visit on 04/22/2021   Component Date Value Ref Range Status     Ethyl Alcohol Enz 04/22/2021 Negative  Cutoff:0.020 gm/dL Final     Amphetamines IA 04/22/2021 Negative  Cutoff:50 ng/mL Final     Bartiburates IA 04/22/2021 Negative  Cutoff:0.1 ug/mL Final     Benzodiazepines IA 04/22/2021 Negative  Cutoff:20 ng/mL Final     Cocaine Metabolite IA 04/22/2021 Negative  Cutoff:25 ng/mL Final     Phencyclidine IA 04/22/2021 Negative  Cutoff:8 ng/mL Final     THC Metabolite IA 04/22/2021 Negative  Cutoff:5 ng/mL Final     Opiates IA 04/22/2021 Positive* Cutoff:5 ng/mL Final     Oxycodones IA 04/22/2021 Negative  Cutoff:5 ng/mL Final    Comment: (Note)  Presumptive immunoassay result indicated need for further  testing; definitive confirmation was negative.       Methadone IA 04/22/2021 Negative  Cutoff:25 ng/mL Final     Fentanyl IA 04/22/2021 Negative  Cutoff:1.0 ng/mL Final     Buprenoophine IA 04/22/2021 Negative  Cutoff:1.0 ng/mL Final     Proproxphine IA 04/22/2021 Negative  Cutoff:50 ng/mL Final     Meperidine IA 04/22/2021 Negative  Cutoff:100 ng/mL Final     Tramadol IA 04/22/2021 Negative  Cutoff:50 ng/mL Final     Gabapentin IA 04/22/2021 Negative  Cutoff:1.0 ug/mL Final     Carisoprodol IA 04/22/2021 Negative  Cutoff:0.5 ug/mL Final     Acetaminophen IA 04/22/2021 Negative  Cutoff:5.0  ug/mL Final    Analysis performed by [facility] [facility] [place] [address]     Other Drugs Detected 04/22/2021 None   Final     Declared Medications 04/22/2021 Not Provided   Final    Comment: (Note)                                                           For clinical consultation, please call                     [phone].                Analysis performed by [facility] [place] [address]  This test was developed and its performance characteristics  determined by [employer].  It has not been cleared or approved  by the Food and Drug Administration.       Opiate Confirmation 04/22/2021 POSITIVE   Final     Codeine 04/22/2021 Negative  ng/mL Final     Morphine 04/22/2021 Negative  ng/mL Final     6-Acetylmorphine 04/22/2021 Negative   Final     Hydrocodone Confirm 04/22/2021 15.6  ng/mL Final     Hydromorphone Confirm 04/22/2021 Negative  ng/mL Final     Dihydrocodeine 04/22/2021 3.2  ng/mL Final    Comment: (Note)  Expected metabolism of opiate class drugs:                                                                                   Parent Drug       Detected Metabolites                        -----------       --------------------                        Codeine:          Major:  Morphine                                              Minor:  Hydrocodone, Hydromorphone,                                   Dihydrocodeine                      Morphine:         Minor:  Hydromorphone                       Hydrocodone:      Hydromorphone, Dihydrocodeine               Hydromorphone:    None                                        Dihydrocodeine:   None           Heroin:           6-Acetylmorphine                    Morphine                    Codeine, in small amounts in comparison                       to morphine, is often detected when                       heroin is the source drug.  Confirmation threshold: 1.0 ng/mL  Analysis performed by [facility] [place] [address]        Oxycodone Confirmation 04/22/2021 Negative   Final     Oxycodone 04/22/2021 Negative  ng/mL Final     Oxymorphone Confirm 04/22/2021 Negative  ng/mL Final    Comment: (Note)  Confirmation threshold: 1.0 ng/mL  Analysis performed by [email or web address] [place] [address]                   Video-Visit Details    Type of service:  Video Visit    Video End Time:2:12 PM    Originating Location (pt. Location): Home    Distant Location (provider location): Home secure location    Platform used for Video Visit: Jie

## 2021-05-27 DIAGNOSIS — I10 BENIGN ESSENTIAL HYPERTENSION: ICD-10-CM

## 2021-05-27 DIAGNOSIS — E03.9 HYPOTHYROIDISM, UNSPECIFIED TYPE: ICD-10-CM

## 2021-05-27 LAB
ALBUMIN SERPL-MCNC: 3.6 G/DL (ref 3.4–5)
ALP SERPL-CCNC: 79 U/L (ref 40–150)
ALT SERPL W P-5'-P-CCNC: 47 U/L (ref 0–50)
ANION GAP SERPL CALCULATED.3IONS-SCNC: 4 MMOL/L (ref 3–14)
AST SERPL W P-5'-P-CCNC: 45 U/L (ref 0–45)
BILIRUB SERPL-MCNC: 1.7 MG/DL (ref 0.2–1.3)
BUN SERPL-MCNC: 5 MG/DL (ref 7–30)
CALCIUM SERPL-MCNC: 8.6 MG/DL (ref 8.5–10.1)
CHLORIDE SERPL-SCNC: 96 MMOL/L (ref 94–109)
CO2 SERPL-SCNC: 35 MMOL/L (ref 20–32)
CREAT SERPL-MCNC: 0.61 MG/DL (ref 0.52–1.04)
GFR SERPL CREATININE-BSD FRML MDRD: >90 ML/MIN/{1.73_M2}
GLUCOSE SERPL-MCNC: 108 MG/DL (ref 70–99)
POTASSIUM SERPL-SCNC: 3.5 MMOL/L (ref 3.4–5.3)
PROT SERPL-MCNC: 7.1 G/DL (ref 6.8–8.8)
SODIUM SERPL-SCNC: 135 MMOL/L (ref 133–144)
TSH SERPL DL<=0.005 MIU/L-ACNC: 1.64 MU/L (ref 0.4–4)

## 2021-05-27 PROCEDURE — 36415 COLL VENOUS BLD VENIPUNCTURE: CPT | Performed by: NURSE PRACTITIONER

## 2021-05-27 PROCEDURE — 80053 COMPREHEN METABOLIC PANEL: CPT | Performed by: NURSE PRACTITIONER

## 2021-05-27 PROCEDURE — 84443 ASSAY THYROID STIM HORMONE: CPT | Performed by: NURSE PRACTITIONER

## 2021-06-07 ENCOUNTER — TELEPHONE (OUTPATIENT)
Dept: FAMILY MEDICINE | Facility: CLINIC | Age: 66
End: 2021-06-07

## 2021-06-07 ENCOUNTER — TELEPHONE (OUTPATIENT)
Dept: ANESTHESIOLOGY | Facility: CLINIC | Age: 66
End: 2021-06-07

## 2021-06-07 DIAGNOSIS — G60.9 IDIOPATHIC PERIPHERAL NEUROPATHY: ICD-10-CM

## 2021-06-07 NOTE — TELEPHONE ENCOUNTER
Refill request    Medication: Buprenorphine 10 Mcg/hr Patch      MNPMP Checked: Yes    Last refilled 05/12/21 for 4 ptaches      Last clinic appointment: 04/22/21  Next clinic appointment: 06/25/21      Preferred pharmacy:    [facility]- [place], [place]- [address] at [facility] of  [address]

## 2021-06-07 NOTE — TELEPHONE ENCOUNTER
Patient Quality Outreach Summary      Summary:    Patient is due/failing the following:   Breast Cancer Screening - Mammogram    Type of outreach:    Sent [employer] message.    Questions for provider review:    None                                                                                                                    Arline Murdock

## 2021-06-11 ENCOUNTER — MYC MEDICAL ADVICE (OUTPATIENT)
Dept: FAMILY MEDICINE | Facility: CLINIC | Age: 66
End: 2021-06-11

## 2021-06-11 ENCOUNTER — MYC REFILL (OUTPATIENT)
Dept: ANESTHESIOLOGY | Facility: CLINIC | Age: 66
End: 2021-06-11

## 2021-06-11 DIAGNOSIS — G60.9 IDIOPATHIC PERIPHERAL NEUROPATHY: ICD-10-CM

## 2021-06-11 RX ORDER — BUPRENORPHINE 10 UG/H
1 PATCH TRANSDERMAL
Qty: 4 PATCH | Refills: 0 | Status: CANCELLED | OUTPATIENT
Start: 2021-06-11

## 2021-06-11 RX ORDER — BUPRENORPHINE 10 UG/H
1 PATCH TRANSDERMAL
Qty: 4 PATCH | Refills: 1 | Status: SHIPPED | OUTPATIENT
Start: 2021-06-11 | End: 2021-08-03
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
## 2021-06-25 ENCOUNTER — VIRTUAL VISIT (OUTPATIENT)
Dept: ANESTHESIOLOGY | Facility: CLINIC | Age: 66
End: 2021-06-25
Payer: COMMERCIAL

## 2021-06-25 DIAGNOSIS — M79.672 PAIN IN BOTH FEET: ICD-10-CM

## 2021-06-25 DIAGNOSIS — G89.4 CHRONIC PAIN SYNDROME: ICD-10-CM

## 2021-06-25 DIAGNOSIS — G60.9 IDIOPATHIC PERIPHERAL NEUROPATHY: Primary | ICD-10-CM

## 2021-06-25 DIAGNOSIS — M79.671 PAIN IN BOTH FEET: ICD-10-CM

## 2021-06-25 PROCEDURE — 99215 OFFICE O/P EST HI 40 MIN: CPT | Mod: 95 | Performed by: NURSE PRACTITIONER

## 2021-06-25 RX ORDER — HYDROCODONE BITARTRATE AND ACETAMINOPHEN 5; 325 MG/1; MG/1
TABLET ORAL
Qty: 90 TABLET | Refills: 0 | Status: SHIPPED | OUTPATIENT
Start: 2021-06-29 | End: 2021-08-03

## 2021-06-25 ASSESSMENT — PAIN SCALES - GENERAL: PAINLEVEL: SEVERE PAIN (6)

## 2021-06-25 NOTE — NURSING NOTE
I called patient and attempted the rooming process. Pt did not answer, I left a message for patient to call back.  If they do not I will attempt to all back in 10-15 min.    Yusra Alvarez LPN

## 2021-06-25 NOTE — PROGRESS NOTES
Hanna is a 66 year old who is being evaluated via a billable video visit.      How would you like to obtain your AVS? MyChart  If the video visit is dropped, the invitation should be resent by: Send to e-mail at: [email or web address]  Will anyone else be joining your video visit? No

## 2021-06-25 NOTE — PATIENT INSTRUCTIONS
1.  Pain Physical Therapy:   Continue physical therapy exercises five days a week as you have been.   2.  Pain Psychologist to address relaxation, behavioral change, coping style, and other factors important to improvement.     NO  Not at this time.    3.  Medication Management:     1. Continue Norco 5/325mg three times daily.    2.  Continue Butrans patch 10mcg change every 7 days.    3. You do meet criteria for medical cannabis and this could be a good option. You have been certified. You will be contacted for next steps.    4.  Potential procedures: let us know if interested in a SCS in the future/insurance changes.     5.  Follow up with FARIBA Dasilva CNP in 8 weeks.
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
## 2021-07-30 ENCOUNTER — MYC REFILL (OUTPATIENT)
Dept: ANESTHESIOLOGY | Facility: CLINIC | Age: 66
End: 2021-07-30

## 2021-07-30 DIAGNOSIS — G60.9 IDIOPATHIC PERIPHERAL NEUROPATHY: ICD-10-CM

## 2021-07-30 DIAGNOSIS — M79.672 PAIN IN BOTH FEET: ICD-10-CM

## 2021-07-30 DIAGNOSIS — M79.671 PAIN IN BOTH FEET: ICD-10-CM

## 2021-08-02 RX ORDER — HYDROCODONE BITARTRATE AND ACETAMINOPHEN 5; 325 MG/1; MG/1
TABLET ORAL
Qty: 90 TABLET | Refills: 0 | Status: CANCELLED | OUTPATIENT
Start: 2021-08-02

## 2021-08-02 NOTE — TELEPHONE ENCOUNTER
From: Hanna Campo      Created: 7/30/2021 10:31 AM        *-*-*This message has not been handled.*-*-*    Refills have been requested for the following medications:         HYDROcodone-acetaminophen (NORCO) 5-325 MG tablet [Tricia Walden, APRN CNP]     Preferred pharmacy: [facility][id number] - [facility], [place] - [address] AT [facility] OF [address]        Will forward to MA and Nurse pool to process refill    Luis Miguel Osborn, NBA  Patient Care Supervisor   [facility] [place]

## 2021-08-02 NOTE — TELEPHONE ENCOUNTER
Received call from patient requesting refill(s) of HYDROcodone-acetaminophen (NORCO) 5-325 MG tablet      Last dispensed from pharmacy on 07/01/21    Patient's last office/virtual visit by prescribing provider on 06/25/21  Next office/virtual appointment scheduled for None    Last urine drug screen date 04/22/21  Current opioid agreement on file (completed within the last year) No Date of opioid agreement: 04/22/21    E-prescribe to   [facility] #[id number] - [place] - [address] AT [facility] [address]  [address]  [place] [address]  Phone: [phone] Fax: [phone]    Will route to nursing Ehrenberg for review and preparation of prescription(s).         Li Merritt MA  [facility] [place]

## 2021-08-02 NOTE — TELEPHONE ENCOUNTER
Refill request sent to the clinic via UpNext on 7/30/21    Two Medications: HYDROcodone-acetaminophen (NORCO) 5-325 MG tablet     buprenorphine (BUTRANS) 10 MCG/HR WK patch           MNPMP Checked: Yes    NORCO: Last refilled 7/1/21 for 90 tablets    Butrans: Last refilled 7/11/21 for 4 patches      Last clinic appointment: 6/25/21Video visit/Iram.   Next clinic appointment: 8/27/21Video Visit/Carmen    Last Drug Screen Collected:  4/22/21  Controlled Substance Agreement signed: 4/22/21    Clinic note on 6/25/21 noted:  As Hanna reports very good pain benefit with Norco and Butrans patch, her overall opioid dose is low, and she notes this is the best regimen she has been on yet- allowing her to complete ADLs, reasonable to continue long term for pain management. Continue Norco 5/325mg TID.    Continue Butrans patch 10mcg change every 7 days. Higher doses caused hallucinations.       Preferred pharmacy:    [facility] #[id number] - [place] - [address] AT [facility] [address]  [address] [facility]  [place] [address]  Phone: [phone] Fax: [phone]    Routed to the provider on 8/2/21    Maddi Galeana LPN

## 2021-08-03 RX ORDER — BUPRENORPHINE 10 UG/H
1 PATCH TRANSDERMAL
Qty: 4 PATCH | Refills: 1 | Status: SHIPPED | OUTPATIENT
Start: 2021-08-05 | End: 2021-09-15

## 2021-08-03 RX ORDER — HYDROCODONE BITARTRATE AND ACETAMINOPHEN 5; 325 MG/1; MG/1
TABLET ORAL
Qty: 90 TABLET | Refills: 0 | Status: SHIPPED | OUTPATIENT
Start: 2021-08-03 | End: 2021-08-27

## 2021-08-03 NOTE — TELEPHONE ENCOUNTER
Signed Prescriptions:                        Disp   Refills    HYDROcodone-acetaminophen (NORCO) 5-325 MG*90 tab*0        Sig: Take max of 3 tabs per day  Authorizing Provider: GUCCI PAITNO    buprenorphine (BUTRANS) 10 MCG/HR WK patch 4 patch1        Sig: Place 1 patch onto the skin every 7 days  Authorizing Provider: GUCCI PATINO      Reviewed [place] Prescription Monitoring Program, appears appropriate.     FARIBA Dasilva [facility]

## 2021-08-26 NOTE — PROGRESS NOTES
[facility]    Date of visit: 8/26/2021    Chief complaint: No chief complaint on file.      Interval history:  Hanna Campo was last seen by Oliva Walden on 6/25/2021.      Recommendations/plan at the last visit included:  Plan:                 1.  Pain Physical Therapy:              Continue physical therapy exercises five days a week              2.  Pain Psychologist to address relaxation, behavioral change, coping style, and other factors important to improvement.                NO  Not at this time.               3.  Medication Management:                           1. As Hanna reports very good pain benefit with Norco and Butrans patch, her overall opioid dose is low, and she notes this is the best regimen she has been on yet- allowing her to complete ADLs, reasonable to continue long term for pain management. Continue Norco 5/325mg TID.                           2.  Continue Butrans patch 10mcg change every 7 days. Higher doses caused hallucinations.                           3.  We discussed utilizing medical cannabis for pain benefit. Hanna is very interested, previously asked her primary care provider about this and was told it was not an option. As meets the criteria for the diagnosis intractable pain, that is the cause of her pain cannot be easily removed and she has tried most means of traditional pain management, and is a candidate for medical cannabis, I have certified her through the [place] department of health website for medical cannabis. she will be contacted for next steps. SHe will monitor benefit.              4.  Potential procedures: she will let us know if interested in a SCS in the future/insurance changes.                5.  Follow up with FARIBA Dasilva CNP in 8 weeks.     Since her last visit, Hanna Campo reports:  ***    Pain scores:  Pain intensity on average is {NUMBERS 0-10:525099} on a scale of 0-10.     Current pain treatments:   ***  Butrans patch  "10 mcg/hr  Hydrocodone-Acetaminophen 5-325 3tabs/day, 90/month    Medical cannabis - certified by Oliva Walden    Past pain treatments:  ***    Side Effects: {SIDE EFFECTS:451242}    Medications:  Current Outpatient Medications   Medication Sig Dispense Refill     buprenorphine (BUTRANS) 10 MCG/HR WK patch Place 1 patch onto the skin every 7 days 4 patch 1     Cetirizine HCl (ZYRTEC ALLERGY PO) Take 5 mg by mouth daily       chlorthalidone (HYGROTON) 50 MG tablet Take 1 tablet (50 mg) by mouth daily 90 tablet 1     HYDROcodone-acetaminophen (NORCO) 5-325 MG tablet Take max of 3 tabs per day 90 tablet 0     levothyroxine (SYNTHROID/LEVOTHROID) 100 MCG tablet Take 1 tablet (100 mcg) by mouth daily 90 tablet 1     medical cannabis (Patient's own supply) (The purpose of this order is to document that the patient reports taking medical cannabis.  This is not a prescription, and is not used to certify that the patient has a qualifying medical condition.) 0 Information only 0     metoprolol succinate ER (TOPROL-XL) 50 MG 24 hr tablet Take 1 tablet (50 mg) by mouth daily 90 tablet 1     vitamin B complex with vitamin C (VITAMIN  B COMPLEX) tablet Take 1 tablet by mouth daily  0     VITAMIN D, CHOLECALCIFEROL, PO Take 5,000 Units by mouth daily         Medical History: any changes in medical history since they were last seen? { :944316::\"No\"}    Review of Systems:  The 14 system ROS was reviewed from the intake questionnaire, and is positive for: ***  Any bowel or bladder problems: ***  Mood: ***    Physical Exam:  not currently breastfeeding.  General: ***  Gait: {GAIT:265863}  MSK exam: ***    Assessment:   ***    Hanna Campo is a 66 year old female who is seen at the pain clinic for ***.    Plan:  1. Physical Therapy:  ***  2. Clinical Health Psychologist to address issues of relaxation, behavioral change, coping style, and other factors important to improvement.  ***  3. Diagnostic Studies:  ***  4. Medication " Management:  ***  5. Further procedures recommended: ***  6. Recommendations to PCP: ***  7. Follow up: ***

## 2021-08-27 ENCOUNTER — TELEPHONE (OUTPATIENT)
Dept: ANESTHESIOLOGY | Facility: CLINIC | Age: 66
End: 2021-08-27

## 2021-08-27 ENCOUNTER — VIRTUAL VISIT (OUTPATIENT)
Dept: ANESTHESIOLOGY | Facility: CLINIC | Age: 66
End: 2021-08-27
Payer: COMMERCIAL

## 2021-08-27 DIAGNOSIS — M79.671 PAIN IN BOTH FEET: ICD-10-CM

## 2021-08-27 DIAGNOSIS — G60.9 IDIOPATHIC PERIPHERAL NEUROPATHY: ICD-10-CM

## 2021-08-27 DIAGNOSIS — M79.672 PAIN IN BOTH FEET: ICD-10-CM

## 2021-08-27 PROCEDURE — 99213 OFFICE O/P EST LOW 20 MIN: CPT | Mod: 95 | Performed by: ANESTHESIOLOGY

## 2021-08-27 RX ORDER — HYDROCODONE BITARTRATE AND ACETAMINOPHEN 5; 325 MG/1; MG/1
TABLET ORAL
Qty: 90 TABLET | Refills: 0 | Status: SHIPPED | OUTPATIENT
Start: 2021-09-02 | End: 2021-09-28

## 2021-08-27 RX ORDER — BUPRENORPHINE 7.5 UG/H
1 PATCH TRANSDERMAL
Qty: 4 PATCH | Refills: 0 | Status: SHIPPED | OUTPATIENT
Start: 2021-08-27 | End: 2021-10-01

## 2021-08-27 ASSESSMENT — PAIN SCALES - GENERAL: PAINLEVEL: SEVERE PAIN (6)

## 2021-08-27 NOTE — PROGRESS NOTES
Hanna is a 66 year old who is being evaluated via a billable video visit.      How would you like to obtain your AVS? MyChart  If the video visit is dropped, the invitation should be resent by: Text to cell phone: [phone]  Will anyone else be joining your video visit? No

## 2021-08-27 NOTE — TELEPHONE ENCOUNTER
Prior Authorization Retail Medication Request    Medication/Dose: Buprenorphine (butrans) 7.5 mcg/hr WK patch  ICD code (if different than what is on RX):     Previously Tried and Failed:    Rationale:     Insurance Name:  Insurance ID:       Pharmacy Information (if different than what is on RX)  Name:    Phone:

## 2021-08-27 NOTE — PATIENT INSTRUCTIONS
Medications:    buprenorphine (BUTRANS) 7.5 MCG/HR WK patch. Place 1 patch onto the skin every 7 days.    HYDROcodone-acetaminophen (NORCO) 5-325 MG tablet.   Take max of 3 tabs per day.      *For refills of opioid medications - You MUST call (Or MyChart) the clinic DIRECTLY and at least 7 days before you run out of your medication.        Treatment planning:    Goal will be to wean next month to Butrans patch 5 mch/hr.       Recommended Follow up:      Follow up in 1 month in clinic for urine drug screen and controlled substance agreement.         Please call [phone], option #1 to schedule your clinic appointment if you don't already have an appointment scheduled.        To speak with a nurse, schedule/reschedule/cancel a clinic appointment, or request a medication refill call: [phone], option #1.    You can also reach us by [employer]: [email or web address]

## 2021-08-27 NOTE — PROGRESS NOTES
[facility]    Date of visit: 8/26/2021    Chief complaint: No chief complaint on file.      Interval history:  Hanna Campo was last seen by Oliva Walden on 6/25/2021.      Recommendations/plan at the last visit included:  Plan:                 1.  Pain Physical Therapy:              Continue physical therapy exercises five days a week              2.  Pain Psychologist to address relaxation, behavioral change, coping style, and other factors important to improvement.                NO  Not at this time.               3.  Medication Management:                           1. As Hanna reports very good pain benefit with Norco and Butrans patch, her overall opioid dose is low, and she notes this is the best regimen she has been on yet- allowing her to complete ADLs, reasonable to continue long term for pain management. Continue Norco 5/325mg TID.                           2.  Continue Butrans patch 10mcg change every 7 days. Higher doses caused hallucinations.                           3.  We discussed utilizing medical cannabis for pain benefit. Hanna is very interested, previously asked her primary care provider about this and was told it was not an option. As meets the criteria for the diagnosis intractable pain, that is the cause of her pain cannot be easily removed and she has tried most means of traditional pain management, and is a candidate for medical cannabis, I have certified her through the [place] department of health website for medical cannabis. she will be contacted for next steps. SHe will monitor benefit.              4.  Potential procedures: she will let us know if interested in a SCS in the future/insurance changes.                5.  Follow up with FARIBA Dasilva CNP in 8 weeks.     Since her last visit, Hanna Campo reports:  Pain has been okay  Started on medical cannabis by Oliva Walden, and states her goal was to wean off Butrans and Norco instead off medical  cannabis    Goal to wean off norco and patch since starting medical cannabis    Pain scores:  Pain intensity on average is 4 on a scale of 0-10.     Current pain treatments:     Butrans patch 10 mcg/hr  Hydrocodone-Acetaminophen 5-325 3tabs/day, 90/month     Medical cannabis - certified by Oliva Walden      Side Effects: denies any problems    Medications:  Current Outpatient Medications   Medication Sig Dispense Refill     buprenorphine (BUTRANS) 10 MCG/HR WK patch Place 1 patch onto the skin every 7 days 4 patch 1     Cetirizine HCl (ZYRTEC ALLERGY PO) Take 5 mg by mouth daily       chlorthalidone (HYGROTON) 50 MG tablet Take 1 tablet (50 mg) by mouth daily 90 tablet 1     HYDROcodone-acetaminophen (NORCO) 5-325 MG tablet Take max of 3 tabs per day 90 tablet 0     levothyroxine (SYNTHROID/LEVOTHROID) 100 MCG tablet Take 1 tablet (100 mcg) by mouth daily 90 tablet 1     medical cannabis (Patient's own supply) (The purpose of this order is to document that the patient reports taking medical cannabis.  This is not a prescription, and is not used to certify that the patient has a qualifying medical condition.) 0 Information only 0     metoprolol succinate ER (TOPROL-XL) 50 MG 24 hr tablet Take 1 tablet (50 mg) by mouth daily 90 tablet 1     vitamin B complex with vitamin C (VITAMIN  B COMPLEX) tablet Take 1 tablet by mouth daily  0     VITAMIN D, CHOLECALCIFEROL, PO Take 5,000 Units by mouth daily         Medical History: any changes in medical history since they were last seen? No    Review of Systems:  The 14 system ROS was reviewed from the intake questionnaire, and is positive for: foot pain bilaterally, adequately controlled with current regimen  Any bowel or bladder problems: deneis  Mood: stable    Physical Exam: Virtual Visit  not currently breastfeeding.      Assessment:   Peripheral Neuropathy    Hanna Campo is a 66 year old female who is seen at the pain clinic for follow up due to chronic bilateral  foot pain due to peripheral neuropathy.  She is currently managing with current medication regimen which includes butrans patch 10 mcg/hr, and hydrocodone. However, she has also started medical marijuana as prescribed by Oliva Walden and notes significant improvement. She would therefore like to wean off her opioids. We will decrease her butrans patch to 7.5 mcg/hr    Plan:  1. Physical Therapy:  Continue daily HEPs  2. Clinical Health Psychologist to address issues of relaxation, behavioral change, coping style, and other factors important to improvement.  none  3. Diagnostic Studies:  no  4. Medication Management:  Decrease butrans patch to 7.5 mcg (from 10 mcg/hr), continue current hydrocodone dose  5. Further procedures recommended: none  6. Recommendations to PCP: none  7. Follow up: f/u in 1 month      PLAN:   Wean down butrans patch to 7.5 for this month and then follow up with me in 1 month in person to sign CSA and do UDS. Goal will be to wean next month to butrans patch 5 mcg/hr. Also she would like to wean off the norco as well so we will discuss this at her next appointment.  She is certified for medical marijuana through Oliva Walden and she will continue this certification for her. The hope is that with the medical cannabis she will not need the butrans and norco anymore.     Follow up in 1 month    Lisa Morales MD    Pain Medicine  Department of Anesthesiology  [facility]

## 2021-08-27 NOTE — LETTER
8/27/2021       RE: Hanna Campo  [address]  [place] [address]     Dear Colleague,    Thank you for referring your patient, Hanna Campo, to the [facility] CLINIC FOR COMPREHENSIVE PAIN MANAGEMENT [place] at [facility]. Please see a copy of my visit note below.    [facility]    Date of visit: 8/26/2021    Chief complaint: No chief complaint on file.      Interval history:  Hanna Campo was last seen by Oliva Walden on 6/25/2021.      Recommendations/plan at the last visit included:  Plan:                 1.  Pain Physical Therapy:              Continue physical therapy exercises five days a week              2.  Pain Psychologist to address relaxation, behavioral change, coping style, and other factors important to improvement.                NO  Not at this time.               3.  Medication Management:                           1. As Hanna reports very good pain benefit with Norco and Butrans patch, her overall opioid dose is low, and she notes this is the best regimen she has been on yet- allowing her to complete ADLs, reasonable to continue long term for pain management. Continue Norco 5/325mg TID.                           2.  Continue Butrans patch 10mcg change every 7 days. Higher doses caused hallucinations.                           3.  We discussed utilizing medical cannabis for pain benefit. Hanna is very interested, previously asked her primary care provider about this and was told it was not an option. As meets the criteria for the diagnosis intractable pain, that is the cause of her pain cannot be easily removed and she has tried most means of traditional pain management, and is a candidate for medical cannabis, I have certified her through the [place] department of health website for medical cannabis. she will be contacted for next steps. SHe will monitor benefit.              4.  Potential procedures:  she will let us know if interested in a SCS in the future/insurance changes.                5.  Follow up with FARIBA Dasilva CNP in 8 weeks.     Since her last visit, Hanna Bethany Alber reports:  Pain has been okay  Started on medical cannabis by Oliva Walden, and states her goal was to wean off Butrans and Norco instead off medical cannabis    Goal to wean off norco and patch since starting medical cannabis    Pain scores:  Pain intensity on average is 4 on a scale of 0-10.     Current pain treatments:     Butrans patch 10 mcg/hr  Hydrocodone-Acetaminophen 5-325 3tabs/day, 90/month     Medical cannabis - certified by Oliva Walden      Side Effects: denies any problems    Medications:  Current Outpatient Medications   Medication Sig Dispense Refill     buprenorphine (BUTRANS) 10 MCG/HR WK patch Place 1 patch onto the skin every 7 days 4 patch 1     Cetirizine HCl (ZYRTEC ALLERGY PO) Take 5 mg by mouth daily       chlorthalidone (HYGROTON) 50 MG tablet Take 1 tablet (50 mg) by mouth daily 90 tablet 1     HYDROcodone-acetaminophen (NORCO) 5-325 MG tablet Take max of 3 tabs per day 90 tablet 0     levothyroxine (SYNTHROID/LEVOTHROID) 100 MCG tablet Take 1 tablet (100 mcg) by mouth daily 90 tablet 1     medical cannabis (Patient's own supply) (The purpose of this order is to document that the patient reports taking medical cannabis.  This is not a prescription, and is not used to certify that the patient has a qualifying medical condition.) 0 Information only 0     metoprolol succinate ER (TOPROL-XL) 50 MG 24 hr tablet Take 1 tablet (50 mg) by mouth daily 90 tablet 1     vitamin B complex with vitamin C (VITAMIN  B COMPLEX) tablet Take 1 tablet by mouth daily  0     VITAMIN D, CHOLECALCIFEROL, PO Take 5,000 Units by mouth daily         Medical History: any changes in medical history since they were last seen? No    Review of Systems:  The 14 system ROS was reviewed from the intake questionnaire, and is positive  for: foot pain bilaterally, adequately controlled with current regimen  Any bowel or bladder problems: deneis  Mood: stable    Physical Exam: Virtual Visit  not currently breastfeeding.      Assessment:   Peripheral Neuropathy    Hanna Campo is a 66 year old female who is seen at the pain clinic for follow up due to chronic bilateral foot pain due to peripheral neuropathy.  She is currently managing with current medication regimen which includes butrans patch 10 mcg/hr, and hydrocodone. However, she has also started medical marijuana as prescribed by Oliva Walden and notes significant improvement. She would therefore like to wean off her opioids. We will decrease her butrans patch to 7.5 mcg/hr    Plan:  1. Physical Therapy:  Continue daily HEPs  2. Clinical Health Psychologist to address issues of relaxation, behavioral change, coping style, and other factors important to improvement.  none  3. Diagnostic Studies:  no  4. Medication Management:  Decrease butrans patch to 7.5 mcg (from 10 mcg/hr), continue current hydrocodone dose  5. Further procedures recommended: none  6. Recommendations to PCP: none  7. Follow up: f/u in 1 month      PLAN:   Wean down butrans patch to 7.5 for this month and then follow up with me in 1 month in person to sign CSA and do UDS. Goal will be to wean next month to butrans patch 5 mcg/hr. Also she would like to wean off the norco as well so we will discuss this at her next appointment.  She is certified for medical marijuana through Oliva Walden and she will continue this certification for her. The hope is that with the medical cannabis she will not need the butrans and norco anymore.     Follow up in 1 month    Lisa Morales MD    Pain Medicine  Department of Anesthesiology  [facility]        Again, thank you for allowing me to participate in the care of your patient.      Sincerely,    Lisa Morales MD

## 2021-08-27 NOTE — TELEPHONE ENCOUNTER
Prior Authorization Not Needed per Insurance    Medication: buprenorphine (BUTRANS) 7.5 MCG/HR WK patch--NO PA NEEDED  Insurance Company: [employer] - Phone [phone] Fax [phone]  Expected CoPay:      Pharmacy Filling the Rx: [id number] - [place], [place] - [address] [place] [address] AT [facility]  Pharmacy Notified: Yes  Patient Notified: Yes **Instructed pharmacy to notify patient when script is ready to /ship.**

## 2021-09-12 ENCOUNTER — HEALTH MAINTENANCE LETTER (OUTPATIENT)
Age: 66
End: 2021-09-12

## 2021-09-15 ENCOUNTER — OFFICE VISIT (OUTPATIENT)
Dept: FAMILY MEDICINE | Facility: CLINIC | Age: 66
End: 2021-09-15
Payer: COMMERCIAL

## 2021-09-15 VITALS
TEMPERATURE: 97.2 F | SYSTOLIC BLOOD PRESSURE: 128 MMHG | RESPIRATION RATE: 16 BRPM | BODY MASS INDEX: 30.84 KG/M2 | OXYGEN SATURATION: 95 % | DIASTOLIC BLOOD PRESSURE: 72 MMHG | WEIGHT: 196.9 LBS | HEART RATE: 65 BPM

## 2021-09-15 DIAGNOSIS — E03.9 HYPOTHYROIDISM, UNSPECIFIED TYPE: ICD-10-CM

## 2021-09-15 DIAGNOSIS — Z48.02: Primary | ICD-10-CM

## 2021-09-15 DIAGNOSIS — I10 BENIGN ESSENTIAL HYPERTENSION: ICD-10-CM

## 2021-09-15 PROCEDURE — 99213 OFFICE O/P EST LOW 20 MIN: CPT | Performed by: NURSE PRACTITIONER

## 2021-09-15 RX ORDER — LEVOTHYROXINE SODIUM 100 UG/1
100 TABLET ORAL DAILY
Qty: 90 TABLET | Refills: 1 | Status: SHIPPED | OUTPATIENT
Start: 2021-09-15 | End: 2022-05-13

## 2021-09-15 RX ORDER — CHLORTHALIDONE 50 MG/1
50 TABLET ORAL DAILY
Qty: 90 TABLET | Refills: 1 | Status: SHIPPED | OUTPATIENT
Start: 2021-09-15 | End: 2022-05-13

## 2021-09-15 RX ORDER — METOPROLOL SUCCINATE 50 MG/1
50 TABLET, EXTENDED RELEASE ORAL DAILY
Qty: 90 TABLET | Refills: 1 | Status: SHIPPED | OUTPATIENT
Start: 2021-09-15 | End: 2022-05-13

## 2021-09-15 ASSESSMENT — PAIN SCALES - GENERAL: PAINLEVEL: SEVERE PAIN (6)

## 2021-09-15 NOTE — PROGRESS NOTES
Assessment & Plan     Encounter for removal of staples  Removed 2 staples easily. No bleeding, no pain.     Benign essential hypertension  BP stable, follow up with provider in 6 months for med check  - chlorthalidone (HYGROTON) 50 MG tablet; Take 1 tablet (50 mg) by mouth daily  - metoprolol succinate ER (TOPROL-XL) 50 MG 24 hr tablet; Take 1 tablet (50 mg) by mouth daily    Hypothyroidism, unspecified type  Stable, refill sent  - levothyroxine (SYNTHROID/LEVOTHROID) 100 MCG tablet; Take 1 tablet (100 mcg) by mouth daily      Return in about 6 months (around 3/15/2022) for Medication check, Phone or Video visit okay.    Stephanie FARIBA Sherman, NP-C  [facility] is a 66 year old who presents for the following health issues  accompanied by her spouse:    History of Present Illness       She eats 4 or more servings of fruits and vegetables daily.She consumes 0 sweetened beverage(s) daily.She exercises with enough effort to increase her heart rate 10 to 19 minutes per day.  She exercises with enough effort to increase her heart rate 4 days per week.   She is taking medications regularly.       Staple removal  Tdap was up to date.  No discharge that she can see, keeping it clean. 3 days after it occurred she moved and cleaned a house. So it throbbed for a few days.     BP is good, labs last in may  butrans patch is weaning with pain clinic.   cannibis is helping her pain.  Feels like life is good now, things moving in good direction      Recently moved in with her daughter. They also plan to be 'snow birds' in 2 years and move to [place].     Review of Systems   Constitutional, HEENT, cardiovascular, pulmonary, skin as above systems are negative, except as otherwise noted.      Objective    /72   Pulse 65   Temp 97.2  F (36.2  C) (Tympanic)   Resp 16   Wt 89.3 kg (196 lb 14.4 oz)   SpO2 95%   BMI 30.84 kg/m    Body mass index is 30.84 kg/m .  Physical Exam    GENERAL: healthy, alert and no distress  RESP: lungs clear to auscultation - no rales, rhonchi or wheezes  CV: regular rate and rhythm, normal S1 S2, no S3 or S4, no murmur, click or rub  SKIN: anterior head with 2 staples, removed easily, no bleeding, incision CDI and healed    Reviewed labs from may, stable

## 2021-09-28 ENCOUNTER — MYC REFILL (OUTPATIENT)
Dept: ANESTHESIOLOGY | Facility: CLINIC | Age: 66
End: 2021-09-28

## 2021-09-28 DIAGNOSIS — M79.671 PAIN IN BOTH FEET: ICD-10-CM

## 2021-09-28 DIAGNOSIS — M79.672 PAIN IN BOTH FEET: ICD-10-CM

## 2021-09-30 NOTE — TELEPHONE ENCOUNTER
Patient requested refill on 9/28/21 for HYDROcodone-acetaminophen (NORCO) 5-325 MG tablet.     Pt has appointment on 10/1/21 with the provider and can discuss refills at the appointment.   Maddi Galeana LPN

## 2021-10-01 ENCOUNTER — OFFICE VISIT (OUTPATIENT)
Dept: ANESTHESIOLOGY | Facility: CLINIC | Age: 66
End: 2021-10-01
Payer: COMMERCIAL

## 2021-10-01 VITALS — SYSTOLIC BLOOD PRESSURE: 146 MMHG | OXYGEN SATURATION: 97 % | HEART RATE: 53 BPM | DIASTOLIC BLOOD PRESSURE: 78 MMHG

## 2021-10-01 DIAGNOSIS — M79.671 PAIN IN BOTH FEET: ICD-10-CM

## 2021-10-01 DIAGNOSIS — G60.9 IDIOPATHIC PERIPHERAL NEUROPATHY: ICD-10-CM

## 2021-10-01 DIAGNOSIS — M79.672 PAIN IN BOTH FEET: ICD-10-CM

## 2021-10-01 PROCEDURE — 99213 OFFICE O/P EST LOW 20 MIN: CPT | Performed by: ANESTHESIOLOGY

## 2021-10-01 RX ORDER — HYDROCODONE BITARTRATE AND ACETAMINOPHEN 5; 325 MG/1; MG/1
TABLET ORAL
Qty: 90 TABLET | Refills: 0 | Status: SHIPPED | OUTPATIENT
Start: 2021-10-01 | End: 2021-10-26

## 2021-10-01 RX ORDER — BUPRENORPHINE 7.5 UG/H
1 PATCH TRANSDERMAL
Qty: 4 PATCH | Refills: 0 | Status: SHIPPED | OUTPATIENT
Start: 2021-10-01 | End: 2021-10-26

## 2021-10-01 ASSESSMENT — PAIN SCALES - GENERAL: PAINLEVEL: SEVERE PAIN (7)

## 2021-10-01 NOTE — LETTER
10/1/2021       RE: Hanna Campo  [address]  [facility] [address]     Dear Colleague,    Thank you for referring your patient, Hanna Campo, to the [facility] CLINIC FOR COMPREHENSIVE PAIN MANAGEMENT [place] at [facility]. Please see a copy of my visit note below.    [facility]    Date of visit: 10/1/2021    Chief complaint:   Chief Complaint   Patient presents with     RECHECK     UMP RETURN, RM 14, Patient reports, 7/10 pain in both her feet.       Interval history:  Hanna Campo was last seen by me on 8/27/2021.      Recommendations/plan at the last visit included:  Wean down butrans patch to 7.5 for this month and then follow up with me in 1 month in person to sign CSA and do UDS. Goal will be to wean next month to butrans patch 5 mcg/hr. Also she would like to wean off the norco as well so we will discuss this at her next appointment.  She is certified for medical marijuana through Oliva Walden and she will continue this certification for her. The hope is that with the medical cannabis she will not need the butrans and norco anymore.      Follow up in 1 month    Since her last visit, Hanna Campo reports:  Working with medical cannabis pharmacist to adjust dosing  Would like to continue at current regimen of butrans patch 7.5 rather than continue weaning down when the medical cannabis dose is appropriately adjusted.     Pain in bilateral feet radiates upward with occassional shooting pain in the toes. Numbness and tingling in the soles of the feet    Pain scores:  Pain intensity on average is 7 on a scale of 0-10.     Current pain treatments:   Hydrocodone  TID  Butrans Patch 7.5   Medical Cannabis      Side Effects: denies any problems    Medications:  Current Outpatient Medications   Medication Sig Dispense Refill     buprenorphine (BUTRANS) 7.5 MCG/HR WK patch Place 1 patch onto the skin every 7 days  4 patch 0     Cetirizine HCl (ZYRTEC ALLERGY PO) Take 5 mg by mouth daily       chlorthalidone (HYGROTON) 50 MG tablet Take 1 tablet (50 mg) by mouth daily 90 tablet 1     HYDROcodone-acetaminophen (NORCO) 5-325 MG tablet Take max of 3 tabs per day 90 tablet 0     levothyroxine (SYNTHROID/LEVOTHROID) 100 MCG tablet Take 1 tablet (100 mcg) by mouth daily 90 tablet 1     medical cannabis (Patient's own supply) (The purpose of this order is to document that the patient reports taking medical cannabis.  This is not a prescription, and is not used to certify that the patient has a qualifying medical condition.) 0 Information only 0     metoprolol succinate ER (TOPROL-XL) 50 MG 24 hr tablet Take 1 tablet (50 mg) by mouth daily 90 tablet 1     vitamin B complex with vitamin C (VITAMIN  B COMPLEX) tablet Take 1 tablet by mouth daily  0     VITAMIN D, CHOLECALCIFEROL, PO Take 5,000 Units by mouth daily         Medical History: any changes in medical history since they were last seen? No    Review of Systems:  The 14 system ROS was reviewed from the intake questionnaire, and is positive for: foot pain, neuropahty  Any bowel or bladder problems: denies  Mood: stable    Physical Exam:  Blood pressure (!) 146/78, pulse 53, SpO2 97 %, not currently breastfeeding.  General: AAOx3, NAD  Gait: Normal  MSK exam: deferred for conversation    Assessment:   Peripheral Neuropathy of Bilateral Feet    Hanna Campo is a 66 year old female who is seen at the pain clinic for follow up in regards to her pain medication management. Refills of her medications were provided. She will continue at her current dose of both the butrans patch and hydrocodone. However, she would like to slowly wean down her overall opioid use.     Plan:  1. Physical Therapy:  Continue daily HEPs  2. Clinical Health Psychologist to address issues of relaxation, behavioral change, coping style, and other factors important to improvement.  Not at this  time  3. Diagnostic Studies:  none  4. Medication Management:  Rx provided  5. Further procedures recommended: none  6. Recommendations to PCP: none  7. Follow up: 2 to 3 months    Lisa Morales MD    Pain Medicine  Department of Anesthesiology  [facility]          Again, thank you for allowing me to participate in the care of your patient.      Sincerely,    Lisa Morales MD

## 2021-10-01 NOTE — PATIENT INSTRUCTIONS
Medications:    buprenorphine (BUTRANS) 7.5 MCG/HR WK patch- Place 1 patch onto the skin every 7 days.    HYDROcodone-acetaminophen (NORCO) 5-325 MG tablet- Take max of 3 tabs per day    For refills of opioid medications - You MUST call (Or MyChart) the clinic DIRECTLY and at least 7 days before you run out of your medication.        Recommended Follow up:      2 months for a virtual Visit with Dr. Morales.        To speak with a nurse, schedule/reschedule/cancel a clinic appointment, or request a medication refill call: [phone], option #1.    You can also reach us by [employer]: [email or web address]

## 2021-10-01 NOTE — NURSING NOTE
Patient presents with:  RECHECK: UMP RETURN, RM 14, Patient reports, 7/10 pain in both her feet.      Severe Pain (7)     Pain Medications     Opioid Combinations Refills Start End     HYDROcodone-acetaminophen (NORCO) 5-325 MG tablet    0 9/2/2021     Sig: Take max of 3 tabs per day    Class: E-Prescribe    Earliest Fill Date: 9/2/2021    Prior authorization: Closed - Prior Authorization not required for patient/medication    Opioid Partial Agonists Refills Start End     buprenorphine (BUTRANS) 7.5 MCG/HR WK patch    0 8/27/2021     Sig - Route: Place 1 patch onto the skin every 7 days - Transdermal    Class: E-Prescribe    Prior authorization: Closed - Prior Authorization not required for patient/medication          What medications are you using for pain?  Norco, buprenorphine, medical cannabis      (Return Patients only) What refills are you needing today? N/A      Chau Turner, EMT

## 2021-10-01 NOTE — PROGRESS NOTES
[facility]    Date of visit: 10/1/2021    Chief complaint:   Chief Complaint   Patient presents with     RECHECK     UMP RETURN, RM 14, Patient reports, 7/10 pain in both her feet.       Interval history:  Hanna Campo was last seen by me on 8/27/2021.      Recommendations/plan at the last visit included:  Wean down butrans patch to 7.5 for this month and then follow up with me in 1 month in person to sign CSA and do UDS. Goal will be to wean next month to butrans patch 5 mcg/hr. Also she would like to wean off the norco as well so we will discuss this at her next appointment.  She is certified for medical marijuana through Oliva Walden and she will continue this certification for her. The hope is that with the medical cannabis she will not need the butrans and norco anymore.      Follow up in 1 month    Since her last visit, Hanna Campo reports:  Working with medical cannabis pharmacist to adjust dosing  Would like to continue at current regimen of butrans patch 7.5 rather than continue weaning down when the medical cannabis dose is appropriately adjusted.     Pain in bilateral feet radiates upward with occassional shooting pain in the toes. Numbness and tingling in the soles of the feet    Pain scores:  Pain intensity on average is 7 on a scale of 0-10.     Current pain treatments:   Hydrocodone  TID  Butrans Patch 7.5   Medical Cannabis      Side Effects: denies any problems    Medications:  Current Outpatient Medications   Medication Sig Dispense Refill     buprenorphine (BUTRANS) 7.5 MCG/HR WK patch Place 1 patch onto the skin every 7 days 4 patch 0     Cetirizine HCl (ZYRTEC ALLERGY PO) Take 5 mg by mouth daily       chlorthalidone (HYGROTON) 50 MG tablet Take 1 tablet (50 mg) by mouth daily 90 tablet 1     HYDROcodone-acetaminophen (NORCO) 5-325 MG tablet Take max of 3 tabs per day 90 tablet 0     levothyroxine (SYNTHROID/LEVOTHROID) 100 MCG tablet Take 1 tablet (100 mcg)  by mouth daily 90 tablet 1     medical cannabis (Patient's own supply) (The purpose of this order is to document that the patient reports taking medical cannabis.  This is not a prescription, and is not used to certify that the patient has a qualifying medical condition.) 0 Information only 0     metoprolol succinate ER (TOPROL-XL) 50 MG 24 hr tablet Take 1 tablet (50 mg) by mouth daily 90 tablet 1     vitamin B complex with vitamin C (VITAMIN  B COMPLEX) tablet Take 1 tablet by mouth daily  0     VITAMIN D, CHOLECALCIFEROL, PO Take 5,000 Units by mouth daily         Medical History: any changes in medical history since they were last seen? No    Review of Systems:  The 14 system ROS was reviewed from the intake questionnaire, and is positive for: foot pain, neuropahty  Any bowel or bladder problems: denies  Mood: stable    Physical Exam:  Blood pressure (!) 146/78, pulse 53, SpO2 97 %, not currently breastfeeding.  General: AAOx3, NAD  Gait: Normal  MSK exam: deferred for conversation    Assessment:   Peripheral Neuropathy of Bilateral Feet    Hanna Campo is a 66 year old female who is seen at the pain clinic for follow up in regards to her pain medication management. Refills of her medications were provided. She will continue at her current dose of both the butrans patch and hydrocodone. However, she would like to slowly wean down her overall opioid use.     Plan:  1. Physical Therapy:  Continue daily HEPs  2. Clinical Health Psychologist to address issues of relaxation, behavioral change, coping style, and other factors important to improvement.  Not at this time  3. Diagnostic Studies:  none  4. Medication Management:  Rx provided  5. Further procedures recommended: none  6. Recommendations to PCP: none  7. Follow up: 2 to 3 months    Lisa Morales MD    Pain Medicine  Department of Anesthesiology  [facility]

## 2021-10-26 ENCOUNTER — TELEPHONE (OUTPATIENT)
Dept: ANESTHESIOLOGY | Facility: CLINIC | Age: 66
End: 2021-10-26

## 2021-10-26 ENCOUNTER — MYC REFILL (OUTPATIENT)
Dept: ANESTHESIOLOGY | Facility: CLINIC | Age: 66
End: 2021-10-26

## 2021-10-26 DIAGNOSIS — M79.672 PAIN IN BOTH FEET: ICD-10-CM

## 2021-10-26 DIAGNOSIS — M79.671 PAIN IN BOTH FEET: ICD-10-CM

## 2021-10-26 DIAGNOSIS — G60.9 IDIOPATHIC PERIPHERAL NEUROPATHY: ICD-10-CM

## 2021-10-26 NOTE — TELEPHONE ENCOUNTER
Refill request routed to the clinic via [employer] on 10/26/21    Two medications requested:      HYDROcodone-acetaminophen (NORCO) 5-325 MG tablet     buprenorphine (BUTRANS) 7.5 MCG/HR WK patch      MNPMP Checked: Yes    Norco- Last refilled 10/1/21 for 90 tablets  Butrans- Last refilled 10/4/21 for 4 patches    Last clinic appointment: 10/1/21  Next clinic appointment: 11/12/21    Last Drug Screen Collected:  4/22/21  Controlled Substance Agreement signed: 4/22/21      Preferred pharmacy:    [id number] - [place], [place] - [address] [place] [address] AT [facility]    Encounter routed to the provider on 10/26/21    Maddi Galeana LPN

## 2021-10-27 RX ORDER — HYDROCODONE BITARTRATE AND ACETAMINOPHEN 5; 325 MG/1; MG/1
TABLET ORAL
Qty: 90 TABLET | Refills: 0 | Status: SHIPPED | OUTPATIENT
Start: 2021-10-27 | End: 2021-11-24

## 2021-10-27 RX ORDER — BUPRENORPHINE 7.5 UG/H
1 PATCH TRANSDERMAL
Qty: 4 PATCH | Refills: 0 | Status: SHIPPED | OUTPATIENT
Start: 2021-10-27 | End: 2021-11-24

## 2021-11-07 ENCOUNTER — HEALTH MAINTENANCE LETTER (OUTPATIENT)
Age: 66
End: 2021-11-07

## 2021-11-21 ENCOUNTER — OFFICE VISIT (OUTPATIENT)
Dept: URGENT CARE | Facility: URGENT CARE | Age: 66
End: 2021-11-21
Payer: COMMERCIAL

## 2021-11-21 VITALS
DIASTOLIC BLOOD PRESSURE: 88 MMHG | SYSTOLIC BLOOD PRESSURE: 151 MMHG | HEART RATE: 56 BPM | TEMPERATURE: 98.1 F | RESPIRATION RATE: 16 BRPM | BODY MASS INDEX: 29.78 KG/M2 | OXYGEN SATURATION: 96 % | WEIGHT: 190.13 LBS

## 2021-11-21 DIAGNOSIS — R42 DIZZINESS: Primary | ICD-10-CM

## 2021-11-21 DIAGNOSIS — R94.31 ABNORMAL FINDING ON EKG: ICD-10-CM

## 2021-11-21 DIAGNOSIS — R00.1 BRADYCARDIA: ICD-10-CM

## 2021-11-21 DIAGNOSIS — I10 BENIGN ESSENTIAL HYPERTENSION: ICD-10-CM

## 2021-11-21 PROCEDURE — 99215 OFFICE O/P EST HI 40 MIN: CPT | Performed by: PHYSICIAN ASSISTANT

## 2021-11-21 PROCEDURE — U0003 INFECTIOUS AGENT DETECTION BY NUCLEIC ACID (DNA OR RNA); SEVERE ACUTE RESPIRATORY SYNDROME CORONAVIRUS 2 (SARS-COV-2) (CORONAVIRUS DISEASE [COVID-19]), AMPLIFIED PROBE TECHNIQUE, MAKING USE OF HIGH THROUGHPUT TECHNOLOGIES AS DESCRIBED BY CMS-2020-01-R: HCPCS | Performed by: PHYSICIAN ASSISTANT

## 2021-11-21 PROCEDURE — U0005 INFEC AGEN DETEC AMPLI PROBE: HCPCS | Performed by: PHYSICIAN ASSISTANT

## 2021-11-21 PROCEDURE — 93000 ELECTROCARDIOGRAM COMPLETE: CPT | Performed by: PHYSICIAN ASSISTANT

## 2021-11-21 ASSESSMENT — ENCOUNTER SYMPTOMS
SEIZURES: 0
SINUS PAIN: 0
SINUS PRESSURE: 0
SHORTNESS OF BREATH: 0
SORE THROAT: 0
FATIGUE: 1
WEAKNESS: 0
SPEECH DIFFICULTY: 0
HEADACHES: 1
APPETITE CHANGE: 1
NAUSEA: 1
COUGH: 0
DIZZINESS: 1
NUMBNESS: 0
LIGHT-HEADEDNESS: 0
FEVER: 0
RHINORRHEA: 0
VOMITING: 1
RESPIRATORY NEGATIVE: 1
TREMORS: 0
FACIAL ASYMMETRY: 0
CARDIOVASCULAR NEGATIVE: 1
PARESTHESIAS: 1
CHILLS: 0
WHEEZING: 0
PALPITATIONS: 0

## 2021-11-21 NOTE — PROGRESS NOTES
Ottoniel Ferreira is a 66 year old who presents for the following health issues   HPI   Dizziness  Onset/Duration: 10days  Description:   Do you feel faint: YES  Does it feel like the surroundings (bed, room) are moving: YES  Unsteady/off balance: YES  Have you passed out or fallen: no  Intensity: moderate  Progression of Symptoms: worsening  Accompanying Signs & Symptoms:  Heart palpitations or chest pain: no  Nausea, vomiting: YES along with fatigue, loss of appetite  Weakness or lack of coordination in arms or legs: no  Vision or speech changes: YES  Numbness or tingling: YES- which she attributes to her neuropathy  Ringing in ears (Tinnitus): no  Hearing Loss: no  History:   Head trauma/concussion history: no  Previous similar symptoms: no  Recent bleeding history: no  Any new medications (BP?): no  Precipitating factors:   Worse with activity: YES  Worse with head movement: YES  Alleviating factors:   Does staying in a fixed position give relief: YES  Therapies tried and outcome: rest, fluids, with minimal relief    Patient Active Problem List   Diagnosis     Benign essential hypertension     Hypothyroidism, unspecified type     Environmental allergies     Obesity (BMI 35.0-39.9) with comorbidity (H)     H/O wheezing     Other chronic pain     Current Outpatient Medications   Medication     buprenorphine (BUTRANS) 7.5 MCG/HR WK patch     Cetirizine HCl (ZYRTEC ALLERGY PO)     chlorthalidone (HYGROTON) 50 MG tablet     HYDROcodone-acetaminophen (NORCO) 5-325 MG tablet     levothyroxine (SYNTHROID/LEVOTHROID) 100 MCG tablet     medical cannabis (Patient's own supply)     metoprolol succinate ER (TOPROL-XL) 50 MG 24 hr tablet     vitamin B complex with vitamin C (VITAMIN  B COMPLEX) tablet     VITAMIN D, CHOLECALCIFEROL, PO     No current facility-administered medications for this visit.      No Known Allergies    Review of Systems   Constitutional: Positive for appetite change and fatigue. Negative for  chills and fever.   HENT: Negative for congestion, ear discharge, ear pain, hearing loss, rhinorrhea, sinus pressure, sinus pain and sore throat.    Eyes: Positive for visual disturbance.   Respiratory: Negative.  Negative for cough, shortness of breath and wheezing.    Cardiovascular: Negative.  Negative for chest pain, palpitations and peripheral edema.   Gastrointestinal: Positive for nausea and vomiting.   Allergic/Immunologic: Negative for environmental allergies.   Neurological: Positive for dizziness, headaches and paresthesias. Negative for tremors, seizures, syncope, facial asymmetry, speech difficulty, weakness, light-headedness and numbness.   All other systems reviewed and are negative.           Objective    BP (!) 151/88 (BP Location: Left arm, Patient Position: Sitting, Cuff Size: Adult Regular)   Pulse 56   Temp 98.1  F (36.7  C) (Tympanic)   Resp 16   Wt 86.2 kg (190 lb 2 oz)   SpO2 96%   Breastfeeding No   BMI 29.78 kg/m    Body mass index is 29.78 kg/m .  Physical Exam  Vitals and nursing note reviewed.   Constitutional:       General: She is not in acute distress.     Appearance: Normal appearance. She is well-developed and normal weight. She is not ill-appearing.   HENT:      Head: Normocephalic and atraumatic.      Ears:      Comments: TMs are intact without any erythema or bulging bilaterally.  Airway is patent.     Nose: Nose normal.      Mouth/Throat:      Lips: Pink.      Mouth: Mucous membranes are moist.      Pharynx: Oropharynx is clear. Uvula midline. No pharyngeal swelling, oropharyngeal exudate or posterior oropharyngeal erythema.      Tonsils: No tonsillar exudate.   Eyes:      General: No scleral icterus.     Extraocular Movements: Extraocular movements intact.      Conjunctiva/sclera: Conjunctivae normal.      Pupils: Pupils are equal, round, and reactive to light.   Neck:      Thyroid: No thyromegaly.      Meningeal: Brudzinski's sign and Kernig's sign absent.    Cardiovascular:      Rate and Rhythm: Regular rhythm. Bradycardia present.      Pulses: Normal pulses.      Heart sounds: Normal heart sounds, S1 normal and S2 normal. No murmur heard.  No friction rub. No gallop.    Pulmonary:      Effort: Pulmonary effort is normal. No accessory muscle usage, respiratory distress or retractions.      Breath sounds: Normal breath sounds. No decreased breath sounds, wheezing, rhonchi or rales.   Musculoskeletal:      Cervical back: Normal range of motion and neck supple.   Lymphadenopathy:      Cervical: No cervical adenopathy.   Skin:     General: Skin is warm and dry.      Findings: No rash.      Comments: Distal pulses are 2+ and symmetric.  No peripheral edema.   Neurological:      General: No focal deficit present.      Mental Status: She is alert and oriented to person, place, and time.      GCS: GCS eye subscore is 4. GCS verbal subscore is 5. GCS motor subscore is 6.      Cranial Nerves: Cranial nerves are intact. No cranial nerve deficit, dysarthria or facial asymmetry.      Sensory: Sensation is intact. No sensory deficit.      Motor: Motor function is intact.      Coordination: Coordination is intact. Romberg sign negative. Coordination normal. Finger-Nose-Finger Test normal.      Gait: Gait is intact. Gait normal.      Deep Tendon Reflexes: Reflexes are normal and symmetric.   Psychiatric:         Mood and Affect: Mood normal.         Behavior: Behavior normal.         Thought Content: Thought content normal.         Judgment: Judgment normal.      EKG:  Sinus bradycardia, normal axis, 51bpm.  Negative precordial T waves, consider ischemia.  No old EKGs for comparison.  Personally reviewed.      Assessment/Plan:  Dizziness:  Along with bradycardia, abnormal EKG, and elevated BP.  H&P is concerning for acute MI vs arrhythmia vs CVA/TIA vs vertigo.  Recommend further evaluation and management in the ER.  Will most likely need further workup with labs and/or imaging.   Patient has declined transportation via ambulance and will have family drive her/him.  Understands risks and benefits of ambulance transfer and s/he has declined.  Call 911 if worsening symptoms.  S/he plans to go to [facility].  S/he left in stable condition with AVS in hand.  F/u with PCP after ER visit.   -     Symptomatic COVID-19 Virus (Coronavirus) by PCR Nose  -     EKG 12-lead complete w/read - Clinics    Bradycardia    Abnormal finding on EKG    Benign essential hypertension        Dena Mabry PA-C

## 2021-11-22 LAB — SARS-COV-2 RNA RESP QL NAA+PROBE: NEGATIVE

## 2021-11-23 ENCOUNTER — VIRTUAL VISIT (OUTPATIENT)
Dept: FAMILY MEDICINE | Facility: CLINIC | Age: 66
End: 2021-11-23
Payer: COMMERCIAL

## 2021-11-23 DIAGNOSIS — R63.0 DECREASED APPETITE: ICD-10-CM

## 2021-11-23 DIAGNOSIS — R42 DIZZINESS: Primary | ICD-10-CM

## 2021-11-23 DIAGNOSIS — R53.83 OTHER FATIGUE: ICD-10-CM

## 2021-11-23 PROCEDURE — 99214 OFFICE O/P EST MOD 30 MIN: CPT | Mod: 95 | Performed by: NURSE PRACTITIONER

## 2021-11-23 RX ORDER — MECLIZINE HYDROCHLORIDE 25 MG/1
50 TABLET ORAL 3 TIMES DAILY PRN
Qty: 90 TABLET | Refills: 1 | Status: SHIPPED | OUTPATIENT
Start: 2021-11-23 | End: 2022-06-21

## 2021-11-23 NOTE — PROGRESS NOTES
Hanna is a 66 year old who is being evaluated via a billable video visit.      How would you like to obtain your AVS? MyChart  If the video visit is dropped, the invitation should be resent by: Text to cell phone: cell  Will anyone else be joining your video visit? No      Video Start Time 1;20pm    Assessment & Plan     Dizziness  Had very thourough workup in the ER. Went to  first, EKG was abnormal so sent to ER. EKG in the ER was normal, negative trop, MRI and CTA were both negative. Labs stable, na 133. She will be stopping her diuretic for 4 days starting today, she will monitor blood pressure if increasing will restart at 1/2 dose, see AVS.  Follow up with PT, if it is determined it is not BPPV, will then consider Neurology referral . She will also trial 50 mg meclizine to see if that is more effective than the 25 mg dose  - Physical Therapy Referral; Future  - meclizine (ANTIVERT) 25 MG tablet; Take 2 tablets (50 mg) by mouth 3 times daily as needed for dizziness    Decreased appetite  On-going for the past 12 days. COVID-19 swab was negative. Could be viral illness? Monitor for now, encouraged fluids, eating as able like BRAT diet    Other fatigue  Sleeping 10-12 hours, this is unusual for her. Monitor for now. TSH has been stable, do not feel we need to recheck      Return in about 6 months (around 5/23/2022).    FARIBA Maloney, NP-C  [facility]   Hanna is a 66 year old who presents for the following health issues  accompanied by her self.    HPI         ER visit at [facility] on 11/21/21  Vertigo-PT referral given, meclizine script  BP was slightly high at 141/93, HR 57  190lbs    Had 10 days of continuous dizziness- started Nov 12th, lightheadedness, lethargy and decreased appetite. Was at , EKG concerning so sent to ER.   Negative for COVID-19  Repeat EKG was SB, otherwise normal  Labs: Na 133, Hemoglobin 16.8, BUN 5 , neg Trop at 5 hours  MRI brain:  no infarction   CT angio head and neck carotid: no aneurysm, neck CTAcarotid arteries patent with mild atherosclerotic changes  Stop chlorithiadone     Yesterday and today not seeing much improvement. Feeling vertigo all the time-she feels unsteady, when she lays back it feels slightly better.  Today is day 12. She stopped chlorithiadone just today-advised to stop for 2 days.  Still decreased appetite, no nausea for a while. Sleeping 10-12 hr a night and exhausted during the day.     2 grandkids were sick and so was her . Went to story time at the [employer], started then.     Has  BP cuff at home, yesterday 138/79, today 133/83.     TSH has been stable, last checked in May and was normal.     Still on butrans patch-will talk to pain doctor on jan 12, 2022.       Call for medication refills      Review of Systems   Constitutional, HEENT, cardiovascular, pulmonary, gi and gu systems are negative, except as otherwise noted.      Objective           Vitals:  No vitals were obtained today due to virtual visit.    Physical Exam   GENERAL: Healthy, alert and no distress  EYES: Eyes grossly normal to inspection.  No discharge or erythema, or obvious scleral/conjunctival abnormalities.  RESP: No audible wheeze, cough, or visible cyanosis.  No visible retractions or increased work of breathing.    SKIN: Visible skin clear. No significant rash, abnormal pigmentation or lesions.  NEURO: Cranial nerves grossly intact.  Mentation and speech appropriate for age.  PSYCH: Mentation appears normal, affect normal/bright, judgement and insight intact, normal speech and appearance well-groomed.    Reviewed from Care Everywhere outside results            Video-Visit Details    Type of service:  Video Visit    Video End Time:1:41 PM    Originating Location (pt. Location): Home    Distant Location (provider location):  Home secure location    Platform used for Video Visit: [email or web address]

## 2021-11-23 NOTE — PATIENT INSTRUCTIONS
Stop diuretic for 4 days, restart at 1/2 tab (25 mg daily). Keep monitoring blood pressure: if getting higher consistently than 140/90, increase back to 1 tab (50 mg)      Physical therapy: [phone]    Try 50 mg for meclizine three times a day as needed    If PT helps the dizziness, okay to follow up with provider in 6 months then call for refills

## 2021-11-24 ENCOUNTER — MYC REFILL (OUTPATIENT)
Dept: ANESTHESIOLOGY | Facility: CLINIC | Age: 66
End: 2021-11-24
Payer: COMMERCIAL

## 2021-11-24 DIAGNOSIS — M79.671 PAIN IN BOTH FEET: ICD-10-CM

## 2021-11-24 DIAGNOSIS — G60.9 IDIOPATHIC PERIPHERAL NEUROPATHY: ICD-10-CM

## 2021-11-24 DIAGNOSIS — M79.672 PAIN IN BOTH FEET: ICD-10-CM

## 2021-11-26 ENCOUNTER — TELEPHONE (OUTPATIENT)
Dept: ANESTHESIOLOGY | Facility: CLINIC | Age: 66
End: 2021-11-26
Payer: COMMERCIAL

## 2021-11-26 NOTE — TELEPHONE ENCOUNTER
Refill request routed via [employer] on 11/24/21    Two Medication requests:     HYDROcodone-acetaminophen (NORCO) 5-325 MG tablet    buprenorphine (BUTRANS) 7.5 MCG/HR WK patch      MNPMP Checked: Yes    Norco Last refilled 11/2/21 for 90 tablets    Butrans Last refilled 11/2/21 for 4 patches      Last clinic appointment: 10/1/21  Next clinic appointment: 1/12/22    Last Drug Screen Collected:  4/22/21  Controlled Substance Agreement signed: 4/22/21      Preferred pharmacy:    [id number] - [place], [place] - [address] AT [address]    Encounter routed to the Dr. Morales on 11/26/21    Maddi Galeana LPN

## 2021-11-29 ENCOUNTER — MYC MEDICAL ADVICE (OUTPATIENT)
Dept: FAMILY MEDICINE | Facility: CLINIC | Age: 66
End: 2021-11-29
Payer: COMMERCIAL

## 2021-11-29 RX ORDER — BUPRENORPHINE 7.5 UG/H
1 PATCH TRANSDERMAL
Qty: 4 PATCH | Refills: 0 | Status: SHIPPED | OUTPATIENT
Start: 2021-11-29 | End: 2021-12-23

## 2021-11-29 RX ORDER — HYDROCODONE BITARTRATE AND ACETAMINOPHEN 5; 325 MG/1; MG/1
1 TABLET ORAL EVERY 8 HOURS PRN
Qty: 90 TABLET | Refills: 0 | Status: SHIPPED | OUTPATIENT
Start: 2021-11-29 | End: 2021-12-23

## 2021-11-30 NOTE — TELEPHONE ENCOUNTER
Routing to provider, see [employer] message below.  Patient sent in response to virtual visit on 11/23/21, as well as a follow up to previous [employer] message from 11/23/21.      Lisa Almonte RN  [facility]           no

## 2021-12-23 ENCOUNTER — TELEPHONE (OUTPATIENT)
Dept: ANESTHESIOLOGY | Facility: CLINIC | Age: 66
End: 2021-12-23
Payer: COMMERCIAL

## 2021-12-23 DIAGNOSIS — M79.672 PAIN IN BOTH FEET: ICD-10-CM

## 2021-12-23 DIAGNOSIS — M79.671 PAIN IN BOTH FEET: ICD-10-CM

## 2021-12-23 DIAGNOSIS — G60.9 IDIOPATHIC PERIPHERAL NEUROPATHY: ICD-10-CM

## 2021-12-23 RX ORDER — HYDROCODONE BITARTRATE AND ACETAMINOPHEN 5; 325 MG/1; MG/1
1 TABLET ORAL EVERY 8 HOURS PRN
Qty: 90 TABLET | Refills: 0 | Status: SHIPPED | OUTPATIENT
Start: 2021-12-29 | End: 2022-01-12

## 2021-12-23 RX ORDER — BUPRENORPHINE 7.5 UG/H
1 PATCH TRANSDERMAL
Qty: 4 PATCH | Refills: 0 | Status: SHIPPED | OUTPATIENT
Start: 2021-12-28 | End: 2022-06-21

## 2021-12-23 NOTE — TELEPHONE ENCOUNTER
Refill request routed via [employer] on 12/23/21     Two Medication requests:      HYDROcodone-acetaminophen (NORCO) 5-325 MG tablet     buprenorphine (BUTRANS) 7.5 MCG/HR WK patch        MNPMP Checked: Yes     Norco Last refilled 12/1/21 for 90 tablets     Butrans Last refilled 12/1/21 for 4 patches        Last clinic appointment: 10/1/21  Next clinic appointment: 1/12/22     Last Drug Screen Collected:  4/22/21  Controlled Substance Agreement signed: 4/22/21        Preferred pharmacy:     [id number] - [place], [place] - [address] AT [address]     Encounter routed to provider on 12/23/21     Maddi Galeana LPN

## 2021-12-23 NOTE — TELEPHONE ENCOUNTER
Signed Prescriptions:                        Disp   Refills    HYDROcodone-acetaminophen (NORCO) 5-325 MG*90 tab*0        Sig: Take 1 tablet by mouth every 8 hours as needed for           severe pain Max of 3 tabs per day  Authorizing Provider: GUCCI PATINO    buprenorphine (BUTRANS) 7.5 MCG/HR WK patch4 patch0        Sig: Place 1 patch onto the skin every 7 days  Authorizing Provider: GUCCI PATINO        Reviewed [place] Prescription Monitoring Program, appears appropriate.     Request seems appropriate, refilled for provider who is out of office.     FARIBA Dasilva [facility]

## 2022-01-02 ENCOUNTER — HEALTH MAINTENANCE LETTER (OUTPATIENT)
Age: 67
End: 2022-01-02

## 2022-01-11 ENCOUNTER — MYC MEDICAL ADVICE (OUTPATIENT)
Dept: FAMILY MEDICINE | Facility: CLINIC | Age: 67
End: 2022-01-11
Payer: COMMERCIAL

## 2022-01-11 DIAGNOSIS — G60.9 IDIOPATHIC NEUROPATHY: Primary | ICD-10-CM

## 2022-01-11 NOTE — TELEPHONE ENCOUNTER
Please print handicap parking form and fax to providers [employer] at [phone].    Thank you,  FARIBA Maloney, NP-C  [facility]

## 2022-01-12 ENCOUNTER — TELEPHONE (OUTPATIENT)
Dept: FAMILY MEDICINE | Facility: CLINIC | Age: 67
End: 2022-01-12

## 2022-01-12 ENCOUNTER — VIRTUAL VISIT (OUTPATIENT)
Dept: ANESTHESIOLOGY | Facility: CLINIC | Age: 67
End: 2022-01-12
Payer: COMMERCIAL

## 2022-01-12 ENCOUNTER — MYC MEDICAL ADVICE (OUTPATIENT)
Dept: FAMILY MEDICINE | Facility: CLINIC | Age: 67
End: 2022-01-12

## 2022-01-12 DIAGNOSIS — M79.672 PAIN IN BOTH FEET: ICD-10-CM

## 2022-01-12 DIAGNOSIS — G60.9 IDIOPATHIC PERIPHERAL NEUROPATHY: ICD-10-CM

## 2022-01-12 DIAGNOSIS — M79.671 PAIN IN BOTH FEET: ICD-10-CM

## 2022-01-12 PROCEDURE — 99213 OFFICE O/P EST LOW 20 MIN: CPT | Mod: 95 | Performed by: ANESTHESIOLOGY

## 2022-01-12 RX ORDER — BUPRENORPHINE 10 UG/H
1 PATCH TRANSDERMAL
Qty: 4 PATCH | Refills: 0 | Status: SHIPPED | OUTPATIENT
Start: 2022-01-24 | End: 2022-02-21

## 2022-01-12 RX ORDER — HYDROCODONE BITARTRATE AND ACETAMINOPHEN 5; 325 MG/1; MG/1
1 TABLET ORAL EVERY 8 HOURS PRN
Qty: 90 TABLET | Refills: 0 | Status: SHIPPED | OUTPATIENT
Start: 2022-01-24 | End: 2022-02-02

## 2022-01-12 NOTE — PROGRESS NOTES
Hanna is a 67 year old who is being evaluated via a billable video visit.        Video-Visit Details    Type of service:  Video Visit    Video Duration:     Originating Location (pt. Location): Home    Distant Location (provider location):  [facility] [place]     Platform used for Video Visit: Other: [employer]    [facility]    Date of visit: 1/12/2022    Chief complaint:   Chief Complaint   Patient presents with     RECHECK     UMP RETURN,Patient reports, 5/10 feet,       Interval history:  Hanna Campo was last seen by me on 10/1/2021.      Recommendations/plan at the last visit included:  1. Physical Therapy:  Continue daily HEPs  2. Clinical Health Psychologist to address issues of relaxation, behavioral change, coping style, and other factors important to improvement.  Not at this time  3. Diagnostic Studies:  none  4. Medication Management:  Rx provided  5. Further procedures recommended: none  6. Recommendations to PCP: none  7. Follow up: 2 to 3 months      Since her last visit, Hanna Campo reports:    Unsure if medical marijuana is helping and would like to stop this and go back to higher dose of butrans patch (10mcg). She was on this dose previously prior to starting medical cannabis    Pain scores:  Pain intensity on average is 5 on a scale of 0-10.     Current pain treatments:     Butrans 7.5  Hydrocodone   Medical Cannabis    Side Effects: denies any problems    Medications:  Current Outpatient Medications   Medication Sig Dispense Refill     buprenorphine (BUTRANS) 7.5 MCG/HR WK patch Place 1 patch onto the skin every 7 days 4 patch 0     Cetirizine HCl (ZYRTEC ALLERGY PO) Take 5 mg by mouth daily       chlorthalidone (HYGROTON) 50 MG tablet Take 1 tablet (50 mg) by mouth daily 90 tablet 1     HYDROcodone-acetaminophen (NORCO) 5-325 MG tablet Take 1 tablet by mouth every 8 hours as needed for severe pain Max of 3 tabs  per day 90 tablet 0     levothyroxine (SYNTHROID/LEVOTHROID) 100 MCG tablet Take 1 tablet (100 mcg) by mouth daily 90 tablet 1     meclizine (ANTIVERT) 25 MG tablet Take 2 tablets (50 mg) by mouth 3 times daily as needed for dizziness 90 tablet 1     medical cannabis (Patient's own supply) (The purpose of this order is to document that the patient reports taking medical cannabis.  This is not a prescription, and is not used to certify that the patient has a qualifying medical condition.) 0 Information only 0     metoprolol succinate ER (TOPROL-XL) 50 MG 24 hr tablet Take 1 tablet (50 mg) by mouth daily 90 tablet 1     vitamin B complex with vitamin C (VITAMIN  B COMPLEX) tablet Take 1 tablet by mouth daily  0     VITAMIN D, CHOLECALCIFEROL, PO Take 5,000 Units by mouth daily         Medical History: any changes in medical history since they were last seen? No    Review of Systems:  The 14 system ROS was reviewed from the intake questionnaire, and is positive for: bilateral foot pain  Any bowel or bladder problems: denies  Mood: stable, looking forward to trip to [place] to visit family    Physical Exam: (virtual visit)  not currently breastfeeding.  General: AAOx3, mNAD    Assessment:   Idiopathic Peripheral Neuropathy    Hanna Campo is a 67 year old female who is seen at the pain clinic for follow up in regards to pain management of her feet due to idiopathic peripheral neuropathy.    Plan:  1. Physical Therapy:  Continue daily HEPs  2. Clinical Health Psychologist to address issues of relaxation, behavioral change, coping style, and other factors important to improvement.  Not at this time  3. Diagnostic Studies:  none  4. Medication Management:  Refill provided to start in 2 weeks at prior dose of butrans patch (10 mcg) as well as of hydorocodone 10 mg  TID PRN  5. Follow up: 2 months in person for updated CSA/UDS    Lisa Morales MD    Pain Medicine  Department of  Anesthesiology  [facility]

## 2022-01-12 NOTE — NURSING NOTE
Patient presents with:  RECHECK: UMP RETURN,Patient reports, 5/10 feet,      Data Unavailable     Pain Medications     Opioid Combinations Refills Start End     HYDROcodone-acetaminophen (NORCO) 5-325 MG tablet    0 12/29/2021     Sig - Route: Take 1 tablet by mouth every 8 hours as needed for severe pain Max of 3 tabs per day - Oral    Class: E-Prescribe    Earliest Fill Date: 12/29/2021    Prior authorization: Closed - Prior Authorization not required for patient/medication    Opioid Partial Agonists Refills Start End     buprenorphine (BUTRANS) 7.5 MCG/HR WK patch    0 12/28/2021     Sig - Route: Place 1 patch onto the skin every 7 days - Transdermal    Class: E-Prescribe    Prior authorization: Closed - Prior Authorization not required for patient/medication          What medications are you using for pain?   Butrans, norco, med cannabis        (Return Patients only) What refills are you needing today? N/A        How will you be connecting to the visit? mychart  How would you like to obtain your AVS? MyChart  If the video visit is dropped, the invitation should be resent by: Text to cell phone: [phone]   Will anyone else be joining your video visit? No        Chau Turner, EMT

## 2022-01-12 NOTE — LETTER
1/12/2022       RE: Hanna Campo  [address]  [place] [address]     Dear Colleague,    Thank you for referring your patient, Hanna Campo, to the [facility] [place] at [facility]. Please see a copy of my visit note below.    [facility]    Date of visit: 1/12/2022    Chief complaint:   Chief Complaint   Patient presents with     RECHECK     UMP RETURN,Patient reports, 5/10 feet,       Interval history:  Hanna Campo was last seen by me on 10/1/2021.      Recommendations/plan at the last visit included:  1. Physical Therapy:  Continue daily HEPs  2. Clinical Health Psychologist to address issues of relaxation, behavioral change, coping style, and other factors important to improvement.  Not at this time  3. Diagnostic Studies:  none  4. Medication Management:  Rx provided  5. Further procedures recommended: none  6. Recommendations to PCP: none  7. Follow up: 2 to 3 months      Since her last visit, Hanna Campo reports:    Unsure if medical marijuana is helping and would like to stop this and go back to higher dose of butrans patch (10mcg). She was on this dose previously prior to starting medical cannabis    Pain scores:  Pain intensity on average is 5 on a scale of 0-10.     Current pain treatments:     Butrans 7.5  Hydrocodone   Medical Cannabis    Side Effects: denies any problems    Medications:  Current Outpatient Medications   Medication Sig Dispense Refill     buprenorphine (BUTRANS) 7.5 MCG/HR WK patch Place 1 patch onto the skin every 7 days 4 patch 0     Cetirizine HCl (ZYRTEC ALLERGY PO) Take 5 mg by mouth daily       chlorthalidone (HYGROTON) 50 MG tablet Take 1 tablet (50 mg) by mouth daily 90 tablet 1     HYDROcodone-acetaminophen (NORCO) 5-325 MG tablet Take 1 tablet by mouth every 8 hours as needed for severe pain Max of 3 tabs per day 90 tablet 0      levothyroxine (SYNTHROID/LEVOTHROID) 100 MCG tablet Take 1 tablet (100 mcg) by mouth daily 90 tablet 1     meclizine (ANTIVERT) 25 MG tablet Take 2 tablets (50 mg) by mouth 3 times daily as needed for dizziness 90 tablet 1     medical cannabis (Patient's own supply) (The purpose of this order is to document that the patient reports taking medical cannabis.  This is not a prescription, and is not used to certify that the patient has a qualifying medical condition.) 0 Information only 0     metoprolol succinate ER (TOPROL-XL) 50 MG 24 hr tablet Take 1 tablet (50 mg) by mouth daily 90 tablet 1     vitamin B complex with vitamin C (VITAMIN  B COMPLEX) tablet Take 1 tablet by mouth daily  0     VITAMIN D, CHOLECALCIFEROL, PO Take 5,000 Units by mouth daily         Medical History: any changes in medical history since they were last seen? No    Review of Systems:  The 14 system ROS was reviewed from the intake questionnaire, and is positive for: bilateral foot pain  Any bowel or bladder problems: denies  Mood: stable, looking forward to trip to [place] to visit family    Physical Exam: (virtual visit)  not currently breastfeeding.  General: AAOx3, mNAD    Assessment:   Idiopathic Peripheral Neuropathy    Hanna Campo is a 67 year old female who is seen at the pain clinic for follow up in regards to pain management of her feet due to idiopathic peripheral neuropathy.    Plan:  1. Physical Therapy:  Continue daily HEPs  2. Clinical Health Psychologist to address issues of relaxation, behavioral change, coping style, and other factors important to improvement.  Not at this time  3. Diagnostic Studies:  none  4. Medication Management:  Refill provided to start in 2 weeks at prior dose of butrans patch (10 mcg) as well as of hydorocodone 10 mg  TID PRN  5. Follow up: 2 months in person for updated CSA/UDS      Lisa Morales MD    Pain Medicine  Department of Anesthesiology  [facility]

## 2022-01-12 NOTE — TELEPHONE ENCOUNTER
Filled out form. Sent back to staff via faxing. Please stamp with clinic address, send copy to scanning and let patient know it is ready for .  Thank you,  FARIBA Maloney, NP-C  [facility]

## 2022-01-12 NOTE — TELEPHONE ENCOUNTER
Left message for patient that forms are ready for  / also sent a MyChart message   RTC in 3 months for 15 month well visit or call sooner if needed     anticipatory guidance    Happy Birthday! It's also time to take your little one to the dentist!  The recommended fist dental visit is age 3. I recommend:    [address] [phone]  [address] [facility], [facility] Adele Ivan    Your baby is now ready to try more finger foods. Encourage infant to transition completely to table food and wean off the bottle over the next couple months. Many foods can pose a choking risk to infants through toddler-roque:  Avoid hotdogs, whole grapes, popcorn, nuts, etc. Remember: juice is candy. Milk or water should be what children drink. Child is ready for first trip to the dentist!  Get into twice daily brushing habit - pea sized flouride toothpaste may be used. Discourage any co-sleeping and establish good nighttime routine to encourage sleep health: Bath time, quiet reading, off to bed. Never leave child alone or supervised by another small child in the bathtub. Read to child on a regular basis. Use sunscreen to protect all skin colors. [place] necessary to assure child safety on stairs, pools, other hazards. Child should remain rear facing in carseat (check car seat limits) as long as possible - ideally until age 2 is safest. Discipline should include encouraging consistent behavior, using lopez voice and face while saying \"no\" to inappropriate behaviors or dangers. Spanking or any corporal punishment is inappropriate and should be avoided. Parents to call with any questions. Vaccine handouts given. Advised give Motrin/Tylenol for any discomfort or low grade fevers (dosage chart given). Call if excessive pain, swelling, redness at the injection site, persistent high fevers, inconsolability, or if any other specific concerns.

## 2022-01-19 ENCOUNTER — TELEPHONE (OUTPATIENT)
Dept: PALLIATIVE MEDICINE | Facility: CLINIC | Age: 67
End: 2022-01-19
Payer: COMMERCIAL

## 2022-01-19 ENCOUNTER — TELEPHONE (OUTPATIENT)
Dept: FAMILY MEDICINE | Facility: CLINIC | Age: 67
End: 2022-01-19
Payer: COMMERCIAL

## 2022-01-19 NOTE — TELEPHONE ENCOUNTER
[facility] Call Center    Phone Message    May a detailed message be left on voicemail: yes     Reason for Call: Medication Refill Request    Has the patient contacted the pharmacy for the refill? Yes   Name of medication being requested: Buprenorphine, Norco  Provider who prescribed the medication: Dr. Henderson  Pharmacy: [facility] #[id number] - [place], [place] - [address]  Date medication is needed: as soon as possible          Action Taken: Message routed to:  [facility] pain     Travel Screening: Not Applicable

## 2022-01-19 NOTE — TELEPHONE ENCOUNTER
EMT Left  informing the pt that she can  her rx on her next fill date 1/24/22.      Chau Turner, EMT

## 2022-01-26 ENCOUNTER — TELEPHONE (OUTPATIENT)
Dept: ANESTHESIOLOGY | Facility: CLINIC | Age: 67
End: 2022-01-26
Payer: COMMERCIAL

## 2022-01-26 NOTE — TELEPHONE ENCOUNTER
[facility] Call Center    Phone Message    May a detailed message be left on voicemail: yes     Reason for Call: Medication Question or concern regarding medication   Prescription Clarification  Name of Medication: HYDROcodone-acetaminophen (NORCO) 5-325 MG tablet     Prescribing Provider: Dr. Morales   Pharmacy: [facility]   What on the order needs clarification? Pt needing the rest of this script sent to her pharmacy, pt stated she picked it up yesterday but since her insurance changed she only got a 7 day supply          Action Taken: Message routed to:  [facility] pain

## 2022-01-31 ENCOUNTER — TELEPHONE (OUTPATIENT)
Dept: FAMILY MEDICINE | Facility: CLINIC | Age: 67
End: 2022-01-31
Payer: COMMERCIAL

## 2022-01-31 NOTE — TELEPHONE ENCOUNTER
Writer spoke with patient regarding DMV forms: per patients request I'm mailing forms to her address to be filled out. I included a stamped envelope.

## 2022-02-02 ENCOUNTER — TELEPHONE (OUTPATIENT)
Dept: PALLIATIVE MEDICINE | Facility: CLINIC | Age: 67
End: 2022-02-02
Payer: COMMERCIAL

## 2022-02-02 DIAGNOSIS — M79.671 PAIN IN BOTH FEET: ICD-10-CM

## 2022-02-02 DIAGNOSIS — G60.9 IDIOPATHIC PERIPHERAL NEUROPATHY: ICD-10-CM

## 2022-02-02 DIAGNOSIS — M79.672 PAIN IN BOTH FEET: ICD-10-CM

## 2022-02-02 RX ORDER — HYDROCODONE BITARTRATE AND ACETAMINOPHEN 5; 325 MG/1; MG/1
1 TABLET ORAL EVERY 8 HOURS PRN
Qty: 69 TABLET | Refills: 0 | Status: SHIPPED | OUTPATIENT
Start: 2022-02-02 | End: 2022-02-25

## 2022-02-02 RX ORDER — BUPRENORPHINE 10 UG/H
1 PATCH TRANSDERMAL
Qty: 4 PATCH | Refills: 0 | Status: CANCELLED | OUTPATIENT
Start: 2022-02-02

## 2022-02-02 NOTE — TELEPHONE ENCOUNTER
[facility] Call Center    Phone Message    May a detailed message be left on voicemail: yes     Reason for Call: Medication Question or concern regarding medication   Prescription Clarification  Name of Medication: buprenorphine (BUTRANS) 7.5 MCG/HR WK patch  Prescribing Provider: Dr Morales   Pharmacy: NA   What on the order needs clarification? Patient is calling to see if she needs a different medication? It is on on the the formulary however this medication needs an PA or exemption for  the medication. It needs to be in writing from the  To the insurance approval for certain criteria for the coverage for this medication.            Action Taken: Message routed to:  [facility]    Travel Screening: Not Applicable

## 2022-02-02 NOTE — TELEPHONE ENCOUNTER
[facility]    Phone Message    May a detailed message be left on voicemail: yes     Reason for Call: Medication Question or concern regarding medication   Prescription Clarification  Name of Medication: HYDROcodone-acetaminophen (NORCO) 5-325 MG tablet   Prescribing Provider: Dr Morales   Pharmacy: [facility] #[id number] - [address]    What on the order needs clarification? The patient is still waiting for her remainder of the medication. Pt needing the rest of this script sent to her pharmacy, pt stated she picked it up yesterday but since her insurance changed she only got a 7 day supply          Action Taken: Message routed to:  [facility]    Travel Screening: Not Applicable

## 2022-02-02 NOTE — CONFIDENTIAL NOTE
RN returned call and spoke with patient to address two separate telephone encounters regarding medications. Patient requesting a refill of her Norco, as she was only given a 7 day supply on 1/25 due to insurance coverage. This was routed to provider on 1/28/22. Writer explained will update provider of refill.     Patient also reported she spoke with her insurance regarding Butrans patches. Her insurance will be faxing over information for the provider to fill out for certain criteria requirements for this medication. Patient reported she did receive her Butrans patches on 1/24.       Dang Barrett RN

## 2022-02-02 NOTE — CONFIDENTIAL NOTE
Refill request    Medication: HYDROcodone-acetaminophen (NORCO) 5-325 MG tablet      Last clinic appointment: 1/12/22  Next clinic appointment: 3/11/22    Last Drug Screen Collected: 4/22/21  Controlled Substance Agreement signed: 6/27/21    Preferred pharmacy:      [id number] - [place], [place] - [address]

## 2022-02-09 ENCOUNTER — TELEPHONE (OUTPATIENT)
Dept: FAMILY MEDICINE | Facility: CLINIC | Age: 67
End: 2022-02-09
Payer: COMMERCIAL

## 2022-02-09 NOTE — TELEPHONE ENCOUNTER
Disability Parking Cert form placed in Stephanie Sherman NP's bin for completion.  Please see prior encounters from 1/31/2 & 1/12/22 & 1/11/22.      Rachel GIRON (R))

## 2022-02-09 NOTE — TELEPHONE ENCOUNTER
Copy made and sent to abstraction.  Original placed at BA  for pt .  Notified pt that form was completed via voicemail.      PROMISE Perera.

## 2022-02-11 ENCOUNTER — TELEPHONE (OUTPATIENT)
Dept: ANESTHESIOLOGY | Facility: CLINIC | Age: 67
End: 2022-02-11
Payer: COMMERCIAL

## 2022-02-11 NOTE — TELEPHONE ENCOUNTER
Central Prior Authorization Team   Phone: [phone]      Tried submitting P/A request, but am getting the message back that its was already submitted and denied (I cannot find that Central P/A Team) worked on that. At this point, would have to appeal or change medication.

## 2022-02-11 NOTE — TELEPHONE ENCOUNTER
Prior Authorization Retail Medication Request    Medication/Dose: buprenorphine (BUTRANS) 10 MCG/HR WK patch  ICD code (if different than what is on RX):    Previously Tried and Failed:    Rationale:      Insurance Name:    Insurance ID:        Pharmacy Information (if different than what is on RX)  Name:    Phone:

## 2022-02-17 NOTE — TELEPHONE ENCOUNTER
It appears a prior authorization was initiated and denied at the clinic level and due to documentation limitations the central prior authorization team will not be able to do the appeal.

## 2022-02-17 NOTE — TELEPHONE ENCOUNTER
Appeal letter available in chart. Encounter routed back to PA team to initiate high priority appeal. Pt will be out of medication early next week.     Aydee Garcia DNP, RN

## 2022-02-18 NOTE — TELEPHONE ENCOUNTER
Medication Appeal Initiation    We have initiated an appeal for the requested medication:    Medication: buprenorphine (BUTRANS) 10 MCG/HR WK patch  Appeal Start Date:  2/18/2022  Insurance Company: [facility] - Phone [phone] Fax [phone]  Comments:  Appeal has been initiated.  I have faxed Letter of Medical Necessity (letters tab) to [facility] - Attn: Complaints, Appeals, and Grievances, fax# [phone]. Marked for urgent review.

## 2022-02-21 DIAGNOSIS — M79.671 PAIN IN BOTH FEET: ICD-10-CM

## 2022-02-21 DIAGNOSIS — G60.9 IDIOPATHIC PERIPHERAL NEUROPATHY: ICD-10-CM

## 2022-02-21 DIAGNOSIS — M79.672 PAIN IN BOTH FEET: ICD-10-CM

## 2022-02-21 RX ORDER — BUPRENORPHINE 10 UG/H
1 PATCH TRANSDERMAL
Qty: 4 PATCH | Refills: 0 | Status: SHIPPED | OUTPATIENT
Start: 2022-02-21 | End: 2022-03-16

## 2022-02-21 NOTE — TELEPHONE ENCOUNTER
RNCC returned call to pt's insurance company, additional information provided. Pt updated that clinic spoke with insurance and refill is being routed to provider for approval. She verbalized understanding and declined questions.     Aydee Garcia DNP, RN

## 2022-02-21 NOTE — TELEPHONE ENCOUNTER
Received call form [facility] Appeal Dept stating that Additional question was sent via fax to provider and have not received response from provider on additional questions. Per Urgent Request, If no response back today, Appeal will most likely be Denied due to additional info needed. Si[facility] stated that she will re send additional information via fax to [phone] and change to standard Appeal with an outcome by 2/25/2022. Please complete additional information requested and send via fax to Appeals ASAP.

## 2022-02-21 NOTE — TELEPHONE ENCOUNTER
[facility] Call Center    Phone Message    May a detailed message be left on voicemail: yes     Reason for Call: Medication Question or concern regarding medication   Prescription Clarification  Name of Medication: buprenorphine    Prescribing Provider: Dr. Morales      What on the order needs clarification? Alicia at [facility] requesting call back to discuss the appeal on this medication, she stated she also sent a fax to the clinic with the questions she needs answered and that can just be faxed back to her. She stated this appeal is due to close today so she needs to hear back asap          Action Taken: Message routed to:  [facility] pain

## 2022-02-21 NOTE — TELEPHONE ENCOUNTER
Refill request    Medication:     buprenorphine (BUTRANS) 10 MCG/HR WK patch      Last clinic appointment: 1/12/22  Next clinic appointment: 3/11/22    Last Drug Screen Collected: 4/22/21  Controlled Substance Agreement signed: 6/27/21      Preferred pharmacy:    [facility] (attached to encounter)     Encounter routed to provider for review and electronic signature.     Aydee Garcia, DNP, RN

## 2022-02-22 NOTE — TELEPHONE ENCOUNTER
MEDICATION APPEAL APPROVED. Pharmacy has been notified, and asked that they let patient know when rx is ready.    Medication: buprenorphine (BUTRANS) 10 MCG/HR WK patch  Authorization Effective Date:  01/21/2022  Authorization Expiration Date:  02/21/2023  Approved Dose/Quantity: 7 patches per 28 days  Reference #:     Insurance Company: [employer] - Phone [phone] Fax [phone]  Expected CoPay:        Which Pharmacy is filling the prescription (Not needed for infusion/clinic administered): [facility]ACY #[id number] - [place] - [address]

## 2022-02-25 ENCOUNTER — MYC REFILL (OUTPATIENT)
Dept: ANESTHESIOLOGY | Facility: CLINIC | Age: 67
End: 2022-02-25
Payer: COMMERCIAL

## 2022-02-25 DIAGNOSIS — M79.672 PAIN IN BOTH FEET: ICD-10-CM

## 2022-02-25 DIAGNOSIS — M79.671 PAIN IN BOTH FEET: ICD-10-CM

## 2022-02-27 ENCOUNTER — HEALTH MAINTENANCE LETTER (OUTPATIENT)
Age: 67
End: 2022-02-27

## 2022-02-28 NOTE — TELEPHONE ENCOUNTER
Refill request    Medication: HYDROcodone-acetaminophen (NORCO) 5-325 MG tablet      Last clinic appointment: 1/12/22  Next clinic appointment: 3/11/22    Last Drug Screen Collected: 4/22/21  Controlled Substance Agreement signed: 4/22/21      Preferred pharmacy:    [id number] - [place], [place] - [address]      Dang Barrett RN

## 2022-03-02 RX ORDER — HYDROCODONE BITARTRATE AND ACETAMINOPHEN 5; 325 MG/1; MG/1
1 TABLET ORAL EVERY 8 HOURS PRN
Qty: 69 TABLET | Refills: 0 | Status: SHIPPED | OUTPATIENT
Start: 2022-03-02 | End: 2022-06-21

## 2022-03-03 DIAGNOSIS — M79.671 PAIN IN BOTH FEET: ICD-10-CM

## 2022-03-03 DIAGNOSIS — M79.672 PAIN IN BOTH FEET: ICD-10-CM

## 2022-03-03 DIAGNOSIS — G60.9 IDIOPATHIC PERIPHERAL NEUROPATHY: Primary | ICD-10-CM

## 2022-03-03 RX ORDER — HYDROCODONE BITARTRATE AND ACETAMINOPHEN 5; 325 MG/1; MG/1
1 TABLET ORAL EVERY 8 HOURS PRN
Qty: 21 TABLET | Refills: 0 | Status: SHIPPED | OUTPATIENT
Start: 2022-03-25 | End: 2022-03-25

## 2022-03-04 NOTE — TELEPHONE ENCOUNTER
Provider spoke to patient over the phone. Patient voiced some frustration about the [employer] messaging that recently occurred in the past day. See those encounters. She was frustrated by nasal spray and inhaler being ordered when she didn't request them. Uses OTC Flonase instead of a prescription-provider adjusted script to say 'Historical'.  She said she was really sick for about 4 days, but denies sending or requesting a refill of Airduo Respiclick-she also never picked it up as it was too expensive. Provider removed all COPD diagnoses from her chart as she does not have that and canclled the inhaler.   She is in significant pain and cymbalta is not helping. She sounded tearful over the phone. The norco is helping and she takes it 1-2 times a day. If she doesn't take it she sits in a chair crying because of the pain. She also uses a laxative prn. She is trying to be careful to not over use it. She will follow up with Dr. Beard about her pain, and see if he can make other adjustments before she sees him in January or see one of his co-workers. All questions answered and patient sounded a little better at the end of the phone call.    FARIBA Maloney, NP-C  [facility]     Telemetry Bed?: Yes   Admitting Physician: LISA GREEN [[id number]]   Is this a telephone or verbal order?: This is a telephone order from the admitting physician

## 2022-03-11 ENCOUNTER — OFFICE VISIT (OUTPATIENT)
Dept: ANESTHESIOLOGY | Facility: CLINIC | Age: 67
End: 2022-03-11
Payer: COMMERCIAL

## 2022-03-11 VITALS — SYSTOLIC BLOOD PRESSURE: 140 MMHG | DIASTOLIC BLOOD PRESSURE: 72 MMHG | HEART RATE: 56 BPM | OXYGEN SATURATION: 97 %

## 2022-03-11 DIAGNOSIS — Z79.891 OPIOID CONTRACT EXISTS: Primary | ICD-10-CM

## 2022-03-11 LAB
CANNABINOIDS UR QL SCN: ABNORMAL
CREAT UR-MCNC: 38 MG/DL

## 2022-03-11 PROCEDURE — 99214 OFFICE O/P EST MOD 30 MIN: CPT | Performed by: ANESTHESIOLOGY

## 2022-03-11 PROCEDURE — 99000 SPECIMEN HANDLING OFFICE-LAB: CPT | Performed by: PATHOLOGY

## 2022-03-11 PROCEDURE — 80307 DRUG TEST PRSMV CHEM ANLYZR: CPT | Mod: 90 | Performed by: PATHOLOGY

## 2022-03-11 ASSESSMENT — PAIN SCALES - GENERAL: PAINLEVEL: SEVERE PAIN (7)

## 2022-03-11 NOTE — LETTER
Opioid / Opioid Plus Controlled Substance Agreement    This is an agreement between you and your provider about the safe and appropriate use of controlled substance/opioids prescribed by your care team. Controlled substances are medicines that can cause physical and mental dependence (abuse).    There are strict laws about having and using these medicines. We here at [facility] are committing to working with you in your efforts to get better. To support you in this work, we ll help you schedule regular office appointments for medicine refills. If we must cancel or change your appointment for any reason, we ll make sure you have enough medicine to last until your next appointment.     As a Provider, I will:    Listen carefully to your concerns and treat you with respect.     Recommend a treatment plan that I believe is in your best interest. This plan may involve therapies other than opioid pain medication.     Talk with you often about the possible benefits, and the risk of harm of any medicine that we prescribe for you.     Provide a plan on how to taper (discontinue or go off) using this medicine if the decision is made to stop its use.    As a Patient, I understand that opioid(s):     Are a controlled substance prescribed by my care team to help me function or work and manage my condition(s).     Are strong medicines and can cause serious side effects such as:    Drowsiness, which can seriously affect my driving ability    A lower breathing rate, enough to cause death    Harm to my thinking ability     Depression     Abuse of and addiction to this medicine    Need to be taken exactly as prescribed. Combining opioids with certain medicines or chemicals (such as illegal drugs, sedatives, sleeping pills, and benzodiazepines) can be dangerous or even fatal. If I stop opioids suddenly, I may have severe withdrawal symptoms.    Do not work for all types of pain nor for all patients. If they re not helpful, I may  be asked to stop them.      The risks, benefits and side effects of these medicine(s) were explained to me. I agree that:  1. I will take part in other treatments as advised by my care team. This may be psychiatry or counseling, physical therapy, behavioral therapy, group treatment or a referral to a specialist.     2. I will keep all my appointments. I understand that this is part of the monitoring of opioids. My care team may require an office visit for EVERY opioid/controlled substance refill. If I miss appointments or don t follow instructions, my care team may stop my medicine.    3. I will take my medicines as prescribed. I will not change the dose or schedule unless my care team tells me to. There will be no refills if I run out early.     4. I may be asked to come to the clinic and complete a urine drug test or complete a pill count at any time. If I don t give a urine sample or participate in a pill count, the care team may stop my medicine.    5. I will only receive prescriptions from this clinic for chronic pain. If I am treated by another provider for acute pain issues, I will tell them that I am taking opioid pain medication for chronic pain and that I have a treatment agreement with this provider. I will inform my [facility] care team within one business day if I am given a prescription for any pain medication by another healthcare provider. My [facility] care team can contact other providers and pharmacists about my use of any medicines.    6. It is up to me to make sure that I don t run out of my medicines on weekends or holidays. If my care team is willing to refill my opioid prescription without a visit, I must request refills only during office hours. Refills may take up to 3 business days to process. I will use one pharmacy to fill all my opioid and other controlled substance prescriptions. I will notify the clinic about any changes to my insurance or medication  availability.    7. I am responsible for my prescriptions. If the medicine/prescription is lost, stolen or destroyed, it will not be replaced. I also agree not to share controlled substance medicines with anyone.    8. I am aware I should not use any illegal or recreational drugs. I agree not to drink alcohol unless my care team says I can.       9. If I enroll in the [place] Medical Cannabis program, I will tell my care team prior to my next refill.     10. I will tell my care team right away if I become pregnant, have a new medical problem treated outside of my regular clinic, or have a change in my medications.    11. I understand that this medicine can affect my thinking, judgment and reaction time. Alcohol and drugs affect the brain and body, which can affect the safety of my driving. Being under the influence of alcohol or drugs can affect my decision-making, behaviors, personal safety, and the safety of others. Driving while impaired (DWI) can occur if a person is driving, operating, or in physical control of a car, motorcycle, boat, snowmobile, ATV, motorbike, off-road vehicle, or any other motor vehicle ([place] Statute 169A.20). I understand the risk if I choose to drive or operate any vehicle or machinery.    I understand that if I do not follow any of the conditions above, my prescriptions or treatment may be stopped or changed.          Opioids  What You Need to Know    What are opioids?   Opioids are pain medicines that must be prescribed by a doctor. They are also known as narcotics.     Examples are:   1. morphine (MS Contin, Silvana)  2. oxycodone (Oxycontin)  3. oxycodone and acetaminophen (Percocet)  4. hydrocodone and acetaminophen (Vicodin, Norco)   5. fentanyl patch (Duragesic)   6. hydromorphone (Dilaudid)   7. methadone  8. codeine (Tylenol #3)     What do opioids do well?   Opioids are best for severe short-term pain such as after a surgery or injury. They may work well for cancer pain. They may  help some people with long-lasting (chronic) pain.     What do opioids NOT do well?   Opioids never get rid of pain entirely, and they don t work well for most patients with chronic pain. Opioids don t reduce swelling, one of the causes of pain.                                    Other ways to manage chronic pain and improve function include:       Treat the health problem that may be causing pain    Anti-inflammation medicines, which reduce swelling and tenderness, such as ibuprofen (Advil, Motrin) or naproxen (Aleve)    Acetaminophen (Tylenol)    Antidepressants and anti-seizure medicines, especially for nerve pain    Topical treatments such as patches or creams    Injections or nerve blocks    Chiropractic or osteopathic treatment    Acupuncture, massage, deep breathing, meditation, visual imagery, aromatherapy    Use heat or ice at the pain site    Physical therapy     Exercise    Stop smoking    Take part in therapy       Risks and side effects     Talk to your doctor before you start or decide to keep taking opioids. Possible side effects include:      Lowering your breathing rate enough to cause death    Overdose, including death, especially if taking higher than prescribed doses    Worse depression symptoms; less pleasure in things you usually enjoy    Feeling tired or sluggish    Slower thoughts or cloudy thinking    Being more sensitive to pain over time; pain is harder to control    Trouble sleeping or restless sleep    Changes in hormone levels (for example, less testosterone)    Changes in sex drive or ability to have sex    Constipation    Unsafe driving    Itching and sweating    Dizziness    Nausea, throwing up and dry mouth    What else should I know about opioids?    Opioids may lead to dependence, tolerance, or addiction.      Dependence means that if you stop or reduce the medicine too quickly, you will have withdrawal symptoms. These include loose poop (diarrhea), jitters, flu-like symptoms,  nervousness and tremors. Dependence is not the same as addiction.                       Tolerance means needing higher doses over time to get the same effect. This may increase the chance of serious side effects.      Addiction is when people improperly use a substance that harms their body, their mind or their relations with others. Use of opiates can cause a relapse of addiction if you have a history of drug or alcohol abuse.      People who have used opioids for a long time may have a lower quality of life, worse depression, higher levels of pain and more visits to doctors.    You can overdose on opioids. Take these steps to lower your risk of overdose:    1. Recognize the signs:  Signs of overdose include decrease or loss of consciousness (blackout), slowed breathing, trouble waking up and blue lips. If someone is worried about overdose, they should call 911.    2. Talk to your doctor about Narcan (naloxone).   If you are at risk for overdose, you may be given a prescription for Narcan. This medicine very quickly reverses the effects of opioids.   If you overdose, a friend or family member can give you Narcan while waiting for the ambulance. They need to know the signs of overdose and how to give Narcan.     3. Don't use alcohol or street drugs.   Taking them with opioids can cause death.    4. Do not take any of these medicines unless your doctor says it s OK. Taking these with opioids can cause death:    Benzodiazepines, such as lorazepam (Ativan), alprazolam (Xanax) or diazepam (Valium)    Muscle relaxers, such as cyclobenzaprine (Flexeril)    Sleeping pills like zolpidem (Ambien)     Other opioids      How to keep you and other people safe while taking opioids:    1. Never share your opioids with others.  Opioid medicines are regulated by the Drug Enforcement Agency ([place]). Selling or sharing medications is a criminal act.    2. Be sure to store opioids in a secure place, locked up if possible. Young children  can easily swallow them and overdose.    3. When you are traveling with your medicines, keep them in the original bottles. If you use a pill box, be sure you also carry a copy of your medicine list from your clinic or pharmacy.    4. Safe disposal of opioids    Most pharmacies have places to get rid of medicine, called disposal kiosks. Medicine disposal options are also available in every [facility]. Search your county and  medication disposal  to find more options. You can find more details at:  [email or web address]     I agree that my provider, clinic care team, and pharmacy may work with any city, state or federal law enforcement agency that investigates the misuse, sale, or other diversion of my controlled medicine. I will allow my provider to discuss my care with, or share a copy of, this agreement with any other treating provider, pharmacy or emergency room where I receive care.    I have read this agreement and have asked questions about anything I did not understand.    _______________________________________________________  Patient Signature - Hanna Campo _____________________                   Date     _______________________________________________________  Provider Signature - Lisa Morales MD   _____________________                   Date     _______________________________________________________  Witness Signature (required if provider not present while patient signing)   _____________________                   Date

## 2022-03-11 NOTE — NURSING NOTE
RN reviewed AVS with patient. Patient to contact clinic if any questions/concerns. Patient verbalized understanding.    Dang Barrett RN

## 2022-03-11 NOTE — NURSING NOTE
Patient presents with:  RECHECK: UMP RETURN,RM 8, patient reports, 7/10 pain in thier calfs and feet      Severe Pain (7)     Pain Medications     Opioid Combinations Refills Start End     HYDROcodone-acetaminophen (NORCO) 5-325 MG tablet    0 3/25/2022     Sig - Route: Take 1 tablet by mouth every 8 hours as needed for severe pain - Oral    Class: E-Prescribe    Earliest Fill Date: 3/23/2022    Prior authorization: Closed - Prior Authorization not required for patient/medication     HYDROcodone-acetaminophen (NORCO) 5-325 MG tablet    0 3/2/2022     Sig - Route: Take 1 tablet by mouth every 8 hours as needed for severe pain Max of 3 tabs per day - Oral    Class: E-Prescribe    Earliest Fill Date: 3/2/2022    Prior authorization: Closed - Prior Authorization not required for patient/medication    Opioid Partial Agonists Refills Start End     buprenorphine (BUTRANS) 10 MCG/HR WK patch    0 2/21/2022     Sig - Route: Place 1 patch onto the skin every 7 days - Transdermal    Class: E-Prescribe    Prior authorization: Closed - Prior Authorization duplicate/in process     buprenorphine (BUTRANS) 7.5 MCG/HR WK patch    0 12/28/2021     Sig - Route: Place 1 patch onto the skin every 7 days - Transdermal    Class: E-Prescribe    Prior authorization: Closed - Prior Authorization not required for patient/medication          What medications are you using for pain?   Norco, Butrans, medical cannabis      (Return Patients only) What refills are you needing today?  Chau Roberts, EMT

## 2022-03-11 NOTE — PATIENT INSTRUCTIONS
Medications:    Refills will be provided when due.      *For refills of opioid medications - You MUST call (Or MyChart) the clinic DIRECTLY and at least 7 days before you run out of your medication.        Treatment planning:    Discussed possible option of Spinal Cord Stimulation for idiopathic peripheral neuropathy. Pamphlet provided for education.    UDS and CSA completed today.      Recommended Follow up:      Follow up in 2-3 months, video is okay.          Please call [phone], option #1 to schedule your clinic appointment if you don't already have an appointment scheduled.        To speak with a nurse, schedule/reschedule/cancel a clinic appointment, or request a medication refill call: [phone], option #1.    You can also reach us by [employer]: [email or web address]

## 2022-03-11 NOTE — LETTER
Date:March 17, 2022      Patient was self referred, no letter generated. Do not send.        [facility] Health Information

## 2022-03-11 NOTE — LETTER
3/11/2022       RE: Hanna Campo  [address]  [place] [address]     Dear Colleague,    Thank you for referring your patient, Hanna Campo, to the [facility] [place] at [facility]. Please see a copy of my visit note below.    [facility]    Date of visit: 3/17/2022    Chief complaint:   Chief Complaint   Patient presents with     RECHECK     UMP RETURN,RM 8, patient reports, 7/10 pain in thier calfs and feet       Interval history:  Hanna Campo was last seen by me on 1/12/2022.      Recommendations/plan at the last visit included:    Plan:  1. Physical Therapy:  Continue daily HEPs  2. Clinical Health Psychologist to address issues of relaxation, behavioral change, coping style, and other factors important to improvement.  Not at this time  3. Diagnostic Studies:  none  4. Medication Management:  Refill provided to start in 2 weeks at prior dose of butrans patch (10 mcg) as well as of hydorocodone 10 mg  TID PRN  5. Follow up: 2 months in person for updated CSA/UDS      Since her last visit, Hanna Campo reports:  Doing well but does notice worsening of neuropathy (idiopathic bilateral peripheral neuropathy in both feet)  Continues to use butrans, gabapentin, and medical marijuana (most helpful for sleep)    Pain scores:  Pain intensity on average is 7 on a scale of 0-10.     Current pain treatments:   Butrans 10  Hydrocodone  TID  Medical Cannabis        Medications:  Current Outpatient Medications   Medication Sig Dispense Refill     buprenorphine (BUTRANS) 10 MCG/HR WK patch Place 1 patch onto the skin every 7 days 4 patch 0     buprenorphine (BUTRANS) 7.5 MCG/HR WK patch Place 1 patch onto the skin every 7 days 4 patch 0     Cetirizine HCl (ZYRTEC ALLERGY PO) Take 5 mg by mouth daily       chlorthalidone (HYGROTON) 50 MG tablet Take 1 tablet (50 mg) by mouth daily  90 tablet 1     [START ON 3/25/2022] HYDROcodone-acetaminophen (NORCO) 5-325 MG tablet Take 1 tablet by mouth every 8 hours as needed for severe pain 21 tablet 0     HYDROcodone-acetaminophen (NORCO) 5-325 MG tablet Take 1 tablet by mouth every 8 hours as needed for severe pain Max of 3 tabs per day 69 tablet 0     levothyroxine (SYNTHROID/LEVOTHROID) 100 MCG tablet Take 1 tablet (100 mcg) by mouth daily 90 tablet 1     meclizine (ANTIVERT) 25 MG tablet Take 2 tablets (50 mg) by mouth 3 times daily as needed for dizziness 90 tablet 1     medical cannabis (Patient's own supply) (The purpose of this order is to document that the patient reports taking medical cannabis.  This is not a prescription, and is not used to certify that the patient has a qualifying medical condition.) 0 Information only 0     metoprolol succinate ER (TOPROL-XL) 50 MG 24 hr tablet Take 1 tablet (50 mg) by mouth daily 90 tablet 1     vitamin B complex with vitamin C (VITAMIN  B COMPLEX) tablet Take 1 tablet by mouth daily  0     VITAMIN D, CHOLECALCIFEROL, PO Take 5,000 Units by mouth daily         Medical History: any changes in medical history since they were last seen? No    Review of Systems:  The 14 system ROS was reviewed from the intake questionnaire, and is positive for: bilateral foot pain  Any bowel or bladder problems: denies  Mood: stable    Physical Exam:  Blood pressure (!) 140/72, pulse 56, SpO2 97 %, not currently breastfeeding.  General: AAOx3, NAD  Gait: Normal  MSK exam: 5/5 strength bl lower extremities, non-antalgic gait    Assessment:   Idiopathic peripheral neuropathy of bilateral lower extremities    Hanna Campo is a 67 year old female who is seen at the pain clinic for follow up in regards to management of her peripheral neuropathy. We will not change her medications at this time and refills will be provided. I did discuss with her the option of spinal cord stimulation trial/implant and she will think about this  option.     Plan:  1. Physical Therapy:  Continue daily HEPs  2. Clinical Health Psychologist to address issues of relaxation, behavioral change, coping style, and other factors important to improvement.  Not at this time  3. Diagnostic Studies:  none  4. Medication Management:  Refills provided  5. Further procedures recommended: none  6. Recommendations to PCP: none  7. Follow up: 2 to 4 months, virtual okay for next follow up    Lisa Morales MD    Pain Medicine  Department of Anesthesiology  [facility]                Again, thank you for allowing me to participate in the care of your patient.      Sincerely,    Lisa Morales MD

## 2022-03-15 LAB
BUPRENORPHINE UR CFM-MCNC: 15 NG/ML
BUPRENORPHINE/CREAT UR: 39 NG/MG {CREAT}
DHC UR CFM-MCNC: 341 NG/ML
DHC/CREAT UR: 897 NG/MG {CREAT}
HYDROCODONE UR CFM-MCNC: 189 NG/ML
HYDROCODONE/CREAT UR: 497 NG/MG {CREAT}
HYDROMORPHONE UR CFM-MCNC: 90 NG/ML
HYDROMORPHONE/CREAT UR: 237 NG/MG {CREAT}
NORBUPRENORPHINE UR CFM-MCNC: 10 NG/ML
NORBUPRENORPHINE/CREAT UR: 26 NG/MG {CREAT}

## 2022-03-17 NOTE — PROGRESS NOTES
[facility]    Date of visit: 3/17/2022    Chief complaint:   Chief Complaint   Patient presents with     RECHECK     UMP RETURN,RM 8, patient reports, 7/10 pain in thier calfs and feet       Interval history:  Hanna Campo was last seen by me on 1/12/2022.      Recommendations/plan at the last visit included:    Plan:  1. Physical Therapy:  Continue daily HEPs  2. Clinical Health Psychologist to address issues of relaxation, behavioral change, coping style, and other factors important to improvement.  Not at this time  3. Diagnostic Studies:  none  4. Medication Management:  Refill provided to start in 2 weeks at prior dose of butrans patch (10 mcg) as well as of hydorocodone 10 mg  TID PRN  5. Follow up: 2 months in person for updated CSA/UDS      Since her last visit, Hanna Campo reports:  Doing well but does notice worsening of neuropathy (idiopathic bilateral peripheral neuropathy in both feet)  Continues to use butrans, gabapentin, and medical marijuana (most helpful for sleep)    Pain scores:  Pain intensity on average is 7 on a scale of 0-10.     Current pain treatments:   Butrans 10  Hydrocodone  TID  Medical Cannabis        Medications:  Current Outpatient Medications   Medication Sig Dispense Refill     buprenorphine (BUTRANS) 10 MCG/HR WK patch Place 1 patch onto the skin every 7 days 4 patch 0     buprenorphine (BUTRANS) 7.5 MCG/HR WK patch Place 1 patch onto the skin every 7 days 4 patch 0     Cetirizine HCl (ZYRTEC ALLERGY PO) Take 5 mg by mouth daily       chlorthalidone (HYGROTON) 50 MG tablet Take 1 tablet (50 mg) by mouth daily 90 tablet 1     [START ON 3/25/2022] HYDROcodone-acetaminophen (NORCO) 5-325 MG tablet Take 1 tablet by mouth every 8 hours as needed for severe pain 21 tablet 0     HYDROcodone-acetaminophen (NORCO) 5-325 MG tablet Take 1 tablet by mouth every 8 hours as needed for severe pain Max of 3 tabs per day 69 tablet 0     levothyroxine  (SYNTHROID/LEVOTHROID) 100 MCG tablet Take 1 tablet (100 mcg) by mouth daily 90 tablet 1     meclizine (ANTIVERT) 25 MG tablet Take 2 tablets (50 mg) by mouth 3 times daily as needed for dizziness 90 tablet 1     medical cannabis (Patient's own supply) (The purpose of this order is to document that the patient reports taking medical cannabis.  This is not a prescription, and is not used to certify that the patient has a qualifying medical condition.) 0 Information only 0     metoprolol succinate ER (TOPROL-XL) 50 MG 24 hr tablet Take 1 tablet (50 mg) by mouth daily 90 tablet 1     vitamin B complex with vitamin C (VITAMIN  B COMPLEX) tablet Take 1 tablet by mouth daily  0     VITAMIN D, CHOLECALCIFEROL, PO Take 5,000 Units by mouth daily         Medical History: any changes in medical history since they were last seen? No    Review of Systems:  The 14 system ROS was reviewed from the intake questionnaire, and is positive for: bilateral foot pain  Any bowel or bladder problems: denies  Mood: stable    Physical Exam:  Blood pressure (!) 140/72, pulse 56, SpO2 97 %, not currently breastfeeding.  General: AAOx3, NAD  Gait: Normal  MSK exam: 5/5 strength bl lower extremities, non-antalgic gait    Assessment:   Idiopathic peripheral neuropathy of bilateral lower extremities    Hanna Campo is a 67 year old female who is seen at the pain clinic for follow up in regards to management of her peripheral neuropathy. We will not change her medications at this time and refills will be provided. I did discuss with her the option of spinal cord stimulation trial/implant and she will think about this option.     Plan:  1. Physical Therapy:  Continue daily HEPs  2. Clinical Health Psychologist to address issues of relaxation, behavioral change, coping style, and other factors important to improvement.  Not at this time  3. Diagnostic Studies:  none  4. Medication Management:  Refills provided  5. Further procedures recommended:  none  6. Recommendations to PCP: none  7. Follow up: 2 to 4 months, virtual okay for next follow up    Lisa Morales MD    Pain Medicine  Department of Anesthesiology  [facility]

## 2022-05-13 DIAGNOSIS — E03.9 HYPOTHYROIDISM, UNSPECIFIED TYPE: ICD-10-CM

## 2022-05-13 DIAGNOSIS — I10 BENIGN ESSENTIAL HYPERTENSION: ICD-10-CM

## 2022-05-13 RX ORDER — CHLORTHALIDONE 50 MG/1
50 TABLET ORAL DAILY
Qty: 30 TABLET | Refills: 0 | Status: SHIPPED | OUTPATIENT
Start: 2022-05-13 | End: 2022-06-21

## 2022-05-13 RX ORDER — LEVOTHYROXINE SODIUM 100 UG/1
100 TABLET ORAL DAILY
Qty: 30 TABLET | Refills: 0 | Status: SHIPPED | OUTPATIENT
Start: 2022-05-13 | End: 2022-06-04

## 2022-05-13 RX ORDER — METOPROLOL SUCCINATE 50 MG/1
50 TABLET, EXTENDED RELEASE ORAL DAILY
Qty: 30 TABLET | Refills: 0 | Status: SHIPPED | OUTPATIENT
Start: 2022-05-13 | End: 2022-06-21

## 2022-05-13 NOTE — TELEPHONE ENCOUNTER
30 day refill sent, due for virtual visit for further refills.  FARIBA Maloney, NP-C  [facility]

## 2022-06-04 ENCOUNTER — MYC REFILL (OUTPATIENT)
Dept: FAMILY MEDICINE | Facility: CLINIC | Age: 67
End: 2022-06-04
Payer: COMMERCIAL

## 2022-06-04 DIAGNOSIS — I10 BENIGN ESSENTIAL HYPERTENSION: Primary | ICD-10-CM

## 2022-06-04 DIAGNOSIS — E03.9 HYPOTHYROIDISM, UNSPECIFIED TYPE: ICD-10-CM

## 2022-06-04 DIAGNOSIS — E78.2 MIXED HYPERLIPIDEMIA: ICD-10-CM

## 2022-06-07 RX ORDER — LEVOTHYROXINE SODIUM 100 UG/1
100 TABLET ORAL DAILY
Qty: 30 TABLET | Refills: 0 | Status: SHIPPED | OUTPATIENT
Start: 2022-06-07 | End: 2022-06-21 | Stop reason: DRUGHIGH

## 2022-06-07 NOTE — TELEPHONE ENCOUNTER
Routing refill request to provider for review/approval because:  Labs not current:  TSH    Pt needs visit for further refills.     Binta Dickerson RN, BSN  [facility]

## 2022-06-16 ENCOUNTER — LAB (OUTPATIENT)
Dept: LAB | Facility: CLINIC | Age: 67
End: 2022-06-16
Payer: COMMERCIAL

## 2022-06-16 DIAGNOSIS — E03.9 HYPOTHYROIDISM, UNSPECIFIED TYPE: ICD-10-CM

## 2022-06-16 DIAGNOSIS — E03.9 HYPOTHYROIDISM, UNSPECIFIED TYPE: Primary | ICD-10-CM

## 2022-06-16 DIAGNOSIS — E78.2 MIXED HYPERLIPIDEMIA: ICD-10-CM

## 2022-06-16 DIAGNOSIS — I10 BENIGN ESSENTIAL HYPERTENSION: ICD-10-CM

## 2022-06-16 LAB
ALBUMIN SERPL-MCNC: 3.7 G/DL (ref 3.4–5)
ALP SERPL-CCNC: 77 U/L (ref 40–150)
ALT SERPL W P-5'-P-CCNC: 20 U/L (ref 0–50)
ANION GAP SERPL CALCULATED.3IONS-SCNC: 6 MMOL/L (ref 3–14)
AST SERPL W P-5'-P-CCNC: 16 U/L (ref 0–45)
BILIRUB SERPL-MCNC: 1.1 MG/DL (ref 0.2–1.3)
BUN SERPL-MCNC: 7 MG/DL (ref 7–30)
CALCIUM SERPL-MCNC: 8.9 MG/DL (ref 8.5–10.1)
CHLORIDE BLD-SCNC: 97 MMOL/L (ref 94–109)
CHOLEST SERPL-MCNC: 183 MG/DL
CO2 SERPL-SCNC: 31 MMOL/L (ref 20–32)
CREAT SERPL-MCNC: 0.51 MG/DL (ref 0.52–1.04)
FASTING STATUS PATIENT QL REPORTED: YES
GFR SERPL CREATININE-BSD FRML MDRD: >90 ML/MIN/1.73M2
GLUCOSE BLD-MCNC: 94 MG/DL (ref 70–99)
HDLC SERPL-MCNC: 49 MG/DL
LDLC SERPL CALC-MCNC: 115 MG/DL
NONHDLC SERPL-MCNC: 134 MG/DL
POTASSIUM BLD-SCNC: 3.6 MMOL/L (ref 3.4–5.3)
PROT SERPL-MCNC: 7.1 G/DL (ref 6.8–8.8)
SODIUM SERPL-SCNC: 134 MMOL/L (ref 133–144)
T4 FREE SERPL-MCNC: 1.48 NG/DL (ref 0.76–1.46)
TRIGL SERPL-MCNC: 96 MG/DL
TSH SERPL DL<=0.005 MIU/L-ACNC: 0.34 MU/L (ref 0.4–4)

## 2022-06-16 PROCEDURE — 36415 COLL VENOUS BLD VENIPUNCTURE: CPT

## 2022-06-16 PROCEDURE — 80061 LIPID PANEL: CPT

## 2022-06-16 PROCEDURE — 80053 COMPREHEN METABOLIC PANEL: CPT

## 2022-06-16 PROCEDURE — 84443 ASSAY THYROID STIM HORMONE: CPT

## 2022-06-16 PROCEDURE — 84439 ASSAY OF FREE THYROXINE: CPT

## 2022-06-16 RX ORDER — LEVOTHYROXINE SODIUM 75 UG/1
75 TABLET ORAL DAILY
Qty: 90 TABLET | Refills: 0 | Status: SHIPPED | OUTPATIENT
Start: 2022-06-16 | End: 2022-06-21

## 2022-06-17 NOTE — RESULT ENCOUNTER NOTE
Eliseo Ferreira,   Your TSH is too low, decrease your levothyroxine to 75 mcg daily. The rest of your labs are looking stable. We can talk more next week about them. Please let me know if you have questions.  Thank you,  FARIBA Maloney, NP-C  [facility]

## 2022-06-21 ENCOUNTER — VIRTUAL VISIT (OUTPATIENT)
Dept: FAMILY MEDICINE | Facility: CLINIC | Age: 67
End: 2022-06-21
Payer: COMMERCIAL

## 2022-06-21 DIAGNOSIS — Z12.31 VISIT FOR SCREENING MAMMOGRAM: ICD-10-CM

## 2022-06-21 DIAGNOSIS — I10 BENIGN ESSENTIAL HYPERTENSION: ICD-10-CM

## 2022-06-21 DIAGNOSIS — Z12.11 SCREEN FOR COLON CANCER: ICD-10-CM

## 2022-06-21 DIAGNOSIS — E28.39 ESTROGEN DEFICIENCY: Primary | ICD-10-CM

## 2022-06-21 DIAGNOSIS — E66.01 MORBID OBESITY (H): ICD-10-CM

## 2022-06-21 DIAGNOSIS — E03.9 HYPOTHYROIDISM, UNSPECIFIED TYPE: ICD-10-CM

## 2022-06-21 PROCEDURE — 99213 OFFICE O/P EST LOW 20 MIN: CPT | Mod: 95 | Performed by: NURSE PRACTITIONER

## 2022-06-21 RX ORDER — CHLORTHALIDONE 50 MG/1
50 TABLET ORAL DAILY
Qty: 90 TABLET | Refills: 1 | Status: SHIPPED | OUTPATIENT
Start: 2022-06-21 | End: 2022-08-05

## 2022-06-21 RX ORDER — LEVOTHYROXINE SODIUM 75 UG/1
75 TABLET ORAL DAILY
Qty: 90 TABLET | Refills: 0 | COMMUNITY
Start: 2022-06-21 | End: 2022-06-23

## 2022-06-21 RX ORDER — METOPROLOL SUCCINATE 50 MG/1
50 TABLET, EXTENDED RELEASE ORAL DAILY
Qty: 90 TABLET | Refills: 1 | Status: SHIPPED | OUTPATIENT
Start: 2022-06-21 | End: 2022-08-11

## 2022-06-21 NOTE — PROGRESS NOTES
Hanna is a 67 year old who is being evaluated via a billable video visit.      How would you like to obtain your AVS? MyChart  If the video visit is dropped, the invitation should be resent by: Text to cell phone: [phone]  Will anyone else be joining your video visit? No          Assessment & Plan     Visit for screening mammogram  Due for mammogram  - MA SCREENING DIGITAL BILAT - Future  (s+30); Future    Screen for colon cancer  Due for screening  - Adult Gastro Ref - Procedure Only; Future    Hypothyroidism, unspecified type  Decrease from 100 mcg to 75 mcg daily. Repeat TSH in 8 weeks, if stable then repeat again end of the year  - levothyroxine (SYNTHROID/LEVOTHROID) 75 MCG tablet; Take 1 tablet (75 mcg) by mouth daily  - TSH with free T4 reflex; Future    Benign essential hypertension  Stable, refilld  - chlorthalidone (HYGROTON) 50 MG tablet; Take 1 tablet (50 mg) by mouth daily  - metoprolol succinate ER (TOPROL XL) 50 MG 24 hr tablet; Take 1 tablet (50 mg) by mouth daily    Estrogen deficiency  Due for DEXA  - DEXA HIP/PELVIS/SPINE - Future; Future    Morbid obesity (H)  Has had significant weight loss! She is eating better and getting as much movement as she is able        Return in about 6 months (around 12/21/2022) for Phone or Video visit okay, Medication check.    FARIBA Maloney, NP-C  [facility]   Hanna is a 67 year old accompanied by her self., presenting for the following health issues:  Thyroid Problem (Renew meds)      History of Present Illness       Hypothyroidism:     Since last visit, patient describes the following symptoms::  None    She eats 2-3 servings of fruits and vegetables daily.She consumes 0 sweetened beverage(s) daily.She exercises with enough effort to increase her heart rate 10 to 19 minutes per day.  She exercises with enough effort to increase her heart rate 3 or less days per week.   She is taking medications regularly.        Recent lab results and to renew medications  She is so tired, watches her 1 yr and 3 yr old grandkids.     Lost of movement  Lost weight, down to 183  Cholesterol is slightly improved    Reviewed labs also: overall things are much improved    Checking BP at home: has been good. Went to dentist few months ago, was 115/?  When she goes to see Dr. Morales it is stressful driving to downtown        Review of Systems   Constitutional, HEENT, cardiovascular, pulmonary, gi and gu systems are negative, except as otherwise noted.      Objective           Vitals:  No vitals were obtained today due to virtual visit.    Physical Exam   GENERAL: Healthy, alert and no distress  EYES: Eyes grossly normal to inspection.  No discharge or erythema, or obvious scleral/conjunctival abnormalities.  RESP: No audible wheeze, cough, or visible cyanosis.  No visible retractions or increased work of breathing.    SKIN: Visible skin clear. No significant rash, abnormal pigmentation or lesions.  NEURO: Cranial nerves grossly intact.  Mentation and speech appropriate for age.  PSYCH: Mentation appears normal, affect normal/bright, judgement and insight intact, normal speech and appearance well-groomed.    Lab on 06/16/2022   Component Date Value Ref Range Status     Sodium 06/16/2022 134  133 - 144 mmol/L Final     Potassium 06/16/2022 3.6  3.4 - 5.3 mmol/L Final     Chloride 06/16/2022 97  94 - 109 mmol/L Final     Carbon Dioxide (CO2) 06/16/2022 31  20 - 32 mmol/L Final     Anion Gap 06/16/2022 6  3 - 14 mmol/L Final     Urea Nitrogen 06/16/2022 7  7 - 30 mg/dL Final     Creatinine 06/16/2022 0.51 (A) 0.52 - 1.04 mg/dL Final     Calcium 06/16/2022 8.9  8.5 - 10.1 mg/dL Final     Glucose 06/16/2022 94  70 - 99 mg/dL Final     Alkaline Phosphatase 06/16/2022 77  40 - 150 U/L Final     AST 06/16/2022 16  0 - 45 U/L Final     ALT 06/16/2022 20  0 - 50 U/L Final     Protein Total 06/16/2022 7.1  6.8 - 8.8 g/dL Final     Albumin 06/16/2022 3.7   3.4 - 5.0 g/dL Final     Bilirubin Total 06/16/2022 1.1  0.2 - 1.3 mg/dL Final     GFR Estimate 06/16/2022 >90  >60 mL/min/1.73m2 Final    Effective December 21, 2021 eGFRcr in adults is calculated using the 2021 CKD-EPI creatinine equation which includes age and gender (Janet hunt al., [place], DOI: 10.1056/NWTQch9019481)     TSH 06/16/2022 0.34 (A) 0.40 - 4.00 mU/L Final     Cholesterol 06/16/2022 183  <200 mg/dL Final     Triglycerides 06/16/2022 96  <150 mg/dL Final     Direct Measure HDL 06/16/2022 49 (A) >=50 mg/dL Final     LDL Cholesterol Calculated 06/16/2022 115 (A) <=100 mg/dL Final     Non HDL Cholesterol 06/16/2022 134 (A) <130 mg/dL Final     Patient Fasting > 8hrs? 06/16/2022 Yes   Final     Free T4 06/16/2022 1.48 (A) 0.76 - 1.46 ng/dL Final             Video-Visit Details    Video Start Time: 4:55pm    Type of service:  Video Visit    Video End Time:5:12 PM    Originating Location (pt. Location): Home    Distant Location (provider location):  [facility]     Platform used for Video Visit: Arch[employer]izer    .  Chalo.

## 2022-06-22 ENCOUNTER — MYC MEDICAL ADVICE (OUTPATIENT)
Dept: FAMILY MEDICINE | Facility: CLINIC | Age: 67
End: 2022-06-22

## 2022-06-22 DIAGNOSIS — E03.9 HYPOTHYROIDISM, UNSPECIFIED TYPE: ICD-10-CM

## 2022-06-22 NOTE — TELEPHONE ENCOUNTER
Routing refill request to provider for review/approval because:  Medication is reported/historical  Richa Downs BSN, RN

## 2022-06-23 ENCOUNTER — MYC REFILL (OUTPATIENT)
Dept: ANESTHESIOLOGY | Facility: CLINIC | Age: 67
End: 2022-06-23

## 2022-06-23 ENCOUNTER — VIRTUAL VISIT (OUTPATIENT)
Dept: ANESTHESIOLOGY | Facility: CLINIC | Age: 67
End: 2022-06-23
Payer: COMMERCIAL

## 2022-06-23 DIAGNOSIS — M79.671 PAIN IN BOTH FEET: ICD-10-CM

## 2022-06-23 DIAGNOSIS — G60.9 IDIOPATHIC PERIPHERAL NEUROPATHY: ICD-10-CM

## 2022-06-23 DIAGNOSIS — M79.672 PAIN IN BOTH FEET: ICD-10-CM

## 2022-06-23 PROCEDURE — 99213 OFFICE O/P EST LOW 20 MIN: CPT | Mod: 95 | Performed by: ANESTHESIOLOGY

## 2022-06-23 RX ORDER — HYDROCODONE BITARTRATE AND ACETAMINOPHEN 5; 325 MG/1; MG/1
1 TABLET ORAL EVERY 8 HOURS PRN
Qty: 90 TABLET | Refills: 0 | Status: CANCELLED | OUTPATIENT
Start: 2022-06-23

## 2022-06-23 RX ORDER — LEVOTHYROXINE SODIUM 75 UG/1
75 TABLET ORAL DAILY
Qty: 90 TABLET | Refills: 0 | Status: SHIPPED | OUTPATIENT
Start: 2022-06-23 | End: 2022-09-08

## 2022-06-23 RX ORDER — BUPRENORPHINE 10 UG/H
1 PATCH TRANSDERMAL
Qty: 4 PATCH | Refills: 3 | Status: SHIPPED | OUTPATIENT
Start: 2022-07-16 | End: 2022-10-31

## 2022-06-23 RX ORDER — HYDROCODONE BITARTRATE AND ACETAMINOPHEN 5; 325 MG/1; MG/1
1 TABLET ORAL EVERY 8 HOURS PRN
Qty: 90 TABLET | Refills: 0 | Status: SHIPPED | OUTPATIENT
Start: 2022-06-23 | End: 2022-08-24

## 2022-06-23 ASSESSMENT — PAIN SCALES - GENERAL: PAINLEVEL: SEVERE PAIN (6)

## 2022-06-23 NOTE — LETTER
Date:June 30, 2022      Provider requested that no letter be sent. Do not send.       [facility]

## 2022-06-23 NOTE — PROGRESS NOTES
TELEPHONE VISIT      [facility]    Date of visit: 6/23/2022    Chief complaint:   Chief Complaint   Patient presents with     Follow Up     Follow-up Video Both Feet Pain Level 6/10       Interval history:  Hanna Campo was last seen by me on 3/11/2022.      Recommendations/plan at the last visit included:  1. Physical Therapy:  Continue daily HEPs  2. Clinical Health Psychologist to address issues of relaxation, behavioral change, coping style, and other factors important to improvement.  Not at this time  3. Diagnostic Studies:  none  4. Medication Management:  Refills provided  5. Further procedures recommended: none  6. Recommendations to PCP: none  7. Follow up: 2 to 4 months, virtual okay for next follow up    Since her last visit, Hanna Campo reports:  Continues to note worsening symptoms of her bilateral peripheral neuropathy of her feet  Discussed potential of spinal cord stimulation and she would like to know the cost and coverage based on her insurance as she is very interested but has a limited budget. She has tried multiple therapies and has reached the point where she is ready to consider SCS    Pain scores:  Pain intensity on average is 6 on a scale of 0-10.     Current pain treatments:   Butrans Patch  Hydrocodone    Side Effects: denies any problems    Medications:  Current Outpatient Medications   Medication Sig Dispense Refill     buprenorphine (BUTRANS) 10 MCG/HR WK patch Place 1 patch onto the skin every 7 days 4 patch 3     Cetirizine HCl (ZYRTEC ALLERGY PO) Take 5 mg by mouth daily       chlorthalidone (HYGROTON) 50 MG tablet Take 1 tablet (50 mg) by mouth daily 90 tablet 1     HYDROcodone-acetaminophen (NORCO) 5-325 MG tablet Take 1 tablet by mouth every 8 hours as needed for severe pain 90 tablet 0     levothyroxine (SYNTHROID/LEVOTHROID) 75 MCG tablet Take 1 tablet (75 mcg) by mouth daily 90 tablet 0     medical cannabis (Patient's own supply) (The purpose of  this order is to document that the patient reports taking medical cannabis.  This is not a prescription, and is not used to certify that the patient has a qualifying medical condition.) 0 Information only 0     metoprolol succinate ER (TOPROL XL) 50 MG 24 hr tablet Take 1 tablet (50 mg) by mouth daily 90 tablet 1     vitamin B complex with vitamin C (STRESS TAB) tablet Take 1 tablet by mouth daily  0     VITAMIN D, CHOLECALCIFEROL, PO Take 5,000 Units by mouth daily         Medical History: any changes in medical history since they were last seen? No    Review of Systems:  The 14 system ROS was reviewed from the intake questionnaire, and is positive for: bilateral foot pain  Any bowel or bladder problems: denies  Mood: stable    Physical Exam: TELEPHONE VISIT    Assessment/PLAN:   Idiopathic Peripheral Neuropathy    Hanna Campo is a 67 year old female who is seen at the pain clinic for follow up of medication management as well as discussion of further options for her peripheral neuropathy.  We again discussed potential of spinal cord stimulation trial and potential implantation. We will contact finance office to determine her coverage and will touch base in 4 weeks to discuss next steps.     Lisa Morales MD    Pain Medicine  Department of Anesthesiology  [facility]      Department of Anesthesiology  [facility]

## 2022-06-23 NOTE — NURSING NOTE
Patient presents with:  Follow Up: Follow-up Video Both Feet Pain Level 6/10      Severe Pain (6)     Pain Medications     Opioid Combinations Refills Start End     HYDROcodone-acetaminophen (NORCO) 5-325 MG tablet    0 5/27/2022     Sig - Route: Take 1 tablet by mouth every 8 hours as needed for severe pain - Oral    Class: E-Prescribe    Earliest Fill Date: 5/27/2022    No prior authorization was found for this prescription.    Found prior authorization for another prescription for the same medication: Closed - Prior Authorization not required for patient/medication    Opioid Partial Agonists Refills Start End     buprenorphine (BUTRANS) 10 MCG/HR WK patch    3 3/17/2022     Sig - Route: Place 1 patch onto the skin every 7 days - Transdermal    Class: E-Prescribe    No prior authorization was found for this prescription.    Found prior authorization for another prescription for the same medication: Closed - Prior Authorization duplicate/in process          What medications are you using for pain? Hydrocodone, Butrans patch, Cannibas    (New patients only) Have you been seen by another pain clinic/ provider? No    (Return Patients only) What refills are you needing today? No

## 2022-06-23 NOTE — PROGRESS NOTES
Hanna is a 67 year old who is being evaluated via a billable video visit.      How would you like to obtain your AVS? MyChart  If the video visit is dropped, the invitation should be resent by: Text to cell phone: [phone]  Will anyone else be joining your video visit? No

## 2022-06-23 NOTE — LETTER
6/23/2022       RE: Hanna Campo  [address]  [facility] [place] [address]     Dear Colleague,    Thank you for referring your patient, Hanan Campo, to the [facility] [place] at [facility]. Please see a copy of my visit note below.    TELEPHONE VISIT      [facility]    Date of visit: 6/23/2022    Chief complaint:   Chief Complaint   Patient presents with     Follow Up     Follow-up Video Both Feet Pain Level 6/10       Interval history:  Hanna Campo was last seen by me on 3/11/2022.      Recommendations/plan at the last visit included:  1. Physical Therapy:  Continue daily HEPs  2. Clinical Health Psychologist to address issues of relaxation, behavioral change, coping style, and other factors important to improvement.  Not at this time  3. Diagnostic Studies:  none  4. Medication Management:  Refills provided  5. Further procedures recommended: none  6. Recommendations to PCP: none  7. Follow up: 2 to 4 months, virtual okay for next follow up    Since her last visit, Hanna Campo reports:  Continues to note worsening symptoms of her bilateral peripheral neuropathy of her feet  Discussed potential of spinal cord stimulation and she would like to know the cost and coverage based on her insurance as she is very interested but has a limited budget. She has tried multiple therapies and has reached the point where she is ready to consider SCS    Pain scores:  Pain intensity on average is 6 on a scale of 0-10.     Current pain treatments:   Butrans Patch  Hydrocodone    Side Effects: denies any problems    Medications:  Current Outpatient Medications   Medication Sig Dispense Refill     buprenorphine (BUTRANS) 10 MCG/HR WK patch Place 1 patch onto the skin every 7 days 4 patch 3     Cetirizine HCl (ZYRTEC ALLERGY PO) Take 5 mg by mouth daily       chlorthalidone (HYGROTON) 50 MG tablet  Take 1 tablet (50 mg) by mouth daily 90 tablet 1     HYDROcodone-acetaminophen (NORCO) 5-325 MG tablet Take 1 tablet by mouth every 8 hours as needed for severe pain 90 tablet 0     levothyroxine (SYNTHROID/LEVOTHROID) 75 MCG tablet Take 1 tablet (75 mcg) by mouth daily 90 tablet 0     medical cannabis (Patient's own supply) (The purpose of this order is to document that the patient reports taking medical cannabis.  This is not a prescription, and is not used to certify that the patient has a qualifying medical condition.) 0 Information only 0     metoprolol succinate ER (TOPROL XL) 50 MG 24 hr tablet Take 1 tablet (50 mg) by mouth daily 90 tablet 1     vitamin B complex with vitamin C (STRESS TAB) tablet Take 1 tablet by mouth daily  0     VITAMIN D, CHOLECALCIFEROL, PO Take 5,000 Units by mouth daily         Medical History: any changes in medical history since they were last seen? No    Review of Systems:  The 14 system ROS was reviewed from the intake questionnaire, and is positive for: bilateral foot pain  Any bowel or bladder problems: denies  Mood: stable    Physical Exam: TELEPHONE VISIT    Assessment/PLAN:   Idiopathic Peripheral Neuropathy    Hanna Campo is a 67 year old female who is seen at the pain clinic for follow up of medication management as well as discussion of further options for her peripheral neuropathy.  We again discussed potential of spinal cord stimulation trial and potential implantation. We will contact finance office to determine her coverage and will touch base in 4 weeks to discuss next steps.     Lisa Morales MD    Pain Medicine  Department of Anesthesiology  [facility]      Department of Anesthesiology  [facility]              Hanna is a 67 year old who is being evaluated via a billable video visit.      How would you like to obtain your AVS? MyChart  If the video visit is dropped, the invitation should be resent by: Text to  cell phone: [phone]  Will anyone else be joining your video visit? No              Again, thank you for allowing me to participate in the care of your patient.      Sincerely,    Lisa Morales MD

## 2022-06-28 ENCOUNTER — TELEPHONE (OUTPATIENT)
Dept: FAMILY MEDICINE | Facility: CLINIC | Age: 67
End: 2022-06-28

## 2022-06-28 ENCOUNTER — MYC MEDICAL ADVICE (OUTPATIENT)
Dept: FAMILY MEDICINE | Facility: CLINIC | Age: 67
End: 2022-06-28

## 2022-06-28 ENCOUNTER — OFFICE VISIT (OUTPATIENT)
Dept: FAMILY MEDICINE | Facility: CLINIC | Age: 67
End: 2022-06-28
Payer: COMMERCIAL

## 2022-06-28 VITALS
OXYGEN SATURATION: 98 % | HEIGHT: 67 IN | SYSTOLIC BLOOD PRESSURE: 136 MMHG | DIASTOLIC BLOOD PRESSURE: 75 MMHG | BODY MASS INDEX: 29.7 KG/M2 | TEMPERATURE: 97.8 F | WEIGHT: 189.25 LBS | HEART RATE: 76 BPM

## 2022-06-28 DIAGNOSIS — M54.16 LUMBAR BACK PAIN WITH RADICULOPATHY AFFECTING LEFT LOWER EXTREMITY: ICD-10-CM

## 2022-06-28 DIAGNOSIS — M54.16 LUMBAR BACK PAIN WITH RADICULOPATHY AFFECTING LEFT LOWER EXTREMITY: Primary | ICD-10-CM

## 2022-06-28 PROCEDURE — 99213 OFFICE O/P EST LOW 20 MIN: CPT | Performed by: PHYSICIAN ASSISTANT

## 2022-06-28 RX ORDER — PREDNISONE 20 MG/1
40 TABLET ORAL DAILY
Qty: 10 TABLET | Refills: 0 | Status: SHIPPED | OUTPATIENT
Start: 2022-06-28 | End: 2022-07-05

## 2022-06-28 ASSESSMENT — ENCOUNTER SYMPTOMS
HEMATURIA: 0
NERVOUS/ANXIOUS: 0
SHORTNESS OF BREATH: 0
LIGHT-HEADEDNESS: 0
DYSURIA: 0
FEVER: 0
VOMITING: 0
DIAPHORESIS: 0
CHILLS: 0
BACK PAIN: 1
NAUSEA: 0
UNEXPECTED WEIGHT CHANGE: 0
ABDOMINAL PAIN: 0

## 2022-06-28 ASSESSMENT — PAIN SCALES - GENERAL: PAINLEVEL: WORST PAIN (10)

## 2022-06-28 NOTE — PATIENT INSTRUCTIONS
Your symptoms are likely due to a muscle spasm as well as a pinched nerve in your back that wraps around and goes down your leg.  To help with muscle spasm, you have been prescribed Zanaflex.  To help with inflammation and compression of the nerve, you have been prescribed a steroid.  You may also use Tylenol, gentle activity such as walking, and heat/ice.  Hopefully, your pain will improve over the next 7-10 days.  If your pain is not improving, please reach out and we will consider an MRI at that point.  If your pain is worsening or you develop any other severe symptoms such as loss of bowel/bladder function, fevers, severe abdominal pain, or any other severe symptoms, please go to the emergency department.

## 2022-06-28 NOTE — TELEPHONE ENCOUNTER
Called patient, she has been seen at the [facility] regarding this concern.    Maddi Parmar RN, [facility]

## 2022-06-28 NOTE — TELEPHONE ENCOUNTER
Reason for Call:  Other appointment    Detailed comments: Patient has really bad leg pain she is wanting to see her primary she is aware urgent care opens up at 10:00 she wants to see if her primary can work her in first please advise thank you    Phone Number Patient can be reached at: Home number on file [phone] (home)    Best Time: anyitme    Can we leave a detailed message on this number? YES    Call taken on 6/28/2022 at 7:55 AM by Jany Romero

## 2022-06-28 NOTE — PROGRESS NOTES
Assessment & Plan     Lumbar back pain with radiculopathy affecting left lower extremity  Patient is a 67-year-old female who presents to clinic due to acute low back pain starting 3 days prior without clear injury.  Pain radiates from low back to anterior left thigh.  Patient notes she was swimming prior to back pain starting.  Vital signs normal.  Physical exam significant for cautious gait and tenderness palpation of lumbar musculature.  Discussed treatment options.  We will proceed with short course of steroids given concerns for radiculopathy/nerve compression.  Patient also prescribed muscle relaxer for possible muscle spasm.  Discussed expected course of recovery and return precautions.  - predniSONE (DELTASONE) 20 MG tablet; Take 2 tablets (40 mg) by mouth daily for 5 days  - tiZANidine (ZANAFLEX) 4 MG tablet; Take 1 tablet (4 mg) by mouth 2 times daily as needed for muscle spasms    See Patient Instructions    Return in about 1 week (around 7/5/2022), or if symptoms worsen or fail to improve.    BALJINDER [facility] MICHAEL Ferreira is a 67 year old, presenting for the following health issues:  Leg Pain      HPI     Pain History:  When did you first notice your pain? - Acute Pain   Have you seen anyone else for your pain? No  Where in your body do you have pain? Back Pain  Onset/Duration: Pain began on Sunday morning with a dull ache. Pain has increased and moved into left thigh. Rare back pain in past.   Description:   Location of pain: low back left  Character of pain: sharp  Pain radiation: radiates into the left leg  New numbness or weakness in legs, not attributed to pain: no, PMHx peripherals neuropathy   Intensity: Currently 10/10  Progression of Symptoms: worsening  History:   Specific cause: none but she notes that she recently went swimming  Pain interferes with job: not applicable  History of back problems: Degenerative disc disease  Any previous MRI or  "X-rays: YES  Sees a specialist for back pain: No  Alleviating factors:   Improved by: none    Precipitating factors:  Worsened by: walking, sitting or laying down  Therapies tried and outcome: Ice pack, medical marijuana, buprenorphine patch, norco    Accompanying Signs & Symptoms:  Risk of Fracture: Age >64  Risk of Cauda Equina: None  Risk of Infection: None  Risk of Cancer: None  Risk of Ankylosing Spondylitis: Onset at age <35, male, AND morning back stiffness  no       Review of Systems   Constitutional: Negative for chills, diaphoresis, fever and unexpected weight change.   Respiratory: Negative for shortness of breath.    Cardiovascular: Negative for chest pain.   Gastrointestinal: Negative for abdominal pain, nausea and vomiting.   Genitourinary: Negative for dysuria and hematuria.   Musculoskeletal: Positive for back pain.   Skin: Negative for rash.   Neurological: Negative for light-headedness.   Psychiatric/Behavioral: The patient is not nervous/anxious.             Objective    /75   Pulse 76   Temp 97.8  F (36.6  C) (Tympanic)   Ht 1.702 m (5' 7\")   Wt 85.8 kg (189 lb 4 oz)   SpO2 98%   Breastfeeding No   BMI 29.64 kg/m    Body mass index is 29.64 kg/m .  Physical Exam  Vitals and nursing note reviewed.   Constitutional:       General: She is not in acute distress.     Appearance: Normal appearance.   HENT:      Head: Normocephalic and atraumatic.   Eyes:      Extraocular Movements: Extraocular movements intact.      Pupils: Pupils are equal, round, and reactive to light.   Cardiovascular:      Rate and Rhythm: Normal rate and regular rhythm.      Heart sounds: Normal heart sounds.   Pulmonary:      Effort: Pulmonary effort is normal.      Breath sounds: Normal breath sounds.   Musculoskeletal:         General: Normal range of motion.      Cervical back: Normal range of motion.      Comments: No tenderness to palpation of lumbar spine.  Tenderness to palpation of bilateral lumbar spinal " musculature.  Sensation tact and equal to bilateral lower extremities.  Cautious gait.   Skin:     General: Skin is warm and dry.   Neurological:      General: No focal deficit present.      Mental Status: She is alert.   Psychiatric:         Mood and Affect: Mood normal.         Behavior: Behavior normal.                          .  ..

## 2022-07-02 ENCOUNTER — E-VISIT (OUTPATIENT)
Dept: URGENT CARE | Facility: CLINIC | Age: 67
End: 2022-07-02
Payer: COMMERCIAL

## 2022-07-02 ENCOUNTER — NURSE TRIAGE (OUTPATIENT)
Dept: NURSING | Facility: CLINIC | Age: 67
End: 2022-07-02

## 2022-07-02 DIAGNOSIS — M54.16 LUMBAR RADICULOPATHY: Primary | ICD-10-CM

## 2022-07-02 PROCEDURE — 99421 OL DIG E/M SVC 5-10 MIN: CPT

## 2022-07-02 NOTE — TELEPHONE ENCOUNTER
"Started prednisone and a muscle relaxant on 6/28/22 for back pain.  Has not improved.   Is currently in [facility].  Called asking if taking additional prednisone is a consideration.    She has Norco and tizanidine.  She said her symptoms are not worse.  She doesn't want to go to an ER.  I suggested reaching out via [employer] and asking if additional prednisone is a consideration or if trying a different muscle relaxant is a consideration.    I read to Hanna the following from 6/28/22's provider's note.    \"If your pain is not improving, please reach out and we will consider an MRI at that point.  If your pain is worsening or you develop any other severe symptoms such as loss of bowel/bladder function, fevers, severe abdominal pain, or any other severe symptoms, please go to the emergency department.    Caller stated understanding and agreement.      Additional Information    Negative: Passed out (i.e., lost consciousness, collapsed and was not responding)    Negative: Shock suspected (e.g., cold/pale/clammy skin, too weak to stand, low BP, rapid pulse)    Negative: Sounds like a life-threatening emergency to the triager    Negative: [1] SEVERE back pain (e.g., excruciating) AND [2] sudden onset AND [3] age > 60    Negative: [1] Unable to urinate (or only a few drops) > 4 hours AND [2] bladder feels very full (e.g., palpable bladder or strong urge to urinate)    Negative: [1] Loss of bladder or bowel control (urine or bowel incontinence; wetting self, leaking stool) AND [2] new onset    Negative: Numbness in groin or rectal area (i.e., loss of sensation)    Negative: [1] SEVERE abdominal pain AND [2] present > 1 hour    Negative: [1] Abdominal pain AND [2] age > 60    Negative: Weakness of a leg or foot (e.g., unable to bear weight, dragging foot)    Negative: Unable to walk    Negative: Patient sounds very sick or weak to the triager    Protocols used: BACK PAIN-A-AH    Siri SALDANA RN [facility]     "

## 2022-07-05 RX ORDER — PREDNISONE 20 MG/1
40 TABLET ORAL DAILY
Qty: 10 TABLET | Refills: 0 | Status: SHIPPED | OUTPATIENT
Start: 2022-07-05 | End: 2022-08-01

## 2022-07-11 ENCOUNTER — TELEPHONE (OUTPATIENT)
Dept: FAMILY MEDICINE | Facility: CLINIC | Age: 67
End: 2022-07-11

## 2022-07-11 DIAGNOSIS — M54.16 LUMBAR BACK PAIN WITH RADICULOPATHY AFFECTING LEFT LOWER EXTREMITY: Primary | ICD-10-CM

## 2022-07-11 NOTE — TELEPHONE ENCOUNTER
I left a detailed message on Hanna's personal voice mail. I instructed patient to reach out to her PCP for guidance.     Binta Elias PA-C is out of the clinic until 7/14/22.     Office visit on 6/28/22 with Binta Elias PA-C  states to Return in about 1 week (around 7/5/2022), or if symptoms worsen or fail to improve. If your pain is not improving, please reach out and we will consider an MRI at that point.     Patient was also referred to Ortho on 7/2/22, however no appointment has been scheduled.     Information also sent via My Chart.     Brina Perera RN BSN  [facility]

## 2022-07-11 NOTE — TELEPHONE ENCOUNTER
I called patient back. She said that she is reaching out to her PCP, Stephanie Sherman NP to see if she would be able to order the MRI.     I informed patient that if she needs anything else from Binta Elias PA-C, she will be back in the clinic on 7/14/22. Patient  voiced understanding and agreement.    Brina Perera RN BSN  [facility]

## 2022-07-11 NOTE — TELEPHONE ENCOUNTER
"Patient calling in tears with 10/10 constant pain in low back that radiates into left leg. She states she does not get any relief from the pain medications, muscle relaxers, ice or heat.     Patient states she can walk but it hurts badly and feels like her leg will give out.    She states this began on 6/26/22 and it progressed so she was seen by MEREDITH Drummond at [facility] due to PCP being unavailable. She states at the time she thought she was told an MRI would be ordered for her. RN notes in AVS, that if patient's pain is not better or it is worsening in 7-10 days an MRI may be considered. RN relayed message from AVS on 6/28/22 to patient. Patient verbalized understanding.     Patient denies loss of bowel/bladder function, fever, or severe abdominal pain.     Patient states she was seen at [facility] emergency room but they told her it would take all night to get her in for an MRI. She states she was in so much pain that she was \"crawling out of my skin\".     She is wondering if she can get an MRI ordered and get in right away. RN advising patient may need to be seen in ER due to extreme pain and to get MRI sooner.     RN advised she does have a  Referral for ortho as well. Patient states she was not contacted by ortho. RN offered number for ortho. Patient declined at this time and requests order for MRI.     Routing to provider to review and advise if patient should be seen in ED with ongoing extreme pain or if order for MRI can be placed.     Please call the patient with advisement.    Maddi Oden RN  [facility]    "

## 2022-07-11 NOTE — TELEPHONE ENCOUNTER
MRI placed, she can call [phone] to schedule. If she needs more/increased pain medication that needs to come from pain clinic. If pain not improving or worsening then recommend going to ER. Also recommend if pain not improving a follow up appointment with provider.     FARIBA Maloney, NP-C  [facility]

## 2022-07-12 ENCOUNTER — MYC MEDICAL ADVICE (OUTPATIENT)
Dept: ANESTHESIOLOGY | Facility: CLINIC | Age: 67
End: 2022-07-12

## 2022-07-13 NOTE — TELEPHONE ENCOUNTER
RN returned call and spoke with patient. Patient reports she is currently admitted at [facility] in [place]. Reports she present to ER with low back pain that is currently uncontrolled in the hospital. Patient wondering about the pain management plan once she discharges. RN explained that patient should resume her prescribed medications that Dr. Morales manages for her and that patient should schedule a follow up appointment to further discuss her treatment plan/medications. Patient concerned about her pain contract with Dr. Morales and if she is able to get pain medication while she is currently admitted. RN explained patient may receive medication and treatment per the MD orders while in the hospital, however patient needs to update the Pain Clinic if any additional medication prescribed when discharged. Patient verbalized understanding. RN gave patient Pain Clinic contact number if her current inpatient care team needs to discuss pain management with Dr. Morales. Patient verbalized understanding.     Dang Barrett RN

## 2022-07-13 NOTE — TELEPHONE ENCOUNTER
Patient called, needs to know how soon she can get in with Dr Morales and how much pain meds she needs from hospital doctor before discharge. Please reach out to patient. Per patient it's URGENT.

## 2022-07-19 ENCOUNTER — TELEPHONE (OUTPATIENT)
Dept: ANESTHESIOLOGY | Facility: CLINIC | Age: 67
End: 2022-07-19

## 2022-07-19 ENCOUNTER — HOSPITAL ENCOUNTER (EMERGENCY)
Facility: CLINIC | Age: 67
Discharge: HOME | End: 2022-07-19
Attending: EMERGENCY MEDICINE | Admitting: EMERGENCY MEDICINE
Payer: COMMERCIAL

## 2022-07-19 ENCOUNTER — NURSE TRIAGE (OUTPATIENT)
Dept: PHYSICAL MEDICINE AND REHAB | Facility: CLINIC | Age: 67
End: 2022-07-19

## 2022-07-19 VITALS
RESPIRATION RATE: 20 BRPM | TEMPERATURE: 98 F | HEART RATE: 51 BPM | WEIGHT: 185 LBS | SYSTOLIC BLOOD PRESSURE: 154 MMHG | BODY MASS INDEX: 29.03 KG/M2 | OXYGEN SATURATION: 98 % | HEIGHT: 67 IN | DIASTOLIC BLOOD PRESSURE: 80 MMHG

## 2022-07-19 DIAGNOSIS — M51.26 LUMBAR DISC HERNIATION: Primary | ICD-10-CM

## 2022-07-19 DIAGNOSIS — M54.50 LUMBAR PAIN: ICD-10-CM

## 2022-07-19 LAB
ALBUMIN UR-MCNC: NEGATIVE MG/DL
APPEARANCE UR: CLEAR
BACTERIA #/AREA URNS HPF: ABNORMAL /HPF
BILIRUB UR QL STRIP: NEGATIVE
COLOR UR AUTO: ABNORMAL
GLUCOSE UR STRIP-MCNC: NEGATIVE MG/DL
HGB UR QL STRIP: NEGATIVE
KETONES UR STRIP-MCNC: NEGATIVE MG/DL
LEUKOCYTE ESTERASE UR QL STRIP: NEGATIVE
NITRATE UR QL: NEGATIVE
PH UR STRIP: 7.5 [PH] (ref 5–7)
RBC URINE: 0 /HPF
SP GR UR STRIP: 1.01 (ref 1–1.03)
SQUAMOUS EPITHELIAL: 2 /HPF
TRANSITIONAL EPI: <1 /HPF
UROBILINOGEN UR STRIP-MCNC: NORMAL MG/DL
WBC URINE: <1 /HPF

## 2022-07-19 PROCEDURE — 96376 TX/PRO/DX INJ SAME DRUG ADON: CPT | Performed by: EMERGENCY MEDICINE

## 2022-07-19 PROCEDURE — 96374 THER/PROPH/DIAG INJ IV PUSH: CPT | Performed by: EMERGENCY MEDICINE

## 2022-07-19 PROCEDURE — 99285 EMERGENCY DEPT VISIT HI MDM: CPT | Performed by: EMERGENCY MEDICINE

## 2022-07-19 PROCEDURE — 250N000011 HC RX IP 250 OP 636: Performed by: EMERGENCY MEDICINE

## 2022-07-19 PROCEDURE — 81003 URINALYSIS AUTO W/O SCOPE: CPT | Performed by: EMERGENCY MEDICINE

## 2022-07-19 PROCEDURE — 99285 EMERGENCY DEPT VISIT HI MDM: CPT | Mod: 25 | Performed by: EMERGENCY MEDICINE

## 2022-07-19 RX ORDER — HYDROMORPHONE HYDROCHLORIDE 1 MG/ML
0.5 INJECTION, SOLUTION INTRAMUSCULAR; INTRAVENOUS; SUBCUTANEOUS
Status: DISCONTINUED | OUTPATIENT
Start: 2022-07-19 | End: 2022-07-19 | Stop reason: HOSPADM

## 2022-07-19 RX ORDER — HYDROMORPHONE HYDROCHLORIDE 2 MG/1
2 TABLET ORAL EVERY 6 HOURS PRN
Qty: 10 TABLET | Refills: 0 | Status: SHIPPED | OUTPATIENT
Start: 2022-07-19 | End: 2022-07-22

## 2022-07-19 RX ADMIN — HYDROMORPHONE HYDROCHLORIDE 0.5 MG: 1 INJECTION, SOLUTION INTRAMUSCULAR; INTRAVENOUS; SUBCUTANEOUS at 19:25

## 2022-07-19 RX ADMIN — HYDROMORPHONE HYDROCHLORIDE 0.5 MG: 1 INJECTION, SOLUTION INTRAMUSCULAR; INTRAVENOUS; SUBCUTANEOUS at 20:34

## 2022-07-19 ASSESSMENT — ENCOUNTER SYMPTOMS
HEADACHES: 0
DIARRHEA: 0
NAUSEA: 0
SORE THROAT: 0
BACK PAIN: 1
FEVER: 0
FATIGUE: 0
CHILLS: 0
NECK PAIN: 0
COUGH: 0
DYSURIA: 0
ABDOMINAL PAIN: 0
DIZZINESS: 0
WEAKNESS: 0
NUMBNESS: 1
ABDOMINAL DISTENTION: 0
ARTHRALGIAS: 0
SHORTNESS OF BREATH: 0
COLOR CHANGE: 0
PALPITATIONS: 0
EYE PAIN: 0
CHEST TIGHTNESS: 0
FREQUENCY: 0
CONSTIPATION: 0
MYALGIAS: 0
CONFUSION: 0
VOMITING: 0
DIFFICULTY URINATING: 0

## 2022-07-19 NOTE — TELEPHONE ENCOUNTER
EMT routed to Adult Pain Nurse Pool.    Cony Ballesteros, EMT    
RN returned call to patient's daughter, Carolann. Daughter wondering what to do for managing her mother's pain until her follow up appointment with Dr. Morales on 7/22. Daughter reports that the patient was discharged from the hospital with Dilaudid- 10 tabs and Hydroxyzine prescriptions, and was advised not to take Norco in addition to the Dilaudid. Patient has completed Hydroxyzine prescription and only has a few left of the Dilaudid and will be out after today. Patient was prescribed 2 mg tabs of Dilaudid with directions of 1/4-1/2 tab every 6 hours. Daughter reports the 1/2 tab manages the patient's pain better, rating pain about a 7/10 when on her pain medication. RN reviewed chart. Dr. Morales prescribes patient Norco and Butrans patch. Patient is currently wearing the Butrans patch as prescribed and using medical cannabis. Daughter requesting anything to help with the patient's pain management until F/U appt on 7/22.     RN spoke with Dr. Morales and gave an update on the patient's status. Dr. Morales gave verbal order for patient to resume Norco after patient finishes with Dilaudid prescription. Patient is okay to increase temporarily to 1 tab 4 times daily (1 tab every 6 hours as needed), and patient should continue wearing Butrans patch.     RN called Carolann back and provided the information as noted above. Daughter verbalized understanding of these instructions and will continue to monitor the patient.     Dang Barrett RN    
[facility]    Phone Message    May a detailed message be left on voicemail: yes     Reason for Call: Other: Per call from Carolann, patient is in severe pain, out of pain meds today and her appt is not until this Friday, 7/22. Carolann requesting call back to discuss symptom and if she can get more meds until appt.      Action Taken: Message routed to:  [facility] Pain     Travel Screening: Not Applicable                                                                      
21:19

## 2022-07-19 NOTE — TELEPHONE ENCOUNTER
SITUATION:  Pt booked at 2:15 for appt with [facility] at 3:30 today.    BACKGROUND:  Family called Orthopedic  to get earlier appt than next Tuesday 7/26 due to out of pain meds and worsened condition.    Onset left leg radicular pain 6/28, pt had 2 ED visits in the past week including 2 day inpatient at [facility] in [place].  She had epidural, MRI, IV opioids and steroids per daughter's report.  States there are bulging discs. Could not proceed to more specific care of situation due to this hospital is out of network, discharged with plan to f/u with [facility].    This morning she is worsened, cannot walk or sleep (for past 16 days) and nothing touches the pain.  Tried to see pain provider who treats neuropathic pain, appt is for Friday 7/22.    ASSESSMENT / ACTION:  Pt arrived in [facility] clinic in severe pain, unable to walk, seated in manual wheel chair, bilat LE are numb due to chronic neuropathy, unable to further assess.      Obtained care everywhere consent to review recent [place] hospitalization / they brought [facility] discharge instructions and disc of MRI (no imaging report).    This clinic is [facility], unable to prescribe meds that might be effective under the current circumstances and unable to give definitive action plan as expected since non surgical interventions have been recently tried.     REQUEST / RECOMMENDATION:  Discussed with MD and recommendation made to have ED evaluation at [facility] which is in network.  Daughter is transporting Hanna to the ED 2 blocks away.    Dipika Motta RN on 7/19/2022 at 4:49 PM

## 2022-07-19 NOTE — ED PROVIDER NOTES
ED Provider Note  [facility]      History     Chief Complaint   Patient presents with     Back Pain     The history is provided by the patient and medical records.     Hanna Campo is a 67 year old female with a PMH notable for idiopathic neuropathy, hypertension and hyperlipidemia who presents to the ED for evaluation of back pain.  Patient reports an onset of left lower back pain on 6/26/2022 that has progressively increased in severity and is now constant.  Patient states that the pain came out of nowhere and denies any fall or trauma to the back.  Patient reports she was hospitalized for her symptoms at [facility] and was given 1 steroid injections and IV Dilaudid.  Patient states she was discharged with pain medications but she has run out of those.  She reports that during her admission her pain was subsided for short period but since she has been discharged her pain has returned.  Patient reports she has an appointment with orthopedics on 7/26/2022 and has an appoint with her pain specialist on 7/22/22.  She states she has previously and her pain specialist for her neuropathy and currently treats her neuropathy with Butrans patch, Norco 3 times daily, and medical cannabis.  Patient denies any urinary symptoms.  No fever.  Patient has numbness and tingling in bilateral feet radiating up to her mid calves at baseline but states that since back pain has worsened the neuropathy symptoms have worsened as well.  Patient reports she cannot feel either foot at this time.    Per chart review patient was seen at [facility] ED on 7/11/2022 with a 2-week history of low back pain radiating down posterior left leg.  MRI of the lumbar spine in the ED revealed left paracentral disc extrusion at L4-L5 causing left L5 nerve root impingement.  Patient was admitted for pain control and consultation with spine service.  She was giving IV Dilaudid, Robaxin, and steroids in the ED.  During  the patient's admittance she was still uncomfortable and was given oxycodone and Vistaril every 4 hours and then every 3 hours when she was still symptomatic.  Upon discharge patient was referred to orthopedic surgeon and pain specialist for further pain management.    MRI lumbar spine without contrast, 7/11/22  Lumbar disc and facet degeneration.  1.  Mild diffuse disc bulges at L1-2, L2-3, and L3-4 do not cause significant narrowing of the spinal canal. Left neural foramen at L3-4 is mildly stenotic medially.  2.  Left paracentral disc extrusion at L4-5 extends above the interspace. It causes impingement of the traversing left L4 nerve root. Mild right and moderate left neural foraminal narrowing.  3.  Fairly advanced degeneration of the disc at L5-S1. No spinal stenosis. Mild left-sided foraminal narrowing.    Past Medical History  Past Medical History:   Diagnosis Date     Environmental allergies      HTN (hypertension)      Hypothyroidism      Past Surgical History:   Procedure Laterality Date     FOOT SURGERY       MAMMOPLASTY REDUCTION BILATERAL       SURGERY GENERAL IP CONSULT      cervical bone fusion, anterior approach     TUBAL LIGATION       ZZC LAP,APPENDIX UNLISTED PROCED       HYDROmorphone (DILAUDID) 2 MG tablet  buprenorphine (BUTRANS) 10 MCG/HR WK patch  Cetirizine HCl (ZYRTEC ALLERGY PO)  chlorthalidone (HYGROTON) 50 MG tablet  HYDROcodone-acetaminophen (NORCO) 5-325 MG tablet  levothyroxine (SYNTHROID/LEVOTHROID) 75 MCG tablet  medical cannabis (Patient's own supply)  metoprolol succinate ER (TOPROL XL) 50 MG 24 hr tablet  predniSONE (DELTASONE) 20 MG tablet  vitamin B complex with vitamin C (STRESS TAB) tablet  VITAMIN D, CHOLECALCIFEROL, PO      No Known Allergies  Family History  Family History   Problem Relation Age of Onset     Hypertension Mother      Anxiety Disorder Mother      Skin Cancer Father      Lung Cancer Father         smoker     Anxiety Disorder Daughter      Diabetes No family  "hx of      Colon Cancer No family hx of      Breast Cancer No family hx of      Social History   Social History     Tobacco Use     Smoking status: Former Smoker     Smokeless tobacco: Never Used   Substance Use Topics     Alcohol use: Yes     Comment: OCCASIONAL     Drug use: Yes     Types: Marijuana     Comment: medical      Past medical history, past surgical history, medications, allergies, family history, and social history were reviewed with the patient. No additional pertinent items.       Review of Systems   Constitutional: Negative for chills, fatigue and fever.   HENT: Negative for congestion and sore throat.    Eyes: Negative for pain and visual disturbance.   Respiratory: Negative for cough, chest tightness and shortness of breath.    Cardiovascular: Negative for chest pain and palpitations.   Gastrointestinal: Negative for abdominal distention, abdominal pain, constipation, diarrhea, nausea and vomiting.   Genitourinary: Negative.  Negative for difficulty urinating, dysuria, frequency and urgency.   Musculoskeletal: Positive for back pain. Negative for arthralgias, myalgias and neck pain.   Skin: Negative for color change and rash.   Neurological: Positive for numbness (Bilateral feet). Negative for dizziness, weakness and headaches.   Psychiatric/Behavioral: Negative for confusion.     A complete review of systems was performed with pertinent positives and negatives noted in the HPI, and all other systems negative.    Physical Exam   BP: (!) 200/183  Pulse: 68  Temp: 98.7  F (37.1  C)  Resp: 24  Height: 170.2 cm (5' 7\")  Weight: 83.9 kg (185 lb)  SpO2: 99 %  Physical Exam  Vitals and nursing note reviewed.   Constitutional:       General: She is not in acute distress.     Appearance: Normal appearance. She is not ill-appearing or toxic-appearing.   HENT:      Head: Normocephalic and atraumatic.      Nose: Nose normal.      Mouth/Throat:      Mouth: Mucous membranes are moist.   Eyes:      Pupils: " Pupils are equal, round, and reactive to light.   Cardiovascular:      Rate and Rhythm: Normal rate.      Pulses: Normal pulses.      Heart sounds: Normal heart sounds.   Pulmonary:      Effort: Pulmonary effort is normal. No respiratory distress.      Breath sounds: Normal breath sounds.   Abdominal:      General: Abdomen is flat. There is no distension.   Musculoskeletal:         General: No swelling or deformity. Normal range of motion.      Cervical back: Normal range of motion. No rigidity.        Back:       Comments: Evaluation of lower extremities limited due to pain, but no obvious weakness.  No discernible sensory deficit.   Skin:     General: Skin is warm.      Capillary Refill: Capillary refill takes less than 2 seconds.   Neurological:      General: No focal deficit present.      Mental Status: She is alert and oriented to person, place, and time.      Cranial Nerves: No cranial nerve deficit.      Sensory: No sensory deficit.      Motor: No weakness.      Coordination: Coordination normal.      Gait: Gait normal.   Psychiatric:         Mood and Affect: Mood normal.         ED Course     7:14 PM  The patient was seen and examined by Florin Valencia DO in Room ED26.   Procedures       The medical record was reviewed and interpreted.  Current labs reviewed and interpreted.  Previous labs reviewed and interpreted.  Previous images reviewed and interpreted: Lumbar MRI.  Managed outpatient prescription medications.              Results for orders placed or performed during the hospital encounter of 07/19/22   UA with Microscopic reflex to Culture     Status: Abnormal    Specimen: Urine, Clean Catch   Result Value Ref Range    Color Urine Light Yellow Colorless, Straw, Light Yellow, Yellow    Appearance Urine Clear Clear    Glucose Urine Negative Negative mg/dL    Bilirubin Urine Negative Negative    Ketones Urine Negative Negative mg/dL    Specific Gravity Urine 1.011 1.003 - 1.035    Blood Urine Negative  Negative    pH Urine 7.5 (H) 5.0 - 7.0    Protein Albumin Urine Negative Negative mg/dL    Urobilinogen Urine Normal Normal, 2.0 mg/dL    Nitrite Urine Negative Negative    Leukocyte Esterase Urine Negative Negative    Bacteria Urine Few (A) None Seen /HPF    RBC Urine 0 <=2 /HPF    WBC Urine <1 <=5 /HPF    Squamous Epithelials Urine 2 (H) <=1 /HPF    Transitional Epithelials Urine <1 <=1 /HPF    Narrative    Urine Culture not indicated     Medications   HYDROmorphone (PF) (DILAUDID) injection 0.5 mg (0.5 mg Intravenous Given 7/19/22 2034)        Assessments & Plan (with Medical Decision Making)   Patient presents to the ED seeking pain control for acute on chronic low back pain.  Was admitted to [facility] recently for the same symptoms.  MRI at that time showed mild disc bulges at L1-L2-L3.  More significant disc herniation at L4-L5 impinging on transverse left L4 nerve root.  Patient was treated with epidural steroid injections which did not improve symptoms.  Was discharged with oral Dilaudid.  Referred to pain management clinic as well as outpatient orthopedics.    On arrival, patient is normal vital signs.  She is crying due to the pain.  She is neurologically intact.  She does endorse some sensory deficit in her feet, but is consistent with her history of neuropathy.  She is able to empty her bladder appropriately.  Given the MRI findings on 7/11/2022 and essentially unchanged symptoms, along with lack of bowel/bladder incontinence/sensory deficit, motor weakness, low suspicion for evolution of symptoms to cause cauda equina syndrome/conus medullaris syndrome.  No fevers infectious symptoms or recent surgery, low suspicion for epidural hematoma, discitis, epidural abscess.    Symptoms likely consistent with acute on chronic low back pain 2/2 to known L4/L5 disc herniation.  Do not feel that further imaging would be helpful.  Patient was given IV Dilaudid in the ED.  I offered admission to the  hospital for pain control, PT/OT consult, and possible rehab placement.  Patient refuses.  She states she would rather be discharged home.  Per her request, I have refilled her oral Dilaudid that was prescribed to her on hospital discharge.  She has appointment to see pain management on Friday, hopefully this will get her to that appointment.  There was also apparently a mixup with her orthopedic appointment for today which has subsequently been canceled.  I placed a new referral for Ortho/sports medicine referral for continued treatment/work-up of her disc herniation.            I have reviewed the nursing notes. I have reviewed the findings, diagnosis, plan and need for follow up with the patient.    Discharge Medication List as of 7/19/2022  9:39 PM      START taking these medications    Details   HYDROmorphone (DILAUDID) 2 MG tablet Take 1 tablet (2 mg) by mouth every 6 hours as needed for pain, Disp-10 tablet, R-0, Local Print             Final diagnoses:   Lumbar pain   Lumbar disc herniation       --  I, Zuly Garcia, am serving as a trained medical scribe to document services personally performed by Florin Valencia DO, based on the provider's statements to me.     I, Florin Valencia DO, was physically present and have reviewed and verified the accuracy of this note documented by Zuly Garcia.    Florin Valencia DO  [facility] EMERGENCY DEPARTMENT  7/19/2022     Florin Valencia DO  07/19/22 [id number]

## 2022-07-20 ENCOUNTER — TELEPHONE (OUTPATIENT)
Dept: ORTHOPEDICS | Facility: CLINIC | Age: 67
End: 2022-07-20

## 2022-07-20 NOTE — DISCHARGE INSTRUCTIONS
You should receive a call from [facility] to schedule your follow up orthopedics appointment. If you do not hear from them within 24 business hours, call [phone], option 3 for help in scheduling your follow up.  If you are seen in the Emergency Department over the weekend, you will receive a phone call on the next business day.

## 2022-07-20 NOTE — TELEPHONE ENCOUNTER
LVM for patient to schedule [facility] appointment with Loni Worrell, Bishop Urrutia, or Estela Watkins for Lumbar disc herniation

## 2022-07-20 NOTE — TELEPHONE ENCOUNTER
Called and Let pt know I can watch for a Tuesday drop off but that kathryn be the earliest.       She stated her daughter said she could be seen tomorrow. Writer unsure of where or with who that would be.       Lesley

## 2022-07-20 NOTE — TELEPHONE ENCOUNTER
[facility]    Phone Message    May a detailed message be left on voicemail: no     Reason for Call: Other: Patient wondering if there is anyway she can be worked in sooner. She is in a lot of pain and has had to go to the ER for this. She is on the wait list     Action Taken: Message routed to:  [facility]    Travel Screening: Not Applicable

## 2022-07-21 ASSESSMENT — ENCOUNTER SYMPTOMS
SPEECH CHANGE: 0
DISTURBANCES IN COORDINATION: 1
ARTHRALGIAS: 1
NECK PAIN: 0
TINGLING: 1
MEMORY LOSS: 0
MUSCLE WEAKNESS: 1
TREMORS: 0
JOINT SWELLING: 0
HEADACHES: 0
MYALGIAS: 1
SEIZURES: 0
NUMBNESS: 1
LOSS OF CONSCIOUSNESS: 0
WEAKNESS: 1
MUSCLE CRAMPS: 0
BACK PAIN: 1
STIFFNESS: 1
PARALYSIS: 0
DIZZINESS: 1

## 2022-07-22 ENCOUNTER — VIRTUAL VISIT (OUTPATIENT)
Dept: ANESTHESIOLOGY | Facility: CLINIC | Age: 67
End: 2022-07-22
Payer: COMMERCIAL

## 2022-07-22 DIAGNOSIS — M54.16 ACUTE LUMBAR RADICULOPATHY: Primary | ICD-10-CM

## 2022-07-22 PROCEDURE — 99214 OFFICE O/P EST MOD 30 MIN: CPT | Mod: 95 | Performed by: ANESTHESIOLOGY

## 2022-07-22 RX ORDER — HYDROMORPHONE HYDROCHLORIDE 2 MG/1
2 TABLET ORAL EVERY 6 HOURS PRN
Qty: 20 TABLET | Refills: 0 | Status: SHIPPED | OUTPATIENT
Start: 2022-07-22 | End: 2022-07-26

## 2022-07-22 ASSESSMENT — PAIN SCALES - GENERAL: PAINLEVEL: EXTREME PAIN (8)

## 2022-07-22 NOTE — PROGRESS NOTES
"[facility]    Date of visit: 7/22/2022    Chief complaint:   Chief Complaint   Patient presents with     Follow Up     Follow-up Left Lower Back and Leg Pain Level 8/10       Interval history:  Hanna Campo was last seen by me on 6/23/2022.      Recommendations/plan at the last visit included:  \"Hanna Campo is a 67 year old female who is seen at the pain clinic for follow up of medication management as well as discussion of further options for her peripheral neuropathy.  We again discussed potential of spinal cord stimulation trial and potential implantation. We will contact finance office to determine her coverage and will touch base in 4 weeks to discuss next steps. \"    Since her last visit, Hanna Campo reports:  June 26th woke up with dull ache down the left leg. Pain became significantly worse and resulted in hospitalization as well as another visit to ED after discharge from hospital. She received LESI and has not noted relief of symptoms.       Went to ED and was admitted last Monday  Had MADELINE on 7/12/2022 - no relief from injection yet (10 days later)    Left side low back radiating down the front of the leg and wraps around to the calf  The pain is getting worse because she is not walking    Has an appointment with [facility] today    Pain scores:  Pain intensity on average is 8 on a scale of 0-10.     Current pain treatments:   Norco - not taking currently because was given dilaudid in the hospital - taking 0.5 to 1 mg (cutting 2 mg tab in quarters or halves).   Hydromorphone - taking 1 mg every 6 hours  Butrans Patch    Medical cannabis  Tylenol      Medications:  Current Outpatient Medications   Medication Sig Dispense Refill     buprenorphine (BUTRANS) 10 MCG/HR WK patch Place 1 patch onto the skin every 7 days 4 patch 3     Cetirizine HCl (ZYRTEC ALLERGY PO) Take 5 mg by mouth daily       chlorthalidone (HYGROTON) 50 MG tablet Take 1 tablet (50 mg) by mouth daily 90 tablet 1 "     HYDROcodone-acetaminophen (NORCO) 5-325 MG tablet Take 1 tablet by mouth every 8 hours as needed for severe pain 90 tablet 0     HYDROmorphone (DILAUDID) 2 MG tablet Take 1 tablet (2 mg) by mouth every 6 hours as needed for pain 10 tablet 0     levothyroxine (SYNTHROID/LEVOTHROID) 75 MCG tablet Take 1 tablet (75 mcg) by mouth daily 90 tablet 0     medical cannabis (Patient's own supply) (The purpose of this order is to document that the patient reports taking medical cannabis.  This is not a prescription, and is not used to certify that the patient has a qualifying medical condition.) 0 Information only 0     metoprolol succinate ER (TOPROL XL) 50 MG 24 hr tablet Take 1 tablet (50 mg) by mouth daily 90 tablet 1     vitamin B complex with vitamin C (STRESS TAB) tablet Take 1 tablet by mouth daily  0     VITAMIN D, CHOLECALCIFEROL, PO Take 5,000 Units by mouth daily       predniSONE (DELTASONE) 20 MG tablet Take 2 tablets (40 mg) by mouth daily 10 tablet 0       Medical History: any changes in medical history since they were last seen? No    Review of Systems:  The 14 system ROS was reviewed from the intake questionnaire, and is positive for: back and leg pain, numbness of bilateral feet  Any bowel or bladder problems: denies (no red flag symptoms, numbness/saddle paresthesia, loss of bowel/bladder, gait change  Mood: stable    Physical Exam: Virtual Visit - patient seated in recliner during the visit, tearful due to pain  not currently breastfeeding.  General: AAOx3, NAD  Gait: Unable to assess - virtual visit      Assessment:   Acute Lumbar Radiculopathy  Idiopathic Peripheral Neuropathy    Hanna Campo is a 67 year old female who is seen at the pain clinic for management of her new onset radicular symptoms that required prior hospitalization and a repeat visit to the emergency department. Her symptoms have not improved and she is managing with medication while awaiting an appointment with orthopedic spine  specialist (scheduling issues have delayed her appointment).  We discussed that she can take a full 2 mg dilaudid tablet with max of 4 tabs per day. When the pain has improved she will need to wean back to her previous regimen.  We will continue closely following her during this acute period.  However, she will need to follow up with orthopedics for interventions as well as her previous MADELINE has not yet provided her any relief.     Plan:  1. Physical Therapy:  As tolerated daily HEPs  2. Clinical Health Psychologist to address issues of relaxation, behavioral change, coping style, and other factors important to improvement.  Not at this time  3. Diagnostic Studies:  none  4. Medication Management:  Hold current Norco, continue butrans patch, 20 tabs of 2 mg dilaudid provided to not exceed 4 tabs per day.   5. Follow up: 4 weeks    Lisa Morales MD    Pain Medicine  Department of Anesthesiology  [facility]            Answers for HPI/ROS submitted by the patient on 7/21/2022  General Symptoms: No  Skin Symptoms: No  HENT Symptoms: No  EYE SYMPTOMS: No  HEART SYMPTOMS: No  LUNG SYMPTOMS: No  INTESTINAL SYMPTOMS: No  URINARY SYMPTOMS: No  GYNECOLOGIC SYMPTOMS: No  BREAST SYMPTOMS: No  SKELETAL SYMPTOMS: Yes  BLOOD SYMPTOMS: No  NERVOUS SYSTEM SYMPTOMS: Yes  MENTAL HEALTH SYMPTOMS: No  Back pain: Yes  Muscle aches: Yes  Neck pain: No  Swollen joints: No  Joint pain: Yes  Bone pain: No  Muscle cramps: No  Muscle weakness: Yes  Joint stiffness: Yes  Bone fracture: No  Trouble with coordination: Yes  Dizziness or trouble with balance: Yes  Fainting or black-out spells: No  Memory loss: No  Headache: No  Seizures: No  Speech problems: No  Tingling: Yes  Tremor: No  Weakness: Yes  Difficulty walking: Yes  Paralysis: No  Numbness: Yes

## 2022-07-22 NOTE — PATIENT INSTRUCTIONS
Medications:    Orders Signed This Visit (2)                          HYDROmorphone (DILAUDID) 2 MG tablet                  Take 1 tablet (2 mg) by mouth every 6 hours as needed for pain, Disp-20 tablet, R-0, E-Prescribe                 naloxone (NARCAN) 4 MG/0.1ML nasal spray                  Spray 1 spray (4 mg) into one nostril alternating nostrils once as        Recommendation:  Physical Therapy Referral placed-   If you have not heard from the scheduling office within 2 business days, please call [phone] for all locations       Pain Psychology Referral placed-  Please contact their scheduling office at [phone] to schedule, if you have not heard from them within 2 business days.     Pain psychology Therapies Requested- Biofeedback, Mindfulness training, CBT, Coping and relaxation therapy.        Recommended Follow up:  As Needed    Recommendations will be written in the providers note for your Primary Care provider (OR other providers in your care team) to review and make changes to your therapies based on their discretion.        To speak with a nurse, schedule/reschedule/cancel a clinic appointment, or request a medication refill call: [phone].    You can also reach us by [employer]: [email or web address]

## 2022-07-22 NOTE — LETTER
"7/22/2022       RE: Hanna Campo  [address]  [place] [address]     Dear Colleague,    Thank you for referring your patient, Hanna Campo, to the [facility] [place] at [facility]. Please see a copy of my visit note below.    Hanna is a 67 year old who is being evaluated via a billable video visit.      How would you like to obtain your AVS? MyChart  If the video visit is dropped, the invitation should be resent by: Text to cell phone: [phone]  Will anyone else be joining your video visit? No            [facility]    Date of visit: 7/22/2022    Chief complaint:   Chief Complaint   Patient presents with     Follow Up     Follow-up Left Lower Back and Leg Pain Level 8/10       Interval history:  Hanna Campo was last seen by me on 6/23/2022.      Recommendations/plan at the last visit included:  \"Hanna Campo is a 67 year old female who is seen at the pain clinic for follow up of medication management as well as discussion of further options for her peripheral neuropathy.  We again discussed potential of spinal cord stimulation trial and potential implantation. We will contact finance office to determine her coverage and will touch base in 4 weeks to discuss next steps. \"    Since her last visit, Hanna Campo reports:  June 26th woke up with dull ache down the left leg. Pain became significantly worse and resulted in hospitalization as well as another visit to ED after discharge from hospital. She received LESI and has not noted relief of symptoms.       Went to ED and was admitted last Monday  Had MADELINE on 7/12/2022 - no relief from injection yet (10 days later)    Left side low back radiating down the front of the leg and wraps around to the calf  The pain is getting worse because she is not walking    Has an appointment with [facility] today    Pain scores:  Pain " intensity on average is 8 on a scale of 0-10.     Current pain treatments:   Norco - not taking currently because was given dilaudid in the hospital - taking 0.5 to 1 mg (cutting 2 mg tab in quarters or halves).   Hydromorphone - taking 1 mg every 6 hours  Butrans Patch    Medical cannabis  Tylenol      Medications:  Current Outpatient Medications   Medication Sig Dispense Refill     buprenorphine (BUTRANS) 10 MCG/HR WK patch Place 1 patch onto the skin every 7 days 4 patch 3     Cetirizine HCl (ZYRTEC ALLERGY PO) Take 5 mg by mouth daily       chlorthalidone (HYGROTON) 50 MG tablet Take 1 tablet (50 mg) by mouth daily 90 tablet 1     HYDROcodone-acetaminophen (NORCO) 5-325 MG tablet Take 1 tablet by mouth every 8 hours as needed for severe pain 90 tablet 0     HYDROmorphone (DILAUDID) 2 MG tablet Take 1 tablet (2 mg) by mouth every 6 hours as needed for pain 10 tablet 0     levothyroxine (SYNTHROID/LEVOTHROID) 75 MCG tablet Take 1 tablet (75 mcg) by mouth daily 90 tablet 0     medical cannabis (Patient's own supply) (The purpose of this order is to document that the patient reports taking medical cannabis.  This is not a prescription, and is not used to certify that the patient has a qualifying medical condition.) 0 Information only 0     metoprolol succinate ER (TOPROL XL) 50 MG 24 hr tablet Take 1 tablet (50 mg) by mouth daily 90 tablet 1     vitamin B complex with vitamin C (STRESS TAB) tablet Take 1 tablet by mouth daily  0     VITAMIN D, CHOLECALCIFEROL, PO Take 5,000 Units by mouth daily       predniSONE (DELTASONE) 20 MG tablet Take 2 tablets (40 mg) by mouth daily 10 tablet 0       Medical History: any changes in medical history since they were last seen? No    Review of Systems:  The 14 system ROS was reviewed from the intake questionnaire, and is positive for: back and leg pain, numbness of bilateral feet  Any bowel or bladder problems: denies (no red flag symptoms, numbness/saddle paresthesia, loss of  bowel/bladder, gait change  Mood: stable    Physical Exam: Virtual Visit - patient seated in recliner during the visit, tearful due to pain  not currently breastfeeding.  General: AAOx3, NAD  Gait: Unable to assess - virtual visit      Assessment:   Acute Lumbar Radiculopathy  Idiopathic Peripheral Neuropathy    Hanna Campo is a 67 year old female who is seen at the pain clinic for management of her new onset radicular symptoms that required prior hospitalization and a repeat visit to the emergency department. Her symptoms have not improved and she is managing with medication while awaiting an appointment with orthopedic spine specialist (scheduling issues have delayed her appointment).  We discussed that she can take a full 2 mg dilaudid tablet with max of 4 tabs per day. When the pain has improved she will need to wean back to her previous regimen.  We will continue closely following her during this acute period.  However, she will need to follow up with orthopedics for interventions as well as her previous MADELINE has not yet provided her any relief.     Plan:  1. Physical Therapy:  As tolerated daily HEPs  2. Clinical Health Psychologist to address issues of relaxation, behavioral change, coping style, and other factors important to improvement.  Not at this time  3. Diagnostic Studies:  none  4. Medication Management:  Hold current Norco, continue butrans patch, 20 tabs of 2 mg dilaudid provided to not exceed 4 tabs per day.   5. Follow up: 4 weeks    Lisa Morales MD    Pain Medicine  Department of Anesthesiology  [facility]            Answers for HPI/ROS submitted by the patient on 7/21/2022  General Symptoms: No  Skin Symptoms: No  HENT Symptoms: No  EYE SYMPTOMS: No  HEART SYMPTOMS: No  LUNG SYMPTOMS: No  INTESTINAL SYMPTOMS: No  URINARY SYMPTOMS: No  GYNECOLOGIC SYMPTOMS: No  BREAST SYMPTOMS: No  SKELETAL SYMPTOMS: Yes  BLOOD SYMPTOMS: No  NERVOUS SYSTEM SYMPTOMS:  Yes  MENTAL HEALTH SYMPTOMS: No  Back pain: Yes  Muscle aches: Yes  Neck pain: No  Swollen joints: No  Joint pain: Yes  Bone pain: No  Muscle cramps: No  Muscle weakness: Yes  Joint stiffness: Yes  Bone fracture: No  Trouble with coordination: Yes  Dizziness or trouble with balance: Yes  Fainting or black-out spells: No  Memory loss: No  Headache: No  Seizures: No  Speech problems: No  Tingling: Yes  Tremor: No  Weakness: Yes  Difficulty walking: Yes  Paralysis: No  Numbness: Yes          Again, thank you for allowing me to participate in the care of your patient.      Sincerely,    Lisa Morales MD

## 2022-07-22 NOTE — LETTER
Date:July 29, 2022      Provider requested that no letter be sent. Do not send.       [facility]

## 2022-07-22 NOTE — NURSING NOTE
Patient presents with:  Follow Up: Follow-up Left Lower Back and Leg Pain Level 8/10      Extreme Pain (8)     Pain Medications     Opioid Combinations Refills Start End     HYDROcodone-acetaminophen (NORCO) 5-325 MG tablet    0 6/23/2022     Sig - Route: Take 1 tablet by mouth every 8 hours as needed for severe pain - Oral    Class: E-Prescribe    Earliest Fill Date: 6/23/2022    No prior authorization was found for this prescription.    Found prior authorization for another prescription for the same medication: Closed - Prior Authorization not required for patient/medication    Opioid Agonists Refills Start End     HYDROmorphone (DILAUDID) 2 MG tablet    0 7/19/2022 7/22/2022    Sig - Route: Take 1 tablet (2 mg) by mouth every 6 hours as needed for pain - Oral    Class: Local Print    Earliest Fill Date: 7/19/2022    Opioid Partial Agonists Refills Start End     buprenorphine (BUTRANS) 10 MCG/HR WK patch    3 7/16/2022     Sig - Route: Place 1 patch onto the skin every 7 days - Transdermal    Class: E-Prescribe    No prior authorization was found for this prescription.    Found prior authorization for another prescription for the same medication: Closed - Prior Authorization duplicate/in process          What medications are you using for pain? Buprenorphine, Dilaudid, Hydrocodone    (New patients only) Have you been seen by another pain clinic/ provider? no    (Return Patients only) What refills are you needing today? no

## 2022-07-22 NOTE — PROGRESS NOTES
Hanna is a 67 year old who is being evaluated via a billable video visit.      How would you like to obtain your AVS? MyChart  If the video visit is dropped, the invitation should be resent by: Text to cell phone: [phone]  Will anyone else be joining your video visit? No

## 2022-07-25 ENCOUNTER — MYC MEDICAL ADVICE (OUTPATIENT)
Dept: ANESTHESIOLOGY | Facility: CLINIC | Age: 67
End: 2022-07-25

## 2022-07-25 ENCOUNTER — MYC MEDICAL ADVICE (OUTPATIENT)
Dept: FAMILY MEDICINE | Facility: CLINIC | Age: 67
End: 2022-07-25

## 2022-07-26 ENCOUNTER — MYC MEDICAL ADVICE (OUTPATIENT)
Dept: ANESTHESIOLOGY | Facility: CLINIC | Age: 67
End: 2022-07-26

## 2022-07-26 DIAGNOSIS — M54.16 ACUTE LUMBAR RADICULOPATHY: ICD-10-CM

## 2022-07-26 RX ORDER — HYDROMORPHONE HYDROCHLORIDE 2 MG/1
2 TABLET ORAL EVERY 6 HOURS PRN
Qty: 28 TABLET | Refills: 0 | Status: SHIPPED | OUTPATIENT
Start: 2022-07-28 | End: 2022-09-08

## 2022-07-26 NOTE — TELEPHONE ENCOUNTER
SHANTHI Called and spoke with Dr. Morales, updating the provider about the pt's concern for pain.     Pt stated that they are almost our of their Dilaudid prescription (Pt is currently taking 2 mg, Q6 PRN). Pt said that their pain is not helped by taking Norco.   Pt has stopped Norco while taking Dilaudid as previously instructed.     Dr. Morales has placed a new order for Dilaudid (2 mg- Q6h PRN/ Max of 4 per day) to be started on 7/28/22 for 7 days (28 tablets). When Dilaudid prescription is finished, pt should resume their Norco as scheduled.   (5/235, Take 1 tablet by mouth every 8 hours as needed for severe pain/Max of 3 per day).  Pt was scheduled for a sooner follow up on 8/17/22, IN PERSON with the provider. Pt was told to bring remaining medication (Norco) with them to this appointment.     Pt verbalized understanding.     Maddi Galeana LPN

## 2022-07-27 NOTE — TELEPHONE ENCOUNTER
Lvm for pt to call back clinic to get covid test scheduled for 8/5. Provided pt with clinic number to return call.

## 2022-08-01 ENCOUNTER — OFFICE VISIT (OUTPATIENT)
Dept: FAMILY MEDICINE | Facility: CLINIC | Age: 67
End: 2022-08-01
Payer: COMMERCIAL

## 2022-08-01 VITALS
HEART RATE: 60 BPM | RESPIRATION RATE: 16 BRPM | DIASTOLIC BLOOD PRESSURE: 82 MMHG | WEIGHT: 188.3 LBS | TEMPERATURE: 97.1 F | SYSTOLIC BLOOD PRESSURE: 150 MMHG | BODY MASS INDEX: 29.49 KG/M2 | OXYGEN SATURATION: 98 %

## 2022-08-01 DIAGNOSIS — Z01.818 PREOP GENERAL PHYSICAL EXAM: Primary | ICD-10-CM

## 2022-08-01 DIAGNOSIS — M54.16 LUMBAR BACK PAIN WITH RADICULOPATHY AFFECTING LEFT LOWER EXTREMITY: ICD-10-CM

## 2022-08-01 DIAGNOSIS — E03.9 HYPOTHYROIDISM, UNSPECIFIED TYPE: ICD-10-CM

## 2022-08-01 DIAGNOSIS — I10 BENIGN ESSENTIAL HYPERTENSION: ICD-10-CM

## 2022-08-01 DIAGNOSIS — G60.9 IDIOPATHIC NEUROPATHY: ICD-10-CM

## 2022-08-01 LAB
ANION GAP SERPL CALCULATED.3IONS-SCNC: 11 MMOL/L (ref 3–14)
BUN SERPL-MCNC: 7 MG/DL (ref 7–30)
CALCIUM SERPL-MCNC: 9 MG/DL (ref 8.5–10.1)
CHLORIDE BLD-SCNC: 95 MMOL/L (ref 94–109)
CO2 SERPL-SCNC: 26 MMOL/L (ref 20–32)
CREAT SERPL-MCNC: 0.53 MG/DL (ref 0.52–1.04)
ERYTHROCYTE [DISTWIDTH] IN BLOOD BY AUTOMATED COUNT: 11.4 % (ref 10–15)
GFR SERPL CREATININE-BSD FRML MDRD: >90 ML/MIN/1.73M2
GLUCOSE BLD-MCNC: 95 MG/DL (ref 70–99)
HCT VFR BLD AUTO: 41 % (ref 35–47)
HGB BLD-MCNC: 14.2 G/DL (ref 11.7–15.7)
MCH RBC QN AUTO: 33.2 PG (ref 26.5–33)
MCHC RBC AUTO-ENTMCNC: 34.6 G/DL (ref 31.5–36.5)
MCV RBC AUTO: 96 FL (ref 78–100)
PLATELET # BLD AUTO: 213 10E3/UL (ref 150–450)
POTASSIUM BLD-SCNC: 3.7 MMOL/L (ref 3.4–5.3)
RBC # BLD AUTO: 4.28 10E6/UL (ref 3.8–5.2)
SODIUM SERPL-SCNC: 132 MMOL/L (ref 133–144)
T4 FREE SERPL-MCNC: 1.03 NG/DL (ref 0.76–1.46)
TSH SERPL DL<=0.005 MIU/L-ACNC: 13.31 MU/L (ref 0.4–4)
WBC # BLD AUTO: 7.5 10E3/UL (ref 4–11)

## 2022-08-01 PROCEDURE — 84443 ASSAY THYROID STIM HORMONE: CPT | Performed by: NURSE PRACTITIONER

## 2022-08-01 PROCEDURE — 36415 COLL VENOUS BLD VENIPUNCTURE: CPT | Performed by: NURSE PRACTITIONER

## 2022-08-01 PROCEDURE — 99214 OFFICE O/P EST MOD 30 MIN: CPT | Performed by: NURSE PRACTITIONER

## 2022-08-01 PROCEDURE — 85027 COMPLETE CBC AUTOMATED: CPT | Performed by: NURSE PRACTITIONER

## 2022-08-01 PROCEDURE — 84439 ASSAY OF FREE THYROXINE: CPT | Performed by: NURSE PRACTITIONER

## 2022-08-01 PROCEDURE — 80048 BASIC METABOLIC PNL TOTAL CA: CPT | Performed by: NURSE PRACTITIONER

## 2022-08-01 PROCEDURE — 93000 ELECTROCARDIOGRAM COMPLETE: CPT | Performed by: NURSE PRACTITIONER

## 2022-08-01 ASSESSMENT — PAIN SCALES - GENERAL: PAINLEVEL: EXTREME PAIN (8)

## 2022-08-01 NOTE — RESULT ENCOUNTER NOTE
Hi Hanna,   Your TSH is now too high. I'm not sure why that is. Are you making sure to take it on an empty stomach? Any missed doses?   The rest of your labs are okay. Sodium a little low, likely dehydration, make sure to drink enough water. Please let me know if you have questions.  Thank you,  FARIBA Maloney, NP-C  [facility]

## 2022-08-01 NOTE — PROGRESS NOTES
[address] [address]  Phone: [phone]  Primary Provider: Lisa Morales  Pre-op Performing Provider: THUY CHATTERJEE      PREOPERATIVE EVALUATION:  Today's date: 8/1/2022    Hanna Campo is a 67 year old female who presents for a preoperative evaluation.    Surgical Information:  Surgery/Procedure: LEFT LUMBAR 4-5 DECOMPRESSION DISCECTOMY  Surgery Location: [facility]   Surgeon: Troy Patton MD  Surgery Date: 8/9/2022  Time of Surgery: 12:00PM   Where patient plans to recover: At home with family   Fax number for surgical facility: [phone]    Type of Anesthesia Anticipated: General    Assessment & Plan     The proposed surgical procedure is considered INTERMEDIATE risk.    Preop general physical exam  Normal exam, no concerns. Has COVID swab set up for later this week. EKG normal  - EKG 12-lead complete w/read - Clinics  - CBC with platelets; Future  - Basic metabolic panel  (Ca, Cl, CO2, Creat, Gluc, K, Na, BUN); Future  - CBC with platelets  - Basic metabolic panel  (Ca, Cl, CO2, Creat, Gluc, K, Na, BUN)    Lumbar back pain with radiculopathy affecting left lower extremity  Reason for surgery. Pain medication helping only some    Hypothyroidism, unspecified type  Due for rechecking, otherwise stable  - TSH with free T4 reflex; Future  - TSH with free T4 reflex    Idiopathic neuropathy  Chronic in feet    Benign essential hypertension  stable           Risks and Recommendations:  The patient has the following additional risks and recommendations for perioperative complications:   - No identified additional risk factors other than previously addressed    Medication Instructions:  Patient is to take all scheduled medications on the day of surgery    RECOMMENDATION:  APPROVAL GIVEN to proceed with proposed procedure, without further diagnostic evaluation.    Subjective     HPI related to upcoming procedure:   Herniated disc in  lumbar causing radiating pain into left leg. Is very uncomfortable today    Preop Questions 7/30/2022   1. Have you ever had a heart attack or stroke? No   2. Have you ever had surgery on your heart or blood vessels, such as a stent placement, a coronary artery bypass, or surgery on an artery in your head, neck, heart, or legs? No   3. Do you have chest pain with activity? No   4. Do you have a history of  heart failure? No   5. Do you currently have a cold, bronchitis or symptoms of other infection? No   6. Do you have a cough, shortness of breath, or wheezing? No   7. Do you or anyone in your family have previous history of blood clots? No   8. Do you or does anyone in your family have a serious bleeding problem such as prolonged bleeding following surgeries or cuts? No   9. Have you ever had problems with anemia or been told to take iron pills? No   10. Have you had any abnormal blood loss such as black, tarry or bloody stools, or abnormal vaginal bleeding? No   11. Have you ever had a blood transfusion? No   12. Are you willing to have a blood transfusion if it is medically needed before, during, or after your surgery? Yes    13. Have you or any of your relatives ever had problems with anesthesia? No   14. Do you have sleep apnea, excessive snoring or daytime drowsiness? No   15. Do you have any artifical heart valves or other implanted medical devices like a pacemaker, defibrillator, or continuous glucose monitor? No   16. Do you have artificial joints? No   17. Are you allergic to latex? No       Health Care Directive:  Patient does not have a Health Care Directive or Living Will: Discussed advance care planning with patient; information given to patient to review.    Preoperative Review of :   reviewed - controlled substances reflected in medication list.      Status of Chronic Conditions:  See problem list for active medical problems.  Problems all longstanding and stable, except as noted/documented.   See ROS for pertinent symptoms related to these conditions.      Review of Systems  CONSTITUTIONAL: NEGATIVE for fever, chills, change in weight  INTEGUMENTARY/SKIN: NEGATIVE for worrisome rashes, moles or lesions  EYES: NEGATIVE for vision changes or irritation  ENT/MOUTH: NEGATIVE for ear, mouth and throat problems  RESP: NEGATIVE for significant cough or SOB  CV: NEGATIVE for chest pain, palpitations or peripheral edema  GI: NEGATIVE for nausea, abdominal pain, heartburn, or change in bowel habits  : NEGATIVE for frequency, dysuria, or hematuria  MUSCULOSKELETAL: NEGATIVE for significant arthralgias or myalgia  NEURO: NEGATIVE for weakness, dizziness or paresthesias  ENDOCRINE: NEGATIVE for temperature intolerance, skin/hair changes  HEME: NEGATIVE for bleeding problems  PSYCHIATRIC: NEGATIVE for changes in mood or affect    Patient Active Problem List    Diagnosis Date Noted     Idiopathic neuropathy 01/12/2022     Priority: Medium     Other chronic pain 02/05/2020     Priority: Medium     H/O wheezing 02/08/2019     Priority: Medium     Obesity (BMI 35.0-39.9) with comorbidity (H) 11/30/2018     Priority: Medium     Benign essential hypertension 05/29/2018     Priority: Medium     Hypothyroidism, unspecified type 05/29/2018     Priority: Medium     Environmental allergies 05/29/2018     Priority: Medium      Past Medical History:   Diagnosis Date     Arthritis right knee     Environmental allergies      HTN (hypertension)      Hypothyroidism      Past Surgical History:   Procedure Laterality Date     BACK SURGERY  June, 1998    Cervical 5,6,7 bone fusion     COLONOSCOPY  1998, 2007     FOOT SURGERY       MAMMOPLASTY REDUCTION BILATERAL       SURGERY GENERAL IP CONSULT      cervical bone fusion, anterior approach     TUBAL LIGATION       ZZC LAP,APPENDIX UNLISTED PROCED       Current Outpatient Medications   Medication Sig Dispense Refill     buprenorphine (BUTRANS) 10 MCG/HR WK patch Place 1 patch onto the  skin every 7 days 4 patch 3     Cetirizine HCl (ZYRTEC ALLERGY PO) Take 5 mg by mouth daily       chlorthalidone (HYGROTON) 50 MG tablet Take 1 tablet (50 mg) by mouth daily 90 tablet 1     HYDROcodone-acetaminophen (NORCO) 5-325 MG tablet Take 1 tablet by mouth every 8 hours as needed for severe pain 90 tablet 0     HYDROmorphone (DILAUDID) 2 MG tablet Take 1 tablet (2 mg) by mouth every 6 hours as needed for pain 28 tablet 0     levothyroxine (SYNTHROID/LEVOTHROID) 75 MCG tablet Take 1 tablet (75 mcg) by mouth daily 90 tablet 0     medical cannabis (Patient's own supply) (The purpose of this order is to document that the patient reports taking medical cannabis.  This is not a prescription, and is not used to certify that the patient has a qualifying medical condition.) 0 Information only 0     metoprolol succinate ER (TOPROL XL) 50 MG 24 hr tablet Take 1 tablet (50 mg) by mouth daily 90 tablet 1     naloxone (NARCAN) 4 MG/0.1ML nasal spray Spray 1 spray (4 mg) into one nostril alternating nostrils once as needed for opioid reversal every 2-3 minutes until assistance arrives 0.2 mL 1     vitamin B complex with vitamin C (STRESS TAB) tablet Take 1 tablet by mouth daily  0     VITAMIN D, CHOLECALCIFEROL, PO Take 5,000 Units by mouth daily         No Known Allergies     Social History     Tobacco Use     Smoking status: Former Smoker     Packs/day: 0.50     Years: 2.00     Pack years: 1.00     Types: Cigarettes     Start date: 1974     Quit date: 10/19/1976     Years since quittin.8     Smokeless tobacco: Never Used     Tobacco comment: Very light smoker. Grew up with and worked in 2nd hand smoke for years   Substance Use Topics     Alcohol use: Yes     Comment: OCCASIONAL     Family History   Problem Relation Age of Onset     Hypertension Mother      Anxiety Disorder Mother      Skin Cancer Father      Lung Cancer Father         smoker     Anxiety Disorder Daughter      Diabetes No family hx of      Colon  Cancer No family hx of      Breast Cancer No family hx of      History   Drug Use Unknown     Comment: medical         Objective     BP (!) 150/82   Pulse 60   Temp 97.1  F (36.2  C) (Tympanic)   Resp 16   Wt 85.4 kg (188 lb 4.8 oz)   SpO2 98%   BMI 29.49 kg/m      Physical Exam    GENERAL APPEARANCE: healthy, alert and no acute distress     EYES: EOMI, PERRL      HENT: ear canals and TM's normal and nose and mouth without ulcers or lesions     NECK: no adenopathy, no asymmetry, masses, or scars and thyroid normal to palpation     RESP: lungs clear to auscultation - no rales, rhonchi or wheezes     CV: regular rates and rhythm, normal S1 S2, no S3 or S4 and no murmur, click or rub     ABDOMEN:  soft, nontender, no HSM or masses and bowel sounds normal     MS: extremities normal- no gross deformities noted aside from left leg pain affecting gait, no evidence of inflammation in joints, FROM in all extremities.     SKIN: no suspicious lesions or rashes     NEURO: decreased strength and tone, sensory exam abormal-radiating pain into left leg. Patient very uncomfortable. Unable to get up onto exam table. Normal speech     PSYCH: mentation appears normal. and affect normal     LYMPHATICS: No cervical adenopathy    Recent Labs   Lab Test 06/16/22  1313 05/27/21  0955    135   POTASSIUM 3.6 3.5   CR 0.51* 0.61        Diagnostics:  Labs pending at this time.  Results will be reviewed when available.   EKG required for HTN and not completed in the last 90 days.     Revised Cardiac Risk Index (RCRI):  The patient has the following serious cardiovascular risks for perioperative complications:   - No serious cardiac risks = 0 points     RCRI Interpretation: 0 points: Class I (very low risk - 0.4% complication rate)           Signed Electronically by:   FARIBA Maloney, NP-C  [facility]  Copy of this evaluation report is provided to requesting physician.

## 2022-08-01 NOTE — Clinical Note
Please fax recent labs and Pre-op to [phone], Dr. Patton. Thank you, FARIBA Maloney, NP-C [facility]

## 2022-08-04 DIAGNOSIS — I10 BENIGN ESSENTIAL HYPERTENSION: ICD-10-CM

## 2022-08-05 ENCOUNTER — LAB (OUTPATIENT)
Dept: LAB | Facility: CLINIC | Age: 67
End: 2022-08-05
Payer: COMMERCIAL

## 2022-08-05 DIAGNOSIS — Z20.822 ENCOUNTER FOR LABORATORY TESTING FOR COVID-19 VIRUS: ICD-10-CM

## 2022-08-05 PROCEDURE — U0003 INFECTIOUS AGENT DETECTION BY NUCLEIC ACID (DNA OR RNA); SEVERE ACUTE RESPIRATORY SYNDROME CORONAVIRUS 2 (SARS-COV-2) (CORONAVIRUS DISEASE [COVID-19]), AMPLIFIED PROBE TECHNIQUE, MAKING USE OF HIGH THROUGHPUT TECHNOLOGIES AS DESCRIBED BY CMS-2020-01-R: HCPCS

## 2022-08-05 PROCEDURE — U0005 INFEC AGEN DETEC AMPLI PROBE: HCPCS

## 2022-08-05 RX ORDER — CHLORTHALIDONE 50 MG/1
50 TABLET ORAL DAILY
Qty: 90 TABLET | Refills: 1 | Status: SHIPPED | OUTPATIENT
Start: 2022-08-05 | End: 2023-03-29

## 2022-08-06 LAB — SARS-COV-2 RNA RESP QL NAA+PROBE: NEGATIVE

## 2022-08-10 ENCOUNTER — MYC MEDICAL ADVICE (OUTPATIENT)
Dept: FAMILY MEDICINE | Facility: CLINIC | Age: 67
End: 2022-08-10

## 2022-08-10 DIAGNOSIS — I10 BENIGN ESSENTIAL HYPERTENSION: Primary | ICD-10-CM

## 2022-08-10 NOTE — TELEPHONE ENCOUNTER
Provider:  Please see MyChart message from the patient.  Did you want some sort of visit for this? Last seen 8/1/2022 for a pre-op.Thank you. Kate Plata R.N.:

## 2022-08-11 RX ORDER — LOSARTAN POTASSIUM 25 MG/1
25 TABLET ORAL DAILY
Qty: 30 TABLET | Refills: 0 | Status: SHIPPED | OUTPATIENT
Start: 2022-08-11 | End: 2022-09-07

## 2022-08-11 NOTE — TELEPHONE ENCOUNTER
RN calling patient to relay provider message below.     Patient verbalized understanding.     RN scheduled patient for follow up virtual visit with provider on 9/8/22 at 5 pm.     Maddi Oden RN  [facility]

## 2022-08-11 NOTE — TELEPHONE ENCOUNTER
Patient called and I gave her Ruth Ochoa's message and she states she just needs a change of medication and she is in pain and needs to talk to someone. She does not want her blood pressure to get low and have a stroke.  Transferred call to the Jason Carr nurse.  Tayler Roche MA  [facility]  2nd Floor  Primary Care

## 2022-08-11 NOTE — TELEPHONE ENCOUNTER
Will send losartan 25 mg once a day, do not see any drug-drug interactions. She should monitor her BP and HR closely at home (twice a day for 3-4 days then daily), this should not affect her HR though. Follow up with NP in a month for blood pressure check, virtual visit okay. Let NP know if any further questions.  Thank you,  FARIBA Maloney, NP-C  [facility]

## 2022-08-11 NOTE — TELEPHONE ENCOUNTER
Pt calling to get another BP mediation sine the hospital told her that she cannot take metoprolol. Pt states that she is scared she is going to have a stroke and wants to get back on medication.     Relayed provider's messae bellow that a visit is needed and pt got upset. Said that she does not understand why she needs a visit.      Routing to provider.      Leslie Garsia RN  [facility]

## 2022-08-14 ASSESSMENT — ANXIETY QUESTIONNAIRES
7. FEELING AFRAID AS IF SOMETHING AWFUL MIGHT HAPPEN: NOT AT ALL
1. FEELING NERVOUS, ANXIOUS, OR ON EDGE: NOT AT ALL
IF YOU CHECKED OFF ANY PROBLEMS ON THIS QUESTIONNAIRE, HOW DIFFICULT HAVE THESE PROBLEMS MADE IT FOR YOU TO DO YOUR WORK, TAKE CARE OF THINGS AT HOME, OR GET ALONG WITH OTHER PEOPLE: NOT DIFFICULT AT ALL
5. BEING SO RESTLESS THAT IT IS HARD TO SIT STILL: NOT AT ALL
GAD7 TOTAL SCORE: 2
3. WORRYING TOO MUCH ABOUT DIFFERENT THINGS: NOT AT ALL
7. FEELING AFRAID AS IF SOMETHING AWFUL MIGHT HAPPEN: NOT AT ALL
2. NOT BEING ABLE TO STOP OR CONTROL WORRYING: NOT AT ALL
4. TROUBLE RELAXING: SEVERAL DAYS
8. IF YOU CHECKED OFF ANY PROBLEMS, HOW DIFFICULT HAVE THESE MADE IT FOR YOU TO DO YOUR WORK, TAKE CARE OF THINGS AT HOME, OR GET ALONG WITH OTHER PEOPLE?: NOT DIFFICULT AT ALL
6. BECOMING EASILY ANNOYED OR IRRITABLE: SEVERAL DAYS
GAD7 TOTAL SCORE: 2

## 2022-08-14 ASSESSMENT — PAIN SCALES - PAIN ENJOYMENT GENERAL ACTIVITY SCALE (PEG)
INTERFERED_GENERAL_ACTIVITY: 10
INTERFERED_GENERAL_ACTIVITY: 10 - COMPLETELY INTERFERES
PEG_TOTALSCORE: 8.67
AVG_PAIN_PASTWEEK: 7
PEG_TOTALSCORE: 8.67
INTERFERED_ENJOYMENT_LIFE: 9
INTERFERED_ENJOYMENT_LIFE: 9
AVG_PAIN_PASTWEEK: 7

## 2022-08-15 ENCOUNTER — MYC MEDICAL ADVICE (OUTPATIENT)
Dept: ANESTHESIOLOGY | Facility: CLINIC | Age: 67
End: 2022-08-15
Payer: COMMERCIAL

## 2022-08-15 PROCEDURE — 99207 PR NO BILLABLE SERVICE THIS VISIT: CPT

## 2022-08-17 ENCOUNTER — OFFICE VISIT (OUTPATIENT)
Dept: ANESTHESIOLOGY | Facility: CLINIC | Age: 67
End: 2022-08-17
Payer: COMMERCIAL

## 2022-08-17 VITALS — SYSTOLIC BLOOD PRESSURE: 122 MMHG | HEART RATE: 76 BPM | DIASTOLIC BLOOD PRESSURE: 71 MMHG | OXYGEN SATURATION: 97 %

## 2022-08-17 DIAGNOSIS — Z79.891 OPIOID CONTRACT EXISTS: Primary | ICD-10-CM

## 2022-08-17 LAB — CREAT UR-MCNC: 254 MG/DL

## 2022-08-17 PROCEDURE — 99000 SPECIMEN HANDLING OFFICE-LAB: CPT | Performed by: PATHOLOGY

## 2022-08-17 PROCEDURE — 99213 OFFICE O/P EST LOW 20 MIN: CPT | Mod: GC | Performed by: ANESTHESIOLOGY

## 2022-08-17 PROCEDURE — 80307 DRUG TEST PRSMV CHEM ANLYZR: CPT | Mod: 90 | Performed by: PATHOLOGY

## 2022-08-17 ASSESSMENT — PAIN SCALES - GENERAL: PAINLEVEL: EXTREME PAIN (8)

## 2022-08-17 NOTE — LETTER
Opioid / Opioid Plus Controlled Substance Agreement    This is an agreement between you and your provider about the safe and appropriate use of controlled substance/opioids prescribed by your care team. Controlled substances are medicines that can cause physical and mental dependence (abuse).    There are strict laws about having and using these medicines. We here at [facility] are committing to working with you in your efforts to get better. To support you in this work, we ll help you schedule regular office appointments for medicine refills. If we must cancel or change your appointment for any reason, we ll make sure you have enough medicine to last until your next appointment.     As a Provider, I will:    Listen carefully to your concerns and treat you with respect.     Recommend a treatment plan that I believe is in your best interest. This plan may involve therapies other than opioid pain medication.     Talk with you often about the possible benefits, and the risk of harm of any medicine that we prescribe for you.     Provide a plan on how to taper (discontinue or go off) using this medicine if the decision is made to stop its use.    As a Patient, I understand that opioid(s):     Are a controlled substance prescribed by my care team to help me function or work and manage my condition(s).     Are strong medicines and can cause serious side effects such as:    Drowsiness, which can seriously affect my driving ability    A lower breathing rate, enough to cause death    Harm to my thinking ability     Depression     Abuse of and addiction to this medicine    Need to be taken exactly as prescribed. Combining opioids with certain medicines or chemicals (such as illegal drugs, sedatives, sleeping pills, and benzodiazepines) can be dangerous or even fatal. If I stop opioids suddenly, I may have severe withdrawal symptoms.    Do not work for all types of pain nor for all patients. If they re not helpful, I may  be asked to stop them.      The risks, benefits and side effects of these medicine(s) were explained to me. I agree that:  1. I will take part in other treatments as advised by my care team. This may be psychiatry or counseling, physical therapy, behavioral therapy, group treatment or a referral to a specialist.     2. I will keep all my appointments. I understand that this is part of the monitoring of opioids. My care team may require an office visit for EVERY opioid/controlled substance refill. If I miss appointments or don t follow instructions, my care team may stop my medicine.    3. I will take my medicines as prescribed. I will not change the dose or schedule unless my care team tells me to. There will be no refills if I run out early.     4. I may be asked to come to the clinic and complete a urine drug test or complete a pill count at any time. If I don t give a urine sample or participate in a pill count, the care team may stop my medicine.    5. I will only receive prescriptions from this clinic for chronic pain. If I am treated by another provider for acute pain issues, I will tell them that I am taking opioid pain medication for chronic pain and that I have a treatment agreement with this provider. I will inform my [facility] care team within one business day if I am given a prescription for any pain medication by another healthcare provider. My [facility] care team can contact other providers and pharmacists about my use of any medicines.    6. It is up to me to make sure that I don t run out of my medicines on weekends or holidays. If my care team is willing to refill my opioid prescription without a visit, I must request refills only during office hours. Refills may take up to 3 business days to process. I will use one pharmacy to fill all my opioid and other controlled substance prescriptions. I will notify the clinic about any changes to my insurance or medication  availability.    7. I am responsible for my prescriptions. If the medicine/prescription is lost, stolen or destroyed, it will not be replaced. I also agree not to share controlled substance medicines with anyone.    8. I am aware I should not use any illegal or recreational drugs. I agree not to drink alcohol unless my care team says I can.       9. If I enroll in the [place] Medical Cannabis program, I will tell my care team prior to my next refill.     10. I will tell my care team right away if I become pregnant, have a new medical problem treated outside of my regular clinic, or have a change in my medications.    11. I understand that this medicine can affect my thinking, judgment and reaction time. Alcohol and drugs affect the brain and body, which can affect the safety of my driving. Being under the influence of alcohol or drugs can affect my decision-making, behaviors, personal safety, and the safety of others. Driving while impaired (DWI) can occur if a person is driving, operating, or in physical control of a car, motorcycle, boat, snowmobile, ATV, motorbike, off-road vehicle, or any other motor vehicle ([place] Statute 169A.20). I understand the risk if I choose to drive or operate any vehicle or machinery.    I understand that if I do not follow any of the conditions above, my prescriptions or treatment may be stopped or changed.          Opioids  What You Need to Know    What are opioids?   Opioids are pain medicines that must be prescribed by a doctor. They are also known as narcotics.     Examples are:   1. morphine (MS Contin, Silvana)  2. oxycodone (Oxycontin)  3. oxycodone and acetaminophen (Percocet)  4. hydrocodone and acetaminophen (Vicodin, Norco)   5. fentanyl patch (Duragesic)   6. hydromorphone (Dilaudid)   7. methadone  8. codeine (Tylenol #3)     What do opioids do well?   Opioids are best for severe short-term pain such as after a surgery or injury. They may work well for cancer pain. They may  help some people with long-lasting (chronic) pain.     What do opioids NOT do well?   Opioids never get rid of pain entirely, and they don t work well for most patients with chronic pain. Opioids don t reduce swelling, one of the causes of pain.                                    Other ways to manage chronic pain and improve function include:       Treat the health problem that may be causing pain    Anti-inflammation medicines, which reduce swelling and tenderness, such as ibuprofen (Advil, Motrin) or naproxen (Aleve)    Acetaminophen (Tylenol)    Antidepressants and anti-seizure medicines, especially for nerve pain    Topical treatments such as patches or creams    Injections or nerve blocks    Chiropractic or osteopathic treatment    Acupuncture, massage, deep breathing, meditation, visual imagery, aromatherapy    Use heat or ice at the pain site    Physical therapy     Exercise    Stop smoking    Take part in therapy       Risks and side effects     Talk to your doctor before you start or decide to keep taking opioids. Possible side effects include:      Lowering your breathing rate enough to cause death    Overdose, including death, especially if taking higher than prescribed doses    Worse depression symptoms; less pleasure in things you usually enjoy    Feeling tired or sluggish    Slower thoughts or cloudy thinking    Being more sensitive to pain over time; pain is harder to control    Trouble sleeping or restless sleep    Changes in hormone levels (for example, less testosterone)    Changes in sex drive or ability to have sex    Constipation    Unsafe driving    Itching and sweating    Dizziness    Nausea, throwing up and dry mouth    What else should I know about opioids?    Opioids may lead to dependence, tolerance, or addiction.      Dependence means that if you stop or reduce the medicine too quickly, you will have withdrawal symptoms. These include loose poop (diarrhea), jitters, flu-like symptoms,  nervousness and tremors. Dependence is not the same as addiction.                       Tolerance means needing higher doses over time to get the same effect. This may increase the chance of serious side effects.      Addiction is when people improperly use a substance that harms their body, their mind or their relations with others. Use of opiates can cause a relapse of addiction if you have a history of drug or alcohol abuse.      People who have used opioids for a long time may have a lower quality of life, worse depression, higher levels of pain and more visits to doctors.    You can overdose on opioids. Take these steps to lower your risk of overdose:    1. Recognize the signs:  Signs of overdose include decrease or loss of consciousness (blackout), slowed breathing, trouble waking up and blue lips. If someone is worried about overdose, they should call 911.    2. Talk to your doctor about Narcan (naloxone).   If you are at risk for overdose, you may be given a prescription for Narcan. This medicine very quickly reverses the effects of opioids.   If you overdose, a friend or family member can give you Narcan while waiting for the ambulance. They need to know the signs of overdose and how to give Narcan.     3. Don't use alcohol or street drugs.   Taking them with opioids can cause death.    4. Do not take any of these medicines unless your doctor says it s OK. Taking these with opioids can cause death:    Benzodiazepines, such as lorazepam (Ativan), alprazolam (Xanax) or diazepam (Valium)    Muscle relaxers, such as cyclobenzaprine (Flexeril)    Sleeping pills like zolpidem (Ambien)     Other opioids      How to keep you and other people safe while taking opioids:    1. Never share your opioids with others.  Opioid medicines are regulated by the Drug Enforcement Agency ([place]). Selling or sharing medications is a criminal act.    2. Be sure to store opioids in a secure place, locked up if possible. Young children  can easily swallow them and overdose.    3. When you are traveling with your medicines, keep them in the original bottles. If you use a pill box, be sure you also carry a copy of your medicine list from your clinic or pharmacy.    4. Safe disposal of opioids    Most pharmacies have places to get rid of medicine, called disposal kiosks. Medicine disposal options are also available in every [facility]. Search your county and  medication disposal  to find more options. You can find more details at:  [email or web address]-[email or web address]-[email or web address]-medications     I agree that my provider, clinic care team, and pharmacy may work with any city, state or federal law enforcement agency that investigates the misuse, sale, or other diversion of my controlled medicine. I will allow my provider to discuss my care with, or share a copy of, this agreement with any other treating provider, pharmacy or emergency room where I receive care.    I have read this agreement and have asked questions about anything I did not understand.    _______________________________________________________  Patient Signature - Hanna Campo _____________________                   Date     _______________________________________________________  Provider Signature - Lisa Morales MD   _____________________                   Date     _______________________________________________________  Witness Signature (required if provider not present while patient signing)   _____________________                   Date

## 2022-08-17 NOTE — PROGRESS NOTES
[facility]        Interval History as of 8/17/22    The patient was last seen by Dr. Morales on 7/22/22. At the time we had been discussing options for peripheral neuropathy management and were to have a future discussion regarding spinal cord stimulation.  Since that time, the patient had a discektomy on 8/9/22 and has post surgical pain, but now has no more pain from L4-L5.  She is no longer taking her additional pain medications and is back to her scheduled medication.  She has 3 muscle relaxers remaining.  She also takes Medical Cannabis and tylenol  The dilaudid was unpleasant for her but it took care of her nerve pain through her recovery.    The pain in her feet and hands remain and are controlled on butrans patch and hydrocodone-acetaminophen as needed.She does need it every 8 hours.  Her pain is otherwise controlled and she is not interested in a new procedure at this time.  She is looking to start her HEP as she recovers from surgery.    Her current surgical pain is just at her incision.   She is wearing a binder and her incision is sealed.   Her post op appointment is this coming Friday 8/29/22.        Date of visit: 7/22/2022    Chief complaint:   Chief Complaint   Patient presents with     Follow Up     Follow-up RM 8 Lower Back Pain Level 7     Medications:  Current Outpatient Medications   Medication Sig Dispense Refill     buprenorphine (BUTRANS) 10 MCG/HR WK patch Place 1 patch onto the skin every 7 days 4 patch 3     Cetirizine HCl (ZYRTEC ALLERGY PO) Take 5 mg by mouth daily       chlorthalidone (HYGROTON) 50 MG tablet Take 1 tablet (50 mg) by mouth daily 90 tablet 1     HYDROcodone-acetaminophen (NORCO) 5-325 MG tablet Take 1 tablet by mouth every 8 hours as needed for severe pain 90 tablet 0     HYDROmorphone (DILAUDID) 2 MG tablet Take 1 tablet (2 mg) by mouth every 6 hours as needed for pain 28 tablet 0     levothyroxine (SYNTHROID/LEVOTHROID) 75 MCG tablet Take 1 tablet  (75 mcg) by mouth daily 90 tablet 0     losartan (COZAAR) 25 MG tablet Take 1 tablet (25 mg) by mouth daily 30 tablet 0     medical cannabis (Patient's own supply) (The purpose of this order is to document that the patient reports taking medical cannabis.  This is not a prescription, and is not used to certify that the patient has a qualifying medical condition.) 0 Information only 0     naloxone (NARCAN) 4 MG/0.1ML nasal spray Spray 1 spray (4 mg) into one nostril alternating nostrils once as needed for opioid reversal every 2-3 minutes until assistance arrives 0.2 mL 1     vitamin B complex with vitamin C (STRESS TAB) tablet Take 1 tablet by mouth daily  0     VITAMIN D, CHOLECALCIFEROL, PO Take 5,000 Units by mouth daily         Medical History: any changes in medical history since they were last seen? No    Review of Systems:  Other than pain and numbness in hands and feet, and surgical pain at her back incision, she denies other symptoms or concerns.    Answers for HPI/ROS submitted by the patient on 8/14/2022  GIRISH 7 TOTAL SCORE: 2      Physical Exam:   Blood pressure 122/71, pulse 76, SpO2 97 %, not currently breastfeeding.  General: AAOx3, NAD  Gait: Patient is standing throughout the visit due to back pain, normal posture    Assessment:   Acute Lumbar Radiculopathy  Idiopathic Peripheral Neuropathy  Status post L4-L5 discektomy    Hanna Campo is a 67 year old female who is seen at the pain clinic as a follow up for management of her radicular symptoms now s/p L4-5 discectomy on 8/9/22. She reports complete relief of her radicular symptoms and now is back to her baseline pain from peripheral neuropathy in her hands and feet. She is back to her previous pain medication regimen of butrans patch and norco as needed. She is not interested in further intervention at this time, but will discuss spinal cord stimulator trial in the future if needed.    Plan:  1. Physical Therapy:  As tolerated daily  HEPs  2. Clinical Health Psychologist to address issues of relaxation, behavioral change, coping style, and other factors important to improvement.  Not at this time  3. Urine drug screen: completed today  4. Medication Management:  Agree with resumption of Norco and butrans patch, is no longer taking dilaudid that had been used during acute pain period. She successfully weaned herself back down to appropriate dose; UDS collected today  5. Follow up:  in 2-3 months. Video is okay.     The patient's assessment and plan was discussed with my attending physician Dr. Morales.    Robbin Reynolds DO  Pain medicine fellow    I saw and examined the patient with the Pain Fellow/Resident. I have reviewed and agree with the resident's note and plan of care and made changes and corrections directly to the body of the note.    TIME SPENT:  BY FELLOW/RESIDENT ALONE 15 MIN  BY MYSELF WITHOUT THE FELLOW/RESIDENT 15 MIN    These times included more than 50% time spent counseling her about her diagnosis and treatment options and coordination of care with the primary team. This also includes time for chart review, preparation, and documentation.     Lisa Morales MD  Pain Medicine, Department of Anesthesiology  , [facility]

## 2022-08-17 NOTE — PATIENT INSTRUCTIONS
Medications:    Please let us know when you are due for refills.     *For refills of opioid medications - You MUST call (Or MyChart) the clinic DIRECTLY and at least 7 days before you run out of your medication.        Treatment planning:    Urine Drug Screen completed today.        Recommended Follow up:      Follow up in 2-3 months. Video is okay.          Please call [phone] to schedule your clinic appointment if you don't already have an appointment scheduled.        To speak with a nurse, schedule/reschedule/cancel a clinic appointment, or request a medication refill call: [phone]    You can also reach us by Angella Joy: [email or web address]

## 2022-08-17 NOTE — LETTER
8/17/2022       RE: Hanna Campo  [address]  [place] [address]     Dear Colleague,    Thank you for referring your patient, Hanna Campo, to the [facility] [place] at [facility]. Please see a copy of my visit note below.    [facility]        Interval History as of 8/17/22    The patient was last seen by Dr. Morales on 7/22/22. At the time we had been discussing options for peripheral neuropathy management and were to have a future discussion regarding spinal cord stimulation.  Since that time, the patient had a discektomy on 8/9/22 and has post surgical pain, but now has no more pain from L4-L5.  She is no longer taking her additional pain medications and is back to her scheduled medication.  She has 3 muscle relaxers remaining.  She also takes Medical Cannabis and tylenol  The dilaudid was unpleasant for her but it took care of her nerve pain through her recovery.    The pain in her feet and hands remain and are controlled on butrans patch and hydrocodone-acetaminophen as needed.She does need it every 8 hours.  Her pain is otherwise controlled and she is not interested in a new procedure at this time.  She is looking to start her HEP as she recovers from surgery.    Her current surgical pain is just at her incision.   She is wearing a binder and her incision is sealed.   Her post op appointment is this coming Friday 8/29/22.        Date of visit: 7/22/2022    Chief complaint:   Chief Complaint   Patient presents with     Follow Up     Follow-up RM 8 Lower Back Pain Level 7     Medications:  Current Outpatient Medications   Medication Sig Dispense Refill     buprenorphine (BUTRANS) 10 MCG/HR WK patch Place 1 patch onto the skin every 7 days 4 patch 3     Cetirizine HCl (ZYRTEC ALLERGY PO) Take 5 mg by mouth daily       chlorthalidone (HYGROTON) 50 MG tablet Take 1 tablet (50 mg)  by mouth daily 90 tablet 1     HYDROcodone-acetaminophen (NORCO) 5-325 MG tablet Take 1 tablet by mouth every 8 hours as needed for severe pain 90 tablet 0     HYDROmorphone (DILAUDID) 2 MG tablet Take 1 tablet (2 mg) by mouth every 6 hours as needed for pain 28 tablet 0     levothyroxine (SYNTHROID/LEVOTHROID) 75 MCG tablet Take 1 tablet (75 mcg) by mouth daily 90 tablet 0     losartan (COZAAR) 25 MG tablet Take 1 tablet (25 mg) by mouth daily 30 tablet 0     medical cannabis (Patient's own supply) (The purpose of this order is to document that the patient reports taking medical cannabis.  This is not a prescription, and is not used to certify that the patient has a qualifying medical condition.) 0 Information only 0     naloxone (NARCAN) 4 MG/0.1ML nasal spray Spray 1 spray (4 mg) into one nostril alternating nostrils once as needed for opioid reversal every 2-3 minutes until assistance arrives 0.2 mL 1     vitamin B complex with vitamin C (STRESS TAB) tablet Take 1 tablet by mouth daily  0     VITAMIN D, CHOLECALCIFEROL, PO Take 5,000 Units by mouth daily         Medical History: any changes in medical history since they were last seen? No    Review of Systems:  Other than pain and numbness in hands and feet, and surgical pain at her back incision, she denies other symptoms or concerns.    Answers for HPI/ROS submitted by the patient on 8/14/2022  GIRISH 7 TOTAL SCORE: 2      Physical Exam:   Blood pressure 122/71, pulse 76, SpO2 97 %, not currently breastfeeding.  General: AAOx3, NAD  Gait: Patient is standing throughout the visit due to back pain, normal posture    Assessment:   Acute Lumbar Radiculopathy  Idiopathic Peripheral Neuropathy  Status post L4-L5 discektomy    Hanna Campo is a 67 year old female who is seen at the pain clinic as a follow up for management of her radicular symptoms now s/p L4-5 discectomy on 8/9/22. She reports complete relief of her radicular symptoms and now is back to her  baseline pain from peripheral neuropathy in her hands and feet. She is back to her previous pain medication regimen of butrans patch and norco as needed. She is not interested in further intervention at this time, but will discuss spinal cord stimulator trial in the future if needed.    Plan:  1. Physical Therapy:  As tolerated daily HEPs  2. Clinical Health Psychologist to address issues of relaxation, behavioral change, coping style, and other factors important to improvement.  Not at this time  3. Urine drug screen: completed today  4. Medication Management:  Agree with resumption of Norco and butrans patch, is no longer taking dilaudid that had been used during acute pain period. She successfully weaned herself back down to appropriate dose; UDS collected today  5. Follow up:  in 2-3 months. Video is okay.     The patient's assessment and plan was discussed with my attending physician Dr. Morales.    Robbin Reynolds DO  Pain medicine fellow    I saw and examined the patient with the Pain Fellow/Resident. I have reviewed and agree with the resident's note and plan of care and made changes and corrections directly to the body of the note.    TIME SPENT:  BY FELLOW/RESIDENT ALONE 15 MIN  BY MYSELF WITHOUT THE FELLOW/RESIDENT 15 MIN    These times included more than 50% time spent counseling her about her diagnosis and treatment options and coordination of care with the primary team. This also includes time for chart review, preparation, and documentation.     Lisa Morales MD  Pain Medicine, Department of Anesthesiology  , [facility]              Again, thank you for allowing me to participate in the care of your patient.      Sincerely,    Lisa Morales MD

## 2022-08-17 NOTE — NURSING NOTE
Patient presents with:  Follow Up: Follow-up RM 8 Lower Back Pain Level 7      Extreme Pain (8)     Pain Medications     Opioid Combinations Refills Start End     HYDROcodone-acetaminophen (NORCO) 5-325 MG tablet    0 6/23/2022     Sig - Route: Take 1 tablet by mouth every 8 hours as needed for severe pain - Oral    Class: E-Prescribe    Earliest Fill Date: 6/23/2022    No prior authorization was found for this prescription.    Found prior authorization for another prescription for the same medication: Closed - Prior Authorization not required for patient/medication    Opioid Agonists Refills Start End     HYDROmorphone (DILAUDID) 2 MG tablet    0 7/28/2022     Sig - Route: Take 1 tablet (2 mg) by mouth every 6 hours as needed for pain - Oral    Class: E-Prescribe    Earliest Fill Date: 7/28/2022    Opioid Partial Agonists Refills Start End     buprenorphine (BUTRANS) 10 MCG/HR WK patch    3 7/16/2022     Sig - Route: Place 1 patch onto the skin every 7 days - Transdermal    Class: E-Prescribe    No prior authorization was found for this prescription.    Found prior authorization for another prescription for the same medication: Closed - Prior Authorization duplicate/in process          What medications are you using for pain? Hydrocodone, Buprenorphine    (New patients only) Have you been seen by another pain clinic/ provider? no    (Return Patients only) What refills are you needing today? no

## 2022-08-17 NOTE — LETTER
Date:August 23, 2022      Provider requested that no letter be sent. Do not send.       [facility]

## 2022-08-20 LAB
BUPRENORPHINE UR CFM-MCNC: 29 NG/ML
BUPRENORPHINE/CREAT UR: 11 NG/MG {CREAT}
DHC UR CFM-MCNC: 1360 NG/ML
DHC/CREAT UR: 535 NG/MG {CREAT}
HYDROCODONE UR CFM-MCNC: 4100 NG/ML
HYDROCODONE/CREAT UR: 1614 NG/MG {CREAT}
HYDROMORPHONE UR CFM-MCNC: 1140 NG/ML
HYDROMORPHONE/CREAT UR: 449 NG/MG {CREAT}
NORBUPRENORPHINE UR CFM-MCNC: 46 NG/ML
NORBUPRENORPHINE/CREAT UR: 18 NG/MG {CREAT}

## 2022-08-24 ENCOUNTER — MYC REFILL (OUTPATIENT)
Dept: ANESTHESIOLOGY | Facility: CLINIC | Age: 67
End: 2022-08-24

## 2022-08-24 DIAGNOSIS — G60.9 IDIOPATHIC PERIPHERAL NEUROPATHY: ICD-10-CM

## 2022-08-24 DIAGNOSIS — M79.672 PAIN IN BOTH FEET: ICD-10-CM

## 2022-08-24 DIAGNOSIS — M79.671 PAIN IN BOTH FEET: ICD-10-CM

## 2022-08-25 NOTE — TELEPHONE ENCOUNTER
Refill request    Medication:       HYDROcodone-acetaminophen (NORCO) 5-325 MG tablet       Last clinic appointment: 8/17/22  Next clinic appointment: 11/17/22    Last Drug Screen Collected: 8/17/22  Controlled Substance Agreement signed: 4/22/22      Preferred pharmacy:    [id number] - [place] - [address]

## 2022-08-31 RX ORDER — HYDROCODONE BITARTRATE AND ACETAMINOPHEN 5; 325 MG/1; MG/1
1 TABLET ORAL EVERY 8 HOURS PRN
Qty: 90 TABLET | Refills: 0 | Status: SHIPPED | OUTPATIENT
Start: 2022-08-31 | End: 2022-09-24

## 2022-09-01 ENCOUNTER — OFFICE VISIT (OUTPATIENT)
Dept: URGENT CARE | Facility: URGENT CARE | Age: 67
End: 2022-09-01
Payer: COMMERCIAL

## 2022-09-01 VITALS
SYSTOLIC BLOOD PRESSURE: 126 MMHG | OXYGEN SATURATION: 95 % | DIASTOLIC BLOOD PRESSURE: 80 MMHG | TEMPERATURE: 97.2 F | HEART RATE: 95 BPM | RESPIRATION RATE: 18 BRPM

## 2022-09-01 DIAGNOSIS — J18.9 PNEUMONIA OF RIGHT LUNG DUE TO INFECTIOUS ORGANISM, UNSPECIFIED PART OF LUNG: ICD-10-CM

## 2022-09-01 DIAGNOSIS — R93.89 ABNORMAL CXR: ICD-10-CM

## 2022-09-01 DIAGNOSIS — R05.9 COUGH: Primary | ICD-10-CM

## 2022-09-01 LAB — SARS-COV-2 RNA RESP QL NAA+PROBE: NEGATIVE

## 2022-09-01 PROCEDURE — U0005 INFEC AGEN DETEC AMPLI PROBE: HCPCS | Performed by: FAMILY MEDICINE

## 2022-09-01 PROCEDURE — 99215 OFFICE O/P EST HI 40 MIN: CPT | Mod: CS | Performed by: FAMILY MEDICINE

## 2022-09-01 PROCEDURE — U0003 INFECTIOUS AGENT DETECTION BY NUCLEIC ACID (DNA OR RNA); SEVERE ACUTE RESPIRATORY SYNDROME CORONAVIRUS 2 (SARS-COV-2) (CORONAVIRUS DISEASE [COVID-19]), AMPLIFIED PROBE TECHNIQUE, MAKING USE OF HIGH THROUGHPUT TECHNOLOGIES AS DESCRIBED BY CMS-2020-01-R: HCPCS | Performed by: FAMILY MEDICINE

## 2022-09-01 RX ORDER — AZITHROMYCIN 250 MG/1
TABLET, FILM COATED ORAL
Qty: 6 TABLET | Refills: 0 | Status: SHIPPED | OUTPATIENT
Start: 2022-09-01 | End: 2022-09-08

## 2022-09-01 RX ORDER — ALBUTEROL SULFATE 90 UG/1
2 AEROSOL, METERED RESPIRATORY (INHALATION) ONCE
Status: COMPLETED | OUTPATIENT
Start: 2022-09-01 | End: 2022-09-01

## 2022-09-01 RX ORDER — PREDNISONE 20 MG/1
40 TABLET ORAL DAILY
Qty: 10 TABLET | Refills: 0 | Status: SHIPPED | OUTPATIENT
Start: 2022-09-01 | End: 2022-09-06

## 2022-09-01 RX ADMIN — ALBUTEROL SULFATE 2 PUFF: 90 INHALANT RESPIRATORY (INHALATION) at 10:43

## 2022-09-01 NOTE — PROGRESS NOTES
Chief complaint: cough    A month ago had a covid test and was negative     12 days ago started with a little cold. Patient was exposed to grandchildren   10 days ago got dizzy and felt lightheaded. Patient laid down all week   6 days ago thought was feeling better and was in the camper and patient had vomiting and also had a headache and stomach pain, cough and congestion and phlegm.   Everyone else is better no one got tested for covid    Coughing   Exposure to pertussis or pertussis like symptoms: NO  Orthopnea, worsening edema, pnd: NO  Rash: NO  Tried OTC medications without relief  No hemoptysis   No calf pain or tenderness. No signs or symptoms of DVT.   Worsening symptoms hence patient came in to be seen     Problem list and histories reviewed & adjusted, as indicated.  Additional history: as documented    Problem list, Medication list, Allergies, and Medical/Social/Surgical histories reviewed in EPIC and updated as appropriate.    ROS:  Constitutional, HEENT, cardiovascular, pulmonary, gi and gu systems are negative, except as otherwise noted.    OBJECTIVE:                                                    /80   Pulse 95   Temp 97.2  F (36.2  C) (Tympanic)   Resp 18   SpO2 95%   There is no height or weight on file to calculate BMI.  GENERAL: healthy, alert and no distress  EYES: Pink palpebral conjunctiva, anicteric sclera  ENT: midline nasal septum normal ear exam. Normal sinuses  NECK: no adenopathy, no asymmetry, masses, or scars and thyroid normal to palpation  RESP: symmetrical chest expansion, no retractions, increased expiratory phase wheezes heard at bilateral lungs with decreased breath sounds   CV: regular rate and rhythm, normal S1 S2, no S3 or S4, no murmur, click or rub, no peripheral edema and peripheral pulses strong  SKIN: no readily visible rashes noted  MS: no gross musculoskeletal defects noted    Diagnostic Test Results:  No results found for this or any previous visit (from  the past 24 hour(s)).     ASSESSMENT/PLAN:                                                        ICD-10-CM    1. Cough  R05.9 Symptomatic; Yes; 8/22/2022 COVID-19 Virus (Coronavirus) by PCR Nose     XR Chest 2 Views     albuterol (PROVENTIL HFA/VENTOLIN HFA) inhaler     amoxicillin-clavulanate (AUGMENTIN) 875-125 MG tablet     azithromycin (ZITHROMAX) 250 MG tablet     predniSONE (DELTASONE) 20 MG tablet   2. Abnormal CXR  R93.89 amoxicillin-clavulanate (AUGMENTIN) 875-125 MG tablet     azithromycin (ZITHROMAX) 250 MG tablet     predniSONE (DELTASONE) 20 MG tablet   3. Pneumonia of right lung due to infectious organism, unspecified part of lung  J18.9 amoxicillin-clavulanate (AUGMENTIN) 875-125 MG tablet     azithromycin (ZITHROMAX) 250 MG tablet     predniSONE (DELTASONE) 20 MG tablet     Urgent Care Course:  Patient initially had mild decreased oxygen at 93% was diaphoretic tight breath sounds and tachypneic  2 doses of albuterol MDI  Patient had remarkable improvement of breathing and was no longer tachypneic and no longer diaphoretic  Chest xray to my review - pneumonia right middle lobe  Prescribed with above  Side effects discussed  Recommend primary care provider follow up in 1-3 days.   If with any symptoms of chest pain or shortness of breath, lightheadedness, palpitations, feeling like passing out or change and worsening in the quality of your symptoms, please proceed to ER. Recommend follow up with PCP in a few days for re-evaluation.   Recommend discussion about a repeat chest xray with primary care provider in 6-8 weeks to make sure infiltrate is clear. Patient voiced understanding. Will send note to primary care provider as well.  Patient was observed here in urgent care   Curb65 score 1 - age only. Patient declined in patient hospitalization and prefers outpatient with close monitoring  Rule out covid.  Isolate until ruled out -     Patient initially said symptoms started 12 days ago but then  recanted and said she thinks these symptoms started 2 days ago.  If concerns and tturns positive - recommend virtual visit to discuss this and patient agrees, to discuss possible covid treatment.     Adverse reactions of medications discussed.  Over the counter medications discussed.   Aware to come back in if with worsening symptoms or if no relief despite treatment plan  Patient voiced understanding and had no further questions.     MD Holly Lopez MD  [facility] [place]  I spent a total of 40 minutes on the day of the visit.   Time spent doing chart review, history and exam, documentation and further activities per the note

## 2022-09-01 NOTE — NURSING NOTE
Clinic Administered Medication Documentation    Administrations This Visit     albuterol (PROVENTIL HFA/VENTOLIN HFA) inhaler     Admin Date  09/01/2022 Action  Given Dose  2 puff Route  Inhalation Site   Administered By  [facility]    Ordering Provider: Holly Fraga MD    Patient Supplied?: No                Inhalable/Nebs Medication Documentation    Patient was given Albuterol Sulfate Inhaler. Prior to medication administration, verified patients identity using patient s name and date of birth. Please see MAR and medication order for additional information.     Expiration Date:  10/2023

## 2022-09-01 NOTE — Clinical Note
Hi! I was wondering if patient can follow up in clinic in the next few days follow up pneumonia?  Also she will likely need a repeat chest xray in 6-8 weeks to make sure the infiltrate is resolved. I unfortunately cannot really follow this for her in urgent care so I recommended that she can discuss this with you for scheduling. Thank you!  Thank you Mary Fraga M.D.

## 2022-09-01 NOTE — PATIENT INSTRUCTIONS
Please follow up with your primary care provider in 1-3 days for recheck pneumonia.  Please go to the Emergency Room with severe or worsening symptoms  Please discuss a repeat chest xray with your primary care provider in 6-8 weeks to make sure that the abnormality seen in chest xray is clear.

## 2022-09-02 ENCOUNTER — TELEPHONE (OUTPATIENT)
Dept: FAMILY MEDICINE | Facility: CLINIC | Age: 67
End: 2022-09-02

## 2022-09-02 ENCOUNTER — MYC MEDICAL ADVICE (OUTPATIENT)
Dept: FAMILY MEDICINE | Facility: CLINIC | Age: 67
End: 2022-09-02

## 2022-09-02 DIAGNOSIS — I10 BENIGN ESSENTIAL HYPERTENSION: ICD-10-CM

## 2022-09-02 NOTE — TELEPHONE ENCOUNTER
Change patient appointment to the 4:20 pm slot on same day and then it will be in person. Please call her and let her know of the change.  Thank you,  FARIBA Maloney, NP-C  [facility]

## 2022-09-02 NOTE — TELEPHONE ENCOUNTER
Reason for Call:  Other appointment    Detailed comments: PATIENT WENT TO  ON 9-1-22    PATIENT DIAGNOSED WITH PNEUMONIA + EAR INFECTION    PATIENT ADVISED TO COME IN WITHIN 1-3 DAYS FOR FOLLOW UP    PATIENT CURRENTLY HAS A 9-8-22 VIRTUAL APPT BUT IS WONDERING IF THIS CAN BE SWITCHED TO AN IN PERSON VISIT INSTEAD DUE TO HER RECENT DIAGNOSIS?    PATIENT WOULD LIKE A CALL BACK    Phone Number Patient can be reached at: Cell number on file:    Telephone Information:   Mobile [phone]       Best Time: ASAP    Can we leave a detailed message on this number? YES    Call taken on 9/2/2022 at 9:25 AM by Anna Crump

## 2022-09-02 NOTE — TELEPHONE ENCOUNTER
Called patient and assisted with scheduling in person follow up with Stephanie Sherman on 9/8/22 @ 4:20PM.   Patient originally had a virtual BP check appointment on 9/8/22 with Stephanie Sherman that was canceled in order to fit patient in for in clinic visit.    4:49 PM   09/02/22  Ivy Contreras CMA

## 2022-09-02 NOTE — TELEPHONE ENCOUNTER
Attempted to call patient to help schedule in person follow up with Stephanie Sherman on 9/8/22 @ 4:20PM.   Patient originally had a virtual BP check appointment on 9/8/22 with Stephanie Sherman that will be canceled in order to fit patient in for in clinic visit.   No answer, LVM @ 4:14 PM     09/02/22  Ivy Contreras CMA

## 2022-09-07 RX ORDER — LOSARTAN POTASSIUM 25 MG/1
25 TABLET ORAL DAILY
Qty: 90 TABLET | Refills: 0 | Status: SHIPPED | OUTPATIENT
Start: 2022-09-07 | End: 2022-09-08

## 2022-09-08 ENCOUNTER — OFFICE VISIT (OUTPATIENT)
Dept: FAMILY MEDICINE | Facility: CLINIC | Age: 67
End: 2022-09-08
Payer: COMMERCIAL

## 2022-09-08 VITALS
TEMPERATURE: 98.4 F | HEIGHT: 67 IN | OXYGEN SATURATION: 98 % | BODY MASS INDEX: 28.41 KG/M2 | WEIGHT: 181 LBS | HEART RATE: 109 BPM | RESPIRATION RATE: 16 BRPM | SYSTOLIC BLOOD PRESSURE: 124 MMHG | DIASTOLIC BLOOD PRESSURE: 80 MMHG

## 2022-09-08 DIAGNOSIS — I10 BENIGN ESSENTIAL HYPERTENSION: ICD-10-CM

## 2022-09-08 DIAGNOSIS — H66.002 NON-RECURRENT ACUTE SUPPURATIVE OTITIS MEDIA OF LEFT EAR WITHOUT SPONTANEOUS RUPTURE OF TYMPANIC MEMBRANE: ICD-10-CM

## 2022-09-08 DIAGNOSIS — J18.9 PNEUMONIA OF LEFT LOWER LOBE DUE TO INFECTIOUS ORGANISM: Primary | ICD-10-CM

## 2022-09-08 DIAGNOSIS — B37.0 THRUSH: ICD-10-CM

## 2022-09-08 DIAGNOSIS — E03.9 HYPOTHYROIDISM, UNSPECIFIED TYPE: ICD-10-CM

## 2022-09-08 DIAGNOSIS — G60.9 IDIOPATHIC NEUROPATHY: ICD-10-CM

## 2022-09-08 DIAGNOSIS — G89.29 OTHER CHRONIC PAIN: ICD-10-CM

## 2022-09-08 PROCEDURE — 99214 OFFICE O/P EST MOD 30 MIN: CPT | Performed by: NURSE PRACTITIONER

## 2022-09-08 RX ORDER — LEVOTHYROXINE SODIUM 75 UG/1
75 TABLET ORAL DAILY
Qty: 90 TABLET | Refills: 0 | Status: SHIPPED | OUTPATIENT
Start: 2022-09-08 | End: 2022-11-28

## 2022-09-08 RX ORDER — NYSTATIN 100000/ML
500000 SUSPENSION, ORAL (FINAL DOSE FORM) ORAL 4 TIMES DAILY
Qty: 100 ML | Refills: 0 | Status: SHIPPED | OUTPATIENT
Start: 2022-09-08 | End: 2022-09-13

## 2022-09-08 RX ORDER — LOSARTAN POTASSIUM 25 MG/1
25 TABLET ORAL 2 TIMES DAILY
Qty: 180 TABLET | Refills: 0 | Status: SHIPPED | OUTPATIENT
Start: 2022-09-08 | End: 2022-12-05

## 2022-09-08 ASSESSMENT — PAIN SCALES - GENERAL: PAINLEVEL: EXTREME PAIN (8)

## 2022-09-08 NOTE — PROGRESS NOTES
"  Assessment & Plan     Pneumonia of left lower lobe due to infectious organism/Non-recurrent acute suppurative otitis media of left ear without spontaneous rupture of tympanic membrane  Went to urgent care 9/1/22, Dx with pneumonia and left ear infection. Her sats were 93%, had SOB, the nebs were helpful. She was sent home with augmentin, prednisone, and zpack. She is feeling much better and lungs sound good, left ear is improving.     Benign essential hypertension  Increase to BID, blood pressures at home are high, update provider in few weeks with [place] message on how things are going  - losartan (COZAAR) 25 MG tablet; Take 1 tablet (25 mg) by mouth 2 times daily    Hypothyroidism, unspecified type  Refill sent  - levothyroxine (SYNTHROID/LEVOTHROID) 75 MCG tablet; Take 1 tablet (75 mcg) by mouth daily    Thrush  Noted some white coating, if she cannot brush it off, start this  - nystatin (MYCOSTATIN) 231725 UNIT/ML suspension; Take 5 mLs (500,000 Units) by mouth 4 times daily for 5 days    Other chronic pain/Idiopathic neuropathy  Her hand/feet pain is getting really bad. She is tearful at times about it. Isn't sure what to do. She is continuing to follow with pain clinic and Dr. Morales         BMI:   Estimated body mass index is 28.78 kg/m  as calculated from the following:    Height as of this encounter: 1.689 m (5' 6.5\").    Weight as of this encounter: 82.1 kg (181 lb).     Return in about 4 months (around 1/8/2023) for Medication check, Phone or Video visit okay, Hypertension Recheck.    FARIBA Maloney, NP-C  [facility]    Ottoniel Ferreira is a 67 year old accompanied by her spouse, presenting for the following health issues:  Hypertension      History of Present Illness       Hypertension: She presents for follow up of hypertension.  She does check blood pressure  regularly outside of the clinic. Outside blood pressures have been over 140/90. She does not follow a low " "salt diet.       Also rechecking pneumonia- starting to feel a little better. No more cough. Went for a walk yesterday and today and is feeling better.  Just a little SOB, but not like it was.     Recheck LT ear to make sure infection is cleared          Review of Systems   Constitutional, HEENT, cardiovascular, pulmonary-as above, gi and gu systems are negative, except as otherwise noted.      Objective    /80   Pulse 109   Temp 98.4  F (36.9  C) (Tympanic)   Resp 16   Ht 1.689 m (5' 6.5\")   Wt 82.1 kg (181 lb)   SpO2 98%   BMI 28.78 kg/m    Body mass index is 28.78 kg/m .  Physical Exam   GENERAL: healthy, alert and no distress  EYES: Eyes grossly normal to inspection, PERRL and conjunctivae and sclerae normal  HENT: right ear canal and TM normal, left is slightly pink no discharge, nose and mouth without ulcers or lesions  NECK: no adenopathy, no asymmetry, masses, or scars and thyroid normal to palpation  RESP: lungs clear to auscultation - no rales, rhonchi or wheezes  CV: regular rate and rhythm, normal S1 S2, no S3 or S4, no murmur, click or rub  MS: using a cane to walk, able to get up on exam table without help    Noted labs and urgent care visit from 9/1/22                "

## 2022-10-27 ENCOUNTER — MYC MEDICAL ADVICE (OUTPATIENT)
Dept: ANESTHESIOLOGY | Facility: CLINIC | Age: 67
End: 2022-10-27

## 2022-10-27 ENCOUNTER — MYC REFILL (OUTPATIENT)
Dept: ANESTHESIOLOGY | Facility: CLINIC | Age: 67
End: 2022-10-27

## 2022-10-27 DIAGNOSIS — G60.9 IDIOPATHIC PERIPHERAL NEUROPATHY: ICD-10-CM

## 2022-10-27 DIAGNOSIS — M79.672 PAIN IN BOTH FEET: ICD-10-CM

## 2022-10-27 DIAGNOSIS — M79.671 PAIN IN BOTH FEET: ICD-10-CM

## 2022-10-27 NOTE — TELEPHONE ENCOUNTER
Refill request    Medication: buprenorphine (BUTRANS) 10 MCG/HR WK patch    Sig: Place 1 patch onto the skin every 7 days - Transdermal    Dispensed: 4 patches  Refills: 3  SOLD to the pt on: 6/23/22 with 3 refills    Last clinic appointment: 8/17/22  Next clinic appointment: 11/9/22    Last Drug Screen Collected: 8/17/22  Controlled Substance Agreement signed: 8/17/22      Preferred pharmacy:      [id number] - [place], [place] - [address]      Dang Barrett RN

## 2022-10-27 NOTE — TELEPHONE ENCOUNTER
Refill request    Medication: HYDROcodone-acetaminophen (NORCO) 5-325 MG tablet    Sig: Take 1 tablet by mouth every 8 hours as needed for severe pain    Dispensed: 90 tablets  Refills: 0  SOLD to the pt on: 10/3- per pharmacy, unable to check     Last clinic appointment: 8/17/22  Next clinic appointment: 11/9/22    Last Drug Screen Collected: 8/17/22  Controlled Substance Agreement signed: 8/17/22      Preferred pharmacy:      [id number] - [place], [place] - [address]      Dang Barrett RN

## 2022-10-31 RX ORDER — HYDROCODONE BITARTRATE AND ACETAMINOPHEN 5; 325 MG/1; MG/1
1 TABLET ORAL EVERY 8 HOURS PRN
Qty: 90 TABLET | Refills: 0 | Status: SHIPPED | OUTPATIENT
Start: 2022-10-31 | End: 2022-11-25

## 2022-10-31 RX ORDER — BUPRENORPHINE 10 UG/H
1 PATCH TRANSDERMAL
Qty: 4 PATCH | Refills: 3 | Status: SHIPPED | OUTPATIENT
Start: 2022-10-31 | End: 2023-01-19

## 2022-11-19 ENCOUNTER — HEALTH MAINTENANCE LETTER (OUTPATIENT)
Age: 67
End: 2022-11-19

## 2022-11-25 ENCOUNTER — MYC REFILL (OUTPATIENT)
Dept: ANESTHESIOLOGY | Facility: CLINIC | Age: 67
End: 2022-11-25

## 2022-11-25 DIAGNOSIS — M79.671 PAIN IN BOTH FEET: ICD-10-CM

## 2022-11-25 DIAGNOSIS — G60.9 IDIOPATHIC PERIPHERAL NEUROPATHY: ICD-10-CM

## 2022-11-25 DIAGNOSIS — M79.672 PAIN IN BOTH FEET: ICD-10-CM

## 2022-11-28 DIAGNOSIS — E03.9 HYPOTHYROIDISM, UNSPECIFIED TYPE: ICD-10-CM

## 2022-11-28 DIAGNOSIS — J18.9 PNEUMONIA OF LEFT LOWER LOBE DUE TO INFECTIOUS ORGANISM: Primary | ICD-10-CM

## 2022-11-28 RX ORDER — LEVOTHYROXINE SODIUM 75 UG/1
75 TABLET ORAL DAILY
Qty: 90 TABLET | Refills: 0 | Status: SHIPPED | OUTPATIENT
Start: 2022-11-28 | End: 2023-03-01

## 2022-11-28 NOTE — TELEPHONE ENCOUNTER
Refill sent. Please call patient: due for lab as last TSH was abnormal. Also due for chest x-ray to follow up on pneumonia.    Thank you,  FARIBA Maloney, NP-C  [facility]

## 2022-11-30 RX ORDER — HYDROCODONE BITARTRATE AND ACETAMINOPHEN 5; 325 MG/1; MG/1
1 TABLET ORAL EVERY 8 HOURS PRN
Qty: 90 TABLET | Refills: 0 | Status: SHIPPED | OUTPATIENT
Start: 2022-11-30 | End: 2022-12-07

## 2022-12-05 DIAGNOSIS — I10 BENIGN ESSENTIAL HYPERTENSION: ICD-10-CM

## 2022-12-05 RX ORDER — LOSARTAN POTASSIUM 25 MG/1
25 TABLET ORAL 2 TIMES DAILY
Qty: 180 TABLET | Refills: 0 | Status: SHIPPED | OUTPATIENT
Start: 2022-12-05 | End: 2023-03-28

## 2022-12-06 ASSESSMENT — PAIN SCALES - PAIN ENJOYMENT GENERAL ACTIVITY SCALE (PEG)
PEG_TOTALSCORE: 7.33
INTERFERED_ENJOYMENT_LIFE: 8
AVG_PAIN_PASTWEEK: 6
PEG_TOTALSCORE: 7.33
INTERFERED_GENERAL_ACTIVITY: 8
INTERFERED_ENJOYMENT_LIFE: 8
AVG_PAIN_PASTWEEK: 6
INTERFERED_GENERAL_ACTIVITY: 8

## 2022-12-06 ASSESSMENT — ANXIETY QUESTIONNAIRES
7. FEELING AFRAID AS IF SOMETHING AWFUL MIGHT HAPPEN: NOT AT ALL
7. FEELING AFRAID AS IF SOMETHING AWFUL MIGHT HAPPEN: NOT AT ALL
3. WORRYING TOO MUCH ABOUT DIFFERENT THINGS: NOT AT ALL
GAD7 TOTAL SCORE: 0
2. NOT BEING ABLE TO STOP OR CONTROL WORRYING: NOT AT ALL
6. BECOMING EASILY ANNOYED OR IRRITABLE: NOT AT ALL
5. BEING SO RESTLESS THAT IT IS HARD TO SIT STILL: NOT AT ALL
GAD7 TOTAL SCORE: 0
GAD7 TOTAL SCORE: 0
4. TROUBLE RELAXING: NOT AT ALL
1. FEELING NERVOUS, ANXIOUS, OR ON EDGE: NOT AT ALL

## 2022-12-07 ENCOUNTER — VIRTUAL VISIT (OUTPATIENT)
Dept: ANESTHESIOLOGY | Facility: CLINIC | Age: 67
End: 2022-12-07
Payer: COMMERCIAL

## 2022-12-07 DIAGNOSIS — M79.672 PAIN IN BOTH FEET: ICD-10-CM

## 2022-12-07 DIAGNOSIS — M79.671 PAIN IN BOTH FEET: ICD-10-CM

## 2022-12-07 DIAGNOSIS — G60.9 IDIOPATHIC PERIPHERAL NEUROPATHY: ICD-10-CM

## 2022-12-07 PROCEDURE — 99213 OFFICE O/P EST LOW 20 MIN: CPT | Mod: 95 | Performed by: ANESTHESIOLOGY

## 2022-12-07 RX ORDER — HYDROCODONE BITARTRATE AND ACETAMINOPHEN 5; 325 MG/1; MG/1
1 TABLET ORAL EVERY 8 HOURS PRN
Qty: 90 TABLET | Refills: 0 | Status: SHIPPED | OUTPATIENT
Start: 2022-12-28 | End: 2023-01-24

## 2022-12-07 ASSESSMENT — PAIN SCALES - GENERAL: PAINLEVEL: WORST PAIN (10)

## 2022-12-07 NOTE — PATIENT INSTRUCTIONS
Medications:    HYDROcodone-acetaminophen (NORCO) 5-325 MG tablet. Take 1 tablet by mouth every 8 hours as needed for severe pain (7-10.       *For refills of opioid medications - You MUST call (Or MyChart) the clinic DIRECTLY and at least 7 days before you run out of your medication.        Recommended Follow up:      Follow up in person in 4 weeks before traveling.         Please call [phone] to schedule your clinic appointment if you don't already have an appointment scheduled.        To speak with a nurse, schedule/reschedule/cancel a clinic appointment, or request a medication refill call: [phone]    You can also reach us by [employer]: [email or web address]

## 2022-12-07 NOTE — NURSING NOTE
Patient presents with:  Follow Up: Follow-up Both Feet Pain      Worst Pain (10)     Pain Medications     Opioid Combinations Refills Start End     HYDROcodone-acetaminophen (NORCO) 5-325 MG tablet    0 11/30/2022     Sig - Route: Take 1 tablet by mouth every 8 hours as needed for severe pain (7-10) - Oral    Class: E-Prescribe    Earliest Fill Date: 11/30/2022    No prior authorization was found for this prescription.    Found prior authorization for another prescription for the same medication: Closed - Prior Authorization not required for patient/medication    Opioid Partial Agonists Refills Start End     buprenorphine (BUTRANS) 10 MCG/HR WK patch    3 10/31/2022     Sig - Route: Place 1 patch onto the skin every 7 days - Transdermal    Class: E-Prescribe    No prior authorization was found for this prescription.    Found prior authorization for another prescription for the same medication: Closed - Prior Authorization duplicate/in process          What medications are you using for pain? Medical Cannabis, Hydrocodone, Butrans    (New patients only) Have you been seen by another pain clinic/ provider? no    (Return Patients only) What refills are you needing today? no

## 2022-12-07 NOTE — LETTER
12/7/2022       RE: Hanna Campo  [address]  [facility] [address]     Dear Colleague,    Thank you for referring your patient, Hanna Campo, to the [facility] [place] at [facility]. Please see a copy of my visit note below.    Hanna is a 67 year old who is being evaluated via a billable video visit.      How would you like to obtain your AVS? MyChart  If the video visit is dropped, the invitation should be resent by: Text to cell phone: [phone]  Will anyone else be joining your video visit? No            Video-Visit Details    Type of service:  Video Visit    Video Start Time (time video started): 0815    Video End Time (time video stopped): 0840    Originating Location (pt. Location): Home      Distant Location (provider location):  On-site    Mode of Communication:  Video Conference via [employer]    Physician has received verbal consent for a Video Visit from the patient? Yes    [facility]    Date of visit: 12/7/2022    Chief complaint:   Chief Complaint   Patient presents with     Follow Up     Follow-up Both Feet Pain       Interval history:  Hanna Campo was last seen by me on 8/17/22.      At the time we had been discussing options for peripheral neuropathy management and were to have a future discussion regarding spinal cord stimulation.  Since that time, the patient had a discektomy on 8/9/22 and has post surgical pain, but now has no more pain from L4-L5.  She is no longer taking her additional pain medications and is back to her scheduled medication.  She also takes Medical Cannabis and tylenol  The dilaudid was unpleasant for her but it took care of her nerve pain through her recovery.     The pain in her feet and hands remain and are controlled on butrans patch and hydrocodone-acetaminophen as needed.She does need it every 8 hours.  Her pain is otherwise  "controlled and she is not interested in a new procedure at this time.  She is looking to start her HEP as she recovers from surgery.    Recommendations/plan at the last visit included:  1. Physical Therapy:  As tolerated daily HEPs  2. Clinical Health Psychologist to address issues of relaxation, behavioral change, coping style, and other factors important to improvement.  Not at this time  3. Urine drug screen: completed today  4. Medication Management:  Agree with resumption of Norco and butrans patch, is no longer taking dilaudid that had been used during acute pain period. She successfully weaned herself back down to appropriate dose; UDS collected today  5. Follow up:  in 2-3 months. Video is okay.     Since her last visit, Hanna Campo reports:  She has been doing her home PT exercises, but hasn't seen a PT in office in over a year. She overall states she is doing \"about the same, but the meds definitely take the edge off\". States her pain remains in her feet and hands, the location and character hasn't changed. [place] and is hoping she can do a virtual visit \"if I need to\" while there. She offers no other major complaints today and is otherwise doing well. She continues to not be interested in SCS during today's visit.    Pain scores:  Pain intensity on average is 7-8 on a scale of 0-10.     Current pain treatments:   Butrans patch  Hydrocodone  Medical marijuana     Past pain treatments:  Gabapentin     Side Effects: denies any problems    Medications:  Current Outpatient Medications   Medication Sig Dispense Refill     buprenorphine (BUTRANS) 10 MCG/HR WK patch Place 1 patch onto the skin every 7 days 4 patch 3     Cetirizine HCl (ZYRTEC ALLERGY PO) Take 5 mg by mouth daily       chlorthalidone (HYGROTON) 50 MG tablet Take 1 tablet (50 mg) by mouth daily 90 tablet 1     HYDROcodone-acetaminophen (NORCO) 5-325 MG tablet Take 1 tablet by mouth every 8 hours as needed " for severe pain (7-10) 90 tablet 0     levothyroxine (SYNTHROID/LEVOTHROID) 75 MCG tablet Take 1 tablet (75 mcg) by mouth daily *due for lab* 90 tablet 0     losartan (COZAAR) 25 MG tablet Take 1 tablet (25 mg) by mouth 2 times daily 180 tablet 0     medical cannabis (Patient's own supply) (The purpose of this order is to document that the patient reports taking medical cannabis.  This is not a prescription, and is not used to certify that the patient has a qualifying medical condition.) 0 Information only 0     vitamin B complex with vitamin C (STRESS TAB) tablet Take 1 tablet by mouth daily  0     VITAMIN D, CHOLECALCIFEROL, PO Take 5,000 Units by mouth daily       naloxone (NARCAN) 4 MG/0.1ML nasal spray Spray 1 spray (4 mg) into one nostril alternating nostrils once as needed for opioid reversal every 2-3 minutes until assistance arrives 0.2 mL 1       Medical History: any changes in medical history since they were last seen? No    Review of Systems:  The 14 system ROS was reviewed from the intake questionnaire, and is positive for: chronic pain  Any bowel or bladder problems: No  Mood: Normal     Physical Exam:  not currently breastfeeding.  General: Pt appears well, conversing appropriately  Gait: Not assessed (video visit)  MSK exam: Moving all extremities appropriately    Assessment:   Idiopathic Peripheral Neuropathy  Chronic Opioid Use, Contract in Place      Pt does not want to pursue SCS at this point. She is overall content with her current medications. She is traveling down south in the next month and is looking forward to the warmer weather. She is hoping to stay there until the spring. She believes her pain is improved with warm weather. She offers no other complaints during today's visit.     We discussed the need to follow up if she will be traveling and that this maybe difficult to continue her opioid prescription if she is not in the state as she does need to check in frequently per guidelines.  We will discuss this again at the next follow up.      Hanna Campo is a 67 year old female who is seen at the pain clinic for chronic peripheral neuropathy.    Plan:  1. Physical Therapy:  Continue home PT  2. Clinical Health Psychologist to address issues of relaxation, behavioral change, coping style, and other factors important to improvement.   3. Diagnostic Studies:  None indicated at this time  4. Medication Management:  Continue current regimen   5. Further procedures recommended: None, have had continued conversations regarding SCS, pt not interested at this time  6. Recommendations to PCP: as above  7. Follow up: Via virtual while pt is traveling south as needed and ideally she will touch base prior to leaving (4 weeks, in person)    Ketan Lundberg    I saw and examined the patient with the Pain Fellow/Resident. I have reviewed and agree with the resident's note and plan of care and made changes and corrections directly to the body of the note.    TIME SPENT:  BY FELLOW/RESIDENT ALONE 10 MIN  BY MYSELF AND FELLOW/RESIDENT TOGETHER 15 MIN    This time includes time for chart review, preparation, and documentation.     Lisa Morales MD  Pain Medicine, Department of Anesthesiology  , [facility]        Lisa Morales MD    Pain Medicine  Department of Anesthesiology  [facility]                        Again, thank you for allowing me to participate in the care of your patient.      Sincerely,    Lisa Morales MD

## 2022-12-07 NOTE — PROGRESS NOTES
Video-Visit Details    Type of service:  Video Visit    Video Start Time (time video started): 0815    Video End Time (time video stopped): 0840    Originating Location (pt. Location): Home      Distant Location (provider location):  On-site    Mode of Communication:  Video Conference via [employer]    Physician has received verbal consent for a Video Visit from the patient? Yes    [facility]    Date of visit: 12/7/2022    Chief complaint:   Chief Complaint   Patient presents with     Follow Up     Follow-up Both Feet Pain       Interval history:  Hanna Campo was last seen by me on 8/17/22.      At the time we had been discussing options for peripheral neuropathy management and were to have a future discussion regarding spinal cord stimulation.  Since that time, the patient had a discektomy on 8/9/22 and has post surgical pain, but now has no more pain from L4-L5.  She is no longer taking her additional pain medications and is back to her scheduled medication.  She also takes Medical Cannabis and tylenol  The dilaudid was unpleasant for her but it took care of her nerve pain through her recovery.     The pain in her feet and hands remain and are controlled on butrans patch and hydrocodone-acetaminophen as needed.She does need it every 8 hours.  Her pain is otherwise controlled and she is not interested in a new procedure at this time.  She is looking to start her HEP as she recovers from surgery.    Recommendations/plan at the last visit included:  1. Physical Therapy:  As tolerated daily HEPs  2. Clinical Health Psychologist to address issues of relaxation, behavioral change, coping style, and other factors important to improvement.  Not at this time  3. Urine drug screen: completed today  4. Medication Management:  Agree with resumption of Norco and butrans patch, is no longer taking dilaudid that had been used during acute pain period. She successfully weaned herself back down to  "appropriate dose; UDS collected today  5. Follow up:  in 2-3 months. Video is okay.     Since her last visit, Hanna Campo reports:  She has been doing her home PT exercises, but hasn't seen a PT in office in over a year. She overall states she is doing \"about the same, but the meds definitely take the edge off\". States her pain remains in her feet and hands, the location and character hasn't changed. She is traveling down [place] for the winter and is hoping she can do a virtual visit \"if I need to\" while there. She offers no other major complaints today and is otherwise doing well. She continues to not be interested in SCS during today's visit.    Pain scores:  Pain intensity on average is 7-8 on a scale of 0-10.     Current pain treatments:   Butrans patch  Hydrocodone  Medical marijuana     Past pain treatments:  Gabapentin     Side Effects: denies any problems    Medications:  Current Outpatient Medications   Medication Sig Dispense Refill     buprenorphine (BUTRANS) 10 MCG/HR WK patch Place 1 patch onto the skin every 7 days 4 patch 3     Cetirizine HCl (ZYRTEC ALLERGY PO) Take 5 mg by mouth daily       chlorthalidone (HYGROTON) 50 MG tablet Take 1 tablet (50 mg) by mouth daily 90 tablet 1     HYDROcodone-acetaminophen (NORCO) 5-325 MG tablet Take 1 tablet by mouth every 8 hours as needed for severe pain (7-10) 90 tablet 0     levothyroxine (SYNTHROID/LEVOTHROID) 75 MCG tablet Take 1 tablet (75 mcg) by mouth daily *due for lab* 90 tablet 0     losartan (COZAAR) 25 MG tablet Take 1 tablet (25 mg) by mouth 2 times daily 180 tablet 0     medical cannabis (Patient's own supply) (The purpose of this order is to document that the patient reports taking medical cannabis.  This is not a prescription, and is not used to certify that the patient has a qualifying medical condition.) 0 Information only 0     vitamin B complex with vitamin C (STRESS TAB) tablet Take 1 tablet by mouth daily  0     VITAMIN D, " CHOLECALCIFEROL, PO Take 5,000 Units by mouth daily       naloxone (NARCAN) 4 MG/0.1ML nasal spray Spray 1 spray (4 mg) into one nostril alternating nostrils once as needed for opioid reversal every 2-3 minutes until assistance arrives 0.2 mL 1       Medical History: any changes in medical history since they were last seen? No    Review of Systems:  The 14 system ROS was reviewed from the intake questionnaire, and is positive for: chronic pain  Any bowel or bladder problems: No  Mood: Normal     Physical Exam:  not currently breastfeeding.  General: Pt appears well, conversing appropriately  Gait: Not assessed (video visit)  MSK exam: Moving all extremities appropriately    Assessment:   Idiopathic Peripheral Neuropathy  Chronic Opioid Use, Contract in Place      Pt does not want to pursue SCS at this point. She is overall content with her current medications. She is traveling down south in the next month and is looking forward to the warmer weather. She is hoping to stay there until the spring. She believes her pain is improved with warm weather. She offers no other complaints during today's visit.     We discussed the need to follow up if she will be traveling and that this maybe difficult to continue her opioid prescription if she is not in the state as she does need to check in frequently per guidelines. We will discuss this again at the next follow up.      Hanna Campo is a 67 year old female who is seen at the pain clinic for chronic peripheral neuropathy.    Plan:  1. Physical Therapy:  Continue home PT  2. Clinical Health Psychologist to address issues of relaxation, behavioral change, coping style, and other factors important to improvement.   3. Diagnostic Studies:  None indicated at this time  4. Medication Management:  Continue current regimen   5. Further procedures recommended: None, have had continued conversations regarding SCS, pt not interested at this time  6. Recommendations to PCP: as  above  7. Follow up: Via virtual while pt is traveling south as needed and ideally she will touch base prior to leaving (4 weeks, in person)    Ketan Lundberg    I saw and examined the patient with the Pain Fellow/Resident. I have reviewed and agree with the resident's note and plan of care and made changes and corrections directly to the body of the note.    TIME SPENT:  BY FELLOW/RESIDENT ALONE 10 MIN  BY MYSELF AND FELLOW/RESIDENT TOGETHER 15 MIN    This time includes time for chart review, preparation, and documentation.     Lisa Morales MD  Pain Medicine, Department of Anesthesiology  , [facility]        Lisa Morales MD    Pain Medicine  Department of Anesthesiology  [facility]

## 2022-12-07 NOTE — PROGRESS NOTES
Hanna is a 67 year old who is being evaluated via a billable video visit.      How would you like to obtain your AVS? MyChart  If the video visit is dropped, the invitation should be resent by: Text to cell phone: [phone]  Will anyone else be joining your video visit? No

## 2022-12-07 NOTE — LETTER
Date:December 13, 2022      Provider requested that no letter be sent. Do not send.       [facility]

## 2022-12-08 ENCOUNTER — LAB (OUTPATIENT)
Dept: LAB | Facility: CLINIC | Age: 67
End: 2022-12-08
Payer: COMMERCIAL

## 2022-12-08 ENCOUNTER — ANCILLARY PROCEDURE (OUTPATIENT)
Dept: GENERAL RADIOLOGY | Facility: CLINIC | Age: 67
End: 2022-12-08
Attending: NURSE PRACTITIONER
Payer: COMMERCIAL

## 2022-12-08 DIAGNOSIS — J18.9 PNEUMONIA OF LEFT LOWER LOBE DUE TO INFECTIOUS ORGANISM: ICD-10-CM

## 2022-12-08 DIAGNOSIS — E03.9 HYPOTHYROIDISM, UNSPECIFIED TYPE: ICD-10-CM

## 2022-12-08 LAB — TSH SERPL DL<=0.005 MIU/L-ACNC: 1.07 MU/L (ref 0.4–4)

## 2022-12-08 PROCEDURE — 84443 ASSAY THYROID STIM HORMONE: CPT

## 2022-12-08 PROCEDURE — 71046 X-RAY EXAM CHEST 2 VIEWS: CPT | Mod: TC | Performed by: FAMILY MEDICINE

## 2022-12-08 PROCEDURE — 36415 COLL VENOUS BLD VENIPUNCTURE: CPT

## 2022-12-08 NOTE — RESULT ENCOUNTER NOTE
Eliseo Ferreira,   Your TSH is normal now. Continue the levothyroxine at 75 mcg daily.  Thank you,  FARIBA Maloney, NP-C  [facility]

## 2022-12-08 NOTE — RESULT ENCOUNTER NOTE
Eliseo Ferreira,   The chest x-ray is clear now. Hope you are feeling good still.  Thank you,  FARIBA Maloney, NP-C  [facility]

## 2023-01-09 ASSESSMENT — PAIN SCALES - PAIN ENJOYMENT GENERAL ACTIVITY SCALE (PEG)
AVG_PAIN_PASTWEEK: 6
INTERFERED_GENERAL_ACTIVITY: 5
INTERFERED_ENJOYMENT_LIFE: 6
AVG_PAIN_PASTWEEK: 6
INTERFERED_GENERAL_ACTIVITY: 5
PEG_TOTALSCORE: 5.67
INTERFERED_ENJOYMENT_LIFE: 6
PEG_TOTALSCORE: 5.67

## 2023-01-09 ASSESSMENT — ANXIETY QUESTIONNAIRES
GAD7 TOTAL SCORE: 0
GAD7 TOTAL SCORE: 0
5. BEING SO RESTLESS THAT IT IS HARD TO SIT STILL: NOT AT ALL
7. FEELING AFRAID AS IF SOMETHING AWFUL MIGHT HAPPEN: NOT AT ALL
1. FEELING NERVOUS, ANXIOUS, OR ON EDGE: NOT AT ALL
7. FEELING AFRAID AS IF SOMETHING AWFUL MIGHT HAPPEN: NOT AT ALL
GAD7 TOTAL SCORE: 0
6. BECOMING EASILY ANNOYED OR IRRITABLE: NOT AT ALL
3. WORRYING TOO MUCH ABOUT DIFFERENT THINGS: NOT AT ALL
4. TROUBLE RELAXING: NOT AT ALL
2. NOT BEING ABLE TO STOP OR CONTROL WORRYING: NOT AT ALL

## 2023-01-11 NOTE — PROGRESS NOTES
[facility]    Date of visit: 1/13/2023    Chief complaint:   Chief Complaint   Patient presents with     Follow Up     Follow-up Both Feet Pain       Interval history:  Hanna Campo was last seen by Dr. Morales on 8/17/2022.      Recommendations/plan at the last visit included:  1. Physical Therapy:  As tolerated daily HEPs  2. Clinical Health Psychologist to address issues of relaxation, behavioral change, coping style, and other factors important to improvement.  Not at this time  3. Urine drug screen: completed today  4. Medication Management:  Agree with resumption of Norco and butrans patch, is no longer taking dilaudid that had been used during acute pain period. She successfully weaned herself back down to appropriate dose; UDS collected today  5. Follow up:  in 2-3 months. Video is okay.    Since her last visit, Hanna Campo reports:    No major changes since her last visit. She reports being here today for getting refills prior to traveling down to [place] for the warmer weather. Denies any worsening of her pain and or issues or side effects from the medications.       Will be traveling to [place] for 3 months and will follow up as soon as she returns. She is unable to transfer her buprenorphine prescription to other pharmacies. We discussed that I will send her a one time prescription to wherever she will be at the time.     Pain scores:  Pain intensity on average is 5 on a scale of 0-10, with exacerbations be as bad as 10/10 pain.     Current pain treatments:   Medical cannabis  Butrans patch (10 mcg/hr)  Norco  Tylenol    Past pain treatments:  Gabapentin    Side Effects: denies any problems    Medications:  Current Outpatient Medications   Medication Sig Dispense Refill     buprenorphine (BUTRANS) 10 MCG/HR WK patch Place 1 patch onto the skin every 7 days 4 patch 3     Cetirizine HCl (ZYRTEC ALLERGY PO) Take 5 mg by mouth daily       chlorthalidone (HYGROTON) 50 MG tablet Take  "1 tablet (50 mg) by mouth daily 90 tablet 1     HYDROcodone-acetaminophen (NORCO) 5-325 MG tablet Take 1 tablet by mouth every 8 hours as needed for severe pain (7-10) 90 tablet 0     levothyroxine (SYNTHROID/LEVOTHROID) 75 MCG tablet Take 1 tablet (75 mcg) by mouth daily *due for lab* 90 tablet 0     losartan (COZAAR) 25 MG tablet Take 1 tablet (25 mg) by mouth 2 times daily 180 tablet 0     medical cannabis (Patient's own supply) (The purpose of this order is to document that the patient reports taking medical cannabis.  This is not a prescription, and is not used to certify that the patient has a qualifying medical condition.) 0 Information only 0     vitamin B complex with vitamin C (STRESS TAB) tablet Take 1 tablet by mouth daily  0     VITAMIN D, CHOLECALCIFEROL, PO Take 5,000 Units by mouth daily       naloxone (NARCAN) 4 MG/0.1ML nasal spray Spray 1 spray (4 mg) into one nostril alternating nostrils once as needed for opioid reversal every 2-3 minutes until assistance arrives 0.2 mL 1       Medical History: any changes in medical history since they were last seen? No    Review of Systems:  The 14 system ROS was reviewed from the intake questionnaire, and is negative  Any bowel or bladder problems: none  Mood: good, no concerns    Physical Exam:  Blood pressure 133/83, pulse 69, height 1.689 m (5' 6.5\"), weight 76.7 kg (169 lb), SpO2 96 %, not currently breastfeeding.  General: happy, no acute distress  Gait: Unsteady, uses cane for balance  MSK exam: grossly preserved strength with reported neuropathy in bilateral feet and hands    Assessment:   Acute Lumbar Radiculopathy  Idiopathic Peripheral Neuropathy  Status post L4-L5 discektomy    Hanna Campo is a 68 year old female who is seen at the pain clinic for follow up of her medication management.  We discussed at length that she will need to follow up as soon as she returns from her travels. I will work with her to ensure that she is able to attain her " next medication refill as appropriate based on when she is due for it.    Plan:  1. Physical Therapy:  Continuing home PT exercises  2. Diagnostic Studies:  none  3. Medication Management:  Refilled current prescriptions: Norco, butrens patches as needed  4. Further procedures recommended: none at this time  5. Recommendations to PCP: none  6. Follow up: 3 months, when she returns from her vacation to [place]    This patient was seen and discussed with the attending physician.    oRbert Huang MD  Anesthesiology Resident, CA-2      I saw and examined the patient with the Pain Fellow/Resident. I have reviewed and agree with the resident's note and plan of care and made changes and corrections directly to the body of the note.    TIME SPENT:  BY FELLOW/RESIDENT ALONE 15 MIN  BY MYSELF AND FELLOW/RESIDENT TOGETHER 15 MIN    This time includes time for chart review, preparation, and documentation.     Lisa Morales MD  Pain Medicine, Department of Anesthesiology  , [facility]          Answers for HPI/ROS submitted by the patient on 1/9/2023  GIRISH 7 TOTAL SCORE: 0

## 2023-01-13 ENCOUNTER — OFFICE VISIT (OUTPATIENT)
Dept: ANESTHESIOLOGY | Facility: CLINIC | Age: 68
End: 2023-01-13
Payer: COMMERCIAL

## 2023-01-13 VITALS
BODY MASS INDEX: 26.53 KG/M2 | HEART RATE: 69 BPM | DIASTOLIC BLOOD PRESSURE: 83 MMHG | OXYGEN SATURATION: 96 % | SYSTOLIC BLOOD PRESSURE: 133 MMHG | WEIGHT: 169 LBS | HEIGHT: 67 IN

## 2023-01-13 DIAGNOSIS — G60.9 IDIOPATHIC PERIPHERAL NEUROPATHY: Primary | ICD-10-CM

## 2023-01-13 PROCEDURE — 99213 OFFICE O/P EST LOW 20 MIN: CPT | Mod: GC | Performed by: ANESTHESIOLOGY

## 2023-01-13 ASSESSMENT — PAIN SCALES - GENERAL: PAINLEVEL: MODERATE PAIN (5)

## 2023-01-13 NOTE — NURSING NOTE
Patient presents with:  Consult: Consult Both Feet Pain      Moderate Pain (5)     Pain Medications     Opioid Combinations Refills Start End     HYDROcodone-acetaminophen (NORCO) 5-325 MG tablet    0 12/28/2022     Sig - Route: Take 1 tablet by mouth every 8 hours as needed for severe pain (7-10) - Oral    Class: E-Prescribe    Earliest Fill Date: 12/28/2022    No prior authorization was found for this prescription.    Found prior authorization for another prescription for the same medication: Closed - Prior Authorization not required for patient/medication    Opioid Partial Agonists Refills Start End     buprenorphine (BUTRANS) 10 MCG/HR WK patch    3 10/31/2022     Sig - Route: Place 1 patch onto the skin every 7 days - Transdermal    Class: E-Prescribe    No prior authorization was found for this prescription.    Found prior authorization for another prescription for the same medication: Closed - Prior Authorization duplicate/in process          What medications are you using for pain? Hydrocodone, Butrans patch, Medical Cannibas    (New patients only) Have you been seen by another pain clinic/ provider? yes    (Return Patients only) What refills are you needing today? no

## 2023-01-13 NOTE — PATIENT INSTRUCTIONS
Referrals:     Acupuncture Referral.  -Please call your insurance provider to find out about acupuncture coverage, being that not all policies cover acupuncture services. Please call to schedule at any external location.      Treatment planning:    Please contact our clinic once you find out more information from your insurance regarding controlled substance prescribing while out of state.      Recommended Follow up:      Follow up in 3-6 months or earlier if indicated.         Please call [phone] to schedule your clinic appointment if you don't already have an appointment scheduled.        To speak with a nurse, schedule/reschedule/cancel a clinic appointment, or request a medication refill call: [phone]    You can also reach us by [employer]: [email or web address]

## 2023-01-13 NOTE — LETTER
1/13/2023       RE: Hanna Campo  [address]  [place] [address]     Dear Colleague,    Thank you for referring your patient, Hanna Campo, to the [facility] [place] at [facility]. Please see a copy of my visit note below.    [facility]    Date of visit: 1/13/2023    Chief complaint:   Chief Complaint   Patient presents with     Follow Up     Follow-up Both Feet Pain       Interval history:  Hanna Campo was last seen by Dr. Morales on 8/17/2022.      Recommendations/plan at the last visit included:  1. Physical Therapy:  As tolerated daily HEPs  2. Clinical Health Psychologist to address issues of relaxation, behavioral change, coping style, and other factors important to improvement.  Not at this time  3. Urine drug screen: completed today  4. Medication Management:  Agree with resumption of Norco and butrans patch, is no longer taking dilaudid that had been used during acute pain period. She successfully weaned herself back down to appropriate dose; UDS collected today  5. Follow up:  in 2-3 months. Video is okay.    Since her last visit, Hanna Campo reports:    No major changes since her last visit. She reports being here today for getting refills prior to traveling down to [place] for the warmer weather. Denies any worsening of her pain and or issues or side effects from the medications.       Will be traveling to [place] for 3 months and will follow up as soon as she returns. She is unable to transfer her buprenorphine prescription to other pharmacies. We discussed that I will send her a one time prescription to wherever she will be at the time.     Pain scores:  Pain intensity on average is 5 on a scale of 0-10, with exacerbations be as bad as 10/10 pain.     Current pain treatments:   Medical cannabis  Butrans patch (10 mcg/hr)  Norco  Tylenol    Past pain  "treatments:  Gabapentin    Side Effects: denies any problems    Medications:  Current Outpatient Medications   Medication Sig Dispense Refill     buprenorphine (BUTRANS) 10 MCG/HR WK patch Place 1 patch onto the skin every 7 days 4 patch 3     Cetirizine HCl (ZYRTEC ALLERGY PO) Take 5 mg by mouth daily       chlorthalidone (HYGROTON) 50 MG tablet Take 1 tablet (50 mg) by mouth daily 90 tablet 1     HYDROcodone-acetaminophen (NORCO) 5-325 MG tablet Take 1 tablet by mouth every 8 hours as needed for severe pain (7-10) 90 tablet 0     levothyroxine (SYNTHROID/LEVOTHROID) 75 MCG tablet Take 1 tablet (75 mcg) by mouth daily *due for lab* 90 tablet 0     losartan (COZAAR) 25 MG tablet Take 1 tablet (25 mg) by mouth 2 times daily 180 tablet 0     medical cannabis (Patient's own supply) (The purpose of this order is to document that the patient reports taking medical cannabis.  This is not a prescription, and is not used to certify that the patient has a qualifying medical condition.) 0 Information only 0     vitamin B complex with vitamin C (STRESS TAB) tablet Take 1 tablet by mouth daily  0     VITAMIN D, CHOLECALCIFEROL, PO Take 5,000 Units by mouth daily       naloxone (NARCAN) 4 MG/0.1ML nasal spray Spray 1 spray (4 mg) into one nostril alternating nostrils once as needed for opioid reversal every 2-3 minutes until assistance arrives 0.2 mL 1       Medical History: any changes in medical history since they were last seen? No    Review of Systems:  The 14 system ROS was reviewed from the intake questionnaire, and is negative  Any bowel or bladder problems: none  Mood: good, no concerns    Physical Exam:  Blood pressure 133/83, pulse 69, height 1.689 m (5' 6.5\"), weight 76.7 kg (169 lb), SpO2 96 %, not currently breastfeeding.  General: happy, no acute distress  Gait: Unsteady, uses cane for balance  MSK exam: grossly preserved strength with reported neuropathy in bilateral feet and hands    Assessment:   Acute Lumbar " Radiculopathy  Idiopathic Peripheral Neuropathy  Status post L4-L5 discektomy    Hanna Campo is a 68 year old female who is seen at the pain clinic for follow up of her medication management.  We discussed at length that she will need to follow up as soon as she returns from her travels. I will work with her to ensure that she is able to attain her next medication refill as appropriate based on when she is due for it.    Plan:  1. Physical Therapy:  Continuing home PT exercises  2. Diagnostic Studies:  none  3. Medication Management:  Refilled current prescriptions: Norco, butrens patches as needed  4. Further procedures recommended: none at this time  5. Recommendations to PCP: none  6. Follow up: 3 months, when she returns from her vacation to [place]    This patient was seen and discussed with the attending physician.    Robert Huang MD  Anesthesiology Resident, CA-2      I saw and examined the patient with the Pain Fellow/Resident. I have reviewed and agree with the resident's note and plan of care and made changes and corrections directly to the body of the note.    TIME SPENT:  BY FELLOW/RESIDENT ALONE 15 MIN  BY MYSELF AND FELLOW/RESIDENT TOGETHER 15 MIN    This time includes time for chart review, preparation, and documentation.     Lisa Morales MD  Pain Medicine, Department of Anesthesiology  , [facility]          Answers for HPI/ROS submitted by the patient on 1/9/2023  GIRISH 7 TOTAL SCORE: 0        [facility]    Date of visit: 1/13/2023Answers for HPI/ROS submitted by the patient on 1/9/2023  GIRISH 7 TOTAL SCORE: 0        Chief complaint:   Chief Complaint   Patient presents with     Follow Up     Follow-up Both Feet Pain       Interval history:  Hanna Campo was last seen by Dr. Morales on 8/17/2022.      Recommendations/plan at the last visit included:  6. Physical Therapy:  As tolerated daily HEPs  7. Clinical Health Psychologist to address  issues of relaxation, behavioral change, coping style, and other factors important to improvement.  Not at this time  8. Urine drug screen: completed today  9. Medication Management:  Agree with resumption of Norco and butrans patch, is no longer taking dilaudid that had been used during acute pain period. She successfully weaned herself back down to appropriate dose; UDS collected today  10. Follow up:  in 2-3 months. Video is okay.    Since her last visit, Hanna Campo reports:  No major changes since her last visit. She reports being here today for getting refills prior to moving down to [place] for 3 months. She will not bring her medical marijuana down with her since it is illegal in [place], but plans to continue using Norco and butrans patches. Denies any worsening of her pain and or issues or side effects from the medications. She continues to do PT exercises at home. She is interested in trying acupuncture, but continues to not be interested in SCS placement. She also plans to move to [place] after returning from [place] and so is interested in being referred to a pain clinic closer to there.     Pain scores:  Pain intensity on average is 5 on a scale of 0-10, with exacerbations be as bad as 10/10 pain.     Current pain treatments:   Medical cannabis  Butrans patch (10 mcg/hr)  Norco  Tylenol    Past pain treatments:  Gabapentin    Side Effects: denies any problems    Medications:  Current Outpatient Medications   Medication Sig Dispense Refill     buprenorphine (BUTRANS) 10 MCG/HR WK patch Place 1 patch onto the skin every 7 days 4 patch 3     Cetirizine HCl (ZYRTEC ALLERGY PO) Take 5 mg by mouth daily       chlorthalidone (HYGROTON) 50 MG tablet Take 1 tablet (50 mg) by mouth daily 90 tablet 1     HYDROcodone-acetaminophen (NORCO) 5-325 MG tablet Take 1 tablet by mouth every 8 hours as needed for severe pain (7-10) 90 tablet 0     levothyroxine (SYNTHROID/LEVOTHROID) 75 MCG tablet Take 1 tablet (75 mcg)  "by mouth daily *due for lab* 90 tablet 0     losartan (COZAAR) 25 MG tablet Take 1 tablet (25 mg) by mouth 2 times daily 180 tablet 0     medical cannabis (Patient's own supply) (The purpose of this order is to document that the patient reports taking medical cannabis.  This is not a prescription, and is not used to certify that the patient has a qualifying medical condition.) 0 Information only 0     vitamin B complex with vitamin C (STRESS TAB) tablet Take 1 tablet by mouth daily  0     VITAMIN D, CHOLECALCIFEROL, PO Take 5,000 Units by mouth daily       naloxone (NARCAN) 4 MG/0.1ML nasal spray Spray 1 spray (4 mg) into one nostril alternating nostrils once as needed for opioid reversal every 2-3 minutes until assistance arrives 0.2 mL 1       Medical History: any changes in medical history since they were last seen? No    Review of Systems:  The 14 system ROS was reviewed from the intake questionnaire, and is negative  Any bowel or bladder problems: none  Mood: good, no concerns    Physical Exam:  Blood pressure 133/83, pulse 69, height 1.689 m (5' 6.5\"), weight 76.7 kg (169 lb), SpO2 96 %, not currently breastfeeding.  General: happy, no acute distress  Gait: Unsteady, uses cane for balance  MSK exam: grossly preserved strength with reported neuropathy in bilateral feet and hands    Assessment:   Acute Lumbar Radiculopathy  Idiopathic Peripheral Neuropathy  Status post L4-L5 discektomy    Hanna Campo is a 68 year old female who is seen at the pain clinic for refills on medications. She reports good pain relief with Norco, butrans patches, and medical marijuania. She is going down to [place] for 3 months and would like prescription refills for Norco and butrans patches. Her last UDS was 08/2022 so there is no need for further workup today and plans to follow up as soon as she returns from [place] in 3 months. She is unable to transfer her buprenorphine prescription to other pharmacies. We discussed that I will " send her a one time prescription to wherever she will be at the time.    Plan:  7. Physical Therapy:  Continue home PT exercises. Referral made for acupuncture.  8. Diagnostic Studies:  None today, last UDS 08/2022  9. Medication Management: She will call pharmacy to see if she can transfer buprenorphine prescription to other pharmacies and follow up with us about this. We will plan to send prescriptions for [place] and butrans to a pharmacy near where she will be in [place] for refills.   10. Further procedures recommended: discussed possible SCS consideration, but patient continues to not be interested.   11. Recommendations to PCP: recommend referral to pain management to [place]  12. Follow up: plan to follow up in clinic, in person, after she returns from [place] in 3 months.     This patient was seen and discussed with the attending physician.    Robert Huang MD  Anesthesiology Resident, CA-2        Answers for HPI/ROS submitted by the patient on 1/9/2023  GIRISH 7 TOTAL SCORE: 0          Again, thank you for allowing me to participate in the care of your patient.      Sincerely,    Lisa Morales MD

## 2023-01-13 NOTE — LETTER
Date:January 20, 2023      Provider requested that no letter be sent. Do not send.       [facility]

## 2023-01-13 NOTE — PROGRESS NOTES
[facility]    Date of visit: 1/13/2023Answers for HPI/ROS submitted by the patient on 1/9/2023  GIRISH 7 TOTAL SCORE: 0        Chief complaint:   Chief Complaint   Patient presents with     Follow Up     Follow-up Both Feet Pain       Interval history:  Hanna Campo was last seen by Dr. Morales on 8/17/2022.      Recommendations/plan at the last visit included:  1. Physical Therapy:  As tolerated daily HEPs  2. Clinical Health Psychologist to address issues of relaxation, behavioral change, coping style, and other factors important to improvement.  Not at this time  3. Urine drug screen: completed today  4. Medication Management:  Agree with resumption of Norco and butrans patch, is no longer taking dilaudid that had been used during acute pain period. She successfully weaned herself back down to appropriate dose; UDS collected today  5. Follow up:  in 2-3 months. Video is okay.    Since her last visit, Hanna Campo reports:  No major changes since her last visit. She reports being here today for getting refills prior to moving down to [place] for 3 months. She will not bring her medical marijuana down with her since it is illegal in [place], but plans to continue using Norco and butrans patches. Denies any worsening of her pain and or issues or side effects from the medications. She continues to do PT exercises at home. She is interested in trying acupuncture, but continues to not be interested in SCS placement. She also plans to move to [place] after returning from [place] and so is interested in being referred to a pain clinic closer to there.     Pain scores:  Pain intensity on average is 5 on a scale of 0-10, with exacerbations be as bad as 10/10 pain.     Current pain treatments:   Medical cannabis  Butrans patch (10 mcg/hr)  Norco  Tylenol    Past pain treatments:  Gabapentin    Side Effects: denies any problems    Medications:  Current Outpatient Medications   Medication Sig Dispense  "Refill     buprenorphine (BUTRANS) 10 MCG/HR WK patch Place 1 patch onto the skin every 7 days 4 patch 3     Cetirizine HCl (ZYRTEC ALLERGY PO) Take 5 mg by mouth daily       chlorthalidone (HYGROTON) 50 MG tablet Take 1 tablet (50 mg) by mouth daily 90 tablet 1     HYDROcodone-acetaminophen (NORCO) 5-325 MG tablet Take 1 tablet by mouth every 8 hours as needed for severe pain (7-10) 90 tablet 0     levothyroxine (SYNTHROID/LEVOTHROID) 75 MCG tablet Take 1 tablet (75 mcg) by mouth daily *due for lab* 90 tablet 0     losartan (COZAAR) 25 MG tablet Take 1 tablet (25 mg) by mouth 2 times daily 180 tablet 0     medical cannabis (Patient's own supply) (The purpose of this order is to document that the patient reports taking medical cannabis.  This is not a prescription, and is not used to certify that the patient has a qualifying medical condition.) 0 Information only 0     vitamin B complex with vitamin C (STRESS TAB) tablet Take 1 tablet by mouth daily  0     VITAMIN D, CHOLECALCIFEROL, PO Take 5,000 Units by mouth daily       naloxone (NARCAN) 4 MG/0.1ML nasal spray Spray 1 spray (4 mg) into one nostril alternating nostrils once as needed for opioid reversal every 2-3 minutes until assistance arrives 0.2 mL 1       Medical History: any changes in medical history since they were last seen? No    Review of Systems:  The 14 system ROS was reviewed from the intake questionnaire, and is negative  Any bowel or bladder problems: none  Mood: good, no concerns    Physical Exam:  Blood pressure 133/83, pulse 69, height 1.689 m (5' 6.5\"), weight 76.7 kg (169 lb), SpO2 96 %, not currently breastfeeding.  General: happy, no acute distress  Gait: Unsteady, uses cane for balance  MSK exam: grossly preserved strength with reported neuropathy in bilateral feet and hands    Assessment:   Acute Lumbar Radiculopathy  Idiopathic Peripheral Neuropathy  Status post L4-L5 discektomy    Hanna Campo is a 68 year old female who is seen at " the pain clinic for refills on medications. She reports good pain relief with Norco, butrans patches, and medical marijuania. She is going down to [place] for 3 months and would like prescription refills for Norco and butrans patches. Her last UDS was 08/2022 so there is no need for further workup today and plans to follow up as soon as she returns from [place] in 3 months. She is unable to transfer her buprenorphine prescription to other pharmacies. We discussed that I will send her a one time prescription to wherever she will be at the time.    Plan:  1. Physical Therapy:  Continue home PT exercises. Referral made for acupuncture.  2. Diagnostic Studies:  None today, last UDS 08/2022  3. Medication Management: She will call pharmacy to see if she can transfer buprenorphine prescription to other pharmacies and follow up with us about this. We will plan to send prescriptions for [place] and butrans to a pharmacy near where she will be in [place] for refills.   4. Further procedures recommended: discussed possible SCS consideration, but patient continues to not be interested.   5. Recommendations to PCP: recommend referral to pain management to [place]  6. Follow up: plan to follow up in clinic, in person, after she returns from [place] in 3 months.     This patient was seen and discussed with the attending physician.    Robert Huang MD  Anesthesiology Resident, CA-2        Answers for HPI/ROS submitted by the patient on 1/9/2023  GIRISH 7 TOTAL SCORE: 0

## 2023-01-19 ENCOUNTER — MYC MEDICAL ADVICE (OUTPATIENT)
Dept: ANESTHESIOLOGY | Facility: CLINIC | Age: 68
End: 2023-01-19
Payer: COMMERCIAL

## 2023-01-19 DIAGNOSIS — M79.672 PAIN IN BOTH FEET: ICD-10-CM

## 2023-01-19 DIAGNOSIS — G60.9 IDIOPATHIC PERIPHERAL NEUROPATHY: ICD-10-CM

## 2023-01-19 DIAGNOSIS — M79.671 PAIN IN BOTH FEET: ICD-10-CM

## 2023-01-19 NOTE — TELEPHONE ENCOUNTER
Refill request    Medication: buprenorphine (BUTRANS) 10 MCG/HR WK patch    Sig: Place 1 patch onto the skin every 7 days     Dispensed: 4  Refills: 3  SOLD to the pt on: 12/29/22    Last clinic appointment: 1/13/23  Next clinic appointment: 4/13/23    Last Drug Screen Collected: 8/17/22  Controlled Substance Agreement signed: 8/17/22    LPN called [facility] in [place]- and confirmed that the remaining refill of their Butrans prescription that was sent on 10/31/22 was cancelled. Pharmacy confirmed. Pt has no active refills on profile.       Preferred pharmacy:    [facility] #[id number]  [facility] [facility]  [phone]

## 2023-01-26 RX ORDER — BUPRENORPHINE 10 UG/H
1 PATCH TRANSDERMAL
Qty: 4 PATCH | Refills: 0 | Status: SHIPPED | OUTPATIENT
Start: 2023-01-26 | End: 2023-02-13

## 2023-03-01 DIAGNOSIS — E03.9 HYPOTHYROIDISM, UNSPECIFIED TYPE: ICD-10-CM

## 2023-03-01 RX ORDER — LEVOTHYROXINE SODIUM 75 UG/1
75 TABLET ORAL DAILY
Qty: 90 TABLET | Refills: 0 | OUTPATIENT
Start: 2023-03-01

## 2023-03-01 RX ORDER — LEVOTHYROXINE SODIUM 75 UG/1
75 TABLET ORAL DAILY
Qty: 90 TABLET | Refills: 0 | Status: SHIPPED | OUTPATIENT
Start: 2023-03-01 | End: 2023-05-31

## 2023-03-01 NOTE — TELEPHONE ENCOUNTER
Medication Question or Refill    Contacts       Type Contact Phone/Fax    03/01/2023 12:46 PM CST Fax (Incoming) [facility]/pharmacy #[id number] - [place] - [address] (Pharmacy) [phone]          What medication are you calling about (include dose and sig)?: levothyroxine (SYNTHROID/LEVOTHROID) 75 MCG tablet      Preferred Pharmacy:  [id number] - [place] - [address]  [address] [facility] [address]  Phone: [phone] Fax: [phone]    Controlled Substance Agreement on file:   CSA -- Patient Level:     [Media Unavailable] Controlled Substance Agreement - Opioid - Scan on 4/2/2022  2:13 PM   [Media Unavailable] Controlled Substance Agreement - Opioid - Scan on 6/27/2021  1:47 PM   [Media Unavailable] Controlled Substance Agreement - Opioid - Scan on 9/20/2020  3:10 PM   [Media Unavailable] Controlled Substance Agreement - Opioid - Scan on 3/5/2020  4:22 PM       Who prescribed the medication?: Stephanie Sherman    Do you need a refill? Yes

## 2023-03-28 DIAGNOSIS — I10 BENIGN ESSENTIAL HYPERTENSION: ICD-10-CM

## 2023-03-28 RX ORDER — LOSARTAN POTASSIUM 25 MG/1
25 TABLET ORAL 2 TIMES DAILY
Qty: 180 TABLET | Refills: 0 | Status: SHIPPED | OUTPATIENT
Start: 2023-03-28 | End: 2023-06-05

## 2023-03-28 NOTE — TELEPHONE ENCOUNTER
Medication Question or Refill        What medication are you calling about (include dose and sig)?:   chlorthalidone (HYGROTON) 50 MG tablet    Preferred Pharmacy:   [id number] - [place] - [address] [facility]  [address] [place] [address]  Phone: [phone] Fax: [phone]      Controlled Substance Agreement on file:   CSA -- Patient Level:     [Media Unavailable] Controlled Substance Agreement - Opioid - Scan on 4/2/2022  2:13 PM   [Media Unavailable] Controlled Substance Agreement - Opioid - Scan on 6/27/2021  1:47 PM   [Media Unavailable] Controlled Substance Agreement - Opioid - Scan on 9/20/2020  3:10 PM   [Media Unavailable] Controlled Substance Agreement - Opioid - Scan on 3/5/2020  4:22 PM       Who prescribed the medication?: Stephanie Sherman    Do you need a refill? Yes

## 2023-03-29 RX ORDER — CHLORTHALIDONE 50 MG/1
50 TABLET ORAL DAILY
Qty: 90 TABLET | Refills: 0 | Status: SHIPPED | OUTPATIENT
Start: 2023-03-29 | End: 2023-06-06

## 2023-04-09 ENCOUNTER — HEALTH MAINTENANCE LETTER (OUTPATIENT)
Age: 68
End: 2023-04-09

## 2023-04-12 ASSESSMENT — PAIN SCALES - PAIN ENJOYMENT GENERAL ACTIVITY SCALE (PEG)
AVG_PAIN_PASTWEEK: 8
PEG_TOTALSCORE: 6
AVG_PAIN_PASTWEEK: 8
INTERFERED_GENERAL_ACTIVITY: 5
PEG_TOTALSCORE: 6
INTERFERED_GENERAL_ACTIVITY: 5
INTERFERED_ENJOYMENT_LIFE: 5
INTERFERED_ENJOYMENT_LIFE: 5

## 2023-04-12 ASSESSMENT — ANXIETY QUESTIONNAIRES
6. BECOMING EASILY ANNOYED OR IRRITABLE: NOT AT ALL
GAD7 TOTAL SCORE: 0
GAD7 TOTAL SCORE: 0
4. TROUBLE RELAXING: NOT AT ALL
GAD7 TOTAL SCORE: 0
7. FEELING AFRAID AS IF SOMETHING AWFUL MIGHT HAPPEN: NOT AT ALL
5. BEING SO RESTLESS THAT IT IS HARD TO SIT STILL: NOT AT ALL
2. NOT BEING ABLE TO STOP OR CONTROL WORRYING: NOT AT ALL
3. WORRYING TOO MUCH ABOUT DIFFERENT THINGS: NOT AT ALL
1. FEELING NERVOUS, ANXIOUS, OR ON EDGE: NOT AT ALL
7. FEELING AFRAID AS IF SOMETHING AWFUL MIGHT HAPPEN: NOT AT ALL

## 2023-04-13 ENCOUNTER — VIRTUAL VISIT (OUTPATIENT)
Dept: ANESTHESIOLOGY | Facility: CLINIC | Age: 68
End: 2023-04-13
Payer: COMMERCIAL

## 2023-04-13 DIAGNOSIS — M79.671 PAIN IN BOTH FEET: ICD-10-CM

## 2023-04-13 DIAGNOSIS — G60.9 IDIOPATHIC PERIPHERAL NEUROPATHY: ICD-10-CM

## 2023-04-13 DIAGNOSIS — M79.672 PAIN IN BOTH FEET: ICD-10-CM

## 2023-04-13 PROCEDURE — 99213 OFFICE O/P EST LOW 20 MIN: CPT | Mod: VID | Performed by: ANESTHESIOLOGY

## 2023-04-13 RX ORDER — BUPRENORPHINE 10 UG/H
1 PATCH TRANSDERMAL
Qty: 4 PATCH | Refills: 0 | Status: SHIPPED | OUTPATIENT
Start: 2023-04-20 | End: 2023-05-15

## 2023-04-13 ASSESSMENT — PAIN SCALES - GENERAL: PAINLEVEL: SEVERE PAIN (6)

## 2023-04-13 NOTE — NURSING NOTE
Patient presents with:  Follow Up: Follow-up both Feet pain      Severe Pain (6)     Pain Medications     Opioid Combinations Refills Start End     HYDROcodone-acetaminophen (NORCO) 5-325 MG tablet    0 3/29/2023     Sig - Route: Take 1 tablet by mouth every 8 hours as needed for severe pain (7-10) - Oral    Class: E-Prescribe    Earliest Fill Date: 3/29/2023    No prior authorization was found for this prescription.    Found prior authorization for another prescription for the same medication: Closed - Prior Authorization not required for patient/medication    Opioid Partial Agonists Refills Start End     buprenorphine (BUTRANS) 10 MCG/HR WK patch    0 3/17/2023     Sig - Route: Place 1 patch onto the skin every 7 days - Transdermal    Class: E-Prescribe    No prior authorization was found for this prescription.    Found prior authorization for another prescription for the same medication: Closed - Prior Authorization duplicate/in process          What medications are you using for pain? Medical Cannabis, Butran patch, Hydrocodone    (New patients only) Have you been seen by another pain clinic/ provider? no    (Return Patients only) What refills are you needing today? no

## 2023-04-13 NOTE — PROGRESS NOTES
Hanna is a 68 year old who is being evaluated via a billable video visit.      How would you like to obtain your AVS? MyChart  If the video visit is dropped, the invitation should be resent by: Text to cell phone: [phone]  Will anyone else be joining your video visit? No

## 2023-04-13 NOTE — PROGRESS NOTES
[facility]    Date of visit: 4/13/2023    Chief complaint:   Chief Complaint   Patient presents with     Follow Up     Follow-up both Feet pain       Interval history:  Hanna Campo was last seen by me on 1/13/2023.      Recommendations/plan at the last visit included:  1. Physical Therapy:  Continuing home PT exercises  2. Diagnostic Studies:  none  3. Medication Management:  Refilled current prescriptions: Norco, butrens patches as needed  4. Further procedures recommended: none at this time  5. Recommendations to PCP: none  6. Follow up: 3 months, when she returns from her vacation to [place]    Since her last visit, Hanna Campo reports:  Has been feeling well overall  Just returned from her road trip  Notes sitting for prolonged period of time with feet down     Pain scores:  Pain intensity on average is 6 on a scale of 0-10.     Current pain treatments:   Hydrocodone 5-325 90 tabs for 30 days  Butrans patch 10 mcg/hr        Side Effects: denies any problems    Medications:  Current Outpatient Medications   Medication Sig Dispense Refill     buprenorphine (BUTRANS) 10 MCG/HR WK patch Place 1 patch onto the skin every 7 days 4 patch 0     Cetirizine HCl (ZYRTEC ALLERGY PO) Take 5 mg by mouth daily       chlorthalidone (HYGROTON) 50 MG tablet Take 1 tablet (50 mg) by mouth daily Due for visit and labs for refills** 90 tablet 0     HYDROcodone-acetaminophen (NORCO) 5-325 MG tablet Take 1 tablet by mouth every 8 hours as needed for severe pain (7-10) 90 tablet 0     levothyroxine (SYNTHROID/LEVOTHROID) 75 MCG tablet Take 1 tablet (75 mcg) by mouth daily *due for lab* 90 tablet 0     losartan (COZAAR) 25 MG tablet Take 1 tablet (25 mg) by mouth 2 times daily 180 tablet 0     medical cannabis (Patient's own supply) (The purpose of this order is to document that the patient reports taking medical cannabis.  This is not a prescription, and is not used to certify that the patient has a  qualifying medical condition.) 0 Information only 0     vitamin B complex with vitamin C (STRESS TAB) tablet Take 1 tablet by mouth daily  0     VITAMIN D, CHOLECALCIFEROL, PO Take 5,000 Units by mouth daily       naloxone (NARCAN) 4 MG/0.1ML nasal spray Spray 1 spray (4 mg) into one nostril alternating nostrils once as needed for opioid reversal every 2-3 minutes until assistance arrives 0.2 mL 1       Medical History: any changes in medical history since they were last seen? No    Review of Systems:  The 14 system ROS was reviewed from the intake questionnaire, and is positive for: peripheral neuropathy, foot pain  Any bowel or bladder problems: denies  Mood: stable    Physical Exam: Virtual visit  not currently breastfeeding.  AAOx3, NAD, seated comfortably in chair for conversation    Assessment:   Idiopathic Peripheral Neuropathy      Hanna Campo is a 68 year old female who is seen at the pain clinic for follow up of medication management. She continues to do well with her current regimen. Refill was provided. She will follow up in person for her next visit in 2 months.     Plan:  1. Physical Therapy:  Continue current regimen  2. Clinical Health Psychologist to address issues of relaxation, behavioral change, coping style, and other factors important to improvement.  Not indicated  3. Diagnostic Studies:  none  4. Medication Management:  Refill rx provided  5. Further procedures recommended: none  6. Recommendations to PCP: none  7. Follow up: 2 months    Lisa Morales MD    Pain Medicine  Department of Anesthesiology  [facility]            Answers for HPI/ROS submitted by the patient on 4/12/2023  GIRISH 7 TOTAL SCORE: 0  General Symptoms: No  Skin Symptoms: No  HENT Symptoms: No  EYE SYMPTOMS: No  HEART SYMPTOMS: No  LUNG SYMPTOMS: No  INTESTINAL SYMPTOMS: No  URINARY SYMPTOMS: No  GYNECOLOGIC SYMPTOMS: No  BREAST SYMPTOMS: No  SKELETAL SYMPTOMS: No  BLOOD SYMPTOMS:  No  NERVOUS SYSTEM SYMPTOMS: No  MENTAL HEALTH SYMPTOMS: No

## 2023-04-13 NOTE — LETTER
4/13/2023       RE: Hanna Campo  [address]  [place] [address]       Dear Colleague,    Thank you for referring your patient, Hanna Campo, to the [facility] [place] at [facility]. Please see a copy of my visit note below.      Hanna is a 68 year old who is being evaluated via a billable video visit.      How would you like to obtain your AVS? MyChart  If the video visit is dropped, the invitation should be resent by: Text to cell phone: [phone]  Will anyone else be joining your video visit? No      [facility]    Date of visit: 4/13/2023    Chief complaint:   Chief Complaint   Patient presents with    Follow Up     Follow-up both Feet pain       Interval history:  Hanna Campo was last seen by me on 1/13/2023.      Recommendations/plan at the last visit included:  Physical Therapy:  Continuing home PT exercises  Diagnostic Studies:  none  Medication Management:  Refilled current prescriptions: Norco, butrens patches as needed  Further procedures recommended: none at this time  Recommendations to PCP: none  Follow up: 3 months, when she returns from her vacation to [place]    Since her last visit, Hanna Campo reports:  Has been feeling well overall  Just returned from her road trip  Notes sitting for prolonged period of time with feet down     Pain scores:  Pain intensity on average is 6 on a scale of 0-10.     Current pain treatments:   Hydrocodone 5-325 90 tabs for 30 days  Butrans patch 10 mcg/hr        Side Effects: denies any problems    Medications:  Current Outpatient Medications   Medication Sig Dispense Refill    buprenorphine (BUTRANS) 10 MCG/HR WK patch Place 1 patch onto the skin every 7 days 4 patch 0    Cetirizine HCl (ZYRTEC ALLERGY PO) Take 5 mg by mouth daily      chlorthalidone (HYGROTON) 50 MG tablet Take 1 tablet (50 mg) by mouth daily Due for  visit and labs for refills** 90 tablet 0    HYDROcodone-acetaminophen (NORCO) 5-325 MG tablet Take 1 tablet by mouth every 8 hours as needed for severe pain (7-10) 90 tablet 0    levothyroxine (SYNTHROID/LEVOTHROID) 75 MCG tablet Take 1 tablet (75 mcg) by mouth daily *due for lab* 90 tablet 0    losartan (COZAAR) 25 MG tablet Take 1 tablet (25 mg) by mouth 2 times daily 180 tablet 0    medical cannabis (Patient's own supply) (The purpose of this order is to document that the patient reports taking medical cannabis.  This is not a prescription, and is not used to certify that the patient has a qualifying medical condition.) 0 Information only 0    vitamin B complex with vitamin C (STRESS TAB) tablet Take 1 tablet by mouth daily  0    VITAMIN D, CHOLECALCIFEROL, PO Take 5,000 Units by mouth daily      naloxone (NARCAN) 4 MG/0.1ML nasal spray Spray 1 spray (4 mg) into one nostril alternating nostrils once as needed for opioid reversal every 2-3 minutes until assistance arrives 0.2 mL 1       Medical History: any changes in medical history since they were last seen? No    Review of Systems:  The 14 system ROS was reviewed from the intake questionnaire, and is positive for: peripheral neuropathy, foot pain  Any bowel or bladder problems: denies  Mood: stable    Physical Exam: Virtual visit  not currently breastfeeding.  AAOx3, NAD, seated comfortably in chair for conversation    Assessment:   Idiopathic Peripheral Neuropathy      Hanna Campo is a 68 year old female who is seen at the pain clinic for follow up of medication management. She continues to do well with her current regimen. Refill was provided. She will follow up in person for her next visit in 2 months.     Plan:  Physical Therapy:  Continue current regimen  Clinical Health Psychologist to address issues of relaxation, behavioral change, coping style, and other factors important to improvement.  Not indicated  Diagnostic Studies:  none  Medication  Management:  Refill rx provided  Further procedures recommended: none  Recommendations to PCP: none  Follow up: 2 months    Lisa Morales MD    Pain Medicine  Department of Anesthesiology  [facility]            Answers for HPI/ROS submitted by the patient on 4/12/2023  GIRISH 7 TOTAL SCORE: 0  General Symptoms: No  Skin Symptoms: No  HENT Symptoms: No  EYE SYMPTOMS: No  HEART SYMPTOMS: No  LUNG SYMPTOMS: No  INTESTINAL SYMPTOMS: No  URINARY SYMPTOMS: No  GYNECOLOGIC SYMPTOMS: No  BREAST SYMPTOMS: No  SKELETAL SYMPTOMS: No  BLOOD SYMPTOMS: No  NERVOUS SYSTEM SYMPTOMS: No  MENTAL HEALTH SYMPTOMS: No          Again, thank you for allowing me to participate in the care of your patient.      Sincerely,    Lisa Morales MD

## 2023-05-31 DIAGNOSIS — E03.9 HYPOTHYROIDISM, UNSPECIFIED TYPE: ICD-10-CM

## 2023-05-31 RX ORDER — LEVOTHYROXINE SODIUM 75 UG/1
75 TABLET ORAL DAILY
Qty: 90 TABLET | Refills: 1 | Status: SHIPPED | OUTPATIENT
Start: 2023-05-31

## 2023-05-31 NOTE — TELEPHONE ENCOUNTER
Medication Question or Refill        What medication are you calling about (include dose and sig)?: levothyroxine (SYNTHROID/LEVOTHROID) 75 MCG tablet    Controlled Substance Agreement on file:   CSA -- Patient Level:    CSA: None found at the patient level.       Who prescribed the medication?:Stephanie Sherman      Do you have any questions or concerns?  Yes

## 2023-06-05 DIAGNOSIS — I10 BENIGN ESSENTIAL HYPERTENSION: ICD-10-CM

## 2023-06-05 NOTE — TELEPHONE ENCOUNTER
Medication Question or Refill        What medication are you calling about (include dose and sig)?: chlorthalidone (HYGROTON) 50 MG tablet    Controlled Substance Agreement on file:   CSA -- Patient Level:    CSA: None found at the patient level.       Who prescribed the medication?:Stephanie Sherman    Do you have any questions or concerns?  yes

## 2023-06-06 RX ORDER — CHLORTHALIDONE 50 MG/1
50 TABLET ORAL DAILY
Qty: 90 TABLET | Refills: 0 | Status: SHIPPED | OUTPATIENT
Start: 2023-06-06

## 2023-06-13 ENCOUNTER — MYC REFILL (OUTPATIENT)
Dept: ANESTHESIOLOGY | Facility: CLINIC | Age: 68
End: 2023-06-13
Payer: COMMERCIAL

## 2023-06-13 DIAGNOSIS — M79.671 PAIN IN BOTH FEET: ICD-10-CM

## 2023-06-13 DIAGNOSIS — M79.672 PAIN IN BOTH FEET: ICD-10-CM

## 2023-06-13 DIAGNOSIS — G60.9 IDIOPATHIC PERIPHERAL NEUROPATHY: ICD-10-CM

## 2023-06-13 NOTE — TELEPHONE ENCOUNTER
Refill request    Medication: buprenorphine (BUTRANS) 10 MCG/HR WK patch    Sig: Place 1 patch onto the skin every 7 days    Dispensed: 4  Refills: 0  SOLD to the pt on: 5/24/23    Last clinic appointment: 4/13/23  Next clinic appointment: 6/21/23    Last Drug Screen Collected: 8/17/22  Controlled Substance Agreement signed: 8/17/22      [facility]:    [id number] - [facility] [address]

## 2023-06-14 RX ORDER — BUPRENORPHINE 10 UG/H
1 PATCH TRANSDERMAL
Qty: 4 PATCH | Refills: 0 | Status: SHIPPED | OUTPATIENT
Start: 2023-06-14 | End: 2023-07-12

## 2023-07-04 ENCOUNTER — MYC REFILL (OUTPATIENT)
Dept: ANESTHESIOLOGY | Facility: CLINIC | Age: 68
End: 2023-07-04
Payer: COMMERCIAL

## 2023-07-04 DIAGNOSIS — M79.672 PAIN IN BOTH FEET: ICD-10-CM

## 2023-07-04 DIAGNOSIS — M79.671 PAIN IN BOTH FEET: ICD-10-CM

## 2023-07-04 DIAGNOSIS — G60.9 IDIOPATHIC PERIPHERAL NEUROPATHY: ICD-10-CM

## 2023-07-04 RX ORDER — BUPRENORPHINE 10 UG/H
1 PATCH TRANSDERMAL
Qty: 4 PATCH | Refills: 0 | Status: CANCELLED | OUTPATIENT
Start: 2023-07-04

## 2023-07-05 NOTE — TELEPHONE ENCOUNTER
Refill request    Medication:     buprenorphine (BUTRANS) 10 MCG/HR WK patch  Sig: Place 1 patch onto the skin every 7 days     Dispensed: 4  Refills: 0  SOLD to the pt on: 6/20/23    Last clinic appointment: 4/13/23  Next clinic appointment: 7/12/23     Last Drug Screen Collected: 8/17/22  Controlled Substance Agreement signed: 8/17/22     Preferred pharmacy:     [id number] - [place], [place] - [address]     Medication pended and routed to the provider to review.      Maddi Galeana LPN

## 2023-07-06 NOTE — TELEPHONE ENCOUNTER
Refill not needed until 7/20/23. Pt can discuss refill at appointment with provider on 7/12/23.    Maddi Galeana LPN

## 2023-07-12 ENCOUNTER — OFFICE VISIT (OUTPATIENT)
Dept: ANESTHESIOLOGY | Facility: CLINIC | Age: 68
End: 2023-07-12
Payer: COMMERCIAL

## 2023-07-12 VITALS
HEART RATE: 59 BPM | SYSTOLIC BLOOD PRESSURE: 133 MMHG | DIASTOLIC BLOOD PRESSURE: 80 MMHG | HEIGHT: 67 IN | OXYGEN SATURATION: 96 % | BODY MASS INDEX: 28.41 KG/M2 | WEIGHT: 181 LBS

## 2023-07-12 DIAGNOSIS — M79.672 PAIN IN BOTH FEET: ICD-10-CM

## 2023-07-12 DIAGNOSIS — Z79.891 OPIOID CONTRACT EXISTS: Primary | ICD-10-CM

## 2023-07-12 DIAGNOSIS — G60.9 IDIOPATHIC PERIPHERAL NEUROPATHY: ICD-10-CM

## 2023-07-12 DIAGNOSIS — M79.671 PAIN IN BOTH FEET: ICD-10-CM

## 2023-07-12 LAB — CREAT UR-MCNC: 47 MG/DL

## 2023-07-12 PROCEDURE — 80357 KETAMINE AND NORKETAMINE: CPT | Performed by: ANESTHESIOLOGY

## 2023-07-12 PROCEDURE — 99000 SPECIMEN HANDLING OFFICE-LAB: CPT | Performed by: PATHOLOGY

## 2023-07-12 PROCEDURE — 80360 METHYLPHENIDATE: CPT | Performed by: ANESTHESIOLOGY

## 2023-07-12 PROCEDURE — 99213 OFFICE O/P EST LOW 20 MIN: CPT | Performed by: ANESTHESIOLOGY

## 2023-07-12 PROCEDURE — 80355 GABAPENTIN NON-BLOOD: CPT | Performed by: ANESTHESIOLOGY

## 2023-07-12 RX ORDER — BUPRENORPHINE 10 UG/H
1 PATCH TRANSDERMAL
Qty: 4 PATCH | Refills: 2 | Status: SHIPPED | OUTPATIENT
Start: 2023-07-12 | End: 2023-10-04

## 2023-07-12 RX ORDER — LEVOTHYROXINE SODIUM 88 UG/1
88 TABLET ORAL DAILY
COMMUNITY

## 2023-07-12 ASSESSMENT — PAIN SCALES - GENERAL: PAINLEVEL: MODERATE PAIN (4)

## 2023-07-12 NOTE — PROGRESS NOTES
[facility]    Date of visit: 7/12/2023    Chief complaint:   Chief Complaint   Patient presents with     Follow Up     2 Months Follow-up both feet pain       Interval history:  Hanna Campo was last seen by me on 4/13/2023.      Recommendations/plan at the last visit included:  1. Physical Therapy:  Continue current regimen  2. Clinical Health Psychologist to address issues of relaxation, behavioral change, coping style, and other factors important to improvement.  Not indicated  3. Diagnostic Studies:  none  4. Medication Management:  Refill rx provided  5. Further procedures recommended: none  6. Recommendations to PCP: none  7. Follow up: 2 months    Since her last visit, Hanna Campo reports:  Will be moving closer to [facility] and will be establishing with a new pain clinic in that area (65 miles away)    Pain scores:  Pain intensity on average is 4 on a scale of 0-10.       Medications:  Current Outpatient Medications   Medication Sig Dispense Refill     buprenorphine (BUTRANS) 10 MCG/HR WK patch Place 1 patch onto the skin every 7 days 4 patch 0     Cetirizine HCl (ZYRTEC ALLERGY PO) Take 5 mg by mouth daily       chlorthalidone (HYGROTON) 50 MG tablet Take 1 tablet (50 mg) by mouth daily Due for visit and labs for refills** 90 tablet 0     HYDROcodone-acetaminophen (NORCO) 5-325 MG tablet Take 1 tablet by mouth every 8 hours as needed for severe pain 90 tablet 0     levothyroxine (SYNTHROID/LEVOTHROID) 88 MCG tablet Take 88 mcg by mouth daily       losartan (COZAAR) 25 MG tablet Take 1 tablet (25 mg) by mouth 2 times daily 180 tablet 0     medical cannabis (Patient's own supply) (The purpose of this order is to document that the patient reports taking medical cannabis.  This is not a prescription, and is not used to certify that the patient has a qualifying medical condition.) 0 Information only 0     vitamin B complex with vitamin C (STRESS TAB) tablet Take 1 tablet by  "mouth daily  0     VITAMIN D, CHOLECALCIFEROL, PO Take 5,000 Units by mouth daily       levothyroxine (SYNTHROID/LEVOTHROID) 75 MCG tablet Take 1 tablet (75 mcg) by mouth daily 90 tablet 1     naloxone (NARCAN) 4 MG/0.1ML nasal spray Spray 1 spray (4 mg) into one nostril alternating nostrils once as needed for opioid reversal every 2-3 minutes until assistance arrives 0.2 mL 1       Medical History: any changes in medical history since they were last seen? No    Review of Systems:  The 14 system ROS was reviewed from the intake questionnaire, and is positive for: bilateral foot pain  Any bowel or bladder problems: denies  Mood: stable    Physical Exam:  Blood pressure 133/80, pulse 59, height 1.689 m (5' 6.5\"), weight 82.1 kg (181 lb), SpO2 96 %, not currently breastfeeding.  General: AAOx3, NAD  Gait: Normal  MSK exam: Deferred for conversation    Assessment:   Idiopathic Peripheral Neuropathy    Hanna Campo is a 68 year old female who is seen at the pain clinic for follow up to discuss medication management. She continues to do well with her current regimen. We will refill her medications as needed today. She will need to follow up in 2 months until she establishes care with a provider in [facility].     Plan:   Follow up in 2 months    Lisa Morales MD    Pain Medicine  Department of Anesthesiology  [facility]        Answers for HPI/ROS submitted by the patient on 7/10/2023  General Symptoms: No  Skin Symptoms: No  HENT Symptoms: No  EYE SYMPTOMS: No  HEART SYMPTOMS: No  LUNG SYMPTOMS: No  INTESTINAL SYMPTOMS: No  URINARY SYMPTOMS: No  GYNECOLOGIC SYMPTOMS: No  BREAST SYMPTOMS: No  SKELETAL SYMPTOMS: No  BLOOD SYMPTOMS: No  NERVOUS SYSTEM SYMPTOMS: No  MENTAL HEALTH SYMPTOMS: No      "

## 2023-07-12 NOTE — LETTER
Opioid / Opioid Plus Controlled Substance Agreement    This is an agreement between you and your provider about the safe and appropriate use of controlled substance/opioids prescribed by your care team. Controlled substances are medicines that can cause physical and mental dependence (abuse).    There are strict laws about having and using these medicines. We here at [facility] are committing to working with you in your efforts to get better. To support you in this work, we ll help you schedule regular office appointments for medicine refills. If we must cancel or change your appointment for any reason, we ll make sure you have enough medicine to last until your next appointment.     As a Provider, I will:  Listen carefully to your concerns and treat you with respect.   Recommend a treatment plan that I believe is in your best interest. This plan may involve therapies other than opioid pain medication.   Talk with you often about the possible benefits, and the risk of harm of any medicine that we prescribe for you.   Provide a plan on how to taper (discontinue or go off) using this medicine if the decision is made to stop its use.    As a Patient, I understand that opioid(s):   Are a controlled substance prescribed by my care team to help me function or work and manage my condition(s).   Are strong medicines and can cause serious side effects such as:  Drowsiness, which can seriously affect my driving ability  A lower breathing rate, enough to cause death  Harm to my thinking ability   Depression   Abuse of and addiction to this medicine  Need to be taken exactly as prescribed. Combining opioids with certain medicines or chemicals (such as illegal drugs, sedatives, sleeping pills, and benzodiazepines) can be dangerous or even fatal. If I stop opioids suddenly, I may have severe withdrawal symptoms.  Do not work for all types of pain nor for all patients. If they re not helpful, I may be asked to stop  them.        The risks, benefits and side effects of these medicine(s) were explained to me. I agree that:  I will take part in other treatments as advised by my care team. This may be psychiatry or counseling, physical therapy, behavioral therapy, group treatment or a referral to a specialist.     I will keep all my appointments. I understand that this is part of the monitoring of opioids. My care team may require an office visit for EVERY opioid/controlled substance refill. If I miss appointments or don t follow instructions, my care team may stop my medicine.    I will take my medicines as prescribed. I will not change the dose or schedule unless my care team tells me to. There will be no refills if I run out early.     I may be asked to come to the clinic and complete a urine drug test or complete a pill count at any time. If I don t give a urine sample or participate in a pill count, the care team may stop my medicine.    I will only receive prescriptions from this clinic for chronic pain. If I am treated by another provider for acute pain issues, I will tell them that I am taking opioid pain medication for chronic pain and that I have a treatment agreement with this provider. I will inform my [facility] care team within one business day if I am given a prescription for any pain medication by another healthcare provider. My [facility] care team can contact other providers and pharmacists about my use of any medicines.    It is up to me to make sure that I don t run out of my medicines on weekends or holidays. If my care team is willing to refill my opioid prescription without a visit, I must request refills only during office hours. Refills may take up to 3 business days to process. I will use one pharmacy to fill all my opioid and other controlled substance prescriptions. I will notify the clinic about any changes to my insurance or medication availability.    I am responsible for my  prescriptions. If the medicine/prescription is lost, stolen or destroyed, it will not be replaced. I also agree not to share controlled substance medicines with anyone.    I am aware I should not use any illegal or recreational drugs. I agree not to drink alcohol unless my care team says I can.       If I enroll in the [place] Medical Cannabis program, I will tell my care team prior to my next refill.     I will tell my care team right away if I become pregnant, have a new medical problem treated outside of my regular clinic, or have a change in my medications.    I understand that this medicine can affect my thinking, judgment and reaction time. Alcohol and drugs affect the brain and body, which can affect the safety of my driving. Being under the influence of alcohol or drugs can affect my decision-making, behaviors, personal safety, and the safety of others. Driving while impaired (DWI) can occur if a person is driving, operating, or in physical control of a car, motorcycle, boat, snowmobile, ATV, motorbike, off-road vehicle, or any other motor vehicle ([place] Statute 169A.20). I understand the risk if I choose to drive or operate any vehicle or machinery.    I understand that if I do not follow any of the conditions above, my prescriptions or treatment may be stopped or changed.          Opioids  What You Need to Know    What are opioids?   Opioids are pain medicines that must be prescribed by a doctor. They are also known as narcotics.     Examples are:   morphine (MS Contin, Silvana)  oxycodone (Oxycontin)  oxycodone and acetaminophen (Percocet)  hydrocodone and acetaminophen (Vicodin, Norco)   fentanyl patch (Duragesic)   hydromorphone (Dilaudid)   methadone  codeine (Tylenol #3)     What do opioids do well?   Opioids are best for severe short-term pain such as after a surgery or injury. They may work well for cancer pain. They may help some people with long-lasting (chronic) pain.     What do opioids NOT do  well?   Opioids never get rid of pain entirely, and they don t work well for most patients with chronic pain. Opioids don t reduce swelling, one of the causes of pain.                                    Other ways to manage chronic pain and improve function include:     Treat the health problem that may be causing pain  Anti-inflammation medicines, which reduce swelling and tenderness, such as ibuprofen (Advil, Motrin) or naproxen (Aleve)  Acetaminophen (Tylenol)  Antidepressants and anti-seizure medicines, especially for nerve pain  Topical treatments such as patches or creams  Injections or nerve blocks  Chiropractic or osteopathic treatment  Acupuncture, massage, deep breathing, meditation, visual imagery, aromatherapy  Use heat or ice at the pain site  Physical therapy   Exercise  Stop smoking  Take part in therapy       Risks and side effects     Talk to your doctor before you start or decide to keep taking opioids. Possible side effects include:    Lowering your breathing rate enough to cause death  Overdose, including death, especially if taking higher than prescribed doses  Worse depression symptoms; less pleasure in things you usually enjoy  Feeling tired or sluggish  Slower thoughts or cloudy thinking  Being more sensitive to pain over time; pain is harder to control  Trouble sleeping or restless sleep  Changes in hormone levels (for example, less testosterone)  Changes in sex drive or ability to have sex  Constipation  Unsafe driving  Itching and sweating  Dizziness  Nausea, throwing up and dry mouth    What else should I know about opioids?    Opioids may lead to dependence, tolerance, or addiction.    Dependence means that if you stop or reduce the medicine too quickly, you will have withdrawal symptoms. These include loose poop (diarrhea), jitters, flu-like symptoms, nervousness and tremors. Dependence is not the same as addiction.                     Tolerance means needing higher doses over time to  get the same effect. This may increase the chance of serious side effects.    Addiction is when people improperly use a substance that harms their body, their mind or their relations with others. Use of opiates can cause a relapse of addiction if you have a history of drug or alcohol abuse.    People who have used opioids for a long time may have a lower quality of life, worse depression, higher levels of pain and more visits to doctors.    You can overdose on opioids. Take these steps to lower your risk of overdose:    Recognize the signs:  Signs of overdose include decrease or loss of consciousness (blackout), slowed breathing, trouble waking up and blue lips. If someone is worried about overdose, they should call 911.    Talk to your doctor about Narcan (naloxone).   If you are at risk for overdose, you may be given a prescription for Narcan. This medicine very quickly reverses the effects of opioids.   If you overdose, a friend or family member can give you Narcan while waiting for the ambulance. They need to know the signs of overdose and how to give Narcan.     Don't use alcohol or street drugs.   Taking them with opioids can cause death.    Do not take any of these medicines unless your doctor says it s OK. Taking these with opioids can cause death:  Benzodiazepines, such as lorazepam (Ativan), alprazolam (Xanax) or diazepam (Valium)  Muscle relaxers, such as cyclobenzaprine (Flexeril)  Sleeping pills like zolpidem (Ambien)   Other opioids      How to keep you and other people safe while taking opioids:    Never share your opioids with others.  Opioid medicines are regulated by the Drug Enforcement Agency ([place]). Selling or sharing medications is a criminal act.    2. Be sure to store opioids in a secure place, locked up if possible. Young children can easily swallow them and overdose.    3. When you are traveling with your medicines, keep them in the original bottles. If you use a pill box, be sure you also  carry a copy of your medicine list from your clinic or pharmacy.    4. Safe disposal of opioids    Most pharmacies have places to get rid of medicine, called disposal kiosks. Medicine disposal options are also available in every [facility]. Search your county and  medication disposal  to find more options. You can find more details at:  [email or web address]     I agree that my provider, clinic care team, and pharmacy may work with any city, state or federal law enforcement agency that investigates the misuse, sale, or other diversion of my controlled medicine. I will allow my provider to discuss my care with, or share a copy of, this agreement with any other treating provider, pharmacy or emergency room where I receive care.    I have read this agreement and have asked questions about anything I did not understand.    _______________________________________________________  Patient Signature - Hanna Campo _____________________                   Date     _______________________________________________________  Provider Signature - Lisa Morales MD   _____________________                   Date     _______________________________________________________  Witness Signature (required if provider not present while patient signing)   _____________________                   Date

## 2023-07-12 NOTE — NURSING NOTE
Patient presents with:  Follow Up: 2 Months Follow-up both feet pain      Moderate Pain (4)     Pain Medications     Opioid Combinations Refills Start End     HYDROcodone-acetaminophen (NORCO) 5-325 MG tablet    0 6/28/2023     Sig - Route: Take 1 tablet by mouth every 8 hours as needed for severe pain - Oral    Class: E-Prescribe    Earliest Fill Date: 6/28/2023    No prior authorization was found for this prescription.    Found prior authorization for another prescription for the same medication: Closed - Prior Authorization not required for patient/medication    Opioid Partial Agonists Refills Start End     buprenorphine (BUTRANS) 10 MCG/HR WK patch    0 6/14/2023     Sig - Route: Place 1 patch onto the skin every 7 days - Transdermal    Class: E-Prescribe    No prior authorization was found for this prescription.    Found prior authorization for another prescription for the same medication: Closed - Prior Authorization duplicate/in process          What medications are you using for pain? Tylenol, butrans patches,hydrocodone, medical cannibas    (New patients only) Have you been seen by another pain clinic/ provider? no    (Return Patients only) What refills are you needing today? no

## 2023-07-12 NOTE — PATIENT INSTRUCTIONS
Medications:    buprenorphine (BUTRANS) 10 MCG/HR WK patch - Place 1 patch onto the skin every 7 days    For refills of opioid medications - You MUST call (Or MyChart) the clinic DIRECTLY and at least 7 days before you run out of your medication.      Treatment planning:    UDS and CSA completed in clinic today.      Recommended Follow up:      Follow up in 2-3 months.       To speak with a nurse, schedule/reschedule/cancel a clinic appointment, or request a medication refill call: [phone].    You can also reach us by [employer]: [email or web address]

## 2023-07-12 NOTE — LETTER
7/12/2023       RE: Hanna Campo  [address]  [facility] [address]     Dear Colleague,    Thank you for referring your patient, Hanna Campo, to the [facility] [place] at [facility]. Please see a copy of my visit note below.    [facility]    Date of visit: 7/12/2023    Chief complaint:   Chief Complaint   Patient presents with    Follow Up     2 Months Follow-up both feet pain       Interval history:  Hanna Campo was last seen by me on 4/13/2023.      Recommendations/plan at the last visit included:  Physical Therapy:  Continue current regimen  Clinical Health Psychologist to address issues of relaxation, behavioral change, coping style, and other factors important to improvement.  Not indicated  Diagnostic Studies:  none  Medication Management:  Refill rx provided  Further procedures recommended: none  Recommendations to PCP: none  Follow up: 2 months    Since her last visit, Hanna Campo reports:  Will be moving closer to [facility] and will be establishing with a new pain clinic in that area (65 miles away)    Pain scores:  Pain intensity on average is 4 on a scale of 0-10.       Medications:  Current Outpatient Medications   Medication Sig Dispense Refill    buprenorphine (BUTRANS) 10 MCG/HR WK patch Place 1 patch onto the skin every 7 days 4 patch 0    Cetirizine HCl (ZYRTEC ALLERGY PO) Take 5 mg by mouth daily      chlorthalidone (HYGROTON) 50 MG tablet Take 1 tablet (50 mg) by mouth daily Due for visit and labs for refills** 90 tablet 0    HYDROcodone-acetaminophen (NORCO) 5-325 MG tablet Take 1 tablet by mouth every 8 hours as needed for severe pain 90 tablet 0    levothyroxine (SYNTHROID/LEVOTHROID) 88 MCG tablet Take 88 mcg by mouth daily      losartan (COZAAR) 25 MG tablet Take 1 tablet (25 mg) by mouth 2 times daily 180 tablet 0    medical cannabis  "(Patient's own supply) (The purpose of this order is to document that the patient reports taking medical cannabis.  This is not a prescription, and is not used to certify that the patient has a qualifying medical condition.) 0 Information only 0    vitamin B complex with vitamin C (STRESS TAB) tablet Take 1 tablet by mouth daily  0    VITAMIN D, CHOLECALCIFEROL, PO Take 5,000 Units by mouth daily      levothyroxine (SYNTHROID/LEVOTHROID) 75 MCG tablet Take 1 tablet (75 mcg) by mouth daily 90 tablet 1    naloxone (NARCAN) 4 MG/0.1ML nasal spray Spray 1 spray (4 mg) into one nostril alternating nostrils once as needed for opioid reversal every 2-3 minutes until assistance arrives 0.2 mL 1       Medical History: any changes in medical history since they were last seen? No    Review of Systems:  The 14 system ROS was reviewed from the intake questionnaire, and is positive for: bilateral foot pain  Any bowel or bladder problems: denies  Mood: stable    Physical Exam:  Blood pressure 133/80, pulse 59, height 1.689 m (5' 6.5\"), weight 82.1 kg (181 lb), SpO2 96 %, not currently breastfeeding.  General: AAOx3, NAD  Gait: Normal  MSK exam: Deferred for conversation    Assessment:   Idiopathic Peripheral Neuropathy    Hanna Campo is a 68 year old female who is seen at the pain clinic for follow up to discuss medication management. She continues to do well with her current regimen. We will refill her medications as needed today. She will need to follow up in 2 months until she establishes care with a provider in [facility].     Plan:   Follow up in 2 months    Answers for HPI/ROS submitted by the patient on 7/10/2023  General Symptoms: No  Skin Symptoms: No  HENT Symptoms: No  EYE SYMPTOMS: No  HEART SYMPTOMS: No  LUNG SYMPTOMS: No  INTESTINAL SYMPTOMS: No  URINARY SYMPTOMS: No  GYNECOLOGIC SYMPTOMS: No  BREAST SYMPTOMS: No  SKELETAL SYMPTOMS: No  BLOOD SYMPTOMS: No  NERVOUS SYSTEM SYMPTOMS: No  MENTAL HEALTH SYMPTOMS: " No          Again, thank you for allowing me to participate in the care of your patient.      Sincerely,    Lisa Morales MD

## 2023-07-15 LAB
BUPRENORPHINE UR CFM-MCNC: 5 NG/ML
BUPRENORPHINE/CREAT UR: 11 NG/MG {CREAT}
DHC UR CFM-MCNC: 244 NG/ML
DHC/CREAT UR: 519 NG/MG {CREAT}
HYDROCODONE UR CFM-MCNC: 184 NG/ML
HYDROCODONE/CREAT UR: 391 NG/MG {CREAT}

## 2023-07-31 ENCOUNTER — MYC REFILL (OUTPATIENT)
Dept: ANESTHESIOLOGY | Facility: CLINIC | Age: 68
End: 2023-07-31
Payer: COMMERCIAL

## 2023-07-31 DIAGNOSIS — M79.671 PAIN IN BOTH FEET: ICD-10-CM

## 2023-07-31 DIAGNOSIS — G60.9 IDIOPATHIC PERIPHERAL NEUROPATHY: ICD-10-CM

## 2023-07-31 DIAGNOSIS — M79.672 PAIN IN BOTH FEET: ICD-10-CM

## 2023-08-01 NOTE — TELEPHONE ENCOUNTER
Refill request    Medication:       HYDROcodone-acetaminophen (NORCO) 5-325 MG tablet     Sig: Take 1 tablet by mouth every 8 hours as needed for severe pain     Dispensed: 90  Refills: 0  SOLD to the pt on: 7/8/23- Called pharmacy to verify, as I was unable to run .     Last clinic appointment: 7/12/23  Next clinic appointment: 9/13/23    Last Drug Screen Collected: 7/12/23  Controlled Substance Agreement signed: 7/12/23      Preferred pharmacy:    [id number] [address]     Medication pended and routed to the provider to review.     Maddi Galeana LPN

## 2023-08-04 RX ORDER — HYDROCODONE BITARTRATE AND ACETAMINOPHEN 5; 325 MG/1; MG/1
1 TABLET ORAL EVERY 8 HOURS PRN
Qty: 90 TABLET | Refills: 0 | Status: SHIPPED | OUTPATIENT
Start: 2023-08-04 | End: 2023-08-28

## 2023-08-28 ENCOUNTER — MYC REFILL (OUTPATIENT)
Dept: ANESTHESIOLOGY | Facility: CLINIC | Age: 68
End: 2023-08-28
Payer: COMMERCIAL

## 2023-08-28 DIAGNOSIS — G60.9 IDIOPATHIC PERIPHERAL NEUROPATHY: ICD-10-CM

## 2023-08-28 DIAGNOSIS — M79.672 PAIN IN BOTH FEET: ICD-10-CM

## 2023-08-28 DIAGNOSIS — M79.671 PAIN IN BOTH FEET: ICD-10-CM

## 2023-08-28 NOTE — TELEPHONE ENCOUNTER
Refill request    Medication: HYDROcodone-acetaminophen (NORCO) 5-325 MG tablet     Sig: Take 1 tablet by mouth every 8 hours as needed for severe pain     Dispensed: 90  Refills: 0  SOLD to the pt on: 8/5/23    Last clinic appointment: 7/12/23  Next clinic appointment: 9/13/23    Last Drug Screen Collected: 7/12/23  Controlled Substance Agreement signed: 7/12/23      Preferred pharmacy:    [id number] - [place], [place] - [address] [place] [address] [phone]       Refill request routed to covering provider.     Maddi Galeana LPN

## 2023-08-29 RX ORDER — HYDROCODONE BITARTRATE AND ACETAMINOPHEN 5; 325 MG/1; MG/1
1 TABLET ORAL EVERY 8 HOURS PRN
Qty: 90 TABLET | Refills: 0 | Status: SHIPPED | OUTPATIENT
Start: 2023-08-29 | End: 2023-09-27

## 2023-08-29 NOTE — TELEPHONE ENCOUNTER
Chart reviewed - Patient of Dr. Morales. Request appears appropriate.  checked, no concerns. Refilled for 30 day supply.     Aydee Garcia DNP, APRN, AGNP-C  [facility]

## 2023-09-12 ASSESSMENT — ENCOUNTER SYMPTOMS
ARTHRALGIAS: 0
MUSCLE CRAMPS: 1
MUSCLE WEAKNESS: 1
NECK PAIN: 0
JOINT SWELLING: 0
BACK PAIN: 0
STIFFNESS: 0

## 2023-09-13 ENCOUNTER — OFFICE VISIT (OUTPATIENT)
Dept: ANESTHESIOLOGY | Facility: CLINIC | Age: 68
End: 2023-09-13
Payer: COMMERCIAL

## 2023-09-13 VITALS
SYSTOLIC BLOOD PRESSURE: 130 MMHG | OXYGEN SATURATION: 100 % | HEART RATE: 65 BPM | WEIGHT: 181 LBS | DIASTOLIC BLOOD PRESSURE: 78 MMHG | HEIGHT: 67 IN | BODY MASS INDEX: 28.41 KG/M2

## 2023-09-13 DIAGNOSIS — G60.9 IDIOPATHIC PERIPHERAL NEUROPATHY: Primary | ICD-10-CM

## 2023-09-13 DIAGNOSIS — Z79.891 OPIOID CONTRACT EXISTS: ICD-10-CM

## 2023-09-13 PROCEDURE — 99213 OFFICE O/P EST LOW 20 MIN: CPT | Performed by: ANESTHESIOLOGY

## 2023-09-13 ASSESSMENT — PAIN SCALES - GENERAL: PAINLEVEL: MODERATE PAIN (4)

## 2023-09-13 NOTE — LETTER
9/13/2023       RE: Hanna Campo  [address]  [facility] [address]     Dear Colleague,    Thank you for referring your patient, Hanna Campo, to the [facility] FOR [facility]NAGEMENT [place] at [facility]. Please see a copy of my visit note below.    [facility]    Date of visit: 9/13/2023    Chief complaint:   Chief Complaint   Patient presents with    Follow Up     Follow-up  Hands, Feet Pain       Interval history:  Hanna Campo was last seen by me on 7/12/2023.      Since her last visit, Hanna Campo reports:    Doing well with current regimen  Has been more active  Taking Norco 2 to 3 times per day and has Butrans patch    Chaga tea from mushrooms      Pain scores:  Pain intensity on average is 4 on a scale of 0-10.     Current pain treatments:   Butrans Patch 10 mcg/hr  Hydrocodone-Acetaminophen 5-325 90/30 days      Side Effects: denies any problems    Medications:  Current Outpatient Medications   Medication Sig Dispense Refill    buprenorphine (BUTRANS) 10 MCG/HR WK patch Place 1 patch onto the skin every 7 days 4 patch 2    Cetirizine HCl (ZYRTEC ALLERGY PO) Take 5 mg by mouth daily      chlorthalidone (HYGROTON) 50 MG tablet Take 1 tablet (50 mg) by mouth daily Due for visit and labs for refills** 90 tablet 0    HYDROcodone-acetaminophen (NORCO) 5-325 MG tablet Take 1 tablet by mouth every 8 hours as needed for severe pain May fill on/after 8/4/23. 90 tablet 0    levothyroxine (SYNTHROID/LEVOTHROID) 88 MCG tablet Take 88 mcg by mouth daily      losartan (COZAAR) 25 MG tablet Take 1 tablet (25 mg) by mouth 2 times daily 180 tablet 0    medical cannabis (Patient's own supply) (The purpose of this order is to document that the patient reports taking medical cannabis.  This is not a prescription, and is not used to certify that the patient has a qualifying medical condition.) 0 Information  "only 0    vitamin B complex with vitamin C (STRESS TAB) tablet Take 1 tablet by mouth daily  0    VITAMIN D, CHOLECALCIFEROL, PO Take 5,000 Units by mouth daily      levothyroxine (SYNTHROID/LEVOTHROID) 75 MCG tablet Take 1 tablet (75 mcg) by mouth daily 90 tablet 1    naloxone (NARCAN) 4 MG/0.1ML nasal spray Spray 1 spray (4 mg) into one nostril alternating nostrils once as needed for opioid reversal every 2-3 minutes until assistance arrives 0.2 mL 1       Medical History: any changes in medical history since they were last seen? No    Review of Systems:  The 14 system ROS was reviewed from the intake questionnaire, and is positive for: bilateral foot pain, neuropathy  Any bowel or bladder problems: denies  Mood: stable    Physical Exam:  Blood pressure 130/78, pulse 65, height 1.689 m (5' 6.5\"), weight 82.1 kg (181 lb), SpO2 100 %, not currently breastfeeding.  General: AAOx3, NAD  Gait: Normal  MSK exam: deferred for conversation    Assessment:   Idiopathic Peripheral Neuropathy     Hanna Campo is a 68 year old female who is seen at the pain clinic for follow up to discuss management of her chronic pain symptoms.    Plan:  Physical Therapy:  continue current daily HEPs  Clinical Health Psychologist to address issues of relaxation, behavioral change, coping style, and other factors important to improvement.  Not at this time  Diagnostic Studies:  none  Medication Management:  Refills provided as needed  Further procedures recommended: none  Recommendations to PCP: none  Follow up: 2 months    Lisa Morales MD    Pain Medicine  Department of Anesthesiology  [facility]          Answers submitted by the patient for this visit:  Symptoms you have experienced in the last 30 days (Submitted on 9/12/2023)  General Symptoms: No  Skin Symptoms: No  HENT Symptoms: No  EYE SYMPTOMS: No  HEART SYMPTOMS: No  LUNG SYMPTOMS: No  INTESTINAL SYMPTOMS: No  URINARY SYMPTOMS: No  GYNECOLOGIC " SYMPTOMS: No  BREAST SYMPTOMS: No  SKELETAL SYMPTOMS: Yes  BLOOD SYMPTOMS: No  NERVOUS SYSTEM SYMPTOMS: No  MENTAL HEALTH SYMPTOMS: No  Please answer the questions below to tell us what condition you are experiencing: (Submitted on 9/12/2023)  Back pain: No  Neck pain: No  Swollen joints: No  Joint pain: No  Bone pain: No  Muscle cramps: Yes  Muscle weakness: Yes  Joint stiffness: No  Bone fracture: No      Again, thank you for allowing me to participate in the care of your patient.      Sincerely,    Lisa Morales MD

## 2023-09-13 NOTE — PATIENT INSTRUCTIONS
Recommended Follow up:      Follow up in 2 months.       Please call [phone] to schedule your clinic appointment if you don't already have an appointment scheduled.        To speak with a nurse, schedule/reschedule/cancel a clinic appointment, or request a medication refill call: [phone]    You can also reach us by [employer]: [email or web address]

## 2023-09-13 NOTE — NURSING NOTE
Patient presents with:  Follow Up: Follow-up  Hands, Feet Pain      Moderate Pain (4)     Pain Medications       Opioid Combinations Refills Start End     HYDROcodone-acetaminophen (NORCO) 5-325 MG tablet    0 8/29/2023     Sig - Route: Take 1 tablet by mouth every 8 hours as needed for severe pain May fill on/after 8/4/23. - Oral    Class: E-Prescribe    Earliest Fill Date: 8/29/2023    No prior authorization was found for this prescription.    Found prior authorization for another prescription for the same medication: Closed - Prior Authorization not required for patient/medication      Opioid Partial Agonists Refills Start End     buprenorphine (BUTRANS) 10 MCG/HR WK patch    2 7/12/2023     Sig - Route: Place 1 patch onto the skin every 7 days - Transdermal    Class: E-Prescribe    Prior authorization: Closed - Prior Authorization not required for patient/medication            What medications are you using for pain? Butrans Patch, Medical cannabis, Hydrocodone    (New patients only) Have you been seen by another pain clinic/ provider? no    (Return Patients only) What refills are you needing today? no

## 2023-09-27 ENCOUNTER — MYC REFILL (OUTPATIENT)
Dept: ANESTHESIOLOGY | Facility: CLINIC | Age: 68
End: 2023-09-27
Payer: COMMERCIAL

## 2023-09-27 DIAGNOSIS — M79.672 PAIN IN BOTH FEET: ICD-10-CM

## 2023-09-27 DIAGNOSIS — G60.9 IDIOPATHIC PERIPHERAL NEUROPATHY: ICD-10-CM

## 2023-09-27 DIAGNOSIS — M79.671 PAIN IN BOTH FEET: ICD-10-CM

## 2023-10-03 ENCOUNTER — MYC MEDICAL ADVICE (OUTPATIENT)
Dept: ANESTHESIOLOGY | Facility: CLINIC | Age: 68
End: 2023-10-03
Payer: COMMERCIAL

## 2023-10-03 DIAGNOSIS — M79.672 PAIN IN BOTH FEET: ICD-10-CM

## 2023-10-03 DIAGNOSIS — G60.9 IDIOPATHIC PERIPHERAL NEUROPATHY: ICD-10-CM

## 2023-10-03 DIAGNOSIS — M79.671 PAIN IN BOTH FEET: ICD-10-CM

## 2023-10-04 RX ORDER — HYDROCODONE BITARTRATE AND ACETAMINOPHEN 5; 325 MG/1; MG/1
1 TABLET ORAL EVERY 8 HOURS PRN
Qty: 90 TABLET | Refills: 0 | Status: SHIPPED | OUTPATIENT
Start: 2023-10-04 | End: 2023-10-25

## 2023-10-04 RX ORDER — BUPRENORPHINE 10 UG/H
1 PATCH TRANSDERMAL
Qty: 4 PATCH | Refills: 2 | Status: SHIPPED | OUTPATIENT
Start: 2023-10-04 | End: 2023-10-06

## 2023-10-04 NOTE — TELEPHONE ENCOUNTER
Refill request    Medication: buprenorphine (BUTRANS) 10 MCG/HR WK patch     Sig: Place 1 patch onto the skin every 7 days     Dispensed: 4 patches  Refills: 2  SOLD to the pt on: 7/15/23, 8/15/23, 9/9/23     Last clinic appointment: 9/13/23  Next clinic appointment: 12/13/23    Last Drug Screen Collected: 7/12/23  Controlled Substance Agreement signed: 7/12/23      Preferred pharmacy:    [facility] [place], [place]         Refill request routed to the provider to review.     Maddi Galeana LPN

## 2023-10-09 RX ORDER — BUPRENORPHINE 10 UG/H
1 PATCH TRANSDERMAL
Qty: 4 PATCH | Refills: 0 | Status: SHIPPED | OUTPATIENT
Start: 2023-10-09 | End: 2023-10-27

## 2023-10-09 NOTE — TELEPHONE ENCOUNTER
RN called [facility] in [place] and spoke with pharmacist to cancel prescription for Butrans Patch. This prescription was sent to the incorrect pharmacy. Medication resent to [facility] in [place], [place], per patient's request.    Dang Barrett RN

## 2023-10-25 ENCOUNTER — MYC REFILL (OUTPATIENT)
Dept: ANESTHESIOLOGY | Facility: CLINIC | Age: 68
End: 2023-10-25
Payer: COMMERCIAL

## 2023-10-25 DIAGNOSIS — G60.9 IDIOPATHIC PERIPHERAL NEUROPATHY: ICD-10-CM

## 2023-10-25 DIAGNOSIS — M79.672 PAIN IN BOTH FEET: ICD-10-CM

## 2023-10-25 DIAGNOSIS — M79.671 PAIN IN BOTH FEET: ICD-10-CM

## 2023-10-26 NOTE — TELEPHONE ENCOUNTER
Refill request    Message from the patient:   It has been harder than myself and the  thought to find a medicare advantage plan that accepts new patients and any of the plans that the  has found.  I should have a doctor by the end of the week and insurance changes over on 11-01-23. I will make an appointment asap! This should be the last refill of norco until I get back.     Medication: HYDROcodone-acetaminophen (NORCO) 5-325 MG tablet     Sig: Take 1 tablet by mouth every 8 hours as needed for severe pain May fill on/after 8/4/23.     Dispensed: 90  Refills: 0  SOLD to the pt on: 10/5/23     Last clinic appointment: 9/13/23  Next clinic appointment: 12/13/23    Last Drug Screen Collected: 7/12/23  Controlled Substance Agreement signed: 7/12/23      Preferred pharmacy:      [facility]/pharmacy #[id number] - [place], [place] - [address] [facility] [address] [phone]       Refill request routed to the provider to review.     Maddi Galeana LPN

## 2023-10-27 ENCOUNTER — MYC REFILL (OUTPATIENT)
Dept: ANESTHESIOLOGY | Facility: CLINIC | Age: 68
End: 2023-10-27
Payer: COMMERCIAL

## 2023-10-27 DIAGNOSIS — M79.672 PAIN IN BOTH FEET: ICD-10-CM

## 2023-10-27 DIAGNOSIS — G60.9 IDIOPATHIC PERIPHERAL NEUROPATHY: ICD-10-CM

## 2023-10-27 DIAGNOSIS — M79.671 PAIN IN BOTH FEET: ICD-10-CM

## 2023-10-27 RX ORDER — HYDROCODONE BITARTRATE AND ACETAMINOPHEN 5; 325 MG/1; MG/1
1 TABLET ORAL EVERY 8 HOURS PRN
Qty: 90 TABLET | Refills: 0 | Status: SHIPPED | OUTPATIENT
Start: 2023-10-27 | End: 2023-11-29

## 2023-10-31 NOTE — TELEPHONE ENCOUNTER
Refill request    Medication: buprenorphine (BUTRANS) 10 MCG/HR WK patch     Sig: Place 1 patch onto the skin every 7 days     Dispensed: 4  Refills: 0  SOLD to the pt on: 10/9/23     Last clinic appointment: 9/13/23  Next clinic appointment: 12/13/23    Last Drug Screen Collected: 7/12/23  Controlled Substance Agreement signed: 7/12/23      Preferred pharmacy:    [id number] [place], [place] - [address] AT [facility] [address] [phone]     Message from the patient:      I was in the process of changing to a different medicare advantage plan for [place] but after calling 25 doctors and not being able to get in anywhere until March or April I decided to keep [employer] and just fly back for any appointments. I couldn't even get into see a general practitioner until spring either.   I already have an appointment scheduled for 12-13--23 and will schedule my next one after that as directed by Dr. Morales.  So my insurance will stay the same.       I also found a pharmacy 3 hours away that can fill the Norco script.     Refill request routed to the provider to review.

## 2023-11-01 RX ORDER — BUPRENORPHINE 10 UG/H
1 PATCH TRANSDERMAL
Qty: 4 PATCH | Refills: 3 | Status: SHIPPED | OUTPATIENT
Start: 2023-11-01 | End: 2024-07-17

## 2023-11-07 ENCOUNTER — TELEPHONE (OUTPATIENT)
Dept: ANESTHESIOLOGY | Facility: CLINIC | Age: 68
End: 2023-11-07
Payer: COMMERCIAL

## 2023-11-07 NOTE — TELEPHONE ENCOUNTER
Prior Authorization Retail Medication Request    Medication/Dose: buprenorphine (BUTRANS) 10 MCG/HR WK patch    Sig - Route: Place 1 patch onto the skin every 7 days - Transdermal      ICD code (if different than what is on RX):    Previously Tried and Failed:    Rationale:      Insurance Name:    Insurance ID:        Pharmacy Information (if different than what is on RX)  Name:    Phone:

## 2023-11-07 NOTE — TELEPHONE ENCOUNTER
Referencing pt's Reflext message from 10/31/23-     I was in the process of changing to a different medicare advantage plan for [place] but after calling 25 doctors and not being able to get in anywhere until March or April I decided to keep [employer] and just fly back for any appointments. I couldn't even get into see a general practitioner until spring either.   I already have an appointment scheduled for 12-13--23 and will schedule my next one after that as directed by Dr. Morales.  So my insurance will stay the same.    LPN called and left a VM for the pt asking that they call back to discuss with clinic staff, or update their new insurance if this has not been done so already.     Maddi Galeana LPN       Name band;

## 2023-11-07 NOTE — TELEPHONE ENCOUNTER
Central Prior Authorization Team   Phone: [phone]        Prior Authorization Cannot be Completed - Patient is in-active per Ins    Medication: BUPRENORPHINE 10 MCG/HR TD PTWK  Insurance Company: [employer] - Phone [phone] Fax [phone]  Expected CoPay: $    Pharmacy Filling the Rx: [id number], [place] - [address] AT [facility] [address]  & [place]  Pharmacy Notified: Yes  Patient Notified: No      The patient's  [employer] insurance ended on 01/01/2023:    I cannot find other coverage and the insurance confirms that her account is termed.

## 2023-11-08 ENCOUNTER — TELEPHONE (OUTPATIENT)
Dept: ANESTHESIOLOGY | Facility: CLINIC | Age: 68
End: 2023-11-08
Payer: COMMERCIAL

## 2023-11-08 NOTE — TELEPHONE ENCOUNTER
Central Prior Authorization Team   Phone: [phone]      I was able to send the PA today per message in another encounter stating that insurance had a glitch in their system.  I will send a message  to the clinic to let them know.    PA Initiation    Medication: BUPRENORPHINE 10 MCG/HR TD PTWK  Insurance Company: [employer] Non-Specialty PA's - Phone [phone] Fax [phone]  Pharmacy Filling the Rx: [facility][id number] - [place], [place] - [address] AT [facility] [address]  Filling Pharmacy Phone: [phone]  Filling Pharmacy Fax: [phone]  Start Date: 11/8/2023

## 2023-11-08 NOTE — TELEPHONE ENCOUNTER
Central Prior Authorization Team   Phone: [phone]      Prior Authorization Approval    Medication: BUPRENORPHINE 10 MCG/HR TD PTWK  Authorization Effective Date: 10/9/2023  Authorization Expiration Date: 11/7/2024  Approved Dose/Quantity: 4 PER 28 DAYS  Reference #:     Insurance Company: [employer] Non-Specialty PA's - Phone [phone] Fax [phone]  Expected CoPay: $    CoPay Card Available: No    Financial Assistance Needed: N/A  Which Pharmacy is filling the prescription: [id number] - [place], [place] - [address] AT [place] [address] & [place]  Pharmacy Notified: YES - THE PHARMACY IS JUST WAITING ON THEIR ORDER, WHICH IS COMING TODAY AND THEY WILL FILL IT FOR THE PATIENT.  Patient Notified: yes **Instructed pharmacy to notify patient when script is ready to /ship.** I left a voicemail for the patient.

## 2023-11-18 ENCOUNTER — HEALTH MAINTENANCE LETTER (OUTPATIENT)
Age: 68
End: 2023-11-18

## 2023-11-22 ENCOUNTER — MYC REFILL (OUTPATIENT)
Dept: FAMILY MEDICINE | Facility: CLINIC | Age: 68
End: 2023-11-22
Payer: COMMERCIAL

## 2023-11-22 DIAGNOSIS — I10 BENIGN ESSENTIAL HYPERTENSION: ICD-10-CM

## 2023-11-22 RX ORDER — LOSARTAN POTASSIUM 25 MG/1
25 TABLET ORAL 2 TIMES DAILY
Qty: 180 TABLET | Refills: 0 | Status: SHIPPED | OUTPATIENT
Start: 2023-11-22

## 2023-11-28 ENCOUNTER — TELEPHONE (OUTPATIENT)
Dept: PALLIATIVE MEDICINE | Facility: CLINIC | Age: 68
End: 2023-11-28
Payer: COMMERCIAL

## 2023-11-28 NOTE — TELEPHONE ENCOUNTER
[facility] Call Center    Phone Message    May a detailed message be left on voicemail: yes     Reason for Call: Other: Pharmacy calling back stating they do not need a prior auth.  They talked to her ins co and it should go through in a couple days.  Pharmacy will call back on Thursday if they need to.      Action Taken: Message routed to:  [facility]    Travel Screening: Not Applicable

## 2023-11-28 NOTE — TELEPHONE ENCOUNTER
[facility] Call Center    Phone Message    May a detailed message be left on voicemail: yes     Reason for Call: Medication Question or concern regarding medication   Prescription Clarification  Name of Medication: buprenorphine (BUTRANS) 10 MCG/HR WK patch   Prescribing Provider: Lsia Morales   Pharmacy:   [id number], [place] - [address] AT [facility] [address]      What on the order needs clarification? Need prior authorization, pharmacy states Pt said she is going out of town soon so trying to get asap       Action Taken: Message routed to:  [facility]     Travel Screening: Not Applicable

## 2023-11-29 ENCOUNTER — MYC REFILL (OUTPATIENT)
Dept: ANESTHESIOLOGY | Facility: CLINIC | Age: 68
End: 2023-11-29
Payer: COMMERCIAL

## 2023-11-29 DIAGNOSIS — G60.9 IDIOPATHIC PERIPHERAL NEUROPATHY: ICD-10-CM

## 2023-11-29 DIAGNOSIS — M79.672 PAIN IN BOTH FEET: ICD-10-CM

## 2023-11-29 DIAGNOSIS — M79.671 PAIN IN BOTH FEET: ICD-10-CM

## 2023-11-29 NOTE — TELEPHONE ENCOUNTER
Refill request    Medication:  HYDROcodone-acetaminophen (NORCO) 5-325 MG tablet     Sig: Take 1 tablet by mouth every 8 hours as needed for severe pain     Dispensed: 90  Refills: 0  SOLD to the pt on: 11/4/23       Last clinic appointment: 9/13/23  Next clinic appointment: 12/13/23    Last Drug Screen Collected: 7/12/23  Controlled Substance Agreement signed: 7/12/23      Preferred pharmacy:    [facility] #[id number] - [place], [place] - [address] [facility]       Refill request routed to the provider to review.     Maddi Galeana LPN

## 2023-11-30 RX ORDER — HYDROCODONE BITARTRATE AND ACETAMINOPHEN 5; 325 MG/1; MG/1
1 TABLET ORAL EVERY 8 HOURS PRN
Qty: 90 TABLET | Refills: 0 | Status: SHIPPED | OUTPATIENT
Start: 2023-11-30 | End: 2024-01-10

## 2023-12-10 ASSESSMENT — ANXIETY QUESTIONNAIRES
1. FEELING NERVOUS, ANXIOUS, OR ON EDGE: NOT AT ALL
6. BECOMING EASILY ANNOYED OR IRRITABLE: NOT AT ALL
GAD7 TOTAL SCORE: 0
GAD7 TOTAL SCORE: 0
7. FEELING AFRAID AS IF SOMETHING AWFUL MIGHT HAPPEN: NOT AT ALL
2. NOT BEING ABLE TO STOP OR CONTROL WORRYING: NOT AT ALL
5. BEING SO RESTLESS THAT IT IS HARD TO SIT STILL: NOT AT ALL
4. TROUBLE RELAXING: NOT AT ALL
3. WORRYING TOO MUCH ABOUT DIFFERENT THINGS: NOT AT ALL

## 2023-12-10 ASSESSMENT — PAIN SCALES - PAIN ENJOYMENT GENERAL ACTIVITY SCALE (PEG)
AVG_PAIN_PASTWEEK: 6
INTERFERED_GENERAL_ACTIVITY: 7
PEG_TOTALSCORE: 5.33
INTERFERED_ENJOYMENT_LIFE: 3

## 2023-12-13 ENCOUNTER — OFFICE VISIT (OUTPATIENT)
Dept: ANESTHESIOLOGY | Facility: CLINIC | Age: 68
End: 2023-12-13
Payer: COMMERCIAL

## 2023-12-13 VITALS
DIASTOLIC BLOOD PRESSURE: 85 MMHG | BODY MASS INDEX: 28.41 KG/M2 | WEIGHT: 181 LBS | SYSTOLIC BLOOD PRESSURE: 145 MMHG | OXYGEN SATURATION: 96 % | HEIGHT: 67 IN | HEART RATE: 73 BPM

## 2023-12-13 DIAGNOSIS — Z79.891 OPIOID CONTRACT EXISTS: ICD-10-CM

## 2023-12-13 DIAGNOSIS — G60.9 IDIOPATHIC PERIPHERAL NEUROPATHY: Primary | ICD-10-CM

## 2023-12-13 PROCEDURE — 99213 OFFICE O/P EST LOW 20 MIN: CPT | Mod: GC | Performed by: ANESTHESIOLOGY

## 2023-12-13 RX ORDER — BUPRENORPHINE 15 UG/H
1 PATCH TRANSDERMAL
Qty: 4 PATCH | Refills: 3 | Status: SHIPPED | OUTPATIENT
Start: 2023-12-13 | End: 2024-01-10

## 2023-12-13 RX ORDER — BUPRENORPHINE 15 UG/H
1 PATCH TRANSDERMAL
Qty: 4 PATCH | Refills: 3 | Status: SHIPPED | OUTPATIENT
Start: 2023-12-13 | End: 2023-12-13

## 2023-12-13 ASSESSMENT — PAIN SCALES - GENERAL: PAINLEVEL: MODERATE PAIN (4)

## 2023-12-13 NOTE — LETTER
12/13/2023       RE: Hanna Campo  [address]  [place] [address]       Dear Colleague,    Thank you for referring your patient, Hanna Campo, to the [facility] [place] at [facility]. Please see a copy of my visit note below.    [facility] : Progress Note    Date of visit: 12/13/2023    Chief Complaint   Patient presents with    Follow Up     Follow-up Both Feet-Hands Pain       Interval history:  Hanna Campo is a 68 year old female was last seen by  on 9/13/2023 for chronic pain symptoms.      Since the last visit, Hanna Campo reports:  Taking hydrocodone-acetaminophen 3 times a day. Getting out of cold is helpful. Were in [place] for 2 months and will be in [place] for 3 weeks and heading back to [place].   Overall feels that current regime is working.       Pain scores:  Pain intensity on average is 4 on a scale of 0-10.      Current pain treatments:   Butrans Patch 10 mcg/hr  Hydrocodone-Acetaminophen 5-325 90/30 days  Medicalm cannabis     Recommendations/plan at the last visit included:  Physical Therapy:  continue current daily HEPs  Clinical Health Psychologist to address issues of relaxation, behavioral change, coping style, and other factors important to improvement.  Not at this time  Diagnostic Studies:  none  Medication Management:  Refills provided as needed  Further procedures recommended: none  Recommendations to PCP: none  Follow up: 2 months      [place] Prescription Monitoring Program:   Reviewed. No concerns     Review of Systems:  The 14 system ROS was reviewed and was negative except what is documented above and as follows.  Any bowel or bladder problems: none  Mood: okay    Physical Exam:  Vitals:    12/13/23 1118   BP: (!) 145/85   BP Location: Right arm   Patient Position: Chair   Cuff  "Size: Adult Large   Pulse: 73   SpO2: 96%   Weight: 82.1 kg (181 lb)   Height: 1.689 m (5' 6.5\")       General: Awake in no apparent distress.   Eyes: Sclerae are anicteric. PERRLA, EOMI   Neck: supple, no masses.   Lungs: unlabored.   Musculoskeletal: 5/5 muscle strength in all extremities.   Neurologic exam:Sensation intact throughout all dermatomes bilateral upper extremities   Psychiatric: Normal affect.   Skin: Warm and Dry.        Medications:  Current Outpatient Medications   Medication Sig Dispense Refill    buprenorphine (BUTRANS) 10 MCG/HR WK patch Place 1 patch onto the skin every 7 days 4 patch 3    Cetirizine HCl (ZYRTEC ALLERGY PO) Take 5 mg by mouth daily      chlorthalidone (HYGROTON) 50 MG tablet Take 1 tablet (50 mg) by mouth daily Due for visit and labs for refills** 90 tablet 0    HYDROcodone-acetaminophen (NORCO) 5-325 MG tablet Take 1 tablet by mouth every 8 hours as needed for severe pain 90 tablet 0    levothyroxine (SYNTHROID/LEVOTHROID) 75 MCG tablet Take 1 tablet (75 mcg) by mouth daily 90 tablet 1    levothyroxine (SYNTHROID/LEVOTHROID) 88 MCG tablet Take 88 mcg by mouth daily      losartan (COZAAR) 25 MG tablet Take 1 tablet (25 mg) by mouth 2 times daily 180 tablet 0    medical cannabis (Patient's own supply) (The purpose of this order is to document that the patient reports taking medical cannabis.  This is not a prescription, and is not used to certify that the patient has a qualifying medical condition.) 0 Information only 0    naloxone (NARCAN) 4 MG/0.1ML nasal spray Spray 1 spray (4 mg) into one nostril alternating nostrils once as needed for opioid reversal every 2-3 minutes until assistance arrives 0.2 mL 1    vitamin B complex with vitamin C (STRESS TAB) tablet Take 1 tablet by mouth daily  0    VITAMIN D, CHOLECALCIFEROL, PO Take 5,000 Units by mouth daily         Analgesic Medications:   Medications related to Pain Management (From now, onward)      None         "       LABORATORY VALUES:   Recent Labs   Lab Test 08/01/22  0844 06/16/22  1313   * 134   POTASSIUM 3.7 3.6   CHLORIDE 95 97   CO2 26 31   ANIONGAP 11 6   GLC 95 94   BUN 7 7   CR 0.53 0.51*   AMBER 9.0 8.9       CBC RESULTS:   Recent Labs   Lab Test 08/01/22  0844   WBC 7.5   RBC 4.28   HGB 14.2   HCT 41.0   MCV 96   MCH 33.2*   MCHC 34.6   RDW 11.4          Most Recent 3 INR's:No lab results found.        ASSESSMENT:  Idiopathic Peripheral Neuropathy      Hanna Campo is a 68 year old female who is seen at the pain clinic for follow up to discuss management of her chronic pain symptoms       PLAN:    Physical Therapy:  continue current daily HEPs  Clinical Health Psychologist to address issues of relaxation, behavioral change, coping style, and other factors important to improvement.  Not at this time  Diagnostic Studies:  none  Medication Management:  Refills provided as needed - will increase butrans patch to 15mcg/hr with goal to see if she would be able to wean down or even off the hydrocodone.   Further procedures recommended: none  Recommendations to PCP: none  Follow up: 2 months (she will let us know how she is doing with higher dose of patch via mychart)    Patient was seen and discussed with my staff physician Dr. Morales , who agrees with my assessment and plan.    Rj DIAZ  Pain Fellow  [facility]         I saw and examined the patient with the Pain Fellow/Resident. I have reviewed and agree with the resident's note and plan of care and made changes and corrections directly to the body of the note.    TIME SPENT:  BY FELLOW/RESIDENT ALONE 15 MIN  BY MYSELF AND FELLOW/RESIDENT TOGETHER 25 MIN      Answers submitted by the patient for this visit:  GIRISH-7 (Submitted on 12/10/2023)  GIRISH 7 TOTAL SCORE: 0      Again, thank you for allowing me to participate in the care of your patient.      Sincerely,    Lisa Morales MD

## 2023-12-13 NOTE — PROGRESS NOTES
"[facility] : Progress Note    Date of visit: 12/13/2023    Chief Complaint   Patient presents with    Follow Up     Follow-up Both Feet-Hands Pain         Interval history:  Hanna Campo is a 68 year old female was last seen by  on 9/13/2023 for chronic pain symptoms.      Since the last visit, Hanna Campo reports:  Taking hydrocodone-acetaminophen 3 times a day. Getting out of cold is helpful. Were in [place] for 2 months and will be in [place] for 3 weeks and heading back to [place].   Overall feels that current regime is working.       Pain scores:  Pain intensity on average is 4 on a scale of 0-10.      Current pain treatments:   Butrans Patch 10 mcg/hr  Hydrocodone-Acetaminophen 5-325 90/30 days  Medicalm cannabis     Recommendations/plan at the last visit included:  Physical Therapy:  continue current daily HEPs  Clinical Health Psychologist to address issues of relaxation, behavioral change, coping style, and other factors important to improvement.  Not at this time  Diagnostic Studies:  none  Medication Management:  Refills provided as needed  Further procedures recommended: none  Recommendations to PCP: none  Follow up: 2 months      [place] Prescription Monitoring Program:   Reviewed. No concerns     Review of Systems:  The 14 system ROS was reviewed and was negative except what is documented above and as follows.  Any bowel or bladder problems: none  Mood: okay    Physical Exam:  Vitals:    12/13/23 1118   BP: (!) 145/85   BP Location: Right arm   Patient Position: Chair   Cuff Size: Adult Large   Pulse: 73   SpO2: 96%   Weight: 82.1 kg (181 lb)   Height: 1.689 m (5' 6.5\")       General: Awake in no apparent distress.   Eyes: Sclerae are anicteric. PERRLA, EOMI   Neck: supple, no masses.   Lungs: unlabored.   Musculoskeletal: 5/5 muscle strength in all extremities.   Neurologic exam:Sensation intact throughout all " dermatomes bilateral upper extremities   Psychiatric: Normal affect.   Skin: Warm and Dry.        Medications:  Current Outpatient Medications   Medication Sig Dispense Refill    buprenorphine (BUTRANS) 10 MCG/HR WK patch Place 1 patch onto the skin every 7 days 4 patch 3    Cetirizine HCl (ZYRTEC ALLERGY PO) Take 5 mg by mouth daily      chlorthalidone (HYGROTON) 50 MG tablet Take 1 tablet (50 mg) by mouth daily Due for visit and labs for refills** 90 tablet 0    HYDROcodone-acetaminophen (NORCO) 5-325 MG tablet Take 1 tablet by mouth every 8 hours as needed for severe pain 90 tablet 0    levothyroxine (SYNTHROID/LEVOTHROID) 75 MCG tablet Take 1 tablet (75 mcg) by mouth daily 90 tablet 1    levothyroxine (SYNTHROID/LEVOTHROID) 88 MCG tablet Take 88 mcg by mouth daily      losartan (COZAAR) 25 MG tablet Take 1 tablet (25 mg) by mouth 2 times daily 180 tablet 0    medical cannabis (Patient's own supply) (The purpose of this order is to document that the patient reports taking medical cannabis.  This is not a prescription, and is not used to certify that the patient has a qualifying medical condition.) 0 Information only 0    naloxone (NARCAN) 4 MG/0.1ML nasal spray Spray 1 spray (4 mg) into one nostril alternating nostrils once as needed for opioid reversal every 2-3 minutes until assistance arrives 0.2 mL 1    vitamin B complex with vitamin C (STRESS TAB) tablet Take 1 tablet by mouth daily  0    VITAMIN D, CHOLECALCIFEROL, PO Take 5,000 Units by mouth daily         Analgesic Medications:   Medications related to Pain Management (From now, onward)      None               LABORATORY VALUES:   Recent Labs   Lab Test 08/01/22  0844 06/16/22  1313   * 134   POTASSIUM 3.7 3.6   CHLORIDE 95 97   CO2 26 31   ANIONGAP 11 6   GLC 95 94   BUN 7 7   CR 0.53 0.51*   AMBER 9.0 8.9       CBC RESULTS:   Recent Labs   Lab Test 08/01/22  0844   WBC 7.5   RBC 4.28   HGB 14.2   HCT 41.0   MCV 96   MCH 33.2*   MCHC 34.6   RDW 11.4           Most Recent 3 INR's:No lab results found.        ASSESSMENT:  Idiopathic Peripheral Neuropathy      Hanna Campo is a 68 year old female who is seen at the pain clinic for follow up to discuss management of her chronic pain symptoms       PLAN:    Physical Therapy:  continue current daily HEPs  Clinical Health Psychologist to address issues of relaxation, behavioral change, coping style, and other factors important to improvement.  Not at this time  Diagnostic Studies:  none  Medication Management:  Refills provided as needed - will increase butrans patch to 15mcg/hr with goal to see if she would be able to wean down or even off the hydrocodone.   Further procedures recommended: none  Recommendations to PCP: none  Follow up: 2 months (she will let us know how she is doing with higher dose of patch via mychart)    Patient was seen and discussed with my staff physician Dr. Morales , who agrees with my assessment and plan.    Rj DIAZ  Pain Fellow  [facility]         I saw and examined the patient with the Pain Fellow/Resident. I have reviewed and agree with the resident's note and plan of care and made changes and corrections directly to the body of the note.    TIME SPENT:  BY FELLOW/RESIDENT ALONE 15 MIN  BY MYSELF AND FELLOW/RESIDENT TOGETHER 25 MIN    Lisa Morales MD  Pain Medicine, Department of Anesthesiology  , [facility]    Answers submitted by the patient for this visit:  GIRISH-7 (Submitted on 12/10/2023)  GIRISH 7 TOTAL SCORE: 0

## 2023-12-13 NOTE — PATIENT INSTRUCTIONS
Medications:    Try to wean down on Norco with higher dose of Butrans Patch.    buprenorphine (BUTRANS) 15 MCG/HR Wk patch. Place 1 patch onto the skin every 7 days.      *For refills of opioid medications - You MUST call (Or MyChart) the clinic DIRECTLY and at least 7 days before you run out of your medication.        Treatment planning:    Send [email or web address] message with update on the increase in Butrans patch      Recommended Follow up:      Follow up in approximately 3 months.        Please call [phone] to schedule your clinic appointment if you don't already have an appointment scheduled.        To speak with a nurse, schedule/reschedule/cancel a clinic appointment, or request a medication refill call: [phone]    You can also reach us by [employer]: [email or web address]

## 2023-12-13 NOTE — NURSING NOTE
Patient presents with:  Follow Up: Follow-up Both Feet-Hands Pain      Moderate Pain (4)     Pain Medications       Opioid Combinations Refills Start End     HYDROcodone-acetaminophen (NORCO) 5-325 MG tablet    0 11/30/2023     Sig - Route: Take 1 tablet by mouth every 8 hours as needed for severe pain - Oral    Class: E-Prescribe    Earliest Fill Date: 11/30/2023    No prior authorization was found for this prescription.    Found prior authorization for another prescription for the same medication: Closed - Prior Authorization not required for patient/medication      Opioid Partial Agonists Refills Start End     buprenorphine (BUTRANS) 10 MCG/HR WK patch    3 11/1/2023     Sig - Route: Place 1 patch onto the skin every 7 days - Transdermal    Class: E-Prescribe    No prior authorization was found for this prescription.    Found prior authorization for another prescription for the same medication: Closed - Prior Authorization not required for patient/medication            What medications are you using for pain? Hydrocodone,, Butrans patch,Medical Cannabis    (New patients only) Have you been seen by another pain clinic/ provider? no    (Return Patients only) What refills are you needing today? no

## 2024-01-10 ENCOUNTER — MYC REFILL (OUTPATIENT)
Dept: ANESTHESIOLOGY | Facility: CLINIC | Age: 69
End: 2024-01-10
Payer: COMMERCIAL

## 2024-01-10 DIAGNOSIS — G60.9 IDIOPATHIC PERIPHERAL NEUROPATHY: ICD-10-CM

## 2024-01-10 DIAGNOSIS — Z79.891 OPIOID CONTRACT EXISTS: ICD-10-CM

## 2024-01-10 DIAGNOSIS — M79.671 PAIN IN BOTH FEET: ICD-10-CM

## 2024-01-10 DIAGNOSIS — M79.672 PAIN IN BOTH FEET: ICD-10-CM

## 2024-01-10 RX ORDER — BUPRENORPHINE 15 UG/H
1 PATCH TRANSDERMAL
Qty: 4 PATCH | Refills: 3 | Status: SHIPPED | OUTPATIENT
Start: 2024-01-10 | End: 2024-05-02

## 2024-01-10 NOTE — TELEPHONE ENCOUNTER
Refill request    Medication: HYDROcodone-acetaminophen (NORCO) 5-325 MG tablet     Sig: Take 1 tablet by mouth every 8 hours as needed for severe pain - Oral     Dispensed: 90  Refills: 0  SOLD to the pt on: 12/5/23     Last clinic appointment: 12/13/23  Next clinic appointment: 4/17/23    Last Drug Screen Collected: 7/12/23  Controlled Substance Agreement signed: 7/12/23      Preferred pharmacy:    [facility] #[id number] - [place] - [address] [facility]         Refill request routed to the provider to review.

## 2024-01-10 NOTE — TELEPHONE ENCOUNTER
Refill request    Medication: buprenorphine (BUTRANS) 15 MCG/HR Wk patch     Sig: Place 1 patch onto the skin every 7 days - Transdermal     Dispensed: 4  Refills: 3  SOLD to the pt on: 12/13/23     Last clinic appointment: 12/13/23  Next clinic appointment: 4/17/24    Last Drug Screen Collected: 7/12/23  Controlled Substance Agreement signed: 7/12/23      Preferred pharmacy:    [facility]/PHARMACY #[id number] - [place] - [address] [facility]         Refill request routed to the provider to review.

## 2024-01-12 ENCOUNTER — MYC REFILL (OUTPATIENT)
Dept: FAMILY MEDICINE | Facility: CLINIC | Age: 69
End: 2024-01-12
Payer: COMMERCIAL

## 2024-01-12 DIAGNOSIS — I10 BENIGN ESSENTIAL HYPERTENSION: ICD-10-CM

## 2024-01-12 RX ORDER — CHLORTHALIDONE 50 MG/1
50 TABLET ORAL DAILY
Qty: 90 TABLET | Refills: 0 | OUTPATIENT
Start: 2024-01-12

## 2024-01-12 NOTE — TELEPHONE ENCOUNTER
Has not been seen in primary care for 1+ year.  Appears she has transferred to a different clinic.  Will decline refill

## 2024-01-17 RX ORDER — HYDROCODONE BITARTRATE AND ACETAMINOPHEN 5; 325 MG/1; MG/1
1 TABLET ORAL EVERY 8 HOURS PRN
Qty: 60 TABLET | Refills: 0 | Status: SHIPPED | OUTPATIENT
Start: 2024-01-17 | End: 2024-01-25

## 2024-01-22 ENCOUNTER — TELEPHONE (OUTPATIENT)
Dept: ANESTHESIOLOGY | Facility: CLINIC | Age: 69
End: 2024-01-22
Payer: COMMERCIAL

## 2024-01-22 NOTE — TELEPHONE ENCOUNTER
Prior Authorization Retail Medication Request    Medication/Dose: HYDROcodone-acetaminophen (NORCO) 5-325 MG tablet     Sig - Route: Take 1 tablet by mouth every 8 hours as needed for severe pain - Oral     Diagnosis and ICD code (if different than what is on RX):    New/renewal/insurance change PA/secondary ins. PA:  Previously Tried and Failed:    Rationale:      Insurance   Primary:   Insurance ID:      Secondary (if applicable):  Insurance ID:      Pharmacy Information (if different than what is on RX)  Name:    Phone:    Fax:

## 2024-01-23 ENCOUNTER — MYC MEDICAL ADVICE (OUTPATIENT)
Dept: ANESTHESIOLOGY | Facility: CLINIC | Age: 69
End: 2024-01-23
Payer: COMMERCIAL

## 2024-01-23 DIAGNOSIS — M79.672 PAIN IN BOTH FEET: ICD-10-CM

## 2024-01-23 DIAGNOSIS — G60.9 IDIOPATHIC PERIPHERAL NEUROPATHY: ICD-10-CM

## 2024-01-23 DIAGNOSIS — M79.671 PAIN IN BOTH FEET: ICD-10-CM

## 2024-01-23 NOTE — TELEPHONE ENCOUNTER
Central Prior Authorization Team  Phone: [phone]    PA Initiation    Medication: HYDROCODONE-ACETAMINOPHEN 5-325 MG PO TABS  Insurance Company: [employer] - Phone [phone] Fax [phone]  Pharmacy Filling the Rx: [facility] - [place], [place] - [address]  Filling Pharmacy Phone: [phone]  Filling Pharmacy Fax:    Start Date: 1/23/2024

## 2024-01-23 NOTE — TELEPHONE ENCOUNTER
Per call to pharmacy, they have already informed pt that rx cannot be dispensed due to [place] is [place] out of [place].    Prior Authorization Approval    Medication: HYDROCODONE-ACETAMINOPHEN 5-325 MG PO TABS  Authorization Effective Date: 1/23/2024  Authorization Expiration Date: 12/22/2024  Approved Dose/Quantity:   Reference #:     Insurance Company: [employer] - Phone [phone] Fax [phone]  Expected CoPay: $    CoPay Card Available:      Financial Assistance Needed:   Which Pharmacy is filling the prescription: [facility] - [place], [place] - [address].  Pharmacy Notified:   Patient Notified:

## 2024-01-24 NOTE — TELEPHONE ENCOUNTER
RN was able to connect with the pharmacy who reports they will have to address this with the pharmacist who is currently in a meeting, but will contact us back with clarification if patient is able to  prescription at this location. Writer provided contact number.    Caitlin Rivera RNCC

## 2024-01-24 NOTE — TELEPHONE ENCOUNTER
RN has attempted to connect with pharmacy multiple times and unable to connect, message states mailbox is not accepting at this time and to call back. Writer contacted patient to update and patient expressed the same concern with being unable to connect with the pharmacy. Writer informed we will continue to attempt to connect to provide information needed for patient to be able to fill prescription. Patient appreciated the call.    Cailtin Rivera RNCC

## 2024-01-25 NOTE — TELEPHONE ENCOUNTER
RN spoke with pharmacy to confirm [place] prescription was not picked up and to cancel the prescription as patient requested it to be sent to a different pharmacy. Pharmacist confirmed and cancelled prescription. New prescription was pended and sent to the provider to review and sign.    Caitlin Rivera RNCC

## 2024-01-26 RX ORDER — HYDROCODONE BITARTRATE AND ACETAMINOPHEN 5; 325 MG/1; MG/1
1 TABLET ORAL EVERY 8 HOURS PRN
Qty: 60 TABLET | Refills: 0 | Status: SHIPPED | OUTPATIENT
Start: 2024-01-26 | End: 2024-04-01

## 2024-01-26 NOTE — TELEPHONE ENCOUNTER
Chart reviewed - Patient of Dr. Morales. Request appears appropriate. See nursing documentation in this encounter. Refilled for 30 day supply.     Aydee Garcia DNP, APRN, AGNP-C  [facility]

## 2024-01-31 NOTE — TELEPHONE ENCOUNTER
Controlled Substance Refill Request for Norco  Problem List Complete:  No     PROVIDER TO CONSIDER COMPLETION OF PROBLEM LIST AND OVERVIEW/CONTROLLED SUBSTANCE AGREEMENT    Last Written Prescription Date:  10/11/19  Last Fill Quantity: 18 tablets   # refills: 0    THE MOST RECENT OFFICE VISIT MUST BE WITHIN THE PAST 3 MONTHS. AT LEAST ONE FACE TO FACE VISIT MUST OCCUR EVERY 6 MONTHS. ADDITIONAL VISITS CAN BE VIRTUAL.  (THIS STATEMENT SHOULD BE DELETED.)    Last Office Visit with [facility] primary care provider: 10/11/19    Future Office visit:   Next 5 appointments (look out 90 days)    Nov 27, 2019 11:00 AM CST  Return Visit with Franky Beard MD  [facility] [address] [place] [id number] [phone]          Controlled substance agreement:   Encounter-Level CSA:    There are no encounter-level csa.     Patient-Level CSA:    There are no patient-level csa.         Last Urine Drug Screen: No results found for: CDAUT, No results found for: COMDAT, No results found for: THC13, PCP13, COC13, MAMP13, OPI13, AMP13, BZO13, TCA13, MTD13, BAR13, OXY13, PPX13, BUP13     Processing:  Rx to be electronically transmitted to pharmacy by provider      [email or web address]       checked in past 3 months?  No-problem list incomplete so  not checked.         Rupali Patterson RN, BSN, PHN          
Requested Prescriptions   Pending Prescriptions Disp Refills     HYDROcodone-acetaminophen (NORCO) 5-325 MG tablet 18 tablet 0     Sig: Take 1 tablet by mouth every 6 hours as needed for pain       There is no refill protocol information for this order          HYDROcodone-acetaminophen (NORCO) 5-325 MG tablet      Last Written Prescription Date:  10/11/19  Last Fill Quantity: 18,   # refills: 0  Last Office Visit: 10/11/19  Future Office visit:    Next 5 appointments (look out 90 days)    Nov 27, 2019 11:00 AM CST  Return Visit with Franky Beard MD  [facility] ([facility]) [address] [place] [id number] [phone]           Routing refill request to provider for review/approval because:  Drug not on the [facility], [facility] or [facility] refill protocol or controlled substance    
See other encounter.  FARIBA Maloney, NP-C  [facility]    
[facility]

## 2024-02-28 ENCOUNTER — MYC REFILL (OUTPATIENT)
Dept: FAMILY MEDICINE | Facility: CLINIC | Age: 69
End: 2024-02-28
Payer: COMMERCIAL

## 2024-02-28 DIAGNOSIS — I10 BENIGN ESSENTIAL HYPERTENSION: ICD-10-CM

## 2024-02-28 RX ORDER — LOSARTAN POTASSIUM 25 MG/1
25 TABLET ORAL 2 TIMES DAILY
Qty: 180 TABLET | Refills: 0 | OUTPATIENT
Start: 2024-02-28

## 2024-04-01 ENCOUNTER — MYC REFILL (OUTPATIENT)
Dept: ANESTHESIOLOGY | Facility: CLINIC | Age: 69
End: 2024-04-01
Payer: COMMERCIAL

## 2024-04-01 DIAGNOSIS — M79.672 PAIN IN BOTH FEET: ICD-10-CM

## 2024-04-01 DIAGNOSIS — M79.671 PAIN IN BOTH FEET: ICD-10-CM

## 2024-04-01 DIAGNOSIS — G60.9 IDIOPATHIC PERIPHERAL NEUROPATHY: ICD-10-CM

## 2024-04-02 NOTE — TELEPHONE ENCOUNTER
Refill request    Message from the patient: I am doing really well with only 1 pill a day if absolutely necessary so only need a refill for any breakout pain at this point.     Medication: HYDROcodone-acetaminophen (NORCO) 5-325 MG tablet     Sig: Take 1 tablet by mouth every 8 hours as needed for severe pain     Dispensed: 60  Refills: 0  SOLD to the pt on: 1/26/24 (Controlled Substance)    Last clinic appointment: 12/13/23  Next clinic appointment: 4/17/24    Last Drug Screen Collected: 7/12/23  Controlled Substance Agreement signed: 7/12/23      Preferred pharmacy:    [facility]/pharmacy #[id number] - [place], [place] - [address] [facility] [phone]     Refill request routed to the provider to review.     Maddi Galeana LPN

## 2024-04-03 RX ORDER — HYDROCODONE BITARTRATE AND ACETAMINOPHEN 5; 325 MG/1; MG/1
1 TABLET ORAL EVERY 8 HOURS PRN
Qty: 60 TABLET | Refills: 0 | Status: SHIPPED | OUTPATIENT
Start: 2024-04-03 | End: 2024-07-01

## 2024-04-10 ASSESSMENT — ANXIETY QUESTIONNAIRES
4. TROUBLE RELAXING: NOT AT ALL
2. NOT BEING ABLE TO STOP OR CONTROL WORRYING: NOT AT ALL
1. FEELING NERVOUS, ANXIOUS, OR ON EDGE: NOT AT ALL
7. FEELING AFRAID AS IF SOMETHING AWFUL MIGHT HAPPEN: NOT AT ALL
7. FEELING AFRAID AS IF SOMETHING AWFUL MIGHT HAPPEN: NOT AT ALL
3. WORRYING TOO MUCH ABOUT DIFFERENT THINGS: NOT AT ALL
GAD7 TOTAL SCORE: 0
GAD7 TOTAL SCORE: 0
6. BECOMING EASILY ANNOYED OR IRRITABLE: NOT AT ALL
5. BEING SO RESTLESS THAT IT IS HARD TO SIT STILL: NOT AT ALL
GAD7 TOTAL SCORE: 0

## 2024-04-10 ASSESSMENT — PAIN SCALES - PAIN ENJOYMENT GENERAL ACTIVITY SCALE (PEG)
INTERFERED_ENJOYMENT_LIFE: 6
INTERFERED_ENJOYMENT_LIFE: 6
AVG_PAIN_PASTWEEK: 6
INTERFERED_GENERAL_ACTIVITY: 6
PEG_TOTALSCORE: 6
PEG_TOTALSCORE: 6
INTERFERED_GENERAL_ACTIVITY: 6
AVG_PAIN_PASTWEEK: 6

## 2024-04-17 ENCOUNTER — OFFICE VISIT (OUTPATIENT)
Dept: ANESTHESIOLOGY | Facility: CLINIC | Age: 69
End: 2024-04-17
Attending: ANESTHESIOLOGY
Payer: COMMERCIAL

## 2024-04-17 VITALS
DIASTOLIC BLOOD PRESSURE: 80 MMHG | OXYGEN SATURATION: 98 % | HEART RATE: 68 BPM | BODY MASS INDEX: 30.9 KG/M2 | SYSTOLIC BLOOD PRESSURE: 132 MMHG | HEIGHT: 64 IN | WEIGHT: 181 LBS

## 2024-04-17 DIAGNOSIS — G60.9 IDIOPATHIC PERIPHERAL NEUROPATHY: Primary | ICD-10-CM

## 2024-04-17 DIAGNOSIS — Z79.891 OPIOID CONTRACT EXISTS: ICD-10-CM

## 2024-04-17 PROCEDURE — 99213 OFFICE O/P EST LOW 20 MIN: CPT | Performed by: ANESTHESIOLOGY

## 2024-04-17 ASSESSMENT — PAIN SCALES - GENERAL: PAINLEVEL: SEVERE PAIN (6)

## 2024-04-17 NOTE — NURSING NOTE
Patient presents with:  Follow Up: Follow-up Chronic Pain - Both Feet-Hands      Severe Pain (6)     Pain Medications       Opioid Combinations Refills Start End     HYDROcodone-acetaminophen (NORCO) 5-325 MG tablet 0 4/3/2024 --    Sig - Route: Take 1 tablet by mouth every 8 hours as needed for severe pain - Oral    Class: E-Prescribe    Earliest Fill Date: 4/3/2024    No prior authorization was found for this prescription.    Found prior authorization for another prescription for the same medication: Closed - Prior Authorization not required for patient/medication       Opioid Partial Agonists Refills Start End     buprenorphine (BUTRANS) 15 MCG/HR Wk patch 3 1/10/2024 --    Sig - Route: Place 1 patch onto the skin every 7 days - Transdermal    Class: E-Prescribe     buprenorphine (BUTRANS) 10 MCG/HR WK patch 3 11/1/2023 --    Sig - Route: Place 1 patch onto the skin every 7 days - Transdermal    Class: E-Prescribe    No prior authorization was found for this prescription.    Found prior authorization for another prescription for the same medication: Closed - Prior Authorization not required for patient/medication            What medications are you using for pain? Hydrocodone, Butrans Patches    (New patients only) Have you been seen by another pain clinic/ provider? no    (Return Patients only) What refills are you needing today? no

## 2024-04-17 NOTE — LETTER
4/17/2024       RE: Hanna [place]o  [address]  [place] [address]       Dear Colleague,    Thank you for referring your patient, Hanna [place], to the [facility] [place] at [facility]. Please see a copy of my visit note below.    [facility] for Comprehensive Chronic Pain Management : Progress Note    Date of visit: 12/13/2023    Chief Complaint   Patient presents with    Follow Up     Follow-up Chronic Pain - Both Feet-Hands         Interval history:  Hanna [place] is a 68 year old female was last seen by  on 12/13/2023 for chronic pain symptoms.      Since the last visit, Hanna [place] reports:  Taking hydrocodone-acetaminophen 1 to 2 tabs per day and even less; doing well with this and the butrans patch    Pain scores:  Pain intensity on average is 4 on a scale of 0-10.      Current pain treatments:   Butrans Patch 15 mcg/hr (increased 12/13/23)  Hydrocodone-Acetaminophen 5-325 60/30 days  Medical cannabis     Recommendations/plan at the last visit included:  Physical Therapy:  continue current daily HEPs  Clinical Health Psychologist to address issues of relaxation, behavioral change, coping style, and other factors important to improvement.  Not at this time  Diagnostic Studies:  none  Medication Management:  Refills provided as needed - will increase butrans patch to 15mcg/hr with goal to see if she would be able to wean down or even off the hydrocodone.   Further procedures recommended: none  Recommendations to PCP: none  Follow up: 2 months (she will let us know how she is doing with higher dose of patch via mychart)      [place] Prescription Monitoring Program:   Reviewed. No concerns     Review of Systems:  The 14 system ROS was reviewed and was negative except what is documented above and as follows.  Any bowel or bladder problems: none  Mood:  "okay    Physical Exam:  Vitals:    04/17/24 1111   BP: 132/80   BP Location: Right arm   Patient Position: Chair   Cuff Size: Adult Large   Pulse: 68   SpO2: 98%   Weight: 82.1 kg (181 lb)   Height: 1.626 m (5' 4\")         General: Awake in no apparent distress.   Eyes: Sclerae are anicteric. PERRLA, EOMI   Neck: supple, no masses.   Lungs: unlabored.   Musculoskeletal: 5/5 muscle strength in all extremities.   Neurologic exam:Sensation intact throughout all dermatomes bilateral upper extremities   Psychiatric: Normal affect.   Skin: Warm and Dry.        Medications:  Current Outpatient Medications   Medication Sig Dispense Refill    buprenorphine (BUTRANS) 10 MCG/HR WK patch Place 1 patch onto the skin every 7 days 4 patch 3    buprenorphine (BUTRANS) 15 MCG/HR Wk patch Place 1 patch onto the skin every 7 days 4 patch 3    Cetirizine HCl (ZYRTEC ALLERGY PO) Take 5 mg by mouth daily      chlorthalidone (HYGROTON) 50 MG tablet Take 1 tablet (50 mg) by mouth daily Due for visit and labs for refills** 90 tablet 0    HYDROcodone-acetaminophen (NORCO) 5-325 MG tablet Take 1 tablet by mouth every 8 hours as needed for severe pain 60 tablet 0    levothyroxine (SYNTHROID/LEVOTHROID) 75 MCG tablet Take 1 tablet (75 mcg) by mouth daily 90 tablet 1    levothyroxine (SYNTHROID/LEVOTHROID) 88 MCG tablet Take 88 mcg by mouth daily      losartan (COZAAR) 25 MG tablet Take 1 tablet (25 mg) by mouth 2 times daily 180 tablet 0    medical cannabis (Patient's own supply) (The purpose of this order is to document that the patient reports taking medical cannabis.  This is not a prescription, and is not used to certify that the patient has a qualifying medical condition.) 0 Information only 0    naloxone (NARCAN) 4 MG/0.1ML nasal spray Spray 1 spray (4 mg) into one nostril alternating nostrils once as needed for opioid reversal every 2-3 minutes until assistance arrives 0.2 mL 1    vitamin B complex with vitamin C (STRESS TAB) tablet Take " 1 tablet by mouth daily  0    VITAMIN D, CHOLECALCIFEROL, PO Take 5,000 Units by mouth daily         Analgesic Medications:   Medications related to Pain Management (From now, onward)      None               LABORATORY VALUES:   Recent Labs   Lab Test 08/01/22  0844 06/16/22  1313   * 134   POTASSIUM 3.7 3.6   CHLORIDE 95 97   CO2 26 31   ANIONGAP 11 6   GLC 95 94   BUN 7 7   CR 0.53 0.51*   AMBER 9.0 8.9       CBC RESULTS:   Recent Labs   Lab Test 08/01/22  0844   WBC 7.5   RBC 4.28   HGB 14.2   HCT 41.0   MCV 96   MCH 33.2*   MCHC 34.6   RDW 11.4          Most Recent 3 INR's:No lab results found.        ASSESSMENT:  Idiopathic Peripheral Neuropathy      Hanna [place] is a 68 year old female who is seen at the pain clinic for follow up to discuss management of her chronic pain symptoms       PLAN:    Physical Therapy:  continue current daily HEPs  Clinical Health Psychologist to address issues of relaxation, behavioral change, coping style, and other factors important to improvement.  Not at this time  Diagnostic Studies:  none  Medication Management:  Refills provided as needed   Further procedures recommended: none  Recommendations to PCP: none  Follow up: 3 months      Answers submitted by the patient for this visit:  GIRISH-7 (Submitted on 4/10/2024)  GIRISH 7 TOTAL SCORE: 0      Again, thank you for allowing me to participate in the care of your patient.      Sincerely,    Lisa Morales MD

## 2024-04-17 NOTE — PATIENT INSTRUCTIONS
January 20, 2022      Thea M Michael  [address]   [place] [address]        Dear ,    We are writing to inform you of your test results.    Your test results fall within the expected range(s) or remain unchanged from previous results.  Please continue with current treatment plan.    Resulted Orders   CRP, inflammation   Result Value Ref Range    CRP Inflammation 4.6 0.0 - 8.0 mg/L   CBC with platelets   Result Value Ref Range    WBC Count 6.3 4.0 - 11.0 10e3/uL    RBC Count 5.07 3.80 - 5.20 10e6/uL    Hemoglobin 14.5 11.7 - 15.7 g/dL    Hematocrit 44.9 35.0 - 47.0 %    MCV 89 78 - 100 fL    MCH 28.6 26.5 - 33.0 pg    MCHC 32.3 31.5 - 36.5 g/dL    RDW 13.6 10.0 - 15.0 %    Platelet Count 221 150 - 450 10e3/uL       If you have any questions or concerns, please call the clinic at the number listed above.       Sincerely,      Rajni Meraz MD           Medications:    Continue medication as prescribed.    *For refills of opioid medications - You MUST call (Or MyChart) the clinic DIRECTLY and at least 7 days before you run out of your medication.      Recommended Follow up:      Follow up in 3-4 months.        Please call [phone] to schedule your clinic appointment if you don't already have an appointment scheduled.        To speak with a nurse, schedule/reschedule/cancel a clinic appointment, or request a medication refill call: [phone]    You can also reach us by [employer]: [email or web address]

## 2024-04-17 NOTE — PROGRESS NOTES
"[facility] for Comprehensive Chronic Pain Management : Progress Note    Date of visit: 12/13/2023    Chief Complaint   Patient presents with    Follow Up     Follow-up Chronic Pain - Both Feet-Hands         Interval history:  Hanna Campo is a 68 year old female was last seen by  on 12/13/2023 for chronic pain symptoms.      Since the last visit, Hanna Campo reports:  Taking hydrocodone-acetaminophen 1 to 2 tabs per day and even less; doing well with this and the butrans patch    Pain scores:  Pain intensity on average is 4 on a scale of 0-10.      Current pain treatments:   Butrans Patch 15 mcg/hr (increased 12/13/23)  Hydrocodone-Acetaminophen 5-325 60/30 days  Medical cannabis     Recommendations/plan at the last visit included:  Physical Therapy:  continue current daily HEPs  Clinical Health Psychologist to address issues of relaxation, behavioral change, coping style, and other factors important to improvement.  Not at this time  Diagnostic Studies:  none  Medication Management:  Refills provided as needed - will increase butrans patch to 15mcg/hr with goal to see if she would be able to wean down or even off the hydrocodone.   Further procedures recommended: none  Recommendations to PCP: none  Follow up: 2 months (she will let us know how she is doing with higher dose of patch via mychart)      [place] Prescription Monitoring Program:   Reviewed. No concerns     Review of Systems:  The 14 system ROS was reviewed and was negative except what is documented above and as follows.  Any bowel or bladder problems: none  Mood: okay    Physical Exam:  Vitals:    04/17/24 1111   BP: 132/80   BP Location: Right arm   Patient Position: Chair   Cuff Size: Adult Large   Pulse: 68   SpO2: 98%   Weight: 82.1 kg (181 lb)   Height: 1.626 m (5' 4\")         General: Awake in no apparent distress.   Eyes: Sclerae are anicteric. PERRLA, EOMI   Neck: supple, no masses.   Lungs: " unlabored.   Musculoskeletal: 5/5 muscle strength in all extremities.   Neurologic exam:Sensation intact throughout all dermatomes bilateral upper extremities   Psychiatric: Normal affect.   Skin: Warm and Dry.        Medications:  Current Outpatient Medications   Medication Sig Dispense Refill    buprenorphine (BUTRANS) 10 MCG/HR WK patch Place 1 patch onto the skin every 7 days 4 patch 3    buprenorphine (BUTRANS) 15 MCG/HR Wk patch Place 1 patch onto the skin every 7 days 4 patch 3    Cetirizine HCl (ZYRTEC ALLERGY PO) Take 5 mg by mouth daily      chlorthalidone (HYGROTON) 50 MG tablet Take 1 tablet (50 mg) by mouth daily Due for visit and labs for refills** 90 tablet 0    HYDROcodone-acetaminophen (NORCO) 5-325 MG tablet Take 1 tablet by mouth every 8 hours as needed for severe pain 60 tablet 0    levothyroxine (SYNTHROID/LEVOTHROID) 75 MCG tablet Take 1 tablet (75 mcg) by mouth daily 90 tablet 1    levothyroxine (SYNTHROID/LEVOTHROID) 88 MCG tablet Take 88 mcg by mouth daily      losartan (COZAAR) 25 MG tablet Take 1 tablet (25 mg) by mouth 2 times daily 180 tablet 0    medical cannabis (Patient's own supply) (The purpose of this order is to document that the patient reports taking medical cannabis.  This is not a prescription, and is not used to certify that the patient has a qualifying medical condition.) 0 Information only 0    naloxone (NARCAN) 4 MG/0.1ML nasal spray Spray 1 spray (4 mg) into one nostril alternating nostrils once as needed for opioid reversal every 2-3 minutes until assistance arrives 0.2 mL 1    vitamin B complex with vitamin C (STRESS TAB) tablet Take 1 tablet by mouth daily  0    VITAMIN D, CHOLECALCIFEROL, PO Take 5,000 Units by mouth daily         Analgesic Medications:   Medications related to Pain Management (From now, onward)      None               LABORATORY VALUES:   Recent Labs   Lab Test 08/01/22  0844 06/16/22  1313   * 134   POTASSIUM 3.7 3.6   CHLORIDE 95 97   CO2 26  31   ANIONGAP 11 6   GLC 95 94   BUN 7 7   CR 0.53 0.51*   AMBER 9.0 8.9       CBC RESULTS:   Recent Labs   Lab Test 08/01/22  0844   WBC 7.5   RBC 4.28   HGB 14.2   HCT 41.0   MCV 96   MCH 33.2*   MCHC 34.6   RDW 11.4          Most Recent 3 INR's:No lab results found.        ASSESSMENT:  Idiopathic Peripheral Neuropathy      Hanna Campo is a 68 year old female who is seen at the pain clinic for follow up to discuss management of her chronic pain symptoms       PLAN:    Physical Therapy:  continue current daily HEPs  Clinical Health Psychologist to address issues of relaxation, behavioral change, coping style, and other factors important to improvement.  Not at this time  Diagnostic Studies:  none  Medication Management:  Refills provided as needed   Further procedures recommended: none  Recommendations to PCP: none  Follow up: 3 months    Lisa Morales MD    Pain Medicine  Department of Anesthesiology  [facility]          Answers submitted by the patient for this visit:  GIRISH-7 (Submitted on 4/10/2024)  GIRISH 7 TOTAL SCORE: 0

## 2024-05-01 ENCOUNTER — MYC MEDICAL ADVICE (OUTPATIENT)
Dept: ANESTHESIOLOGY | Facility: CLINIC | Age: 69
End: 2024-05-01
Payer: COMMERCIAL

## 2024-05-01 DIAGNOSIS — Z79.891 OPIOID CONTRACT EXISTS: ICD-10-CM

## 2024-05-01 DIAGNOSIS — G60.9 IDIOPATHIC PERIPHERAL NEUROPATHY: ICD-10-CM

## 2024-05-02 RX ORDER — BUPRENORPHINE 15 UG/H
1 PATCH TRANSDERMAL
Qty: 4 PATCH | Refills: 3 | Status: SHIPPED | OUTPATIENT
Start: 2024-05-02 | End: 2024-08-28

## 2024-05-02 NOTE — TELEPHONE ENCOUNTER
Refill request    Message from the patient:     Aden says to contact the pharmacy where this was last filled but we re back in [place] now. Can you please refill:  buprenorphine 10 mcg/hour Ptwk  [facility] #[id number] - [place], [place] - [address]  [address] [place] [address] , [address]  [facility] [id number] [phone]    Medication: buprenorphine (BUTRANS) 15 MCG/HR Wk patch     Sig: Place 1 patch onto the skin every 7 days     Dispensed: 4  Refills: 3    Sold to patient:  4/8/24 (LPN called [facility] in [place] to verify date.)  The patient has no refills left on profile at this pharmacy.     Last clinic appointment: 4/17/24  Next clinic appointment: 7/17/24    Last Drug Screen Collected: 7/12/23  Controlled Substance Agreement signed: 7/12/23      Preferred pharmacy:    [id number] - [place] - [address]   [address] [place] [address], [address]  [facility] [address] [phone]    Refill request routed to the provider to review.     Maddi Galeana LPN

## 2024-06-15 ENCOUNTER — HEALTH MAINTENANCE LETTER (OUTPATIENT)
Age: 69
End: 2024-06-15

## 2024-07-01 ENCOUNTER — MYC MEDICAL ADVICE (OUTPATIENT)
Dept: ANESTHESIOLOGY | Facility: CLINIC | Age: 69
End: 2024-07-01
Payer: COMMERCIAL

## 2024-07-01 ENCOUNTER — MYC REFILL (OUTPATIENT)
Dept: ANESTHESIOLOGY | Facility: CLINIC | Age: 69
End: 2024-07-01
Payer: COMMERCIAL

## 2024-07-01 DIAGNOSIS — G60.9 IDIOPATHIC PERIPHERAL NEUROPATHY: ICD-10-CM

## 2024-07-01 DIAGNOSIS — M79.671 PAIN IN BOTH FEET: ICD-10-CM

## 2024-07-01 DIAGNOSIS — M79.672 PAIN IN BOTH FEET: ICD-10-CM

## 2024-07-01 NOTE — TELEPHONE ENCOUNTER
Refill request:    Medication: HYDROcodone-acetaminophen (NORCO) 5-325 MG tablet    Sig: Take 1 tablet by mouth every 8 hours as needed for severe pain     Dispensed: 60  Refills: 0  SOLD to the pt on: 4/15/24     Last clinic appointment: 4/17/24  Next clinic appointment: 7/17/24    Last Drug Screen Collected: 7/12/23  Controlled Substance Agreement signed: 7/12/23      Preferred pharmacy:    [facility] #[id number] - [place], [place] - [address] [place] [address]        Refill request routed to the provider to review.

## 2024-07-02 NOTE — TELEPHONE ENCOUNTER
[facility] Call Center    Phone Message    May a detailed message be left on voicemail: yes     Reason for Call: Other: Patient calling stating the pharmacy ([facility]) said that the [place] script was canceled.  She is wondering why?  Please call her back to advise.      Action Taken: Other: [facility]    Travel Screening: Not Applicable     Date of Service:

## 2024-07-02 NOTE — TELEPHONE ENCOUNTER
"Spoke with patient regarding message she left about medication (Norco) being \"cancelled\".  I explained that Dr. Morales had just not signed the order as of yet.  I told her that I would remind her in the AM when she came in.  Patient stated understanding of the conversation.    Larisa Eldridge, SHANTHI    "

## 2024-07-05 RX ORDER — HYDROCODONE BITARTRATE AND ACETAMINOPHEN 5; 325 MG/1; MG/1
1 TABLET ORAL EVERY 8 HOURS PRN
Qty: 60 TABLET | Refills: 0 | Status: SHIPPED | OUTPATIENT
Start: 2024-07-05 | End: 2024-07-17

## 2024-07-08 ENCOUNTER — MYC MEDICAL ADVICE (OUTPATIENT)
Dept: ANESTHESIOLOGY | Facility: CLINIC | Age: 69
End: 2024-07-08
Payer: COMMERCIAL

## 2024-07-08 NOTE — PROGRESS NOTES
Video-Visit Details    Type of service:  Video Visit     Originating Location (pt. Location): Home    Distant Location (provider location):  On-site  Platform used for Video Visit: [facility]     Date of visit: 7/9/2024      Assessment:   Hanna Campo is a 69 year old female with a past medical history significant for idiopathic peripheral neuropathy, bilateral foot pain, chronic pain syndrome, who presents in follow up for medical cannabis re-certification.    Assigned to [facility] nursing team.     Visit Diagnoses:  1. Chronic pain syndrome    2. Idiopathic peripheral neuropathy    3. Pain in both feet    4. Medical cannabis use    5. Chronic, continuous use of opioids        Plan:  Diagnosis reviewed, treatment option addressed, and risk/benifits discussed.  Self-care instructions given.  I am recommending a multidisciplinary treatment plan to help this patient better manage their pain.      Diagnostic Tests:    Last CMP on 5/21/24 reviewed - hepatic and renal function intact/stable, GFR >60.     Medication Management:   Medical [place] - [place] today. Previously certified by Oliva Walden CNP. Last visit to dispensary 9+ months ago and purchased a significant supply that lasted her several months.   Primary pain condition associated with certification: neuropathy.     Follow up with FARIBA Smith CNP in around 3 years if interested in medical cannabis certification renewal.  Continue follow up with Dr. Morales for ongoing chronic pain management.       Review of Electronic Chart: Today I have also reviewed available medical information in the patient's medical record at [facility] ([facility]) and Care Everywhere (if available), including relevant provider notes, laboratory work, and imaging.     Aydee Garcia DNP, FARIBA, AGNP-C  [facility]     -------------------------------------------------    Subjective:    Chief complaint:    Chief Complaint   Patient presents with    Pain     Follow up appointment- Pt stated no concerns to discuss today.        Interval history:  Hanna Campo is a 69 year old female last seen by primary pain provider on 4/17/24.  They are a patient of Dr. Morales seen by me for the first time in follow up and for medical cannabis certification.     Recommendations/plan at the last visit included:  Physical Therapy:  continue current daily HEPs  Clinical Health Psychologist to address issues of relaxation, behavioral change, coping style, and other factors important to improvement.  Not at this time  Diagnostic Studies:  none  Medication Management:  Refills provided as needed   Further procedures recommended: none  Recommendations to PCP: none  Follow up: 3 months    Since her last visit, Hanna Campo reports:  -She presents today for medical cannabis re-evaluation and certification.   -Previously certified by Oliva Walden CNP, has let certification lapse.  -Primary pain provider is Dr. Morales for long term management of Butrans and hydrocodone.   -She reports original certification through Oliva Walden CNP, does not remember who certified last and not currently registered anymore.   -She reports last visit before she left for [place] in the winter.   -She notes medical cannabis has allowed her to significantly reduce usage of hydrocodone. Continues Butrans 15 mcg/hr TD patch, may take 1 hydrocodone daily PRN but does not use short acting opioid every day.  -She uses cannabis gummies and vape. Notes gummies seem to be most effective for pain control.     Medical Cannabis Screening Questions:  History of severe cardiac: NO   History of severe hepatic dysfunction: NO  Family or personal hx of schizophrenia/schizoaffective disorder: NO  Hepatic function test in the past 12 months: YES - CMP on 5/21/24, hepatic function intact.       Patient Supplied Answers To the  Pain Questionnaire      7/9/2024    10:19  AM   [facility] -  Patient Entered Questionnaire/Answers   How would you describe the pain cramping    sharp    numbness    throbbing   Which of the following worsen your pain standing    sitting    walking    exercise   Which of the following improve or reduce your pain lying down    medication   What number best describes your average pain for the past week:  0 = No pain  to  10 = Worst pain imaginable 5   What number best describes your LOWEST pain in past 24 hours:  0 = No pain  to  10 = Worst pain imaginable 2   What number best describes your WORST pain in past 24 hours:  0 = No pain  to  10 = Worst pain imaginable 9   When is your pain worst Constant   What non-medicine treatments have you already had for your pain pain clinic    physical therapy    TENS (electrical stimulator)         Interval history from last visit on 4/17/24 with Dr. Morales:  Hanna Campo is a 68 year old female was last seen by  on 12/13/2023 for chronic pain symptoms.    Since the last visit, Hanna Campo reports:  Taking hydrocodone-acetaminophen 1 to 2 tabs per day and even less; doing well with this and the butrans patch  Pain scores:  Pain intensity on average is 4 on a scale of 0-10.       Current Pain Treatments:    Butrans Patch 15 mcg/hr   Hydrocodone-Acetaminophen 5-325 60/30 days   Medical cannabis - gummies and vape     Review of [place] Prescription Monitoring Program (): No concern for abuse or misuse of controlled medications based on this report. Reviewed - appears appropriate.    Past pain treatments:  Gabapentin 200 mg QHS  Cymbalta - night sweats, GI upset  Essential oils   CBD  Physical therapy   TENS unit    Medications:  Current Outpatient Medications   Medication Sig Dispense Refill    buprenorphine (BUTRANS) 15 MCG/HR Wk patch Place 1 patch onto the skin every 7 days 4 patch 3    Cetirizine HCl (ZYRTEC ALLERGY PO) Take 5 mg by mouth daily      chlorthalidone (HYGROTON) 50 MG tablet Take 1  tablet (50 mg) by mouth daily Due for visit and labs for refills** 90 tablet 0    HYDROcodone-acetaminophen (NORCO) 5-325 MG tablet Take 1 tablet by mouth every 8 hours as needed for severe pain 60 tablet 0    levothyroxine (SYNTHROID/LEVOTHROID) 88 MCG tablet Take 88 mcg by mouth daily      losartan (COZAAR) 25 MG tablet Take 1 tablet (25 mg) by mouth 2 times daily 180 tablet 0    medical cannabis (Patient's own supply) (The purpose of this order is to document that the patient reports taking medical cannabis.  This is not a prescription, and is not used to certify that the patient has a qualifying medical condition.) 0 Information only 0    vitamin B complex with vitamin C (STRESS TAB) tablet Take 1 tablet by mouth daily  0    VITAMIN D, CHOLECALCIFEROL, PO Take 5,000 Units by mouth daily      buprenorphine (BUTRANS) 10 MCG/HR WK patch Place 1 patch onto the skin every 7 days 4 patch 3    levothyroxine (SYNTHROID/LEVOTHROID) 75 MCG tablet Take 1 tablet (75 mcg) by mouth daily 90 tablet 1    naloxone (NARCAN) 4 MG/0.1ML nasal spray Spray 1 spray (4 mg) into one nostril alternating nostrils once as needed for opioid reversal every 2-3 minutes until assistance arrives 0.2 mL 1       Medical History: any changes in medical history since they were last seen in the pain clinic? No    ROS: 10 point ROS neg other than the symptoms noted above in the HPI or patient questionnaire.       Objective:    Physical Exam:  GENERAL: alert and no distress  EYES: Eyes grossly normal to inspection.  No discharge or erythema, or obvious scleral/conjunctival abnormalities.  RESP: No audible wheeze, cough, or visible cyanosis.    SKIN: Visible skin clear. No significant rash, abnormal pigmentation or lesions.  NEURO: Cranial nerves grossly intact.  Mentation and speech appropriate for age.  PSYCH: Appropriate affect, tone, and pace of words     Diagnostic Tests/Imaging/Labs:  CMP on 5/21/24 - hepatic and renal function intact, GFR  >60    BILLING TIME DOCUMENTATION:   The total TIME spent on this patient on the date of the encounter/appointment was 41 minutes.      TOTAL TIME includes:   Time spent preparing to see the patient (reviewing records and tests)   Time spent face to face (or over the phone) with the patient   Time spent ordering tests, medications, procedures and referrals   Time spent Referring and communicating with other healthcare professionals   Time spent documenting clinical information in Epic

## 2024-07-08 NOTE — TELEPHONE ENCOUNTER
RN spoke with patient to inform our Nurse Practitioner Aydee Garcia agreed to see her tomorrow for medical cannabis certification. Patient confirmed video visit. Writer assisted with scheduling.    Caitlin Rivera RNCC

## 2024-07-09 ENCOUNTER — VIRTUAL VISIT (OUTPATIENT)
Dept: ANESTHESIOLOGY | Facility: CLINIC | Age: 69
End: 2024-07-09
Payer: COMMERCIAL

## 2024-07-09 DIAGNOSIS — G60.9 IDIOPATHIC PERIPHERAL NEUROPATHY: ICD-10-CM

## 2024-07-09 DIAGNOSIS — F11.90 CHRONIC, CONTINUOUS USE OF OPIOIDS: ICD-10-CM

## 2024-07-09 DIAGNOSIS — Z79.899 MEDICAL CANNABIS USE: ICD-10-CM

## 2024-07-09 DIAGNOSIS — M79.671 PAIN IN BOTH FEET: ICD-10-CM

## 2024-07-09 DIAGNOSIS — M79.672 PAIN IN BOTH FEET: ICD-10-CM

## 2024-07-09 DIAGNOSIS — G89.4 CHRONIC PAIN SYNDROME: Primary | ICD-10-CM

## 2024-07-09 PROCEDURE — 99215 OFFICE O/P EST HI 40 MIN: CPT | Mod: 95

## 2024-07-09 ASSESSMENT — PAIN SCALES - PAIN ENJOYMENT GENERAL ACTIVITY SCALE (PEG)
PEG_TOTALSCORE: 4.67
PEG_TOTALSCORE: 4.67
INTERFERED_ENJOYMENT_LIFE: 5
INTERFERED_GENERAL_ACTIVITY: 4
INTERFERED_GENERAL_ACTIVITY: 4
AVG_PAIN_PASTWEEK: 5
AVG_PAIN_PASTWEEK: 5
INTERFERED_ENJOYMENT_LIFE: 5

## 2024-07-09 ASSESSMENT — ANXIETY QUESTIONNAIRES
3. WORRYING TOO MUCH ABOUT DIFFERENT THINGS: NOT AT ALL
1. FEELING NERVOUS, ANXIOUS, OR ON EDGE: NOT AT ALL
2. NOT BEING ABLE TO STOP OR CONTROL WORRYING: NOT AT ALL
6. BECOMING EASILY ANNOYED OR IRRITABLE: NOT AT ALL
GAD7 TOTAL SCORE: 0
5. BEING SO RESTLESS THAT IT IS HARD TO SIT STILL: NOT AT ALL
7. FEELING AFRAID AS IF SOMETHING AWFUL MIGHT HAPPEN: NOT AT ALL
7. FEELING AFRAID AS IF SOMETHING AWFUL MIGHT HAPPEN: NOT AT ALL
GAD7 TOTAL SCORE: 0
4. TROUBLE RELAXING: NOT AT ALL
GAD7 TOTAL SCORE: 0

## 2024-07-09 NOTE — NURSING NOTE
How will you be connecting to the visit? MyChart  How would you like to obtain your AVS? MyChart  If the video visit is dropped, the invitation should be resent by: Text to cell phone: [phone]  Will anyone else be joining your video visit? No     Patient presents with:  Pain: Follow up appointment- Pt stated no concerns to discuss today.       Data Unavailable     Pain Medications       Opioid Combinations Refills Start End     HYDROcodone-acetaminophen (NORCO) 5-325 MG tablet 0 7/5/2024 --    Sig - Route: Take 1 tablet by mouth every 8 hours as needed for severe pain - Oral    Class: E-Prescribe    Earliest Fill Date: 7/5/2024    No prior authorization was found for this prescription.    Found prior authorization for another prescription for the same medication: Closed - Prior Authorization not required for patient/medication       Opioid Partial Agonists Refills Start End     buprenorphine (BUTRANS) 10 MCG/HR WK patch 3 11/1/2023 --    Sig - Route: Place 1 patch onto the skin every 7 days - Transdermal    Class: E-Prescribe    No prior authorization was found for this prescription.    Found prior authorization for another prescription for the same medication: Closed - Prior Authorization not required for patient/medication     buprenorphine (BUTRANS) 15 MCG/HR Wk patch 3 5/2/2024 --    Sig - Route: Place 1 patch onto the skin every 7 days - Transdermal    Class: E-Prescribe            What medications are you using for pain? Norco, Butrans patch, Medical Cannabis    (Return Patients only) What refills are you needing today? None, pt just had refills.       Maddi Galeana LPN

## 2024-07-09 NOTE — PATIENT INSTRUCTIONS
Diagnostic Tests:    Last CMP on 5/21/24 reviewed - hepatic and renal function intact/stable, GFR >60.     Medication Management:   Medical [place] Previously [place] by Oliva Walden CNP. Last visit to dispensary 9+ months ago and purchased a significant supply that lasted her several months.   Primary pain condition associated with certification: neuropathy.     Follow up with FARIBA Smith CNP in around 3 years if interested in medical cannabis certification renewal.  Continue follow up with Dr. Morales for ongoing chronic pain management.

## 2024-07-15 ASSESSMENT — PAIN SCALES - PAIN ENJOYMENT GENERAL ACTIVITY SCALE (PEG)
INTERFERED_GENERAL_ACTIVITY: 5
AVG_PAIN_PASTWEEK: 5
PEG_TOTALSCORE: 5
INTERFERED_ENJOYMENT_LIFE: 5

## 2024-07-17 ENCOUNTER — OFFICE VISIT (OUTPATIENT)
Dept: ANESTHESIOLOGY | Facility: CLINIC | Age: 69
End: 2024-07-17
Payer: COMMERCIAL

## 2024-07-17 VITALS
OXYGEN SATURATION: 98 % | DIASTOLIC BLOOD PRESSURE: 74 MMHG | HEART RATE: 71 BPM | RESPIRATION RATE: 16 BRPM | SYSTOLIC BLOOD PRESSURE: 131 MMHG

## 2024-07-17 DIAGNOSIS — G60.9 IDIOPATHIC PERIPHERAL NEUROPATHY: ICD-10-CM

## 2024-07-17 DIAGNOSIS — M79.672 PAIN IN BOTH FEET: ICD-10-CM

## 2024-07-17 DIAGNOSIS — Z79.891 OPIOID CONTRACT EXISTS: Primary | ICD-10-CM

## 2024-07-17 DIAGNOSIS — M79.671 PAIN IN BOTH FEET: ICD-10-CM

## 2024-07-17 LAB — CREAT UR-MCNC: 37 MG/DL

## 2024-07-17 PROCEDURE — 99213 OFFICE O/P EST LOW 20 MIN: CPT | Performed by: ANESTHESIOLOGY

## 2024-07-17 PROCEDURE — 80353 DRUG SCREENING COCAINE: CPT | Performed by: ANESTHESIOLOGY

## 2024-07-17 PROCEDURE — 80367 DRUG SCREENING PROPOXYPHENE: CPT | Performed by: ANESTHESIOLOGY

## 2024-07-17 PROCEDURE — 99000 SPECIMEN HANDLING OFFICE-LAB: CPT | Performed by: PATHOLOGY

## 2024-07-17 RX ORDER — HYDROCODONE BITARTRATE AND ACETAMINOPHEN 5; 325 MG/1; MG/1
1 TABLET ORAL EVERY 6 HOURS PRN
Qty: 90 TABLET | Refills: 0 | Status: SHIPPED | OUTPATIENT
Start: 2024-07-17 | End: 2024-08-20

## 2024-07-17 ASSESSMENT — PAIN SCALES - GENERAL: PAINLEVEL: MODERATE PAIN (5)

## 2024-07-17 NOTE — LETTER
Opioid / Opioid Plus Controlled Substance Agreement    This is an agreement between you and your provider about the safe and appropriate use of controlled substance/opioids prescribed by your care team. Controlled substances are medicines that can cause physical and mental dependence (abuse).    There are strict laws about having and using these medicines. We here at [facility] are committing to working with you in your efforts to get better. To support you in this work, we ll help you schedule regular office appointments for medicine refills. If we must cancel or change your appointment for any reason, we ll make sure you have enough medicine to last until your next appointment.     As a Provider, I will:  Listen carefully to your concerns and treat you with respect.   Recommend a treatment plan that I believe is in your best interest. This plan may involve therapies other than opioid pain medication.   Talk with you often about the possible benefits, and the risk of harm of any medicine that we prescribe for you.   Provide a plan on how to taper (discontinue or go off) using this medicine if the decision is made to stop its use.    As a Patient, I understand that opioid(s):   Are a controlled substance prescribed by my care team to help me function or work and manage my condition(s).   Are strong medicines and can cause serious side effects such as:  Drowsiness, which can seriously affect my driving ability  A lower breathing rate, enough to cause death  Harm to my thinking ability   Depression   Abuse of and addiction to this medicine  Need to be taken exactly as prescribed. Combining opioids with certain medicines or chemicals (such as illegal drugs, sedatives, sleeping pills, and benzodiazepines) can be dangerous or even fatal. If I stop opioids suddenly, I may have severe withdrawal symptoms.  Do not work for all types of pain nor for all patients. If they re not helpful, I may be asked to stop  them.      The risks, benefits and side effects of these medicine(s) were explained to me. I agree that:  I will take part in other treatments as advised by my care team. This may be psychiatry or counseling, physical therapy, behavioral therapy, group treatment or a referral to a specialist.     I will keep all my appointments. I understand that this is part of the monitoring of opioids. My care team may require an office visit for EVERY opioid/controlled substance refill. If I miss appointments or don t follow instructions, my care team may stop my medicine.    I will take my medicines as prescribed. I will not change the dose or schedule unless my care team tells me to. There will be no refills if I run out early.     I may be asked to come to the clinic and complete a urine drug test or complete a pill count at any time. If I don t give a urine sample or participate in a pill count, the care team may stop my medicine.    I will only receive prescriptions from this clinic for chronic pain. If I am treated by another provider for acute pain issues, I will tell them that I am taking opioid pain medication for chronic pain and that I have a treatment agreement with this provider. I will inform my [facility] care team within one business day if I am given a prescription for any pain medication by another healthcare provider. My [facility] care team can contact other providers and pharmacists about my use of any medicines.    It is up to me to make sure that I don t run out of my medicines on weekends or holidays. If my care team is willing to refill my opioid prescription without a visit, I must request refills only during office hours. Refills may take up to 7 business days to process. I will use one pharmacy to fill all my opioid and other controlled substance prescriptions. I will notify the clinic about any changes to my insurance or medication availability.    I am responsible for my prescriptions.  If the medicine/prescription is lost, stolen or destroyed, it will not be replaced. I also agree not to share controlled substance medicines with anyone.    I am aware I should not use any illegal or recreational drugs. I agree not to drink alcohol unless my care team says I can.       If I enroll in the [place] Medical Cannabis program, I will tell my care team prior to my next refill.     I will tell my care team right away if I become pregnant, have a new medical problem treated outside of my regular clinic, or have a change in my medications.    I understand that this medicine can affect my thinking, judgment and reaction time. Alcohol and drugs affect the brain and body, which can affect the safety of my driving. Being under the influence of alcohol or drugs can affect my decision-making, behaviors, personal safety, and the safety of others. Driving while impaired (DWI) can occur if a person is driving, operating, or in physical control of a car, motorcycle, boat, snowmobile, ATV, motorbike, off-road vehicle, or any other motor vehicle ([place] Statute 169[place].20). I understand the risk if I choose to drive or operate any vehicle or machinery.    I understand that if I do not follow any of the conditions above, my prescriptions or treatment may be stopped or changed.          Opioids  What You Need to Know    What are opioids?   Opioids are pain medicines that must be prescribed by a doctor. They are also known as narcotics.     Examples are:   morphine (MS Contin, Silvana)  oxycodone (Oxycontin)  oxycodone and acetaminophen (Percocet)  hydrocodone and acetaminophen (Vicodin, Norco)   fentanyl patch (Duragesic)   hydromorphone (Dilaudid)   methadone  codeine (Tylenol #3)     What do opioids do well?   Opioids are best for severe short-term pain such as after a surgery or injury. They may work well for cancer pain. They may help some people with long-lasting (chronic) pain.     What do opioids NOT do well?   Opioids  never get rid of pain entirely, and they don t work well for most patients with chronic pain. Opioids don t reduce swelling, one of the causes of pain.                                    Other ways to manage chronic pain and improve function include:     Treat the health problem that may be causing pain  Anti-inflammation medicines, which reduce swelling and tenderness, such as ibuprofen (Advil, Motrin) or naproxen (Aleve)  Acetaminophen (Tylenol)  Antidepressants and anti-seizure medicines, especially for nerve pain  Topical treatments such as patches or creams  Injections or nerve blocks  Chiropractic or osteopathic treatment  Acupuncture, massage, deep breathing, meditation, visual imagery, aromatherapy  Use heat or ice at the pain site  Physical therapy   Exercise  Stop smoking  Take part in therapy       Risks and side effects     Talk to your doctor before you start or decide to keep taking opioids. Possible side effects include:    Lowering your breathing rate enough to cause death  Overdose, including death, especially if taking higher than prescribed doses  Worse depression symptoms; less pleasure in things you usually enjoy  Feeling tired or sluggish  Slower thoughts or cloudy thinking  Being more sensitive to pain over time; pain is harder to control  Trouble sleeping or restless sleep  Changes in hormone levels (for example, less testosterone)  Changes in sex drive or ability to have sex  Constipation  Unsafe driving  Itching and sweating  Dizziness  Nausea, throwing up and dry mouth    What else should I know about opioids?    Opioids may lead to dependence, tolerance, or addiction.    Dependence means that if you stop or reduce the medicine too quickly, you will have withdrawal symptoms. These include loose poop (diarrhea), jitters, flu-like symptoms, nervousness and tremors. Dependence is not the same as addiction.                     Tolerance means needing higher doses over time to get the same  effect. This may increase the chance of serious side effects.    Addiction is when people improperly use a substance that harms their body, their mind or their relations with others. Use of opiates can cause a relapse of addiction if you have a history of drug or alcohol abuse.    People who have used opioids for a long time may have a lower quality of life, worse depression, higher levels of pain and more visits to doctors.    You can overdose on opioids. Take these steps to lower your risk of overdose:    Recognize the signs:  Signs of overdose include decrease or loss of consciousness (blackout), slowed breathing, trouble waking up and blue lips. If someone is worried about overdose, they should call 911.    Talk to your doctor about Narcan (naloxone).   If you are at risk for overdose, you may be given a prescription for Narcan. This medicine very quickly reverses the effects of opioids.   If you overdose, a friend or family member can give you Narcan while waiting for the ambulance. They need to know the signs of overdose and how to give Narcan.     Don't use alcohol or street drugs.   Taking them with opioids can cause death.    Do not take any of these medicines unless your doctor says it s OK. Taking these with opioids can cause death:  Benzodiazepines, such as lorazepam (Ativan), alprazolam (Xanax) or diazepam (Valium)  Muscle relaxers, such as cyclobenzaprine (Flexeril)  Sleeping pills like zolpidem (Ambien)   Other opioids      How to keep you and other people safe while taking opioids:    Never share your opioids with others.  Opioid medicines are regulated by the Drug Enforcement Agency ([place]). Selling or sharing medications is a criminal act.    2. Be sure to store opioids in a secure place, locked up if possible. Young children can easily swallow them and overdose.    3. When you are traveling with your medicines, keep them in the original bottles. If you use a pill box, be sure you also carry a copy  of your medicine list from your clinic or pharmacy.    4. Safe disposal of opioids    Most pharmacies have places to get rid of medicine, called disposal kiosks. Medicine disposal options are also available in every [facility]. Search your county and  medication disposal  to find more options. You can find more details at:  https://www.pca.[facility].[place]./living-green/managing-unwanted-medications     I agree that my provider, clinic care team, and pharmacy may work with any city, state or federal law enforcement agency that investigates the misuse, sale, or other diversion of my controlled medicine. I will allow my provider to discuss my care with, or share a copy of, this agreement with any other treating provider, pharmacy or emergency room where I receive care.    I have read this agreement and have asked questions about anything I did not understand.    _______________________________________________________  Patient Signature - Hanna Campo _____________________                   Date     _______________________________________________________  Provider Signature - Lisa Morales MD   _____________________                   Date     _______________________________________________________  Witness Signature (required if provider not present while patient signing)   _____________________                   Date

## 2024-07-17 NOTE — NURSING NOTE
RN reviewed AVS with patient. Patient to contact clinic if any questions/concerns. Patient verbalized understanding. UDS and CSA completed in clinic.    Dang Barrett RN

## 2024-07-17 NOTE — PROGRESS NOTES
[facility]    Date of visit: 7/17/2024    Chief complaint:   Chief Complaint   Patient presents with    RECHECK     Follow up pain issues       Interval history:  Hanna Campo was last seen by me on 4/17/2024.      Recommendations/plan at the last visit included:  Physical Therapy:  continue current daily HEPs  Clinical Health Psychologist to address issues of relaxation, behavioral change, coping style, and other factors important to improvement.  Not at this time  Diagnostic Studies:  none  Medication Management:  Refills provided as needed   Further procedures recommended: none  Recommendations to PCP: none  Follow up: 3 months    Since her last visit, Hanna Campo reports:  Doing well overall   No negative side effects with butrans patch  Does continue to use hydrocodone 2 to 3 tabs per day  Will continue traveling  Able to engage in activities of daily living with current medication regimen     Pain scores:  Pain intensity on average is 5 on a scale of 0-10.     Current pain treatments:   Butrans Patch 15 mcg/hr (increased 12/13/23)  Hydrocodone-Acetaminophen 5-325 60 to 90 per month  Medical cannabis       Medications:  Current Outpatient Medications   Medication Sig Dispense Refill    buprenorphine (BUTRANS) 15 MCG/HR Wk patch Place 1 patch onto the skin every 7 days 4 patch 3    Cetirizine HCl (ZYRTEC ALLERGY PO) Take 5 mg by mouth daily      chlorthalidone (HYGROTON) 50 MG tablet Take 1 tablet (50 mg) by mouth daily Due for visit and labs for refills** 90 tablet 0    HYDROcodone-acetaminophen (NORCO) 5-325 MG tablet Take 1 tablet by mouth every 8 hours as needed for severe pain 60 tablet 0    levothyroxine (SYNTHROID/LEVOTHROID) 88 MCG tablet Take 88 mcg by mouth daily      losartan (COZAAR) 25 MG tablet Take 1 tablet (25 mg) by mouth 2 times daily 180 tablet 0    medical cannabis (Patient's own supply) (The purpose of this order is to document that the patient reports taking  medical cannabis.  This is not a prescription, and is not used to certify that the patient has a qualifying medical condition.) 0 Information only 0    vitamin B complex with vitamin C (STRESS TAB) tablet Take 1 tablet by mouth daily  0    VITAMIN D, CHOLECALCIFEROL, PO Take 5,000 Units by mouth daily      buprenorphine (BUTRANS) 10 MCG/HR WK patch Place 1 patch onto the skin every 7 days 4 patch 3    levothyroxine (SYNTHROID/LEVOTHROID) 75 MCG tablet Take 1 tablet (75 mcg) by mouth daily 90 tablet 1    naloxone (NARCAN) 4 MG/0.1ML nasal spray Spray 1 spray (4 mg) into one nostril alternating nostrils once as needed for opioid reversal every 2-3 minutes until assistance arrives 0.2 mL 1       Medical History: any changes in medical history since they were last seen? No    Review of Systems:  The 14 system ROS was reviewed from the intake questionnaire, and is positive for: foot pain  Any bowel or bladder problems: denies  Mood: stable    Physical Exam:  Blood pressure 131/74, pulse 71, resp. rate 16, SpO2 98%, not currently breastfeeding.  General: AAOx3, NAD  Gait: Normal  MSK exam: deferred for conversation    Assessment:   Idiopathic peripheral neuropathy  Opioid Contract exists    Hanna Campo is a 69 year old female who is seen at the pain clinic for follow up to discuss current management of her peripheral neuropathy symptoms. She is doing well with current regimen.  Will follow up again in September.     Plan:  Physical Therapy:  continue daily HEPs, rides bikes and active  Clinical Health Psychologist to address issues of relaxation, behavioral change, coping style, and other factors important to improvement.  Not indicated  Diagnostic Studies:  none  Medication Management:  Refill provided as needed  Further procedures recommended: none  Recommendations to PCP: none  Follow up: September 2024    Lisa Morales MD    Pain Medicine  Department of Anesthesiology  [facility]  [place]

## 2024-07-17 NOTE — NURSING NOTE
Patient presents with:  RECHECK: Follow up pain issues      Moderate Pain (5)     Pain Medications       Opioid Combinations Refills Start End     HYDROcodone-acetaminophen (NORCO) 5-325 MG tablet 0 7/5/2024 --    Sig - Route: Take 1 tablet by mouth every 8 hours as needed for severe pain - Oral    Class: E-Prescribe    Earliest Fill Date: 7/5/2024    No prior authorization was found for this prescription.    Found prior authorization for another prescription for the same medication: Closed - Prior Authorization not required for patient/medication       Opioid Partial Agonists Refills Start End     buprenorphine (BUTRANS) 15 MCG/HR Wk patch 3 5/2/2024 --    Sig - Route: Place 1 patch onto the skin every 7 days - Transdermal    Class: E-Prescribe     buprenorphine (BUTRANS) 10 MCG/HR WK patch 3 11/1/2023 --    Sig - Route: Place 1 patch onto the skin every 7 days - Transdermal    Class: E-Prescribe    No prior authorization was found for this prescription.    Found prior authorization for another prescription for the same medication: Closed - Prior Authorization not required for patient/medication            What medications are you using for pain? Norco and butrans     (Return Patients only) What refills are you needing today? no    Expectations: follow up    Larisa Eldridge LPN

## 2024-07-17 NOTE — PATIENT INSTRUCTIONS
Medications:    Norco refilled.    *For refills of opioid medications - You MUST call (Or MyChart) the clinic DIRECTLY and at least 7 days before you run out of your medication.        Treatment planning:    UDS and CSA completed in clinic.      Recommended Follow up:      Follow up in September.    Please call [phone] to schedule your clinic appointment if you don't already have an appointment scheduled.        To speak with a nurse, schedule/reschedule/cancel a clinic appointment, or request a medication refill call: [phone]    You can also reach us by [employer]: [email or web address]

## 2024-07-17 NOTE — LETTER
7/17/2024       RE: Hanna Campo  [address]  [place] [address]     Dear Colleague,    Thank you for referring your patient, Hanna Campo, to the [facility] FOR COMPREHENSIVE PAIN MANAGEMENT [place] at [facility]. Please see a copy of my visit note below.    [facility]    Date of visit: 7/17/2024    Chief complaint:   Chief Complaint   Patient presents with    RECHECK     Follow up pain issues       Interval history:  Hanna Campo was last seen by me on 4/17/2024.      Recommendations/plan at the last visit included:  Physical Therapy:  continue current daily HEPs  Clinical Health Psychologist to address issues of relaxation, behavioral change, coping style, and other factors important to improvement.  Not at this time  Diagnostic Studies:  none  Medication Management:  Refills provided as needed   Further procedures recommended: none  Recommendations to PCP: none  Follow up: 3 months    Since her last visit, Hanna Campo reports:  Doing well overall   No negative side effects with butrans patch  Does continue to use hydrocodone 2 to 3 tabs per day  Will continue traveling  Able to engage in activities of daily living with current medication regimen     Pain scores:  Pain intensity on average is 5 on a scale of 0-10.     Current pain treatments:   Butrans Patch 15 mcg/hr (increased 12/13/23)  Hydrocodone-Acetaminophen 5-325 60 to 90 per month  Medical cannabis       Medications:  Current Outpatient Medications   Medication Sig Dispense Refill    buprenorphine (BUTRANS) 15 MCG/HR Wk patch Place 1 patch onto the skin every 7 days 4 patch 3    Cetirizine HCl (ZYRTEC ALLERGY PO) Take 5 mg by mouth daily      chlorthalidone (HYGROTON) 50 MG tablet Take 1 tablet (50 mg) by mouth daily Due for visit and labs for refills** 90 tablet 0    HYDROcodone-acetaminophen (NORCO) 5-325 MG tablet Take 1 tablet by mouth every  8 hours as needed for severe pain 60 tablet 0    levothyroxine (SYNTHROID/LEVOTHROID) 88 MCG tablet Take 88 mcg by mouth daily      losartan (COZAAR) 25 MG tablet Take 1 tablet (25 mg) by mouth 2 times daily 180 tablet 0    medical cannabis (Patient's own supply) (The purpose of this order is to document that the patient reports taking medical cannabis.  This is not a prescription, and is not used to certify that the patient has a qualifying medical condition.) 0 Information only 0    vitamin B complex with vitamin C (STRESS TAB) tablet Take 1 tablet by mouth daily  0    VITAMIN D, CHOLECALCIFEROL, PO Take 5,000 Units by mouth daily      buprenorphine (BUTRANS) 10 MCG/HR WK patch Place 1 patch onto the skin every 7 days 4 patch 3    levothyroxine (SYNTHROID/LEVOTHROID) 75 MCG tablet Take 1 tablet (75 mcg) by mouth daily 90 tablet 1    naloxone (NARCAN) 4 MG/0.1ML nasal spray Spray 1 spray (4 mg) into one nostril alternating nostrils once as needed for opioid reversal every 2-3 minutes until assistance arrives 0.2 mL 1       Medical History: any changes in medical history since they were last seen? No    Review of Systems:  The 14 system ROS was reviewed from the intake questionnaire, and is positive for: foot pain  Any bowel or bladder problems: denies  Mood: stable    Physical Exam:  Blood pressure 131/74, pulse 71, resp. rate 16, SpO2 98%, not currently breastfeeding.  General: AAOx3, NAD  Gait: Normal  MSK exam: deferred for conversation    Assessment:   Idiopathic peripheral neuropathy  Opioid Contract exists    Hanna Campo is a 69 year old female who is seen at the pain clinic for follow up to discuss current management of her peripheral neuropathy symptoms. She is doing well with current regimen.  Will follow up again in September.     Plan:  Physical Therapy:  continue daily HEPs, rides bikes and active  Clinical Health Psychologist to address issues of relaxation, behavioral change, coping style, and  other factors important to improvement.  Not indicated  Diagnostic Studies:  none  Medication Management:  Refill provided as needed  Further procedures recommended: none  Recommendations to PCP: none  Follow up: September 2024          Again, thank you for allowing me to participate in the care of your patient.      Sincerely,    Lisa Morales MD

## 2024-07-19 LAB
BUPRENORPHINE UR CFM-MCNC: 8 NG/ML
BUPRENORPHINE/CREAT UR: 22 NG/MG {CREAT}
DHC UR CFM-MCNC: 157 NG/ML
DHC/CREAT UR: 424 NG/MG {CREAT}
HYDROCODONE UR CFM-MCNC: 216 NG/ML
HYDROCODONE/CREAT UR: 584 NG/MG {CREAT}
NORBUPRENORPHINE UR CFM-MCNC: 7 NG/ML
NORBUPRENORPHINE/CREAT UR: 19 NG/MG {CREAT}

## 2024-08-20 ENCOUNTER — MYC REFILL (OUTPATIENT)
Dept: ANESTHESIOLOGY | Facility: CLINIC | Age: 69
End: 2024-08-20
Payer: COMMERCIAL

## 2024-08-20 DIAGNOSIS — G60.9 IDIOPATHIC PERIPHERAL NEUROPATHY: ICD-10-CM

## 2024-08-20 DIAGNOSIS — M79.672 PAIN IN BOTH FEET: ICD-10-CM

## 2024-08-20 DIAGNOSIS — M79.671 PAIN IN BOTH FEET: ICD-10-CM

## 2024-08-21 NOTE — TELEPHONE ENCOUNTER
Refill request    Medication: HYDROcodone-acetaminophen (NORCO) 5-325 MG tablet    Sig: Take 1 tablet by mouth every 6 hours as needed for severe pain    Dispensed: 90  Refills: 0  SOLD to the pt: 7/23/24    Last clinic appointment: 7/17/24  Next clinic appointment: 9/25/24    Last Drug Screen Collected: 7/17/24  Controlled Substance Agreement signed: 7/17/24      Preferred pharmacy:    [facility] #[id number] - [address]    Refill request routed to the provider to review.

## 2024-08-26 RX ORDER — HYDROCODONE BITARTRATE AND ACETAMINOPHEN 5; 325 MG/1; MG/1
1 TABLET ORAL EVERY 6 HOURS PRN
Qty: 90 TABLET | Refills: 0 | Status: SHIPPED | OUTPATIENT
Start: 2024-08-26 | End: 2024-09-20

## 2024-08-26 NOTE — TELEPHONE ENCOUNTER
Chart reviewed - Patient of Dr. Morales. Request appears appropriate.  checked, no concerns. Refilled for 30 day supply.     Aydee Garcia DNP, APRN, AGNP-C  [facility]

## 2024-08-28 ENCOUNTER — MYC REFILL (OUTPATIENT)
Dept: ANESTHESIOLOGY | Facility: CLINIC | Age: 69
End: 2024-08-28
Payer: COMMERCIAL

## 2024-08-28 DIAGNOSIS — G60.9 IDIOPATHIC PERIPHERAL NEUROPATHY: ICD-10-CM

## 2024-08-28 DIAGNOSIS — Z79.891 OPIOID CONTRACT EXISTS: ICD-10-CM

## 2024-08-28 NOTE — TELEPHONE ENCOUNTER
Refill request    Medication: buprenorphine (BUTRANS) 15 MCG/HR Wk patch     Sig: Place 1 patch onto the skin every 7 days - Transdermal     Dispensed: 4  Refills: 3  SOLD to the pt on: 8/5/24    Last clinic appointment: 7/17/24  Next clinic appointment: 9/25/24    Last Drug Screen Collected: 7/17/24  Controlled Substance Agreement signed: 7/17/24      [facility] pharmacy:    [id number] - [address]    Refill request routed to the provider to review.

## 2024-09-04 RX ORDER — BUPRENORPHINE 15 UG/H
1 PATCH TRANSDERMAL
Qty: 4 PATCH | Refills: 3 | Status: SHIPPED | OUTPATIENT
Start: 2024-09-04 | End: 2024-09-25

## 2024-09-20 ENCOUNTER — MYC REFILL (OUTPATIENT)
Dept: ANESTHESIOLOGY | Facility: CLINIC | Age: 69
End: 2024-09-20
Payer: COMMERCIAL

## 2024-09-20 DIAGNOSIS — M79.672 PAIN IN BOTH FEET: ICD-10-CM

## 2024-09-20 DIAGNOSIS — G60.9 IDIOPATHIC PERIPHERAL NEUROPATHY: ICD-10-CM

## 2024-09-20 DIAGNOSIS — M79.671 PAIN IN BOTH FEET: ICD-10-CM

## 2024-09-20 ASSESSMENT — PAIN SCALES - PAIN ENJOYMENT GENERAL ACTIVITY SCALE (PEG)
INTERFERED_GENERAL_ACTIVITY: 5
INTERFERED_ENJOYMENT_LIFE: 5
AVG_PAIN_PASTWEEK: 7
PEG_TOTALSCORE: 5.67

## 2024-09-20 NOTE — TELEPHONE ENCOUNTER
Refill request    Medication: HYDROcodone-acetaminophen (NORCO) 5-325 MG tablet    Sig: Take 1 tablet by mouth every 6 hours as needed for severe pain. Max 3 tabs/day     Dispensed: 90  Refills: 0  SOLD to the pt on: 8/27     Last clinic appointment: 7/9/24  Next clinic appointment: 9/25/24    Last Drug Screen Collected: 7/17/24  Controlled Substance Agreement signed: 7/17/24    Preferred pharmacy:    [facility] #[id number] - [address]    Refill request routed to the provider to review.

## 2024-09-25 ENCOUNTER — OFFICE VISIT (OUTPATIENT)
Dept: ANESTHESIOLOGY | Facility: CLINIC | Age: 69
End: 2024-09-25
Payer: COMMERCIAL

## 2024-09-25 VITALS — HEART RATE: 69 BPM | OXYGEN SATURATION: 98 % | SYSTOLIC BLOOD PRESSURE: 139 MMHG | DIASTOLIC BLOOD PRESSURE: 69 MMHG

## 2024-09-25 DIAGNOSIS — G60.9 IDIOPATHIC PERIPHERAL NEUROPATHY: ICD-10-CM

## 2024-09-25 DIAGNOSIS — Z79.891 OPIOID CONTRACT EXISTS: ICD-10-CM

## 2024-09-25 PROCEDURE — 99213 OFFICE O/P EST LOW 20 MIN: CPT | Performed by: ANESTHESIOLOGY

## 2024-09-25 RX ORDER — HYDROCODONE BITARTRATE AND ACETAMINOPHEN 5; 325 MG/1; MG/1
1 TABLET ORAL EVERY 6 HOURS PRN
Qty: 90 TABLET | Refills: 0 | Status: SHIPPED | OUTPATIENT
Start: 2024-09-25

## 2024-09-25 RX ORDER — BUPRENORPHINE 15 UG/H
1 PATCH TRANSDERMAL
Qty: 4 PATCH | Refills: 3 | Status: SHIPPED | OUTPATIENT
Start: 2024-09-25

## 2024-09-25 ASSESSMENT — PAIN SCALES - GENERAL: PAINLEVEL: SEVERE PAIN (6)

## 2024-09-25 NOTE — NURSING NOTE
"Patient presents with:  Pain  RECHECK: Follow up-pain      Severe Pain (6)     Pain Medications       Opioid Combinations Refills Start End     HYDROcodone-acetaminophen (NORCO) 5-325 MG tablet 0 8/26/2024 --    Sig - Route: Take 1 tablet by mouth every 6 hours as needed for severe pain. Max 3 tabs/day, #90 tabs to last 30 days. May fill/start 8/26. - Oral    Class: E-Prescribe    Earliest Fill Date: 8/26/2024    No prior authorization was found for this prescription.    Found prior authorization for another prescription for the same medication: Closed - Prior Authorization not required for patient/medication       Opioid Partial Agonists Refills Start End     buprenorphine (BUTRANS) 15 MCG/HR Wk patch 3 9/4/2024 --    Sig - Route: Place 1 patch onto the skin every 7 days. - Transdermal    Class: E-Prescribe            What medications are you using for pain? Norco, Butrans, and Medical Marijuana    What refills are you needing today? Norco    Expectations: \"to continue to take care of me\"      Nenita Rock, RAFAEL      "

## 2024-09-25 NOTE — LETTER
9/25/2024       RE: Hanna Campo  [address]  [place] [address]     Dear Colleague,    Thank you for referring your patient, Hanna Campo, to the [facility] CL[facility] FOR COMPREHENSIVE PAIN MANAGEMENT [place] at [facility]. Please see a copy of my visit note below.    Hanna Campo was last seen by Dr. Morales on 7/17/2024.       Recommendations/plan at the last visit included:  Physical Therapy:  continue daily HEPs, rides bikes and active  Clinical Health Psychologist to address issues of relaxation, behavioral change, coping style, and other factors important to improvement.  Not indicated  Diagnostic Studies:  none  Medication Management:  Refill provided as needed  Further procedures recommended: none  Recommendations to PCP: none  Follow up: September 2024     Since her last visit, Hanna Campo reports:  Continues to do well with current medication regimen (see below)  Plans on traveling to [place] for winter time   Will follow up for refills          Pain scores:  Pain intensity on average is 6 on a scale of 0-10.      Current pain treatments:   Butrans Patch 15 mcg/hr (increased 12/13/23)  Hydrocodone-Acetaminophen 5-325 60 to 90 per month  Medical cannabis         Medications:  Current Outpatient Prescriptions          Current Outpatient Medications   Medication Sig Dispense Refill     buprenorphine (BUTRANS) 15 MCG/HR Wk patch Place 1 patch onto the skin every 7 days 4 patch 3     Cetirizine HCl (ZYRTEC ALLERGY PO) Take 5 mg by mouth daily         chlorthalidone (HYGROTON) 50 MG tablet Take 1 tablet (50 mg) by mouth daily Due for visit and labs for refills** 90 tablet 0     HYDROcodone-acetaminophen (NORCO) 5-325 MG tablet Take 1 tablet by mouth every 8 hours as needed for severe pain 60 tablet 0     levothyroxine (SYNTHROID/LEVOTHROID) 88 MCG tablet Take 88 mcg by mouth daily         losartan (COZAAR) 25 MG tablet Take 1 tablet  (25 mg) by mouth 2 times daily 180 tablet 0     medical cannabis (Patient's own supply) (The purpose of this order is to document that the patient reports taking medical cannabis.  This is not a prescription, and is not used to certify that the patient has a qualifying medical condition.) 0 Information only 0     vitamin B complex with vitamin C (STRESS TAB) tablet Take 1 tablet by mouth daily   0     VITAMIN D, CHOLECALCIFEROL, PO Take 5,000 Units by mouth daily         buprenorphine (BUTRANS) 10 MCG/HR WK patch Place 1 patch onto the skin every 7 days 4 patch 3     levothyroxine (SYNTHROID/LEVOTHROID) 75 MCG tablet Take 1 tablet (75 mcg) by mouth daily 90 tablet 1     naloxone (NARCAN) 4 MG/0.1ML nasal spray Spray 1 spray (4 mg) into one nostril alternating nostrils once as needed for opioid reversal every 2-3 minutes until assistance arrives 0.2 mL 1            Medical History: any changes in medical history since they were last seen? No     Review of Systems:  The 14 system ROS was reviewed from the intake questionnaire, and is positive for: foot pain  Any bowel or bladder problems: denies  Mood: stable     Physical Exam:  General: AAOx3, NAD  Gait:     Normal  MSK exam: deferred for conversation     Assessment/Plan:   Idiopathic peripheral neuropathy  Opioid Contract exists     Hanna Campo is a 69 year old female who is seen at the pain clinic for follow up to discuss current management of her peripheral neuropathy symptoms. She is doing well with current regimen.  Will follow up in approx 3 months or so for refills as needed.       Lisa Morales MD    Pain Medicine  Department of Anesthesiology  [facility]      Again, thank you for allowing me to participate in the care of your patient.      Sincerely,    Lisa Morales MD

## 2024-09-25 NOTE — PROGRESS NOTES
Hanna Campo was last seen by Dr. Morales on 7/17/2024.       Recommendations/plan at the last visit included:  Physical Therapy:  continue daily HEPs, rides bikes and active  Clinical Health Psychologist to address issues of relaxation, behavioral change, coping style, and other factors important to improvement.  Not indicated  Diagnostic Studies:  none  Medication Management:  Refill provided as needed  Further procedures recommended: none  Recommendations to PCP: none  Follow up: September 2024     Since her last visit, Hanna Campo reports:  Continues to do well with current medication regimen (see below)  Plans on traveling to [place] for winter time   Will follow up for refills          Pain scores:  Pain intensity on average is 6 on a scale of 0-10.      Current pain treatments:   Butrans Patch 15 mcg/hr (increased 12/13/23)  Hydrocodone-Acetaminophen 5-325 60 to 90 per month  Medical cannabis         Medications:  Current Outpatient Prescriptions          Current Outpatient Medications   Medication Sig Dispense Refill    buprenorphine (BUTRANS) 15 MCG/HR Wk patch Place 1 patch onto the skin every 7 days 4 patch 3    Cetirizine HCl (ZYRTEC ALLERGY PO) Take 5 mg by mouth daily        chlorthalidone (HYGROTON) 50 MG tablet Take 1 tablet (50 mg) by mouth daily Due for visit and labs for refills** 90 tablet 0    HYDROcodone-acetaminophen (NORCO) 5-325 MG tablet Take 1 tablet by mouth every 8 hours as needed for severe pain 60 tablet 0    levothyroxine (SYNTHROID/LEVOTHROID) 88 MCG tablet Take 88 mcg by mouth daily        losartan (COZAAR) 25 MG tablet Take 1 tablet (25 mg) by mouth 2 times daily 180 tablet 0    medical cannabis (Patient's own supply) (The purpose of this order is to document that the patient reports taking medical cannabis.  This is not a prescription, and is not used to certify that the patient has a qualifying medical condition.) 0 Information only 0    vitamin B complex with vitamin C  (STRESS TAB) tablet Take 1 tablet by mouth daily   0    VITAMIN D, CHOLECALCIFEROL, PO Take 5,000 Units by mouth daily        buprenorphine (BUTRANS) 10 MCG/HR WK patch Place 1 patch onto the skin every 7 days 4 patch 3    levothyroxine (SYNTHROID/LEVOTHROID) 75 MCG tablet Take 1 tablet (75 mcg) by mouth daily 90 tablet 1    naloxone (NARCAN) 4 MG/0.1ML nasal spray Spray 1 spray (4 mg) into one nostril alternating nostrils once as needed for opioid reversal every 2-3 minutes until assistance arrives 0.2 mL 1            Medical History: any changes in medical history since they were last seen? No     Review of Systems:  The 14 system ROS was reviewed from the intake questionnaire, and is positive for: foot pain  Any bowel or bladder problems: denies  Mood: stable     Physical Exam:  General: AAOx3, NAD  Gait:     Normal  MSK exam: deferred for conversation     Assessment/Plan:   Idiopathic peripheral neuropathy  Opioid Contract exists     Hanna Campo is a 69 year old female who is seen at the pain clinic for follow up to discuss current management of her peripheral neuropathy symptoms. She is doing well with current regimen.  Will follow up in approx 3 months or so for refills as needed.       Lisa Morales MD    Pain Medicine  Department of Anesthesiology  [facility]

## 2024-09-25 NOTE — PATIENT INSTRUCTIONS
Medications:    Refills will be provided.     *For refills of opioid medications - You MUST call (Or MyChart) the clinic DIRECTLY and at least 7 days before you run out of your medication.      Recommended Follow up:      Follow up in April when you return to [place].       Please call [phone] to schedule your clinic appointment if you don't already have an appointment scheduled.        To speak with a nurse, schedule/reschedule/cancel a clinic appointment, or request a medication refill call: [phone]    You can also reach us by [employer]: [email or web address]

## 2024-09-28 ENCOUNTER — MYC MEDICAL ADVICE (OUTPATIENT)
Dept: ANESTHESIOLOGY | Facility: CLINIC | Age: 69
End: 2024-09-28
Payer: COMMERCIAL

## 2024-09-30 NOTE — TELEPHONE ENCOUNTER
RN spoke with patient about her refill being sent to [place]. Patient informed she doesn't need to change her patch until Wednesday and informed she is going to use her previous 10 mcg patch if needed during her travels until she is able to  the 15 mcg patch at the [place] pharmacy. Writer informed a message would be sent to the provider for an update on her plan.    Caitlin Rivera RNCC

## 2025-01-23 ENCOUNTER — MYC REFILL (OUTPATIENT)
Dept: ANESTHESIOLOGY | Facility: CLINIC | Age: 70
End: 2025-01-23
Payer: COMMERCIAL

## 2025-01-23 DIAGNOSIS — Z79.891 OPIOID CONTRACT EXISTS: ICD-10-CM

## 2025-01-23 DIAGNOSIS — G60.9 IDIOPATHIC PERIPHERAL NEUROPATHY: ICD-10-CM

## 2025-01-23 RX ORDER — BUPRENORPHINE 15 UG/H
1 PATCH TRANSDERMAL
Qty: 4 PATCH | Refills: 3 | Status: SHIPPED | OUTPATIENT
Start: 2025-01-23

## 2025-01-23 NOTE — TELEPHONE ENCOUNTER
Refill request    Medication: buprenorphine (BUTRANS) 15 MCG/HR Wk patch    Sig: Place 1 patch onto the skin every 7 days.     Dispensed: 4 patches  Refills: 3  SOLD to the pt on: check      Last clinic appointment: 9/25/24  Next clinic appointment: none listed    Last Drug Screen Collected: 7/17/24  Controlled Substance Agreement signed: 7/17/24    Pref[facility] pharmacy:    [id number] [place], [place] - [address] AT [facility] [address]    Refill request routed to the provider to review.

## 2025-03-13 ENCOUNTER — HOSPITAL ENCOUNTER (EMERGENCY)
Dept: HOSPITAL 90 - EDH | Age: 70
Discharge: HOME | End: 2025-03-13
Payer: MEDICARE

## 2025-03-13 VITALS — WEIGHT: 175.05 LBS | BODY MASS INDEX: 28.13 KG/M2 | HEIGHT: 66 IN

## 2025-03-13 VITALS
DIASTOLIC BLOOD PRESSURE: 72 MMHG | OXYGEN SATURATION: 98 % | HEART RATE: 74 BPM | RESPIRATION RATE: 16 BRPM | TEMPERATURE: 98.1 F | SYSTOLIC BLOOD PRESSURE: 124 MMHG

## 2025-03-13 DIAGNOSIS — E86.0: Primary | ICD-10-CM

## 2025-03-13 DIAGNOSIS — M79.7: ICD-10-CM

## 2025-03-13 DIAGNOSIS — I10: ICD-10-CM

## 2025-03-13 DIAGNOSIS — E87.6: ICD-10-CM

## 2025-03-13 DIAGNOSIS — E03.9: ICD-10-CM

## 2025-03-13 DIAGNOSIS — Z98.890: ICD-10-CM

## 2025-03-13 DIAGNOSIS — Z90.49: ICD-10-CM

## 2025-03-13 LAB
BASOPHILS # BLD AUTO: 0.04 K/UL (ref 0–0.2)
BASOPHILS NFR BLD AUTO: 0.7 % (ref 0–5)
BNP SERPL-MCNC: 33 PG/ML (ref 0–100)
BUN SERPL-MCNC: 8 MG/DL (ref 7–18)
CHLORIDE SERPL-SCNC: 89 MMOL/L (ref 101–111)
CK SERPL-CCNC: 20 U/L (ref 21–232)
CO2 SERPL-SCNC: 35 MMOL/L (ref 21–32)
CREAT SERPL-MCNC: 0.6 MG/DL (ref 0.5–1)
EOSINOPHIL # BLD AUTO: 0.06 K/UL (ref 0–0.7)
EOSINOPHIL NFR BLD AUTO: 1.1 % (ref 0–8)
ERYTHROCYTE [DISTWIDTH] IN BLOOD BY AUTOMATED COUNT: 12 % (ref 11–15.5)
GFR SERPL CREATININE-BSD FRML MDRD: 98 ML/MIN (ref 90–?)
GLUCOSE SERPL-MCNC: 100 MG/DL (ref 70–105)
HCT VFR BLD AUTO: 38.4 % (ref 36–48)
IMM GRANULOCYTES # BLD: 0.02 K/UL (ref 0–1)
LYMPHOCYTES # SPEC AUTO: 1.5 K/UL (ref 1–4.8)
LYMPHOCYTES NFR SPEC AUTO: 27 % (ref 21–51)
MCH RBC QN AUTO: 34.1 PG (ref 27–33)
MCHC RBC AUTO-ENTMCNC: 35.2 G/DL (ref 32–36)
MCV RBC AUTO: 97 FL (ref 79–99)
MONOCYTES # BLD AUTO: 0.7 K/UL (ref 0.1–1)
MONOCYTES NFR BLD AUTO: 12.5 % (ref 3–13)
NEUTROPHILS # BLD AUTO: 3.2 K/UL (ref 1.8–7.7)
NEUTROPHILS NFR BLD AUTO: 58.3 % (ref 40–77)
NRBC BLD MANUAL-RTO: 0 % (ref 0–0.19)
PLATELET # BLD AUTO: 264 K/UL (ref 130–400)
POTASSIUM SERPL-SCNC: 3.2 MMOL/L (ref 3.5–5.1)
RBC # BLD AUTO: 3.96 MIL/UL (ref 4–5.5)
SODIUM SERPL-SCNC: 128 MMOL/L (ref 136–145)
WBC # BLD AUTO: 5.5 K/UL (ref 4.8–10.8)

## 2025-03-13 PROCEDURE — 99285 EMERGENCY DEPT VISIT HI MDM: CPT

## 2025-03-13 PROCEDURE — 82550 ASSAY OF CK (CPK): CPT

## 2025-03-13 PROCEDURE — 71045 X-RAY EXAM CHEST 1 VIEW: CPT

## 2025-03-13 PROCEDURE — 93005 ELECTROCARDIOGRAM TRACING: CPT

## 2025-03-13 PROCEDURE — 80048 BASIC METABOLIC PNL TOTAL CA: CPT

## 2025-03-13 PROCEDURE — 85025 COMPLETE CBC W/AUTO DIFF WBC: CPT

## 2025-03-13 PROCEDURE — 84484 ASSAY OF TROPONIN QUANT: CPT

## 2025-03-13 PROCEDURE — 36415 COLL VENOUS BLD VENIPUNCTURE: CPT

## 2025-03-13 PROCEDURE — 83880 ASSAY OF NATRIURETIC PEPTIDE: CPT

## 2025-03-13 RX ADMIN — Medication ONE MLS/HR: at 18:24

## 2025-03-17 ENCOUNTER — MYC REFILL (OUTPATIENT)
Dept: FAMILY MEDICINE | Facility: CLINIC | Age: 70
End: 2025-03-17
Payer: COMMERCIAL

## 2025-03-17 DIAGNOSIS — I10 BENIGN ESSENTIAL HYPERTENSION: ICD-10-CM

## 2025-03-17 RX ORDER — LOSARTAN POTASSIUM 25 MG/1
25 TABLET ORAL 2 TIMES DAILY
Qty: 180 TABLET | Refills: 0 | Status: CANCELLED | OUTPATIENT
Start: 2025-03-17

## 2025-03-17 NOTE — TELEPHONE ENCOUNTER
Clinic RN: Please investigate patient's chart or contact patient if the information cannot be found because patient should have run out of this medication on 2/2024. Confirm patient is taking this medication as prescribed. Document findings and route refill encounter to provider for approval or denial.

## 2025-03-17 NOTE — TELEPHONE ENCOUNTER
Per chart review, pt has never seen Dr. Dang. Pt most recently saw FARIBA Maloney CNP (no longer with [facility]) in 2022. Dr. Dang likely filled in 5/2024 as a covering provider.     Pt appears to have established care with a provider at [facility] (Lynn Ibanez). [facility] provider sent in 90 day supply plus 3 refills for patient on 5/21/24. Pt needs to contact PCP for refill.     Refill declined.     Maricruz Stafford RN

## 2025-03-18 ENCOUNTER — HOSPITAL ENCOUNTER (EMERGENCY)
Dept: HOSPITAL 90 - EDH | Age: 70
Discharge: HOME | End: 2025-03-18
Payer: MEDICARE

## 2025-03-18 VITALS
SYSTOLIC BLOOD PRESSURE: 154 MMHG | RESPIRATION RATE: 18 BRPM | DIASTOLIC BLOOD PRESSURE: 85 MMHG | HEART RATE: 71 BPM | OXYGEN SATURATION: 97 % | TEMPERATURE: 98.2 F

## 2025-03-18 VITALS — BODY MASS INDEX: 28.91 KG/M2 | WEIGHT: 179.9 LBS | HEIGHT: 66 IN

## 2025-03-18 DIAGNOSIS — M79.7: ICD-10-CM

## 2025-03-18 DIAGNOSIS — Z90.49: ICD-10-CM

## 2025-03-18 DIAGNOSIS — E03.9: ICD-10-CM

## 2025-03-18 DIAGNOSIS — E86.0: Primary | ICD-10-CM

## 2025-03-18 DIAGNOSIS — E83.42: ICD-10-CM

## 2025-03-18 DIAGNOSIS — I10: ICD-10-CM

## 2025-03-18 LAB
APPEARANCE UR: CLEAR
APTT PPP: 27.9 SEC (ref 26.3–35.5)
BASOPHILS # BLD AUTO: 0.04 K/UL (ref 0–0.2)
BASOPHILS NFR BLD AUTO: 0.7 % (ref 0–5)
BILIRUB UR QL STRIP: NEGATIVE MG/DL
BNP SERPL-MCNC: 41 PG/ML (ref 0–100)
BUN SERPL-MCNC: 5 MG/DL (ref 7–18)
CHLORIDE SERPL-SCNC: 92 MMOL/L (ref 101–111)
CK SERPL-CCNC: 48 U/L (ref 21–232)
CO2 SERPL-SCNC: 32 MMOL/L (ref 21–32)
COLOR UR: COLORLESS
CREAT SERPL-MCNC: 0.7 MG/DL (ref 0.5–1)
DEPRECATED SQUAMOUS URNS QL MICRO: (no result) /HPF (ref 0–2)
EOSINOPHIL # BLD AUTO: 0.11 K/UL (ref 0–0.7)
EOSINOPHIL NFR BLD AUTO: 1.8 % (ref 0–8)
ERYTHROCYTE [DISTWIDTH] IN BLOOD BY AUTOMATED COUNT: 12.4 % (ref 11–15.5)
GFR SERPL CREATININE-BSD FRML MDRD: 94 ML/MIN (ref 90–?)
GLUCOSE SERPL-MCNC: 89 MG/DL (ref 70–105)
GLUCOSE UR STRIP-MCNC: NEGATIVE MG/DL
HCT VFR BLD AUTO: 40.2 % (ref 36–48)
HGB UR QL STRIP: NEGATIVE
IMM GRANULOCYTES # BLD: 0.02 K/UL (ref 0–1)
INR PPP: 1.03 (ref 0.85–1.15)
KETONES UR STRIP-MCNC: NEGATIVE MG/DL
LEUKOCYTE ESTERASE UR QL STRIP: NEGATIVE LEU/UL
LYMPHOCYTES # SPEC AUTO: 1.8 K/UL (ref 1–4.8)
LYMPHOCYTES NFR SPEC AUTO: 28.9 % (ref 21–51)
MAGNESIUM SERPL-MCNC: 1.6 MG/DL (ref 1.8–2.4)
MCH RBC QN AUTO: 33.8 PG (ref 27–33)
MCHC RBC AUTO-ENTMCNC: 34.3 G/DL (ref 32–36)
MCV RBC AUTO: 98.5 FL (ref 79–99)
MICRO URNS: YES
MONOCYTES # BLD AUTO: 0.7 K/UL (ref 0.1–1)
MONOCYTES NFR BLD AUTO: 11.9 % (ref 3–13)
NEUTROPHILS # BLD AUTO: 3.5 K/UL (ref 1.8–7.7)
NEUTROPHILS NFR BLD AUTO: 56.4 % (ref 40–77)
NITRITE UR QL STRIP: NEGATIVE
NRBC BLD MANUAL-RTO: 0 % (ref 0–0.19)
PH UR STRIP: 7.5 [PH] (ref 5–8)
PLATELET # BLD AUTO: 382 K/UL (ref 130–400)
POTASSIUM SERPL-SCNC: 3.9 MMOL/L (ref 3.5–5.1)
PROT UR QL STRIP: NEGATIVE MG/DL
PROTHROMBIN TIME: 10.9 SEC (ref 9.6–11.6)
RBC # BLD AUTO: 4.08 MIL/UL (ref 4–5.5)
RBC #/AREA URNS HPF: (no result) /HPF (ref 0–1)
SODIUM SERPL-SCNC: 128 MMOL/L (ref 136–145)
SP GR UR STRIP: 1 (ref 1–1.03)
UROBILINOGEN UR STRIP-MCNC: 0.2 MG/DL (ref 0.2–1)
WBC # BLD AUTO: 6.1 K/UL (ref 4.8–10.8)
WBC #/AREA URNS HPF: (no result) /HPF (ref 0–1)

## 2025-03-18 PROCEDURE — 81001 URINALYSIS AUTO W/SCOPE: CPT

## 2025-03-18 PROCEDURE — 85610 PROTHROMBIN TIME: CPT

## 2025-03-18 PROCEDURE — 70450 CT HEAD/BRAIN W/O DYE: CPT

## 2025-03-18 PROCEDURE — 71045 X-RAY EXAM CHEST 1 VIEW: CPT

## 2025-03-18 PROCEDURE — 85025 COMPLETE CBC W/AUTO DIFF WBC: CPT

## 2025-03-18 PROCEDURE — 93005 ELECTROCARDIOGRAM TRACING: CPT

## 2025-03-18 PROCEDURE — 36415 COLL VENOUS BLD VENIPUNCTURE: CPT

## 2025-03-18 PROCEDURE — 80048 BASIC METABOLIC PNL TOTAL CA: CPT

## 2025-03-18 PROCEDURE — 83735 ASSAY OF MAGNESIUM: CPT

## 2025-03-18 PROCEDURE — 96365 THER/PROPH/DIAG IV INF INIT: CPT

## 2025-03-18 PROCEDURE — 82550 ASSAY OF CK (CPK): CPT

## 2025-03-18 PROCEDURE — 83880 ASSAY OF NATRIURETIC PEPTIDE: CPT

## 2025-03-18 PROCEDURE — 84484 ASSAY OF TROPONIN QUANT: CPT

## 2025-03-18 PROCEDURE — 85730 THROMBOPLASTIN TIME PARTIAL: CPT

## 2025-03-18 PROCEDURE — 99285 EMERGENCY DEPT VISIT HI MDM: CPT

## 2025-03-18 RX ADMIN — MAGNESIUM SULFATE IN WATER STA MLS/HR: 40 INJECTION, SOLUTION INTRAVENOUS at 18:01

## 2025-03-18 RX ADMIN — Medication ONE MLS/HR: at 18:01

## 2025-04-06 ENCOUNTER — HOSPITAL ENCOUNTER (EMERGENCY)
Dept: HOSPITAL 90 - EDH | Age: 70
Discharge: HOME | End: 2025-04-06
Payer: MEDICARE

## 2025-04-06 VITALS — HEIGHT: 66 IN | BODY MASS INDEX: 28.13 KG/M2 | WEIGHT: 175.05 LBS

## 2025-04-06 VITALS
DIASTOLIC BLOOD PRESSURE: 73 MMHG | TEMPERATURE: 98.1 F | SYSTOLIC BLOOD PRESSURE: 131 MMHG | HEART RATE: 72 BPM | OXYGEN SATURATION: 98 % | RESPIRATION RATE: 20 BRPM

## 2025-04-06 DIAGNOSIS — M79.7: ICD-10-CM

## 2025-04-06 DIAGNOSIS — E03.9: ICD-10-CM

## 2025-04-06 DIAGNOSIS — E87.6: ICD-10-CM

## 2025-04-06 DIAGNOSIS — Z90.49: ICD-10-CM

## 2025-04-06 DIAGNOSIS — M71.21: ICD-10-CM

## 2025-04-06 DIAGNOSIS — M79.89: Primary | ICD-10-CM

## 2025-04-06 DIAGNOSIS — I10: ICD-10-CM

## 2025-04-06 LAB
ALBUMIN SERPL-MCNC: 3.6 G/DL (ref 3.5–5)
APTT PPP: 28.5 SEC (ref 26.3–35.5)
BASOPHILS # BLD AUTO: 0.01 K/UL (ref 0–0.2)
BASOPHILS NFR BLD AUTO: 0.2 % (ref 0–5)
BILIRUB DIRECT SERPL-MCNC: 0.2 MG/DL (ref 0–0.3)
BILIRUB SERPL-MCNC: 0.7 MG/DL (ref 0.2–1)
BNP SERPL-MCNC: 61 PG/ML (ref 0–100)
CREAT SERPL-MCNC: 0.8 MG/DL (ref 0.5–1)
EOSINOPHIL # BLD AUTO: 0.18 K/UL (ref 0–0.7)
EOSINOPHIL NFR BLD AUTO: 2.9 % (ref 0–8)
ERYTHROCYTE [DISTWIDTH] IN BLOOD BY AUTOMATED COUNT: 12.6 % (ref 11–15.5)
IMM GRANULOCYTES # BLD: 0.02 K/UL (ref 0–1)
INR PPP: 1.02 (ref 0.85–1.15)
LYMPHOCYTES # SPEC AUTO: 1.7 K/UL (ref 1–4.8)
LYMPHOCYTES NFR SPEC AUTO: 26.6 % (ref 21–51)
MCHC RBC AUTO-ENTMCNC: 33.7 G/DL (ref 32–36)
MCV RBC AUTO: 100.8 FL (ref 79–99)
MONOCYTES # BLD AUTO: 0.7 K/UL (ref 0.1–1)
MONOCYTES NFR BLD AUTO: 10.8 % (ref 3–13)
NEUTROPHILS # BLD AUTO: 3.7 K/UL (ref 1.8–7.7)
NEUTROPHILS NFR BLD AUTO: 59.2 % (ref 40–77)
PLATELET # BLD AUTO: 226 K/UL (ref 130–400)
POTASSIUM SERPL-SCNC: 2.9 MMOL/L (ref 3.5–5.1)
PROT SERPL-MCNC: 7.1 G/DL (ref 6–8.3)
PROTHROMBIN TIME: 10.8 SEC (ref 9.6–11.6)
RBC # BLD AUTO: 3.77 MIL/UL (ref 4–5.5)
WBC # BLD AUTO: 6.2 K/UL (ref 4.8–10.8)

## 2025-04-06 PROCEDURE — 85025 COMPLETE CBC W/AUTO DIFF WBC: CPT

## 2025-04-06 PROCEDURE — 80076 HEPATIC FUNCTION PANEL: CPT

## 2025-04-06 PROCEDURE — 83880 ASSAY OF NATRIURETIC PEPTIDE: CPT

## 2025-04-06 PROCEDURE — 85730 THROMBOPLASTIN TIME PARTIAL: CPT

## 2025-04-06 PROCEDURE — 36415 COLL VENOUS BLD VENIPUNCTURE: CPT

## 2025-04-06 PROCEDURE — 80048 BASIC METABOLIC PNL TOTAL CA: CPT

## 2025-04-06 PROCEDURE — 85610 PROTHROMBIN TIME: CPT

## 2025-04-06 PROCEDURE — 99284 EMERGENCY DEPT VISIT MOD MDM: CPT

## 2025-04-06 PROCEDURE — 93971 EXTREMITY STUDY: CPT

## 2025-05-05 ASSESSMENT — PAIN SCALES - PAIN ENJOYMENT GENERAL ACTIVITY SCALE (PEG)
INTERFERED_ENJOYMENT_LIFE: 5
AVG_PAIN_PASTWEEK: 5
PEG_TOTALSCORE: 5
INTERFERED_GENERAL_ACTIVITY: 5

## 2025-05-07 ENCOUNTER — OFFICE VISIT (OUTPATIENT)
Dept: ANESTHESIOLOGY | Facility: CLINIC | Age: 70
End: 2025-05-07
Attending: ANESTHESIOLOGY
Payer: COMMERCIAL

## 2025-05-07 VITALS
DIASTOLIC BLOOD PRESSURE: 81 MMHG | SYSTOLIC BLOOD PRESSURE: 154 MMHG | HEART RATE: 83 BPM | OXYGEN SATURATION: 98 % | RESPIRATION RATE: 16 BRPM

## 2025-05-07 DIAGNOSIS — M79.671 PAIN IN BOTH FEET: ICD-10-CM

## 2025-05-07 DIAGNOSIS — Z79.891 OPIOID CONTRACT EXISTS: ICD-10-CM

## 2025-05-07 DIAGNOSIS — M79.672 PAIN IN BOTH FEET: ICD-10-CM

## 2025-05-07 DIAGNOSIS — G60.9 IDIOPATHIC PERIPHERAL NEUROPATHY: ICD-10-CM

## 2025-05-07 LAB — CREAT UR-MCNC: 83 MG/DL

## 2025-05-07 PROCEDURE — 80324 DRUG SCREEN AMPHETAMINES 1/2: CPT | Performed by: ANESTHESIOLOGY

## 2025-05-07 PROCEDURE — 99213 OFFICE O/P EST LOW 20 MIN: CPT | Performed by: ANESTHESIOLOGY

## 2025-05-07 PROCEDURE — 1125F AMNT PAIN NOTED PAIN PRSNT: CPT | Performed by: ANESTHESIOLOGY

## 2025-05-07 PROCEDURE — 3077F SYST BP >= 140 MM HG: CPT | Performed by: ANESTHESIOLOGY

## 2025-05-07 PROCEDURE — 99000 SPECIMEN HANDLING OFFICE-LAB: CPT | Performed by: PATHOLOGY

## 2025-05-07 PROCEDURE — 3079F DIAST BP 80-89 MM HG: CPT | Performed by: ANESTHESIOLOGY

## 2025-05-07 RX ORDER — BUPRENORPHINE 15 UG/H
1 PATCH TRANSDERMAL
Qty: 4 PATCH | Refills: 3 | Status: SHIPPED | OUTPATIENT
Start: 2025-05-07

## 2025-05-07 RX ORDER — HYDROCODONE BITARTRATE AND ACETAMINOPHEN 5; 325 MG/1; MG/1
1 TABLET ORAL EVERY 6 HOURS PRN
Qty: 90 TABLET | Refills: 0 | Status: SHIPPED | OUTPATIENT
Start: 2025-05-07

## 2025-05-07 ASSESSMENT — PAIN SCALES - GENERAL: PAINLEVEL_OUTOF10: MODERATE PAIN (6)

## 2025-05-07 NOTE — PROGRESS NOTES
[facility]    Date of visit: 5/7/2025    Chief complaint:   Chief Complaint   Patient presents with    Pain    Pain Management    RECHECK     Follow up- no changes reported       Interval history:  Hanna Campo was last seen by me on 9/25/2024.      Recommendations/plan at the last visit included:  Hanna Campo is a 69 year old female who is seen at the pain clinic for follow up to discuss current management of her peripheral neuropathy symptoms. She is doing well with current regimen.  Will follow up in approx 3 months or so for refills as needed.     Since her last visit, Hanna Campo reports:  Pt returned from [place] for the winter. While in [place], pt got the flu, bronchitis, and sinusitis. She also had a fall while remodeling her kitchen with  and cut a part of her toe off when removing dead skin off of her foot. Although it was an eventful winter in [place], pt overall doing well and happy to return to [place] whether in [place]. Pt reports being mindful of when she used her pain medication; therefore, prescription from September 2024 for Norco lasted all throughout the winter and early spring.    Pain scores:  Pain intensity on average is 6 on a scale of 0-10.     Current pain treatments:   Butrans patch 15 mcg/hr q7days  Norco 5-325 mg, max 3 tabs daily  Medical Cannabis (patient's own supply)      Side Effects: no side effect    Medications:  Current Outpatient Medications   Medication Sig Dispense Refill    buprenorphine (BUTRANS) 15 MCG/HR Wk patch Place 1 patch onto the skin every 7 days. 4 patch 3    Cetirizine HCl (ZYRTEC ALLERGY PO) Take 5 mg by mouth daily      chlorthalidone (HYGROTON) 50 MG tablet Take 1 tablet (50 mg) by mouth daily Due for visit and labs for refills** 90 tablet 0    HYDROcodone-acetaminophen (NORCO) 5-325 MG tablet Take 1 tablet by mouth every 6 hours as needed for severe pain. Max 3 tabs/day, #90 tabs to last 30 days. May fill/start 8/26. 90 tablet 0     levothyroxine (SYNTHROID/LEVOTHROID) 88 MCG tablet Take 88 mcg by mouth daily      losartan (COZAAR) 25 MG tablet Take 1 tablet (25 mg) by mouth 2 times daily 180 tablet 0    medical cannabis (Patient's own supply) (The purpose of this order is to document that the patient reports taking medical cannabis.  This is not a prescription, and is not used to certify that the patient has a qualifying medical condition.) 0 Information only 0    vitamin B complex with vitamin C (STRESS TAB) tablet Take 1 tablet by mouth daily  0    VITAMIN D, CHOLECALCIFEROL, PO Take 5,000 Units by mouth daily      levothyroxine (SYNTHROID/LEVOTHROID) 75 MCG tablet Take 1 tablet (75 mcg) by mouth daily 90 tablet 1    naloxone (NARCAN) 4 MG/0.1ML nasal spray Spray 1 spray (4 mg) into one nostril alternating nostrils once as needed for opioid reversal every 2-3 minutes until assistance arrives 0.2 mL 1       Medical History: any changes in medical history since they were last seen? No    Review of Systems:  The 14 system ROS was reviewed from the intake questionnaire, and is positive for: numbness in lower extremities b/l  Any bowel or bladder problems: no  Mood: baseline    Physical Exam:  Blood pressure (!) 154/81, pulse 83, resp. rate 16, SpO2 98%, not currently breastfeeding.  General: NAD, answers questions appropriately  Gait: Normal  MSK exam: deferred for conversation    Assessment:   Idiopathic Peripheral Neuropathy  Opioid contract due    Hanna Campo is a 70 year old female who is seen at the pain clinic for medication management, follow up idiopathic peripheral neuropathy, UDS, and sign new pain contract.    Plan:  Medication Management:  Butrans patch and Norco  Follow up: q3 months    Total time spent on day of visit includes 20 min.    Lisa Morales MD    Pain Medicine  Department of Anesthesiology  [facility]

## 2025-05-07 NOTE — PATIENT INSTRUCTIONS
Medications:    Medication refills provided.     *For refills of opioid medications - You MUST call (Or MyChart) the clinic DIRECTLY and at least 7 days before you run out of your medication.      Treatment planning:    CSA signed and UDS collected today.      Recommended Follow up:      Follow up in 3 months.        Please call [phone] to schedule your clinic appointment if you don't already have an appointment scheduled.      To speak with a nurse, schedule/reschedule/cancel a clinic appointment, or request a medication refill call: [phone]    You can also reach us by [employer]: [email or web address]

## 2025-05-07 NOTE — Clinical Note
5/7/2025       RE: Hanna Campo  [address]  [facility] [address]     Dear Colleague,    Thank you for referring your patient, Hanna Campo, to the [facility] [place] at [facility]. Please see a copy of my visit note below.    [facility]    Date of visit: 5/7/2025    Chief complaint:   Chief Complaint   Patient presents with    Pain    Pain Management    RECHECK     Follow up- no changes reported       Interval history:  Hanna Campo was last seen by me on 9/25/2024.      Recommendations/plan at the last visit included:  Hanna Campo is a 69 year old female who is seen at the pain clinic for follow up to discuss current management of her peripheral neuropathy symptoms. She is doing well with current regimen.  Will follow up in approx 3 months or so for refills as needed.     Since her last visit, Hanna Campo reports:  ***    Pain scores:  Pain intensity on average is {NUMBERS 0-10:128344} on a scale of 0-10.     Current pain treatments:   ***    Past pain treatments:  ***    Side Effects: {SIDE EFFECTS:072668}    Medications:  Current Outpatient Medications   Medication Sig Dispense Refill    buprenorphine (BUTRANS) 15 MCG/HR Wk patch Place 1 patch onto the skin every 7 days. 4 patch 3    Cetirizine HCl (ZYRTEC ALLERGY PO) Take 5 mg by mouth daily      chlorthalidone (HYGROTON) 50 MG tablet Take 1 tablet (50 mg) by mouth daily Due for visit and labs for refills** 90 tablet 0    HYDROcodone-acetaminophen (NORCO) 5-325 MG tablet Take 1 tablet by mouth every 6 hours as needed for severe pain. Max 3 tabs/day, #90 tabs to last 30 days. May fill/start 8/26. 90 tablet 0    levothyroxine (SYNTHROID/LEVOTHROID) 88 MCG tablet Take 88 mcg by mouth daily      losartan (COZAAR) 25 MG tablet Take 1 tablet (25 mg) by mouth 2 times daily 180 tablet 0    medical cannabis (Patient's own  "supply) (The purpose of this order is to document that the patient reports taking medical cannabis.  This is not a prescription, and is not used to certify that the patient has a qualifying medical condition.) 0 Information only 0    vitamin B complex with vitamin C (STRESS TAB) tablet Take 1 tablet by mouth daily  0    VITAMIN D, CHOLECALCIFEROL, PO Take 5,000 Units by mouth daily      levothyroxine (SYNTHROID/LEVOTHROID) 75 MCG tablet Take 1 tablet (75 mcg) by mouth daily 90 tablet 1    naloxone (NARCAN) 4 MG/0.1ML nasal spray Spray 1 spray (4 mg) into one nostril alternating nostrils once as needed for opioid reversal every 2-3 minutes until assistance arrives 0.2 mL 1       Medical History: any changes in medical history since they were last seen? { :331156::\"No\"}    Review of Systems:  The 14 system ROS was reviewed from the intake questionnaire, and is positive for: ***  Any bowel or bladder problems: ***  Mood: ***    Physical Exam:  Blood pressure (!) 154/81, pulse 83, resp. rate 16, SpO2 98%, not currently breastfeeding.  General: ***  Gait: {GAIT:141795}  MSK exam: ***    Assessment:   ***    Hanna Campo is a 70 year old female who is seen at the pain clinic for ***.    Plan:  Physical Therapy:  ***  Clinical Health Psychologist to address issues of relaxation, behavioral change, coping style, and other factors important to improvement.  ***  Diagnostic Studies:  ***  Medication Management:  ***  Further procedures recommended: ***  Recommendations to PCP: ***  Follow up: ***    Total time spent on day of visit includes ***    Lisa Morales MD    Pain Medicine  Department of Anesthesiology  [facility]                Again, thank you for allowing me to participate in the care of your patient.      Sincerely,    Lisa Morales MD    "

## 2025-05-07 NOTE — LETTER
Opioid / Opioid Plus Controlled Substance Agreement    This is an agreement between you and your provider about the safe and appropriate use of controlled substance/opioids prescribed by your care team. Controlled substances are medicines that can cause physical and mental dependence (abuse).    There are strict laws about having and using these medicines. We here at [facility] are committing to working with you in your efforts to get better. To support you in this work, we ll help you schedule regular office appointments for medicine refills. If we must cancel or change your appointment for any reason, we ll make sure you have enough medicine to last until your next appointment.     As a Provider, I will:  Listen carefully to your concerns and treat you with respect.   Recommend a treatment plan that I believe is in your best interest. This plan may involve therapies other than opioid pain medication.   Talk with you often about the possible benefits, and the risk of harm of any medicine that we prescribe for you.   Provide a plan on how to taper (discontinue or go off) using this medicine if the decision is made to stop its use.    As a Patient, I understand that opioid(s):   Are a controlled substance prescribed by my care team to help me function or work and manage my condition(s).   Are strong medicines and can cause serious side effects such as:  Drowsiness, which can seriously affect my driving ability  A lower breathing rate, enough to cause death  Harm to my thinking ability   Depression   Abuse of and addiction to this medicine  Need to be taken exactly as prescribed. Combining opioids with certain medicines or chemicals (such as illegal drugs, sedatives, sleeping pills, and benzodiazepines) can be dangerous or even fatal. If I stop opioids suddenly, I may have severe withdrawal symptoms.  Do not work for all types of pain nor for all patients. If they re not helpful, I may be asked to stop  them.      The risks, benefits and side effects of these medicine(s) were explained to me. I agree that:  I will take part in other treatments as advised by my care team. This may be psychiatry or counseling, physical therapy, behavioral therapy, group treatment or a referral to a specialist.     I will keep all my appointments. I understand that this is part of the monitoring of opioids. My care team may require an office visit for EVERY opioid/controlled substance refill. If I miss appointments or don t follow instructions, my care team may stop my medicine.    I will take my medicines as prescribed. I will not change the dose or schedule unless my care team tells me to. There will be no refills if I run out early.     I may be asked to come to the clinic and complete a urine drug test or complete a pill count at any time. If I don t give a urine sample or participate in a pill count, the care team may stop my medicine.    I will only receive prescriptions from this clinic for chronic pain. If I am treated by another provider for acute pain issues, I will tell them that I am taking opioid pain medication for chronic pain and that I have a treatment agreement with this provider. I will inform my [facility] care team within one business day if I am given a prescription for any pain medication by another healthcare provider. My [facility] care team can contact other providers and pharmacists about my use of any medicines.    It is up to me to make sure that I don t run out of my medicines on weekends or holidays. If my care team is willing to refill my opioid prescription without a visit, I must request refills only during office hours. Refills may take up to 7 business days to process. I will use one pharmacy to fill all my opioid and other controlled substance prescriptions. I will notify the clinic about any changes to my insurance or medication availability.    I am responsible for my prescriptions.  If the medicine/prescription is lost, stolen or destroyed, it will not be replaced. I also agree not to share controlled substance medicines with anyone.    I am aware I should not use any illegal or recreational drugs. I agree not to drink alcohol unless my care team says I can.       If I enroll in the [place] Medical Cannabis program, I will tell my care team prior to my next refill.     I will tell my care team right away if I become pregnant, have a new medical problem treated outside of my regular clinic, or have a change in my medications.    I understand that this medicine can affect my thinking, judgment and reaction time. Alcohol and drugs affect the brain and body, which can affect the safety of my driving. Being under the influence of alcohol or drugs can affect my decision-making, behaviors, personal safety, and the safety of others. Driving while impaired (DWI) can occur if a person is driving, operating, or in physical control of a car, motorcycle, boat, snowmobile, ATV, motorbike, off-road vehicle, or any other motor vehicle ([place] Statute 169A.20). I understand the risk if I choose to drive or operate any vehicle or machinery.    I understand that if I do not follow any of the conditions above, my prescriptions or treatment may be stopped or changed.          Opioids  What You Need to Know    What are opioids?   Opioids are pain medicines that must be prescribed by a doctor. They are also known as narcotics.     Examples are:   morphine (MS Contin, Silvana)  oxycodone (Oxycontin)  oxycodone and acetaminophen (Percocet)  hydrocodone and acetaminophen (Vicodin, Norco)   fentanyl patch (Duragesic)   hydromorphone (Dilaudid)   methadone  codeine (Tylenol #3)     What do opioids do well?   Opioids are best for severe short-term pain such as after a surgery or injury. They may work well for cancer pain. They may help some people with long-lasting (chronic) pain.     What do opioids NOT do well?   Opioids  never get rid of pain entirely, and they don t work well for most patients with chronic pain. Opioids don t reduce swelling, one of the causes of pain.                                    Other ways to manage chronic pain and improve function include:     Treat the health problem that may be causing pain  Anti-inflammation medicines, which reduce swelling and tenderness, such as ibuprofen (Advil, Motrin) or naproxen (Aleve)  Acetaminophen (Tylenol)  Antidepressants and anti-seizure medicines, especially for nerve pain  Topical treatments such as patches or creams  Injections or nerve blocks  Chiropractic or osteopathic treatment  Acupuncture, massage, deep breathing, meditation, visual imagery, aromatherapy  Use heat or ice at the pain site  Physical therapy   Exercise  Stop smoking  Take part in therapy       Risks and side effects     Talk to your doctor before you start or decide to keep taking opioids. Possible side effects include:    Lowering your breathing rate enough to cause death  Overdose, including death, especially if taking higher than prescribed doses  Worse depression symptoms; less pleasure in things you usually enjoy  Feeling tired or sluggish  Slower thoughts or cloudy thinking  Being more sensitive to pain over time; pain is harder to control  Trouble sleeping or restless sleep  Changes in hormone levels (for example, less testosterone)  Changes in sex drive or ability to have sex  Constipation  Unsafe driving  Itching and sweating  Dizziness  Nausea, throwing up and dry mouth    What else should I know about opioids?    Opioids may lead to dependence, tolerance, or addiction.    Dependence means that if you stop or reduce the medicine too quickly, you will have withdrawal symptoms. These include loose poop (diarrhea), jitters, flu-like symptoms, nervousness and tremors. Dependence is not the same as addiction.                     Tolerance means needing higher doses over time to get the same  effect. This may increase the chance of serious side effects.    Addiction is when people improperly use a substance that harms their body, their mind or their relations with others. Use of opiates can cause a relapse of addiction if you have a history of drug or alcohol abuse.    People who have used opioids for a long time may have a lower quality of life, worse depression, higher levels of pain and more visits to doctors.    You can overdose on opioids. Take these steps to lower your risk of overdose:    Recognize the signs:  Signs of overdose include decrease or loss of consciousness (blackout), slowed breathing, trouble waking up and blue lips. If someone is worried about overdose, they should call 911.    Talk to your doctor about Narcan (naloxone).   If you are at risk for overdose, you may be given a prescription for Narcan. This medicine very quickly reverses the effects of opioids.   If you overdose, a friend or family member can give you Narcan while waiting for the ambulance. They need to know the signs of overdose and how to give Narcan.     Don't use alcohol or street drugs.   Taking them with opioids can cause death.    Do not take any of these medicines unless your doctor says it s OK. Taking these with opioids can cause death:  Benzodiazepines, such as lorazepam (Ativan), alprazolam (Xanax) or diazepam (Valium)  Muscle relaxers, such as cyclobenzaprine (Flexeril)  Sleeping pills like zolpidem (Ambien)   Other opioids      How to keep you and other people safe while taking opioids:    Never share your opioids with others.  Opioid medicines are regulated by the Drug Enforcement Agency ([place]). Selling or sharing medications is a criminal act.    2. Be sure to store opioids in a secure place, locked up if possible. Young children can easily swallow them and overdose.    3. When you are traveling with your medicines, keep them in the original bottles. If you use a pill box, be sure you also carry a copy  of your medicine list from your clinic or pharmacy.    4. Safe disposal of opioids    Most pharmacies have places to get rid of medicine, called disposal kiosks. Medicine disposal options are also available in every [facility]. Search your county and  medication disposal  to find more options. You can find more details at:  [email or web address]     I agree that my provider, clinic care team, and pharmacy may work with any city, state or federal law enforcement agency that investigates the misuse, sale, or other diversion of my controlled medicine. I will allow my provider to discuss my care with, or share a copy of, this agreement with any other treating provider, pharmacy or emergency room where I receive care.    I have read this agreement and have asked questions about anything I did not understand.    _______________________________________________________  Patient Signature - Hanna Campo _____________________                   Date     _______________________________________________________  Provider Signature - Lisa Morales MD   _____________________                   Date     _______________________________________________________  Witness Signature (required if provider not present while patient signing)   _____________________                   Date

## 2025-05-07 NOTE — NURSING NOTE
Patient presents with:  Pain  Pain Management  RECHECK: Follow up- no changes reported      Moderate Pain (6)     Pain Medications       Opioid Combinations Refills Start End     HYDROcodone-acetaminophen (NORCO) 5-325 MG tablet 0 9/25/2024 --    Sig - Route: Take 1 tablet by mouth every 6 hours as needed for severe pain. Max 3 tabs/day, #90 tabs to last 30 days. May fill/start 8/26. - Oral    Class: E-Prescribe    Earliest Fill Date: 9/25/2024    No prior authorization was found for this prescription.    Found prior authorization for another prescription for the same medication: Closed - Prior Authorization not required for patient/medication       Opioid Partial Agonists Refills Start End     buprenorphine (BUTRANS) 15 MCG/HR Wk patch 3 1/23/2025 --    Sig - Route: Place 1 patch onto the skin every 7 days. - Transdermal    Class: E-Prescribe    No prior authorization was found for this prescription.    Found prior authorization for another prescription for the same medication: Approved            What medications are you using for pain? Butrans, Norco      What refills are you needing today? Ada Roberts    Expectations: follow up and refills    Larisa Eldridge LPN

## 2025-05-08 ENCOUNTER — TELEPHONE (OUTPATIENT)
Dept: ANESTHESIOLOGY | Facility: CLINIC | Age: 70
End: 2025-05-08
Payer: COMMERCIAL

## 2025-05-08 NOTE — TELEPHONE ENCOUNTER
Patient confirmed scheduled appointment:     Date: 7/30/25  Time: 11:30 AM  Visit type: Return Pain  Visit mode: Virtual Visit  Provider: Dr. Lisa Morales  [facility]    Additional Notes: Switched mode to virtual visit per patient's request. [place]

## 2025-05-13 LAB
BUPRENORPHINE UR CFM-MCNC: 23 NG/ML
BUPRENORPHINE/CREAT UR: 28 NG/MG {CREAT}
DHC UR CFM-MCNC: 184 NG/ML
DHC/CREAT UR: 222 NG/MG {CREAT}
HYDROCODONE UR CFM-MCNC: 161 NG/ML
HYDROCODONE/CREAT UR: 194 NG/MG {CREAT}
HYDROMORPHONE UR CFM-MCNC: 60 NG/ML
HYDROMORPHONE/CREAT UR: 72 NG/MG {CREAT}
NORBUPRENORPHINE UR CFM-MCNC: 19 NG/ML
NORBUPRENORPHINE/CREAT UR: 23 NG/MG {CREAT}

## 2025-05-14 ENCOUNTER — MYC MEDICAL ADVICE (OUTPATIENT)
Dept: ANESTHESIOLOGY | Facility: CLINIC | Age: 70
End: 2025-05-14
Payer: COMMERCIAL

## 2025-05-14 NOTE — TELEPHONE ENCOUNTER
"Spoke to pharmacist regarding an error on the directions for patient Ada. The directions from 5/7/25 state: \"may start/fill 8/26\".  That is incorrect-was copied from an order on 8/26/24.  Confirmed that pharmacy could fill medication.  Pharmacy states they will fill and notify patient when it is ready.    Larisa Eldridge, CHRISTIANN    "

## 2025-06-21 ENCOUNTER — HEALTH MAINTENANCE LETTER (OUTPATIENT)
Age: 70
End: 2025-06-21

## 2025-07-29 ASSESSMENT — PAIN SCALES - PAIN ENJOYMENT GENERAL ACTIVITY SCALE (PEG)
AVG_PAIN_PASTWEEK: 5
INTERFERED_GENERAL_ACTIVITY: 6
INTERFERED_ENJOYMENT_LIFE: 6
PEG_TOTALSCORE: 5.67

## 2025-07-30 ENCOUNTER — VIRTUAL VISIT (OUTPATIENT)
Dept: ANESTHESIOLOGY | Facility: CLINIC | Age: 70
End: 2025-07-30
Payer: COMMERCIAL

## 2025-07-30 DIAGNOSIS — G60.9 IDIOPATHIC PERIPHERAL NEUROPATHY: ICD-10-CM

## 2025-07-30 DIAGNOSIS — M79.672 PAIN IN BOTH FEET: ICD-10-CM

## 2025-07-30 DIAGNOSIS — M79.671 PAIN IN BOTH FEET: ICD-10-CM

## 2025-07-30 RX ORDER — LANOLIN ALCOHOL/MO/W.PET/CERES
400 CREAM (GRAM) TOPICAL 2 TIMES DAILY
COMMUNITY
Start: 2025-07-21

## 2025-07-30 RX ORDER — HYDROCODONE BITARTRATE AND ACETAMINOPHEN 5; 325 MG/1; MG/1
1 TABLET ORAL EVERY 8 HOURS PRN
Qty: 90 TABLET | Refills: 0 | Status: SHIPPED | OUTPATIENT
Start: 2025-07-30

## 2025-07-30 ASSESSMENT — PAIN SCALES - GENERAL: PAINLEVEL_OUTOF10: MODERATE PAIN (4)

## 2025-07-30 NOTE — NURSING NOTE
How will you be connecting to the visit? MyChart  How would you like to obtain your AVS? MyChart  If the video visit is dropped, the invitation should be resent by: Text to cell phone: [phone]  Will anyone else be joining your video visit? No  Are you currently in the [facility] yes        Patient presents with:  Pain  Pain Management  RECHECK: Follow up-no changes reported      Moderate Pain (4)     Pain Medications       Opioid Combinations Refills Start End     HYDROcodone-acetaminophen (NORCO) 5-325 MG tablet 0 5/7/2025 --    Sig - Route: Take 1 tablet by mouth every 6 hours as needed for severe pain. Max 3 tabs/day, #90 tabs to last 30 days. May fill/start 8/26. - Oral    Class: E-Prescribe    Earliest Fill Date: 5/7/2025    No prior authorization was found for this prescription.    Found prior authorization for another prescription for the same medication: Closed - Prior Authorization not required for patient/medication       Opioid Partial Agonists Refills Start End     buprenorphine (BUTRANS) 15 MCG/HR Wk patch 3 5/7/2025 --    Sig - Route: Place 1 patch onto the skin every 7 days. - Transdermal    Class: E-Prescribe    No prior authorization was found for this prescription.    Found prior authorization for another prescription for the same medication: Approved            What medications are you using for pain? Butrans, norco    What refills are you needing today? norco    Expectations: none    Larisa Eldridge LPN

## 2025-07-30 NOTE — PROGRESS NOTES
[facility]    Date of visit: 7/30/2025    Chief complaint:   Chief Complaint   Patient presents with    Pain    Pain Management    RECHECK     Follow up-no changes reported       Interval history:  Hanna Campo was last seen by me on 5/7/2025.      Recommendations/plan at the last visit included:  Medication Management:  Butrans patch and Norco  Follow up: q3 months    Since her last visit, Hanna Campo reports:  Doing okay since last visit  Saw podiatrist about feet curling up - due to neuropathy  Stretches are not though helpful for the pain  Pain is otherwise the same without changes  She states she is due for a refill of hydrocodone - last refill was 5/7/2025        Pain scores:  Pain intensity on average is 6 on a scale of 0-10.     Current pain treatments:   Butrans patch 15 mcg/hr weekly patch  Hydrocodone 1 to 2 most days, no more than 3 per day    Past pain treatments:      Side Effects: denies any problems    Medications:  Current Outpatient Medications   Medication Sig Dispense Refill    buprenorphine (BUTRANS) 15 MCG/HR Wk patch Place 1 patch onto the skin every 7 days. 4 patch 3    Cetirizine HCl (ZYRTEC ALLERGY PO) Take 5 mg by mouth daily      HYDROcodone-acetaminophen (NORCO) 5-325 MG tablet Take 1 tablet by mouth every 6 hours as needed for severe pain. Max 3 tabs/day, #90 tabs to last 30 days. May fill/start 8/26. 90 tablet 0    levothyroxine (SYNTHROID/LEVOTHROID) 88 MCG tablet Take 88 mcg by mouth daily      losartan (COZAAR) 25 MG tablet Take 1 tablet (25 mg) by mouth 2 times daily 180 tablet 0    MAGNESIUM OXIDE 400 (240 Mg) MG tablet Take 400 mg by mouth 2 times daily.      medical cannabis (Patient's own supply) (The purpose of this order is to document that the patient reports taking medical cannabis.  This is not a prescription, and is not used to certify that the patient has a qualifying medical condition.) 0 Information only 0    naloxone (NARCAN) 4 MG/0.1ML  nasal spray Spray 1 spray (4 mg) into one nostril alternating nostrils once as needed for opioid reversal every 2-3 minutes until assistance arrives 0.2 mL 1    vitamin B complex with vitamin C (STRESS TAB) tablet Take 1 tablet by mouth daily  0    VITAMIN D, CHOLECALCIFEROL, PO Take 5,000 Units by mouth daily      chlorthalidone (HYGROTON) 50 MG tablet Take 1 tablet (50 mg) by mouth daily Due for visit and labs for refills** 90 tablet 0    levothyroxine (SYNTHROID/LEVOTHROID) 75 MCG tablet Take 1 tablet (75 mcg) by mouth daily 90 tablet 1       Medical History: any changes in medical history since they were last seen? No    Review of Systems:  The 14 system ROS was reviewed from the intake questionnaire, and is positive for: bilateral foot, peripheral neuropathy pain  Any bowel or bladder problems: denies  Mood: stable    Physical Exam: Virtual visit  not currently breastfeeding.  General: AAOx3, NAD      Assessment:   Idiopathic Peripheral Neuropathy    Hanna Campo is a 70 year old female who is seen at the pain clinic for follow up for management of her chronic pain due to idiopathic peripheral neuropathy.  The pain has been consistently the same and she has been managing well with the current medication regimen. Refills provided today as needed.     Plan:  Physical Therapy:  Continue daily HEPs  Clinical Health Psychologist to address issues of relaxation, behavioral change, coping style, and other factors important to improvement.  Not indicated  Diagnostic Studies:  none  Medication Management:  Refill of Hydrocodone provided, 90 tabs for 30 days  Further procedures recommended: none  Recommendations to PCP: none  Follow up: in 6 to 8 weeks      Lisa Morales MD    Pain Medicine  Department of Anesthesiology  [facility]

## 2025-07-30 NOTE — LETTER
7/30/2025       RE: Hanna Campo  [address]  [facility] [address]     Dear Colleague,    Thank you for referring your patient, Hanna Campo, to the [facility] [place] at [facility]. Please see a copy of my visit note below.    [facility]    Date of visit: 7/30/2025    Chief complaint:   Chief Complaint   Patient presents with     Pain     Pain Management     RECHECK     Follow up-no changes reported       Interval history:  Hanna Campo was last seen by me on 5/7/2025.      Recommendations/plan at the last visit included:  Medication Management:  Butrans patch and Norco  Follow up: q3 months    Since her last visit, Hanna Campo reports:  Doing okay since last visit  Saw podiatrist about feet curling up - due to neuropathy  Stretches are not though helpful for the pain  Pain is otherwise the same without changes  She states she is due for a refill of hydrocodone - last refill was 5/7/2025        Pain scores:  Pain intensity on average is 6 on a scale of 0-10.     Current pain treatments:   Butrans patch 15 mcg/hr weekly patch  Hydrocodone 1 to 2 most days, no more than 3 per day    Past pain treatments:      Side Effects: denies any problems    Medications:  Current Outpatient Medications   Medication Sig Dispense Refill     buprenorphine (BUTRANS) 15 MCG/HR Wk patch Place 1 patch onto the skin every 7 days. 4 patch 3     Cetirizine HCl (ZYRTEC ALLERGY PO) Take 5 mg by mouth daily       HYDROcodone-acetaminophen (NORCO) 5-325 MG tablet Take 1 tablet by mouth every 6 hours as needed for severe pain. Max 3 tabs/day, #90 tabs to last 30 days. May fill/start 8/26. 90 tablet 0     levothyroxine (SYNTHROID/LEVOTHROID) 88 MCG tablet Take 88 mcg by mouth daily       losartan (COZAAR) 25 MG tablet Take 1 tablet (25 mg) by mouth 2 times daily 180 tablet 0     MAGNESIUM OXIDE 400 (240  Mg) MG tablet Take 400 mg by mouth 2 times daily.       medical cannabis (Patient's own supply) (The purpose of this order is to document that the patient reports taking medical cannabis.  This is not a prescription, and is not used to certify that the patient has a qualifying medical condition.) 0 Information only 0     naloxone (NARCAN) 4 MG/0.1ML nasal spray Spray 1 spray (4 mg) into one nostril alternating nostrils once as needed for opioid reversal every 2-3 minutes until assistance arrives 0.2 mL 1     vitamin B complex with vitamin C (STRESS TAB) tablet Take 1 tablet by mouth daily  0     VITAMIN D, CHOLECALCIFEROL, PO Take 5,000 Units by mouth daily       chlorthalidone (HYGROTON) 50 MG tablet Take 1 tablet (50 mg) by mouth daily Due for visit and labs for refills** 90 tablet 0     levothyroxine (SYNTHROID/LEVOTHROID) 75 MCG tablet Take 1 tablet (75 mcg) by mouth daily 90 tablet 1       Medical History: any changes in medical history since they were last seen? No    Review of Systems:  The 14 system ROS was reviewed from the intake questionnaire, and is positive for: bilateral foot, peripheral neuropathy pain  Any bowel or bladder problems: denies  Mood: stable    Physical Exam: Virtual visit  not currently breastfeeding.  General: AAOx3, NAD      Assessment:   Idiopathic Peripheral Neuropathy    Hanna Campo is a 70 year old female who is seen at the pain clinic for follow up for management of her chronic pain due to idiopathic peripheral neuropathy.  The pain has been consistently the same and she has been managing well with the current medication regimen. Refills provided today as needed.     Plan:  Physical Therapy:  Continue daily HEPs  Clinical Health Psychologist to address issues of relaxation, behavioral change, coping style, and other factors important to improvement.  Not indicated  Diagnostic Studies:  none  Medication Management:  Refill of Hydrocodone provided, 90 tabs for 30 days  Further  procedures recommended: none  Recommendations to PCP: none  Follow up: in 6 to 8 weeks      Lisa Morales MD    Pain Medicine  Department of Anesthesiology  [facility]              Again, thank you for allowing me to participate in the care of your patient.      Sincerely,    Lisa Morales MD

## 2025-08-26 ENCOUNTER — MYC REFILL (OUTPATIENT)
Dept: ANESTHESIOLOGY | Facility: CLINIC | Age: 70
End: 2025-08-26
Payer: COMMERCIAL

## 2025-08-26 DIAGNOSIS — G60.9 IDIOPATHIC PERIPHERAL NEUROPATHY: ICD-10-CM

## 2025-08-26 DIAGNOSIS — M79.672 PAIN IN BOTH FEET: ICD-10-CM

## 2025-08-26 DIAGNOSIS — M79.671 PAIN IN BOTH FEET: ICD-10-CM

## 2025-08-28 RX ORDER — HYDROCODONE BITARTRATE AND ACETAMINOPHEN 5; 325 MG/1; MG/1
1 TABLET ORAL EVERY 8 HOURS PRN
Qty: 90 TABLET | Refills: 0 | Status: SHIPPED | OUTPATIENT
Start: 2025-08-28

## 2025-09-03 DIAGNOSIS — Z79.891 OPIOID CONTRACT EXISTS: ICD-10-CM

## 2025-09-03 DIAGNOSIS — G60.9 IDIOPATHIC PERIPHERAL NEUROPATHY: ICD-10-CM

## 2025-09-04 RX ORDER — BUPRENORPHINE 15 UG/H
1 PATCH TRANSDERMAL
Qty: 4 PATCH | Refills: 3 | Status: SHIPPED | OUTPATIENT
Start: 2025-09-04